# Patient Record
Sex: MALE | Employment: OTHER | URBAN - METROPOLITAN AREA
[De-identification: names, ages, dates, MRNs, and addresses within clinical notes are randomized per-mention and may not be internally consistent; named-entity substitution may affect disease eponyms.]

---

## 2017-02-03 DIAGNOSIS — G89.29 CHRONIC LEFT-SIDED LOW BACK PAIN WITH LEFT-SIDED SCIATICA: ICD-10-CM

## 2017-02-03 DIAGNOSIS — M54.42 CHRONIC LEFT-SIDED LOW BACK PAIN WITH LEFT-SIDED SCIATICA: ICD-10-CM

## 2017-02-03 RX ORDER — TIZANIDINE 4 MG/1
TABLET ORAL
Qty: 30 TAB | Refills: 0 | Status: SHIPPED | OUTPATIENT
Start: 2017-02-03 | End: 2017-03-28 | Stop reason: SDUPTHER

## 2017-02-03 RX ORDER — LIDOCAINE 50 MG/G
PATCH TOPICAL
Qty: 30 PATCH | Refills: 0 | Status: SHIPPED | OUTPATIENT
Start: 2017-02-03 | End: 2017-03-28 | Stop reason: SDUPTHER

## 2017-03-24 DIAGNOSIS — M54.42 CHRONIC LEFT-SIDED LOW BACK PAIN WITH LEFT-SIDED SCIATICA: ICD-10-CM

## 2017-03-24 DIAGNOSIS — G89.29 CHRONIC LEFT-SIDED LOW BACK PAIN WITH LEFT-SIDED SCIATICA: ICD-10-CM

## 2017-03-24 RX ORDER — TIZANIDINE 4 MG/1
TABLET ORAL
Qty: 30 TAB | Refills: 0 | OUTPATIENT
Start: 2017-03-24

## 2017-03-28 ENCOUNTER — OFFICE VISIT (OUTPATIENT)
Dept: FAMILY MEDICINE CLINIC | Age: 52
End: 2017-03-28

## 2017-03-28 VITALS
TEMPERATURE: 98.2 F | BODY MASS INDEX: 28.88 KG/M2 | DIASTOLIC BLOOD PRESSURE: 92 MMHG | SYSTOLIC BLOOD PRESSURE: 142 MMHG | RESPIRATION RATE: 16 BRPM | WEIGHT: 225 LBS | HEART RATE: 108 BPM | OXYGEN SATURATION: 96 % | HEIGHT: 74 IN

## 2017-03-28 DIAGNOSIS — F11.20 NARCOTIC ADDICTION (HCC): Primary | ICD-10-CM

## 2017-03-28 DIAGNOSIS — M54.42 CHRONIC LEFT-SIDED LOW BACK PAIN WITH LEFT-SIDED SCIATICA: ICD-10-CM

## 2017-03-28 DIAGNOSIS — G89.29 CHRONIC LEFT-SIDED LOW BACK PAIN WITH LEFT-SIDED SCIATICA: ICD-10-CM

## 2017-03-28 DIAGNOSIS — F10.20 UNCOMPLICATED ALCOHOL DEPENDENCE (HCC): ICD-10-CM

## 2017-03-28 RX ORDER — GABAPENTIN 300 MG/1
600 CAPSULE ORAL 3 TIMES DAILY
Qty: 540 CAP | Refills: 1 | Status: SHIPPED | OUTPATIENT
Start: 2017-03-28 | End: 2017-07-31 | Stop reason: SDUPTHER

## 2017-03-28 RX ORDER — LIDOCAINE 50 MG/G
PATCH TOPICAL
Qty: 90 PATCH | Refills: 0 | Status: SHIPPED | OUTPATIENT
Start: 2017-03-28 | End: 2018-02-01 | Stop reason: SDUPTHER

## 2017-03-28 RX ORDER — TIZANIDINE 4 MG/1
TABLET ORAL
Qty: 30 TAB | Refills: 0 | Status: SHIPPED | OUTPATIENT
Start: 2017-03-28 | End: 2017-05-16 | Stop reason: SDUPTHER

## 2017-03-28 NOTE — PROGRESS NOTES
Mack Umaña is a 49 y.o. male here today for medication refill. 1. Have you been to the ER, urgent care clinic since your last visit? Hospitalized since your last visit? Yes Reason for visit: 3/18/17 [facility], Alcohol dependency    2. Have you seen or consulted any other health care providers outside of the [address] [place] [address] since your last visit? Include any pap smears or colon screening.  No

## 2017-03-28 NOTE — PROGRESS NOTES
Assessment/Plan:    1. Chronic left-sided low back pain with left-sided sciatica  -refilled meds. - tiZANidine (ZANAFLEX) 4 mg tablet; TAKE 1 TABLET BY MOUTH EVERY DAY AS NEEDED  Dispense: 30 Tab; Refill: 0  - gabapentin (NEURONTIN) 300 mg capsule; Take 2 Caps by mouth three (3) times daily. Dispense: 540 Cap; Refill: 1  - lidocaine (LIDODERM) 5 %; APPLY 1 NEW PATCH TO AFFECTED AREA AND KEEP ON FOR 12 HOURS PER DAY THEN REMOVE AND KEEP OFF FOR 12 HOURS  Dispense: 90 Patch; Refill: 0    2. Narcotic addiction ([facility] and ETOH dependence  - pt to call with information on the inpatient rehab info. - REFERRAL TO BEHAVIORAL HEALTH      The plan was discussed with the patient. The patient verbalized understanding and is in agreement with the plan. All medication potential side effects were discussed with the patient. Health Maintenance:   Health Maintenance   Topic Date Due    DTaP/Tdap/Td series (1 - Tdap) 07/11/1987    FOBT Q 1 YEAR AGE 50-75  07/11/2016    Pneumococcal 19-64 Medium Risk  Completed    INFLUENZA AGE 9 TO ADULT  Completed       Titi Culp is a 49 y.o. male and presents with Follow Up Chronic Condition     Subjective:  Pt with chronic back pain. States he's addicted to alcohol and narcotics. He is requesting refills on lidoderm, zanaflex. He is accompanied by his . They are working to get him into inpatient substance abuse program.    ROS:  Constitutional: No recent weight change. No weakness/fatigue. No f/c. Cardiovascular: No CP/palpitations. No ALBA/orthopnea/PND. Respiratory: No cough/sputum, dyspnea, wheezing. Gastointestinal: No dysphagia, reflux. No n/v. No constipation/diarrhea. No melena/rectal bleeding. Neurological: No seizures/numbness/weakness. No paresthesias. The problem list was updated as a part of today's visit.   Patient Active Problem List   Diagnosis Code    ETOH abuse F10.10    Chronic back pain M54.9, G89.29    Tobacco dependence F17.200    HTN (hypertension) I10    HLD (hyperlipidemia) E78.5    Bipolar 1 disorder, depressed, severe ([address] F31.4    EtOH dependence ([address] F10.20    MDD (major depressive disorder), recurrent episode, severe ([address] F33.2    History of suicide attempt Z91.5    Depression F32.9       The PSH, FH were reviewed. SH:  Social History   Substance Use Topics    Smoking status: Current Every Day Smoker     Packs/day: 1.00     Years: 20.00     Types: Cigarettes    Smokeless tobacco: Never Used    Alcohol use 0.0 oz/week      Comment:  ETOH abuse       Medications/Allergies:  Current Outpatient Prescriptions on File Prior to Visit   Medication Sig Dispense Refill    multivitamin (ONE A DAY) tablet Take 1 Tab by mouth daily. 90 Tab 3    losartan-hydroCHLOROthiazide (HYZAAR) 100-25 mg per tablet TAKE 1 TABLET BY MOUTH DAILY FOR 30 DAYS FOR HYPERTENSION. Indications: Hypertension 90 Tab 1    carvedilol (COREG) 3.125 mg tablet TAKE 1 TABLET BY MOUTH TWICE A DAY. 60 Tab 5    aspirin delayed-release 81 mg tablet TAKE 1 TAB BY MOUTH DAILY. 90 Tab 3    QUEtiapine (SEROQUEL) 200 mg tablet Take 200 mg by mouth nightly as needed.  busPIRone (BUSPAR) 10 mg tablet Take 20 mg by mouth three (3) times daily.  DULoxetine (CYMBALTA) 60 mg capsule Take 60 mg by mouth daily. No current facility-administered medications on file prior to visit. Allergies   Allergen Reactions    Prednisone Other (comments)     He stated it hypes him up       Objective:  Visit Vitals    BP (!) 142/92    Pulse (!) 108    Temp 98.2 °F (36.8 °C)    Resp 16    Ht 6' 2\" (1.88 m)    Wt 225 lb (102.1 kg)    SpO2 96%    BMI 28.89 kg/m2      Constitutional: Well developed, nourished, no distress, alert   CV: S1, S2.  RRR. No murmurs/rubs. No thrills palpated. No carotid bruits. Intact distal pulses. No edema. Pulm: No abnormalities on inspection. Clear to auscultation bilaterally. No wheezing/rhonchi.   Normal effort. MS: Gait normal.  Joints without deformity/tenderness. Strength intact bilateral upper and lower ext. Normal ROM all extremities. Neuro: A/O x 3. No focal motor or sensory deficits. Speech normal.   Skin: seborrhea. Psych: Appropriate affect, judgement and insight. Short-term memory intact.

## 2017-03-28 NOTE — MR AVS SNAPSHOT
Visit Information Date & Time Provider Department Dept. Phone Encounter #  
 3/28/2017  2:45 PM Nia Mir MD 3 [facility] [phone] [id number] Upcoming Health Maintenance Date Due DTaP/Tdap/Td series (1 - Tdap) 7/11/1987 FOBT Q 1 YEAR AGE 50-75 7/11/2016 Allergies as of 3/28/2017  Review Complete On: 3/28/2017 By: Nia Mir MD  
  
 Severity Noted Reaction Type Reactions Prednisone  04/27/2014   Side Effect Other (comments) He stated it hypes him up Current Immunizations  Reviewed on 2/7/2014 Name Date Influenza Vaccine 9/1/2014, 11/15/2011 Novel Influenza-H1N1-09, All Formulations 10/26/2016 Pneumococcal Polysaccharide (PPSV-23) 11/30/2011, 11/15/2011 Not reviewed this visit You Were Diagnosed With   
  
 Codes Comments Narcotic addiction ([facility]    -  Primary ICD-10-CM: F11.20 ICD-9-CM: 304.90 Chronic left-sided low back pain with left-sided sciatica     ICD-10-CM: M54.42, G89.29 ICD-9-CM: 724.2, 724.3, 338.29 Uncomplicated alcohol dependence ([facility]     ICD-10-CM: F10.20 ICD-9-CM: 303.90 Vitals BP Pulse Temp Resp Height(growth percentile) Weight(growth percentile) (!) 142/92 (!) 108 98.2 °F (36.8 °C) 16 6' 2\" (1.88 m) 225 lb (102.1 kg) SpO2 BMI Smoking Status 96% 28.89 kg/m2 Current Every Day Smoker Vitals History BMI and BSA Data Body Mass Index Body Surface Area  
 28.89 kg/m 2 2.31 m 2 Preferred Pharmacy Pharmacy Name Phone  [facility], [address] [phone] Your Updated Medication List  
  
   
This list is accurate as of: 3/28/17  3:05 PM.  Always use your most recent med list.  
  
  
  
  
 aspirin delayed-release 81 mg tablet TAKE 1 TAB BY MOUTH DAILY. busPIRone 10 mg tablet Commonly known as:  BUSPAR Take 20 mg by mouth three (3) times daily. carvedilol 3.125 mg tablet Commonly known as:  COREG  
TAKE 1 TABLET BY MOUTH TWICE A DAY. CYMBALTA 60 mg capsule Generic drug:  DULoxetine Take 60 mg by mouth daily. gabapentin 300 mg capsule Commonly known as:  NEURONTIN Take 2 Caps by mouth three (3) times daily. lidocaine 5 % Commonly known as:  LIDODERM  
APPLY 1 NEW PATCH TO AFFECTED AREA AND KEEP ON FOR 12 HOURS PER DAY THEN REMOVE AND KEEP OFF FOR 12 HOURS  
  
 losartan-hydroCHLOROthiazide 100-25 mg per tablet Commonly known as:  HYZAAR  
TAKE 1 TABLET BY MOUTH DAILY FOR 30 DAYS FOR HYPERTENSION. Indications: Hypertension  
  
 multivitamin tablet Commonly known as:  ONE A DAY Take 1 Tab by mouth daily. SEROquel 200 mg tablet Generic drug:  QUEtiapine Take 200 mg by mouth nightly as needed. tiZANidine 4 mg tablet Commonly known as:  Elenore Piggs TAKE 1 TABLET BY MOUTH EVERY DAY AS NEEDED Prescriptions Sent to Pharmacy Refills  
 tiZANidine (ZANAFLEX) 4 mg tablet 0 Sig: TAKE 1 TABLET BY MOUTH EVERY DAY AS NEEDED Class: Normal  
 Pharmacy: [address] Ph #: [phone]  
 gabapentin (NEURONTIN) 300 mg capsule 1 Sig: Take 2 Caps by mouth three (3) times daily. Class: Normal  
 Pharmacy: [address] Ph #: [phone] Route: Oral  
 lidocaine (LIDODERM) 5 % 0 Sig: APPLY 1 NEW PATCH TO AFFECTED AREA AND KEEP ON FOR 12 HOURS PER DAY THEN REMOVE AND KEEP OFF FOR 12 HOURS Class: Normal  
 Pharmacy: [address] Ph #: [phone] We Performed the Following REFERRAL TO BEHAVIORAL HEALTH [REF15 Custom] Comments:  
 Please evaluate patient for inpatient rehab for narcotic and alcohol addiction. Pt to call with name Referral Information Referral ID Referred By Referred To  
  
 [id number] Estelle Ryan V Not Available Visits Status Start Date End Date 1 New Request 3/28/17 3/28/18 If your referral has a status of pending review or denied, additional information will be sent to support the outcome of this decision. Introducing [facility] SERVICES! Sugey Palafox introduces [employer] patient portal. Now you can access parts of your medical record, email your doctor's office, and request medication refills online. 1. In your internet browser, go to [email or web address] 2. Click on the First Time User? Click Here link in the Sign In box. You will see the New Member Sign Up page. 3. Enter your [employer] Access Code exactly as it appears below. You will not need to use this code after youve completed the sign-up process. If you do not sign up before the expiration date, you must request a new code. · [employer] Access Code: [email or web address] Expires: 6/26/2017  3:05 PM 
 
4. Enter the last four digits of your Social Security Number (xxxx) and Date of Birth (mm/dd/yyyy) as indicated and click Submit. You will be taken to the next sign-up page. 5. Create a [employer] ID. This will be your [employer] login ID and cannot be changed, so think of one that is secure and easy to remember. 6. Create a [employer] password. You can change your password at any time. 7. Enter your Password Reset Question and Answer. This can be used at a later time if you forget your password. 8. Enter your e-mail address. You will receive e-mail notification when new information is available in [address] 9. Click Sign Up. You can now view and download portions of your medical record. 10. Click the Download Summary menu link to download a portable copy of your medical information. If you have questions, please visit the Frequently Asked Questions section of the [employer] website. Remember, [employer] is NOT to be used for urgent needs. For medical emergencies, dial 911. Now available from your iPhone and Android! Please provide this summary of care documentation to your next provider. Your primary care clinician is listed as Oswald Colin. If you have any questions after today's visit, please call [phone].

## 2017-04-12 ENCOUNTER — PATIENT OUTREACH (OUTPATIENT)
Dept: FAMILY MEDICINE CLINIC | Age: 52
End: 2017-04-12

## 2017-04-12 NOTE — PROGRESS NOTES
.  Transitional Care Nurse Navigator Note:  Hospital Follow Up for [facility] Admission from 04/1 - 10/2017 for Alcohol Intoxication. RRAT score: 22 High  Medical History:     Past Medical History:   Diagnosis Date    Abuse     Anemia NEC     Anxiety     Arthritis     Chronic pain     Contact dermatitis and other eczema, due to unspecified cause     Depression     Depression     Headache(784.0)     Hypercholesterolemia     Hypertension     Liver disease     Low back pain     with left leg pain -- multilevel spondylosis and L4 radiculitis    Neck pain     mild spondylosis    Other ill-defined conditions(799.89)     MS symptoms    Pancreatitis     Psychotic disorder     Trauma        Patient presenting symptoms AMS  Diagnosed with   Metabolic encephalopathy    Alcohol intoxication    Narcotic overdose      Admitted to Hospitalist Service at [facility] to inpatient detox rehab stay [facility]. SW notes d/c orders and plan to transition to [facility].  Suzanne King manager with [facility]  [phone]) is en route to provide transportation.

## 2017-04-12 NOTE — Clinical Note
Patient was discharged to [facility] inpatient alcohol rehab detox facility on 4/10/2017 after a 9 day stay at [facility].

## 2017-05-01 ENCOUNTER — PATIENT OUTREACH (OUTPATIENT)
Dept: FAMILY MEDICINE CLINIC | Age: 52
End: 2017-05-01

## 2017-05-15 ENCOUNTER — PATIENT OUTREACH (OUTPATIENT)
Dept: FAMILY MEDICINE CLINIC | Age: 52
End: 2017-05-15

## 2017-05-16 DIAGNOSIS — M54.42 CHRONIC LEFT-SIDED LOW BACK PAIN WITH LEFT-SIDED SCIATICA: ICD-10-CM

## 2017-05-16 DIAGNOSIS — G89.29 CHRONIC LEFT-SIDED LOW BACK PAIN WITH LEFT-SIDED SCIATICA: ICD-10-CM

## 2017-05-17 RX ORDER — TIZANIDINE 4 MG/1
TABLET ORAL
Qty: 30 TAB | Refills: 0 | Status: SHIPPED | OUTPATIENT
Start: 2017-05-17 | End: 2017-08-21 | Stop reason: SDUPTHER

## 2017-06-06 ENCOUNTER — PATIENT OUTREACH (OUTPATIENT)
Dept: FAMILY MEDICINE CLINIC | Age: 52
End: 2017-06-06

## 2017-07-19 ENCOUNTER — TELEPHONE (OUTPATIENT)
Dept: FAMILY MEDICINE CLINIC | Age: 52
End: 2017-07-19

## 2017-07-19 NOTE — TELEPHONE ENCOUNTER
Patient was admitted to the hospital on 7/14/17 for blood clot at [facility]. Patient will be discharge tomorrow 7/20/17 and asking for a hospital follow up. There was nothing available next week. I also did see that the nurse navigator have been trying to get hold of him since June based on the note provided in [facility]. I verified to the patient if he happened to talk to the nurse navigator yet. Patient then stated that he never talked to anybody from the office that his cell phone is currently not working. Patient asking to be called back at [phone] room#[id number]. Please advise.

## 2017-07-20 ENCOUNTER — PATIENT OUTREACH (OUTPATIENT)
Dept: FAMILY MEDICINE CLINIC | Age: 52
End: 2017-07-20

## 2017-07-20 PROBLEM — J96.01 ACUTE HYPOXEMIC RESPIRATORY FAILURE (HCC): Status: ACTIVE | Noted: 2017-03-12

## 2017-07-20 PROBLEM — F10.931 DELIRIUM TREMENS (HCC): Status: ACTIVE | Noted: 2017-03-12

## 2017-07-20 PROBLEM — J69.0 ASPIRATION PNEUMONITIS (HCC): Status: ACTIVE | Noted: 2017-03-12

## 2017-07-20 PROBLEM — I26.99 PULMONARY EMBOLISM WITHOUT ACUTE COR PULMONALE (HCC): Status: ACTIVE | Noted: 2017-07-14

## 2017-07-20 PROBLEM — I26.99 PULMONARY EMBOLISM (HCC): Status: ACTIVE | Noted: 2017-07-14

## 2017-07-20 PROBLEM — F10.929 ALCOHOL INTOXICATION (HCC): Status: ACTIVE | Noted: 2017-03-12

## 2017-07-20 PROBLEM — F10.20 ALCOHOLISM (HCC): Status: ACTIVE | Noted: 2017-03-12

## 2017-07-20 RX ORDER — AMLODIPINE BESYLATE 5 MG/1
1 TABLET ORAL DAILY
COMMUNITY
Start: 2016-11-18 | End: 2017-08-21 | Stop reason: SDUPTHER

## 2017-07-20 NOTE — Clinical Note
Patient coming in for [facility] 7/31/2017 d/c from [facility] 7/20/2017 for PE. [place] a [place] at the [place]. Will be on Xarelto.  Did have some alcohol withdrawal symptoms prior to d/c

## 2017-07-20 NOTE — PROGRESS NOTES
Kwasi Vargas [facility] Follow Up for [facility] Admission from 7/14 - 20/2017 for PE. RRAT score: 25 High    Medical History:     Past Medical History:   Diagnosis Date    Abuse     Anemia NEC     Anxiety     Arthritis     Chronic pain     Contact dermatitis and other eczema, due to unspecified cause     Depression     Depression     Headache(784.0)     Hypercholesterolemia     Hypertension     Liver disease     Low back pain     with left leg pain -- multilevel spondylosis and L4 radiculitis    Neck pain     mild spondylosis    Other ill-defined conditions(799.89)     MS symptoms    Pancreatitis     Psychotic disorder     Trauma           This represents Transitions of Care b/c NN spoke with patient and/or caregiver within same business days of discharge. Pt's TCM follow up appt is scheduled with Dr. Paul Gtz on Monday 7/31/2017 @ 2 pm which is within 7 business days of discharge.  Called patient on 7/20/2017 and verified with 2 identifiers. Patient stated that he asked the nurse to have NN to call him at the hospital because his cell phone will be suspended until he paid it sometime today. Assured him that it was okay that I called him there. Discussed new medications Xarelto at present he is taking 15 mg twice a day. He has called his pharmacy to see if his medicaid card will pay for the medications but they said without the hard script they could not scan it for the cost. Then patient stated that he was calling down to the [facility] to see if they would fill it. Patient stated that he also was proactive in getting him a place to live ,[facility]. He feels since he has been sleeping in his car and back on the alcohol is what attributed to his blood clot and his feet and ankle swelling.   Isaura Willingham     Patient presenting symptoms Chest Pains          Course of current Hospitalization (referenced by Cyril Mcgowan MD   note):   Suzanne Hampton. is a 47 y.o. male with PMHx as noted below who comes in with complaint of leg edema and pain. Pt was checking in for alcohol detox and staff sent pt for eval of leg swelling and pain. On arrival pt also stated that he has been having CP x3 nights. He has hx of alcohol use, depression, bipolar, chronic back pain, HTN, TIA. States has not been much ambulatory over the last 2months, was sleeping in his car over the last 2 months. He went through alcohol detox program several times in the past, last drink of vodka was today. Over the last 2 weeks has leg edema and mild right calf pain, c/o mild shortness of breath and chest pain on the left side of the chest with radiation to the left arm, non-exertional, non worse with exertion, 8/10 on intensity. He has mild chronic cough, chronic smoker. He denies fever/chills, headache, dizziness, dysuria. States has RUQ abdominal pain that has been going on for 2 weeks, intermittent, associated with non-bloody diarrhea. He denies melena. Says had nausea and vomiting without blood yesterday. 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 Admitted to Hospitalist Service    Medication Reconciliation completed: YES     Barriers to care?   Housing Has a room at the [facility] [address] at present how long is undetermined  Substance abuse Alcohol,Vodka Smoking status: Current Every Day Smoker   Packs/day: 1.50   Alcohol use 128.1 oz/week   [address], 21 Glasses of wine per week   Comment: 1/5 vodka daily and 4/40 oz beers                                         

## 2017-07-24 ENCOUNTER — PATIENT OUTREACH (OUTPATIENT)
Dept: FAMILY MEDICINE CLINIC | Age: 52
End: 2017-07-24

## 2017-07-24 NOTE — PROGRESS NOTES
Patient Outreach made to patient. He states he is doing fine. No issues with the Xarelto. He will come in for his appointment on 7/31/17. He will contact office if he need any assistance.

## 2017-07-31 ENCOUNTER — OFFICE VISIT (OUTPATIENT)
Dept: FAMILY MEDICINE CLINIC | Age: 52
End: 2017-07-31

## 2017-07-31 ENCOUNTER — HOSPITAL ENCOUNTER (OUTPATIENT)
Dept: LAB | Age: 52
Discharge: HOME | End: 2017-07-31

## 2017-07-31 VITALS
OXYGEN SATURATION: 96 % | DIASTOLIC BLOOD PRESSURE: 98 MMHG | HEART RATE: 81 BPM | HEIGHT: 74 IN | TEMPERATURE: 97.9 F | WEIGHT: 242 LBS | SYSTOLIC BLOOD PRESSURE: 152 MMHG | BODY MASS INDEX: 31.06 KG/M2 | RESPIRATION RATE: 20 BRPM

## 2017-07-31 DIAGNOSIS — G89.29 CHRONIC LEFT-SIDED LOW BACK PAIN WITH LEFT-SIDED SCIATICA: ICD-10-CM

## 2017-07-31 DIAGNOSIS — I10 HTN (HYPERTENSION), BENIGN: ICD-10-CM

## 2017-07-31 DIAGNOSIS — I26.99 OTHER PULMONARY EMBOLISM WITHOUT ACUTE COR PULMONALE, UNSPECIFIED CHRONICITY (HCC): Primary | ICD-10-CM

## 2017-07-31 DIAGNOSIS — Z12.11 SCREEN FOR COLON CANCER: ICD-10-CM

## 2017-07-31 DIAGNOSIS — M54.42 CHRONIC LEFT-SIDED LOW BACK PAIN WITH LEFT-SIDED SCIATICA: ICD-10-CM

## 2017-07-31 DIAGNOSIS — R74.01 TRANSAMINITIS: ICD-10-CM

## 2017-07-31 DIAGNOSIS — F10.20 UNCOMPLICATED ALCOHOL DEPENDENCE (HCC): ICD-10-CM

## 2017-07-31 PROBLEM — F10.931 DELIRIUM TREMENS (HCC): Status: RESOLVED | Noted: 2017-03-12 | Resolved: 2017-07-31

## 2017-07-31 PROBLEM — J96.01 ACUTE HYPOXEMIC RESPIRATORY FAILURE (HCC): Status: RESOLVED | Noted: 2017-03-12 | Resolved: 2017-07-31

## 2017-07-31 PROBLEM — F10.929 ALCOHOL INTOXICATION (HCC): Status: RESOLVED | Noted: 2017-03-12 | Resolved: 2017-07-31

## 2017-07-31 PROCEDURE — 99001 SPECIMEN HANDLING PT-LAB: CPT | Performed by: INTERNAL MEDICINE

## 2017-07-31 RX ORDER — CHLORDIAZEPOXIDE HYDROCHLORIDE 5 MG/1
5 CAPSULE, GELATIN COATED ORAL
COMMUNITY
End: 2017-08-31 | Stop reason: ALTCHOICE

## 2017-07-31 RX ORDER — GABAPENTIN 300 MG/1
600 CAPSULE ORAL 3 TIMES DAILY
Qty: 540 CAP | Refills: 1 | Status: SHIPPED | OUTPATIENT
Start: 2017-07-31 | End: 2017-08-21 | Stop reason: SDUPTHER

## 2017-07-31 NOTE — PROGRESS NOTES
Cassy Hastings is a 47 y.o. male here today for hospital follow-up. 1. Have you been to the ER, urgent care clinic since your last visit? Hospitalized since your last visit? Yes Reason for visit: [facility] general, chest pain    2. Have you seen or consulted any other health care providers outside of the [address] [place] [address] since your last visit? Include any pap smears or colon screening.  No

## 2017-07-31 NOTE — MR AVS SNAPSHOT
Visit Information Date & Time Provider Department Dept. Phone Encounter #  
 7/31/2017  2:00 PM Tyson Ramos  [address] [id number]  Upcoming Health Maintenance Date Due DTaP/Tdap/Td series (1 - Tdap) 7/11/1987 Pneumococcal 19-64 Highest Risk (3 of 3 - PCV13) 11/30/2012 FOBT Q 1 YEAR AGE 50-75 7/11/2016 INFLUENZA AGE 9 TO ADULT 8/1/2017 Allergies as of 7/31/2017  Review Complete On: 7/31/2017 By: Den Townsend LPN Severity Noted Reaction Type Reactions Prednisone  04/27/2014   Side Effect Other (comments) He stated it hypes him up Current Immunizations  Reviewed on 2/7/2014 Name Date Influenza Vaccine 9/1/2014, 11/15/2011 Novel Influenza-H1N1-09, All Formulations 10/26/2016 Pneumococcal Polysaccharide (PPSV-23) 11/30/2011, 11/15/2011 Not reviewed this visit You Were Diagnosed With   
  
 Codes Comments Other pulmonary embolism without acute cor pulmonale, unspecified chronicity (HCC)    -  Primary ICD-10-CM: I26.99 
ICD-9-CM: 415.19 Transaminitis     ICD-10-CM: R74.0 ICD-9-CM: 790.4 HTN (hypertension), benign     ICD-10-CM: I10 
ICD-9-CM: 401.1 Lumbar radicular pain     ICD-10-CM: M54.16 
ICD-9-CM: 724.4 Uncomplicated alcohol dependence ([facility]     ICD-10-CM: F10.20 ICD-9-CM: 303.90 Chronic left-sided low back pain with left-sided sciatica     ICD-10-CM: M54.42, G89.29 ICD-9-CM: 724.2, 724.3, 338.29 Screen for colon cancer     ICD-10-CM: Z12.11 ICD-9-CM: V76.51 Vitals BP Pulse Temp Resp Height(growth percentile) Weight(growth percentile) (!) 152/98 81 97.9 °F (36.6 °C) 20 6' 2\" (1.88 m) 242 lb (109.8 kg) SpO2 BMI Smoking Status 96% 31.07 kg/m2 Current Every Day Smoker Vitals History BMI and BSA Data Body Mass Index Body Surface Area 31.07 kg/m 2 2.39 m 2 Preferred Pharmacy Pharmacy Name Phone [address], [address] [place] [address] [address] [phone] Your Updated Medication List  
  
   
This list is accurate as of: 7/31/17  2:08 PM.  Always use your most recent med list. amLODIPine 5 mg tablet Commonly known as:  Lamar Donaldo Take 1 Tab by mouth daily. aspirin delayed-release 81 mg tablet TAKE 1 TAB BY MOUTH DAILY. busPIRone 10 mg tablet Commonly known as:  BUSPAR Take 20 mg by mouth three (3) times daily. carvedilol 3.125 mg tablet Commonly known as:  COREG  
TAKE 1 TABLET BY MOUTH TWICE A DAY. chlordiazePOXIDE 5 mg capsule Commonly known as:  LIBRIUM Take 5 mg by mouth three (3) times daily as needed for Anxiety. CYMBALTA 60 mg capsule Generic drug:  DULoxetine Take 60 mg by mouth daily. gabapentin 300 mg capsule Commonly known as:  NEURONTIN Take 2 Caps by mouth three (3) times daily. lidocaine 5 % Commonly known as:  LIDODERM  
APPLY 1 NEW PATCH TO AFFECTED AREA AND KEEP ON FOR 12 HOURS PER DAY THEN REMOVE AND KEEP OFF FOR 12 HOURS  
  
 losartan-hydroCHLOROthiazide 100-25 mg per tablet Commonly known as:  HYZAAR  
TAKE 1 TABLET BY MOUTH DAILY FOR 30 DAYS FOR HYPERTENSION. Indications: Hypertension  
  
 multivitamin tablet Commonly known as:  ONE A DAY Take 1 Tab by mouth daily. rivaroxaban 20 mg Tab tablet Commonly known as:  Lourena Lawtey Take 1 Tab by mouth daily. SEROquel 200 mg tablet Generic drug:  QUEtiapine Take 200 mg by mouth nightly as needed. tiZANidine 4 mg tablet Commonly known as:  Eber Marcelo TAKE 1 TABLET BY MOUTH EVERY DAY AS NEEDED Prescriptions Sent to Pharmacy Refills  
 rivaroxaban (XARELTO) 20 mg tab tablet 0 Sig: Take 1 Tab by mouth daily. Class: Normal  
 Pharmacy: [address] #: [phone] Route: Oral  
 gabapentin (NEURONTIN) 300 mg capsule 1 Sig: Take 2 Caps by mouth three (3) times daily. Class: Normal  
 Pharmacy: [address] #: [phone] Route: Oral  
  
To-Do List   
 07/31/2017 Lab:  CBC WITH AUTOMATED DIFF   
  
 07/31/2017 Lab:  METABOLIC PANEL, COMPREHENSIVE   
  
 07/31/2017 Lab:  OCCULT BLOOD, IMMUNOASSAY (FIT) Introducing hospitals & HEALTH SERVICES! Grover Candy introduces [employer] patient portal. Now you can access parts of your medical record, email your doctor's office, and request medication refills online. 1. In your internet browser, go to [email or web address] 2. Click on the First Time User? Click Here link in the Sign In box. You will see the New Member Sign Up page. 3. Enter your [employer] Access Code exactly as it appears below. You will not need to use this code after youve completed the sign-up process. If you do not sign up before the expiration date, you must request a new code. · [employer] Access Code: [email or web address] Expires: 10/29/2017  1:27 PM 
 
4. Enter the last four digits of your Social Security Number (xxxx) and Date of Birth (mm/dd/yyyy) as indicated and click Submit. You will be taken to the next sign-up page. 5. Create a [employer] ID. This will be your [employer] login ID and cannot be changed, so think of one that is secure and easy to remember. 6. Create a [employer] password. You can change your password at any time. 7. Enter your Password Reset Question and Answer. This can be used at a later time if you forget your password. 8. Enter your e-mail address. You will receive e-mail notification when new information is available in [address] 9. Click Sign Up. You can now view and download portions of your medical record. 10. Click the Download Summary menu link to download a portable copy of your medical information.  
 
If you have questions, please visit the Frequently Asked Questions section of the [employer]. Remember, [employer] is NOT to be used for urgent needs. For medical emergencies, dial 911. Now available from your iPhone and Android! Please provide this summary of care documentation to your next provider. Your primary care clinician is listed as Oswald Colin. If you have any questions after today's visit, please call [phone].

## 2017-07-31 NOTE — PROGRESS NOTES
Assessment/Plan:    1. Other pulmonary embolism without acute cor pulmonale, unspecified chronicity ([facility]  -xarelto. Will need for anticoag for 3 mos (end date 10/14/17)  - rivaroxaban (XARELTO) 20 mg tab tablet; Take 1 Tab by mouth daily. Dispense: 90 Tab; Refill: 0  - CBC WITH AUTOMATED DIFF; Future  - CBC WITH AUTOMATED DIFF    2. Transaminitis  -from ETOH use and possible underlying fatty liver  - METABOLIC PANEL, COMPREHENSIVE; Future  - METABOLIC PANEL, COMPREHENSIVE    3. HTN (hypertension), benign  -cont current. F/u in 1mo. - CBC WITH AUTOMATED DIFF; Future  - CBC WITH AUTOMATED DIFF    5. Uncomplicated alcohol dependence ([facility]- discussed importance of abstaining from ETOH. Librium taper per hospital discharge. - chlordiazePOXIDE (LIBRIUM) 5 mg capsule; Take 5 mg by mouth three (3) times daily as needed for Anxiety. 6. Chronic left-sided low back pain with left-sided sciatica-refilled neurontin  - gabapentin (NEURONTIN) 300 mg capsule; Take 2 Caps by mouth three (3) times daily. Dispense: 540 Cap; Refill: 1    7. Screen for colon cancer- can't get colo for 3 mos. FIT for now. - OCCULT BLOOD, IMMUNOASSAY (FIT); Future    The plan was discussed with the patient. The patient verbalized understanding and is in agreement with the plan. All medication potential side effects were discussed with the patient. Health Maintenance:   Health Maintenance   Topic Date Due    DTaP/Tdap/Td series (1 - Tdap) 07/11/1987    Pneumococcal 19-64 Highest Risk (3 of 3 - PCV13) 11/30/2012    FOBT Q 1 YEAR AGE 50-75  07/11/2016    INFLUENZA AGE 9 TO ADULT  08/01/2017       Dany Duvall is a 47 y.o. male and presents with Hospital Follow Up     Subjective:  Pt recently found to have PE. He is on xarelto. No dyspnea or chest pain. This was in setting of prolonged immobility from ETOH intoxication. HTN - norvasc added at last visit. bp still slightly high. Pt is on neurontin for chronic low back pain. Has pain that radiates down L leg. Epidural injections have helped in the past. MRI showed mild bulging discs and mild foraminal stenosis. ETOH abuse - he attends AA daily. States he has quit. He is followed by psychiatry. ROS:  Constitutional: No recent weight change. No weakness/fatigue. No f/c. Eyes: No change in vision, double/blurred vision or eye pain/redness. Cardiovascular: No CP/palpitations. No ALBA/orthopnea/PND. Respiratory: No cough/sputum, dyspnea, wheezing. Gastointestinal: No dysphagia, reflux. No n/v. No constipation/diarrhea. No melena/rectal bleeding. Genitourinary: No dysuria, urinary hesitancy, nocturia, hematuria. No incontinence. Musculoskeletal: No joint pain/stiffness. No muscle pain/tenderness. Neurological: No seizures/numbness/weakness. No paresthesias. Psychiatric:  + depression, no anxiety. The problem list was updated as a part of today's visit. 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     SH:  Social History   Substance Use Topics    Smoking status: Current Every Day Smoker     Packs/day: 1.00     Years: 20.00     Types: Cigarettes    Smokeless tobacco: Never Used    Alcohol use 0.0 oz/week      Comment:  ETOH abuse       Medications/Allergies:  Current Outpatient Prescriptions on File Prior to Visit   Medication Sig Dispense Refill    amLODIPine (NORVASC) 5 mg tablet Take 1 Tab by mouth daily.  tiZANidine (ZANAFLEX) 4 mg tablet TAKE 1 TABLET BY MOUTH EVERY DAY AS NEEDED 30 Tab 0    gabapentin (NEURONTIN) 300 mg capsule Take 2 Caps by mouth three (3) times daily. 540 Cap 1    lidocaine (LIDODERM) 5 % APPLY 1 NEW PATCH TO AFFECTED AREA AND KEEP ON FOR 12 HOURS PER DAY THEN REMOVE AND KEEP OFF FOR 12 HOURS 90 Patch 0    multivitamin (ONE A DAY) tablet Take 1 Tab by mouth daily. 90 Tab 3    losartan-hydroCHLOROthiazide (HYZAAR) 100-25 mg per tablet TAKE 1 TABLET BY MOUTH DAILY FOR 30 DAYS FOR HYPERTENSION. Indications: Hypertension 90 Tab 1    carvedilol (COREG) 3.125 mg tablet TAKE 1 TABLET BY MOUTH TWICE A DAY. 60 Tab 5    aspirin delayed-release 81 mg tablet TAKE 1 TAB BY MOUTH DAILY. 90 Tab 3    QUEtiapine (SEROQUEL) 200 mg tablet Take 200 mg by mouth nightly as needed.  busPIRone (BUSPAR) 10 mg tablet Take 20 mg by mouth three (3) times daily.  DULoxetine (CYMBALTA) 60 mg capsule Take 60 mg by mouth daily. No current facility-administered medications on file prior to visit. Allergies   Allergen Reactions    Prednisone Other (comments)     He stated it hypes him up       Objective:  Visit Vitals    BP (!) 152/98    Pulse 81    Temp 97.9 °F (36.6 °C)    Resp 20    Ht 6' 2\" (1.88 m)    Wt 242 lb (109.8 kg)    SpO2 96%    BMI 31.07 kg/m2      Constitutional: Well developed, nourished, no distress, alert, obese habitus   CV: S1, S2.  RRR. No murmurs/rubs. No thrills palpated. No carotid bruits. Intact distal pulses. No edema. Pulm: No abnormalities on inspection. Clear to auscultation bilaterally. No wheezing/rhonchi. Normal effort. GI: Soft, nontender, nondistended. Normal active bowel sounds. MS: Gait normal.  Joints without deformity/tenderness. Strength intact bilateral upper and lower ext. Normal ROM all extremities. Neuro: A/O x 3. No focal motor or sensory deficits. Speech normal.   Psych: Appropriate affect, judgement and insight. Short-term memory intact. Labwork and Ancillary Studies:    CBC w/Diff  Lab Results   Component Value Date/Time    WBC 4.5 11/25/2016 09:25 AM    HGB 15.6 11/25/2016 09:25 AM    PLATELET 320 47/16/1049 09:25 AM         Basic Metabolic Profile/LFTs  Lab Results   Component Value Date/Time    Sodium 140 11/25/2016 09:25 AM    Potassium 4.1 11/25/2016 09:25 AM    Chloride 100 11/25/2016 09:25 AM    CO2 24 11/25/2016 09:25 AM    Anion gap 8 09/29/2015 10:24 PM    Glucose 97 11/25/2016 09:25 AM    BUN 11 11/25/2016 09:25 AM    Creatinine 0.86 11/25/2016 09:25 AM    BUN/Creatinine ratio 13 11/25/2016 09:25 AM    GFR est  11/25/2016 09:25 AM    GFR est non- 11/25/2016 09:25 AM    Calcium 9.3 11/25/2016 09:25 AM      Lab Results   Component Value Date/Time    ALT (SGPT) 26 11/25/2016 09:25 AM    AST (SGOT) 18 11/25/2016 09:25 AM    Alk. phosphatase 68 11/25/2016 09:25 AM    Bilirubin, direct <0.1 09/29/2015 10:24 PM    Bilirubin, total 0.2 11/25/2016 09:25 AM       Cholesterol  Lab Results   Component Value Date/Time    Cholesterol, total 159 11/25/2016 09:25 AM    HDL Cholesterol 46 11/25/2016 09:25 AM    LDL, calculated 81 11/25/2016 09:25 AM    Triglyceride 158 11/25/2016 09:25 AM    CHOL/HDL Ratio 4.0 04/22/2014 05:10 AM       MRI lumbar spine 9/2016: L1/L2: Patent canal and foramina. L2/L3: Patent canal and foramina. L3/L4: Mild disc bulge. Bilateral facet arthropathy. Mild spinal stenosis. Mild foraminal stenoses, left more than right. Transient exiting nerve contact but no overt impingement. L4/L5: Mild broadly based disc bulge.  More pronounced facet arthropathy. Mild spinal stenosis. There is some lateral recess stenosis right more than left. Transient distortion or crossing right L5 nerve. Unchanged. Moderate foraminal stenoses with transient distortion of perineural fat, left more than right. Reference sagittal image 5 on the left, and sagittal image 14 on the right. L5/S1: Transitional morphology. Patent canal and foramina.

## 2017-08-01 LAB
ALBUMIN SERPL-MCNC: 4.2 G/DL (ref 3.5–5.5)
ALBUMIN/GLOB SERPL: 1.8 {RATIO} (ref 1.2–2.2)
ALP SERPL-CCNC: 88 IU/L (ref 39–117)
ALT SERPL-CCNC: 32 IU/L (ref 0–44)
AST SERPL-CCNC: 26 IU/L (ref 0–40)
BASOPHILS # BLD AUTO: 0.1 X10E3/UL (ref 0–0.2)
BASOPHILS NFR BLD AUTO: 1 %
BILIRUB SERPL-MCNC: <0.2 MG/DL (ref 0–1.2)
BUN SERPL-MCNC: 8 MG/DL (ref 6–24)
BUN/CREAT SERPL: 8 (ref 9–20)
CALCIUM SERPL-MCNC: 9.5 MG/DL (ref 8.7–10.2)
CHLORIDE SERPL-SCNC: 99 MMOL/L (ref 96–106)
CO2 SERPL-SCNC: 20 MMOL/L (ref 18–29)
CREAT SERPL-MCNC: 1.04 MG/DL (ref 0.76–1.27)
EOSINOPHIL # BLD AUTO: 0.3 X10E3/UL (ref 0–0.4)
EOSINOPHIL NFR BLD AUTO: 3 %
ERYTHROCYTE [DISTWIDTH] IN BLOOD BY AUTOMATED COUNT: 16.1 % (ref 12.3–15.4)
GLOBULIN SER CALC-MCNC: 2.4 G/DL (ref 1.5–4.5)
GLUCOSE SERPL-MCNC: 95 MG/DL (ref 65–99)
HCT VFR BLD AUTO: 48 % (ref 37.5–51)
HGB BLD-MCNC: 15.8 G/DL (ref 12.6–17.7)
IMM GRANULOCYTES # BLD: 0 X10E3/UL (ref 0–0.1)
IMM GRANULOCYTES NFR BLD: 0 %
LYMPHOCYTES # BLD AUTO: 3 X10E3/UL (ref 0.7–3.1)
LYMPHOCYTES NFR BLD AUTO: 32 %
MCH RBC QN AUTO: 30 PG (ref 26.6–33)
MCHC RBC AUTO-ENTMCNC: 32.9 G/DL (ref 31.5–35.7)
MCV RBC AUTO: 91 FL (ref 79–97)
MONOCYTES # BLD AUTO: 0.6 X10E3/UL (ref 0.1–0.9)
MONOCYTES NFR BLD AUTO: 7 %
NEUTROPHILS # BLD AUTO: 5.3 X10E3/UL (ref 1.4–7)
NEUTROPHILS NFR BLD AUTO: 57 %
PLATELET # BLD AUTO: 296 X10E3/UL (ref 150–379)
POTASSIUM SERPL-SCNC: 4 MMOL/L (ref 3.5–5.2)
PROT SERPL-MCNC: 6.6 G/DL (ref 6–8.5)
RBC # BLD AUTO: 5.27 X10E6/UL (ref 4.14–5.8)
SODIUM SERPL-SCNC: 139 MMOL/L (ref 134–144)
WBC # BLD AUTO: 9.3 X10E3/UL (ref 3.4–10.8)

## 2017-08-08 ENCOUNTER — PATIENT OUTREACH (OUTPATIENT)
Dept: FAMILY MEDICINE CLINIC | Age: 52
End: 2017-08-08

## 2017-08-21 ENCOUNTER — PATIENT OUTREACH (OUTPATIENT)
Dept: FAMILY MEDICINE CLINIC | Age: 52
End: 2017-08-21

## 2017-08-21 DIAGNOSIS — M54.42 CHRONIC LEFT-SIDED LOW BACK PAIN WITH LEFT-SIDED SCIATICA: ICD-10-CM

## 2017-08-21 DIAGNOSIS — I10 ESSENTIAL HYPERTENSION: ICD-10-CM

## 2017-08-21 DIAGNOSIS — G89.29 CHRONIC LEFT-SIDED LOW BACK PAIN WITH LEFT-SIDED SCIATICA: ICD-10-CM

## 2017-08-21 PROBLEM — J69.0 ASPIRATION PNEUMONITIS (HCC): Status: RESOLVED | Noted: 2017-03-12 | Resolved: 2017-08-21

## 2017-08-21 RX ORDER — LOSARTAN POTASSIUM AND HYDROCHLOROTHIAZIDE 25; 100 MG/1; MG/1
TABLET ORAL
Qty: 90 TAB | Refills: 0 | Status: SHIPPED | OUTPATIENT
Start: 2017-08-21 | End: 2018-01-17

## 2017-08-21 RX ORDER — AMLODIPINE BESYLATE 5 MG/1
5 TABLET ORAL DAILY
Qty: 90 TAB | Refills: 0 | Status: SHIPPED | OUTPATIENT
Start: 2017-08-21 | End: 2017-08-31 | Stop reason: ALTCHOICE

## 2017-08-21 RX ORDER — TIZANIDINE 4 MG/1
TABLET ORAL
Qty: 30 TAB | Refills: 0 | Status: SHIPPED | OUTPATIENT
Start: 2017-08-21 | End: 2017-09-21 | Stop reason: SDUPTHER

## 2017-08-21 RX ORDER — GABAPENTIN 300 MG/1
600 CAPSULE ORAL 3 TIMES DAILY
Qty: 540 CAP | Refills: 1 | Status: SHIPPED | OUTPATIENT
Start: 2017-08-21 | End: 2018-02-27 | Stop reason: SDUPTHER

## 2017-08-21 RX ORDER — CARVEDILOL 3.12 MG/1
TABLET ORAL
Qty: 180 TAB | Refills: 0 | Status: SHIPPED | OUTPATIENT
Start: 2017-08-21 | End: 2017-11-19 | Stop reason: SDUPTHER

## 2017-08-21 NOTE — PROGRESS NOTES
Patient Outreach made to patient. He states he is doing fine. He just needs medication refills. Refill request sent to [address]. This episode is resolved.

## 2017-08-31 ENCOUNTER — OFFICE VISIT (OUTPATIENT)
Dept: FAMILY MEDICINE CLINIC | Age: 52
End: 2017-08-31

## 2017-08-31 VITALS
WEIGHT: 242 LBS | OXYGEN SATURATION: 96 % | HEART RATE: 82 BPM | TEMPERATURE: 98.3 F | DIASTOLIC BLOOD PRESSURE: 89 MMHG | SYSTOLIC BLOOD PRESSURE: 120 MMHG | HEIGHT: 74 IN | BODY MASS INDEX: 31.06 KG/M2 | RESPIRATION RATE: 20 BRPM

## 2017-08-31 DIAGNOSIS — I25.10 CORONARY ARTERY DISEASE INVOLVING NATIVE CORONARY ARTERY OF NATIVE HEART WITHOUT ANGINA PECTORIS: ICD-10-CM

## 2017-08-31 DIAGNOSIS — E87.6 DIURETIC-INDUCED HYPOKALEMIA: ICD-10-CM

## 2017-08-31 DIAGNOSIS — I21.4 NSTEMI (NON-ST ELEVATED MYOCARDIAL INFARCTION) (HCC): Primary | ICD-10-CM

## 2017-08-31 DIAGNOSIS — Z12.11 SCREEN FOR COLON CANCER: ICD-10-CM

## 2017-08-31 DIAGNOSIS — T50.2X5A DIURETIC-INDUCED HYPOKALEMIA: ICD-10-CM

## 2017-08-31 RX ORDER — SIMVASTATIN 20 MG/1
TABLET, FILM COATED ORAL
COMMUNITY
End: 2017-10-12 | Stop reason: SDUPTHER

## 2017-08-31 RX ORDER — NITROGLYCERIN 0.4 MG/1
0.4 TABLET SUBLINGUAL
Qty: 30 TAB | Refills: 0 | Status: SHIPPED | OUTPATIENT
Start: 2017-08-31 | End: 2018-08-11 | Stop reason: SDUPTHER

## 2017-08-31 RX ORDER — NITROGLYCERIN 400 UG/1
1 SPRAY ORAL
COMMUNITY
End: 2017-08-31 | Stop reason: ALTCHOICE

## 2017-08-31 RX ORDER — LANOLIN ALCOHOL/MO/W.PET/CERES
CREAM (GRAM) TOPICAL DAILY
COMMUNITY
End: 2018-01-17

## 2017-08-31 RX ORDER — POTASSIUM CHLORIDE 20 MEQ/1
20 TABLET, EXTENDED RELEASE ORAL 2 TIMES DAILY
Qty: 90 TAB | Refills: 0 | Status: SHIPPED | OUTPATIENT
Start: 2017-08-31 | End: 2017-10-27 | Stop reason: SDUPTHER

## 2017-08-31 RX ORDER — IBUPROFEN 200 MG
1 TABLET ORAL EVERY 24 HOURS
COMMUNITY
End: 2017-08-31 | Stop reason: ALTCHOICE

## 2017-08-31 NOTE — MR AVS SNAPSHOT
Visit Information Date & Time Provider Department Dept. Phone Encounter #  
 8/31/2017  1:30 PM Can Casas, [facility] [phone] [id number] Upcoming Health Maintenance Date Due DTaP/Tdap/Td series (1 - Tdap) 7/11/1987 FOBT Q 1 YEAR AGE 50-75 7/11/2016 INFLUENZA AGE 9 TO ADULT 8/1/2017 Allergies as of 8/31/2017  Review Complete On: 8/31/2017 By: Can Casas MD  
  
 Severity Noted Reaction Type Reactions Prednisone  04/27/2014   Side Effect Other (comments) He stated it hypes him up Current Immunizations  Reviewed on 2/7/2014 Name Date Influenza Vaccine 9/1/2014, 11/15/2011 Novel Influenza-H1N1-09, All Formulations 10/26/2016 Pneumococcal Polysaccharide (PPSV-23) 11/30/2011, 11/15/2011 Not reviewed this visit You Were Diagnosed With   
  
 Codes Comments NSTEMI (non-ST elevated myocardial infarction) ([facility]    -  Primary ICD-10-CM: I21.4 ICD-9-CM: 410.70 Diuretic-induced hypokalemia     ICD-10-CM: E87.6, T50.2X5A 
ICD-9-CM: 276.8, E944.4 Coronary artery disease involving native coronary artery of native heart without angina pectoris     ICD-10-CM: I25.10 ICD-9-CM: 414.01 Screen for colon cancer     ICD-10-CM: Z12.11 ICD-9-CM: V76.51 Vitals BP Pulse Temp Resp Height(growth percentile) Weight(growth percentile) 120/89 (BP 1 Location: Left arm, BP Patient Position: Sitting) 82 98.3 °F (36.8 °C) (Oral) 20 6' 2\" (1.88 m) 242 lb (109.8 kg) SpO2 BMI Smoking Status 96% 31.07 kg/m2 Current Every Day Smoker Vitals History BMI and BSA Data Body Mass Index Body Surface Area 31.07 kg/m 2 2.39 m 2 Preferred Pharmacy Pharmacy Name Phone [facility], [address] [facility] [address] [phone] Your Updated Medication List  
  
   
This list is accurate as of: 8/31/17  1:47 PM.  Always use your most recent med list.  
  
  
  
  
 aspirin delayed-release 81 mg tablet TAKE 1 TAB BY MOUTH DAILY. busPIRone 10 mg tablet Commonly known as:  BUSPAR Take 20 mg by mouth three (3) times daily. carvedilol 3.125 mg tablet Commonly known as:  COREG  
TAKE 1 TABLET BY MOUTH TWICE A DAY. CYMBALTA 60 mg capsule Generic drug:  DULoxetine Take 60 mg by mouth daily. gabapentin 300 mg capsule Commonly known as:  NEURONTIN Take 2 Caps by mouth three (3) times daily. lidocaine 5 % Commonly known as:  LIDODERM  
APPLY 1 NEW PATCH TO AFFECTED AREA AND KEEP ON FOR 12 HOURS PER DAY THEN REMOVE AND KEEP OFF FOR 12 HOURS  
  
 losartan-hydroCHLOROthiazide 100-25 mg per tablet Commonly known as:  HYZAAR  
TAKE 1 TABLET BY MOUTH DAILY FOR 30 DAYS FOR HYPERTENSION. Indications: hypertension  
  
 multivitamin tablet Commonly known as:  ONE A DAY Take 1 Tab by mouth daily. nitroglycerin 0.4 mg SL tablet Commonly known as:  NITROSTAT  
1 Tab by SubLINGual route every five (5) minutes as needed for Chest Pain (not to exceed 3 doses). potassium chloride 20 mEq tablet Commonly known as:  K-DUR, KLOR-CON Take 1 Tab by mouth two (2) times a day. Indications: hypokalemia  
  
 rivaroxaban 20 mg Tab tablet Commonly known as:  Payam Silvaer Take 1 Tab by mouth daily. SEROquel 200 mg tablet Generic drug:  QUEtiapine Take 200 mg by mouth nightly as needed. simvastatin 20 mg tablet Commonly known as:  ZOCOR Take  by mouth nightly. thiamine 100 mg tablet Commonly known as:  B-1 Take  by mouth daily. tiZANidine 4 mg tablet Commonly known as:  Miguelito Jack TAKE 1 TABLET BY MOUTH EVERY DAY AS NEEDED Prescriptions Sent to Pharmacy Refills  
 nitroglycerin (NITROSTAT) 0.4 mg SL tablet 0 Si Tab by SubLINGual route every five (5) minutes as needed for Chest Pain (not to exceed 3 doses).   
 Class: Normal  
 Pharmacy: [address] #: [phone] Route: SubLINGual  
 potassium chloride (K-DUR, KLOR-CON) 20 mEq tablet 0 Sig: Take 1 Tab by mouth two (2) times a day. Indications: hypokalemia Class: Normal  
 Pharmacy: [address] #: [phone] Route: Oral  
  
To-Do List   
 09/30/2017 Lab:  OCCULT BLOOD, IMMUNOASSAY (FIT) Introducing [facility] SERVICES! Dany Cary introduces Immedia patient portal. Now you can access parts of your medical record, email your doctor's office, and request medication refills online. 1. In your internet browser, go to [email or web address] 2. Click on the First Time User? Click Here link in the Sign In box. You will see the New Member Sign Up page. 3. Enter your Immedia Access Code exactly as it appears below. You will not need to use this code after youve completed the sign-up process. If you do not sign up before the expiration date, you must request a new code. · Immedia Access Code: [email or web address] Expires: 10/29/2017  1:27 PM 
 
4. Enter the last four digits of your Social Security Number (xxxx) and Date of Birth (mm/dd/yyyy) as indicated and click Submit. You will be taken to the next sign-up page. 5. Create a Immedia ID. This will be your Immedia login ID and cannot be changed, so think of one that is secure and easy to remember. 6. Create a Immedia password. You can change your password at any time. 7. Enter your Password Reset Question and Answer. This can be used at a later time if you forget your password. 8. Enter your e-mail address. You will receive e-mail notification when new information is available in [address] 9. Click Sign Up. You can now view and download portions of your medical record. 10. Click the Download Summary menu link to download a portable copy of your medical information. If you have questions, please visit the Frequently Asked Questions section of the [employer] website. Remember, VeryLastRoom is NOT to be used for urgent needs. For medical emergencies, dial 911. Now available from your iPhone and Android! Please provide this summary of care documentation to your next provider. Your primary care clinician is listed as Oswald Colin. If you have any questions after today's visit, please call [phone].

## 2017-08-31 NOTE — PROGRESS NOTES
1. Have you been to the ER, urgent care clinic since your last visit? Hospitalized since your last visit? Yes When: 8/28/17 Davy Hanks    2. Have you seen or consulted any other health care providers outside of the [address] [place] [address] since your last visit? Include any pap smears or colon screening.  No

## 2017-08-31 NOTE — PROGRESS NOTES
Assessment/Plan:    1. NSTEMI (non-ST elevated myocardial infarction) (HCC)/Coronary artery disease involving native coronary artery of native heart without angina pectoris  -on ASA. EF good. Has appt with cards. - nitroglycerin (NITROSTAT) 0.4 mg SL tablet; 1 Tab by SubLINGual route every five (5) minutes as needed for Chest Pain (not to exceed 3 doses). Dispense: 30 Tab; Refill: 0    2. Diuretic-induced hypokalemia  - potassium chloride (K-DUR, KLOR-CON) 20 mEq tablet; Take 1 Tab by mouth two (2) times a day. Indications: hypokalemia  Dispense: 90 Tab; Refill: 0    3. Screen for colon cancer  - OCCULT BLOOD, IMMUNOASSAY (FIT); Future    The plan was discussed with the patient. The patient verbalized understanding and is in agreement with the plan. All medication potential side effects were discussed with the patient. Health Maintenance:   Health Maintenance   Topic Date Due    DTaP/Tdap/Td series (1 - Tdap) 07/11/1987    FOBT Q 1 YEAR AGE 50-75  07/11/2016    INFLUENZA AGE 9 TO ADULT  08/01/2017    Pneumococcal 19-64 Medium Risk  Completed       Luis Elder is a 47 y.o. male and presents with Transitions Of Care; Hypertension; and Depression     Subjective:  He recently was hospitalized for NSTEMI (discharged yesterday). Cath showed: possible occlusion of small distal Cx, OM 50%, RCA 50%- medical management recommended. He was started on statin. Not having any more chest pain. But does have a HA. Has appt with cards 9/21. Echo looked good. Labs showed low potassium. On hctz. I reviewed the labs as noted. ROS:  Constitutional: No recent weight change. No weakness/fatigue. No f/c. HENT: + HA, no dizziness. No hearing loss/tinnitus. No nasal congestion/discharge. Eyes: No change in vision, double/blurred vision or eye pain/redness. Cardiovascular: No CP/palpitations. No ALBA/orthopnea/PND. Respiratory: No cough/sputum, dyspnea, wheezing.    Gastointestinal: No dysphagia, reflux. No n/v. No constipation/diarrhea. No melena/rectal bleeding. The problem list was updated as a part of today's visit. 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 SH:  Social History   Substance Use Topics    Smoking status: Current Every Day Smoker     Packs/day: 1.00     Years: 20.00     Types: Cigarettes    Smokeless tobacco: Never Used    Alcohol use 0.0 oz/week      Comment:  ETOH abuse       Medications/Allergies:  Current Outpatient Prescriptions on File Prior to Visit   Medication Sig Dispense Refill    carvedilol (COREG) 3.125 mg tablet TAKE 1 TABLET BY MOUTH TWICE A DAY. 180 Tab 0    losartan-hydroCHLOROthiazide (HYZAAR) 100-25 mg per tablet TAKE 1 TABLET BY MOUTH DAILY FOR 30 DAYS FOR HYPERTENSION. Indications: hypertension 90 Tab 0    tiZANidine (ZANAFLEX) 4 mg tablet TAKE 1 TABLET BY MOUTH EVERY DAY AS NEEDED 30 Tab 0    gabapentin (NEURONTIN) 300 mg capsule Take 2 Caps by mouth three (3) times daily. 540 Cap 1    rivaroxaban (XARELTO) 20 mg tab tablet Take 1 Tab by mouth daily.  90 Tab 0    lidocaine (LIDODERM) 5 % APPLY 1 NEW PATCH TO AFFECTED AREA AND KEEP ON FOR 12 HOURS PER DAY THEN REMOVE AND KEEP OFF FOR 12 HOURS 90 Patch 0    multivitamin (ONE A DAY) tablet Take 1 Tab by mouth daily. 90 Tab 3    aspirin delayed-release 81 mg tablet TAKE 1 TAB BY MOUTH DAILY. 90 Tab 3    QUEtiapine (SEROQUEL) 200 mg tablet Take 200 mg by mouth nightly as needed.  busPIRone (BUSPAR) 10 mg tablet Take 20 mg by mouth three (3) times daily.  DULoxetine (CYMBALTA) 60 mg capsule Take 60 mg by mouth daily. No current facility-administered medications on file prior to visit. Allergies   Allergen Reactions    Prednisone Other (comments)     He stated it hypes him up       Objective:  Visit Vitals    /89 (BP 1 Location: Left arm, BP Patient Position: Sitting)    Pulse 82    Temp 98.3 °F (36.8 °C) (Oral)    Resp 20    Ht 6' 2\" (1.88 m)    Wt 242 lb (109.8 kg)    SpO2 96%    BMI 31.07 kg/m2      Constitutional: Well developed, nourished, no distress, alert, obese habitus   CV: S1, S2.  RRR. No murmurs/rubs. No thrills palpated. No carotid bruits. Intact distal pulses. No edema. Pulm: No abnormalities on inspection. Clear to auscultation bilaterally. No wheezing/rhonchi. Normal effort. Neuro: A/O x 3. No focal motor or sensory deficits. Speech normal.   Skin: Cath site on R wrist looks good. Psych: Appropriate affect, judgement and insight. Short-term memory intact.        Labwork and Ancillary Studies:    CBC w/Diff  Lab Results   Component Value Date/Time    WBC 9.3 07/31/2017 02:17 PM    HGB 15.8 07/31/2017 02:17 PM    PLATELET 341 84/58/3293 02:17 PM         Basic Metabolic Profile/LFTs  Lab Results   Component Value Date/Time    Sodium 139 07/31/2017 02:17 PM    Potassium 4.0 07/31/2017 02:17 PM    Chloride 99 07/31/2017 02:17 PM    CO2 20 07/31/2017 02:17 PM    Anion gap 8 09/29/2015 10:24 PM    Glucose 95 07/31/2017 02:17 PM    BUN 8 07/31/2017 02:17 PM    Creatinine 1.04 07/31/2017 02:17 PM    BUN/Creatinine ratio 8 07/31/2017 02:17 PM    GFR est AA 96 07/31/2017 02:17 PM    GFR est non-AA 83 07/31/2017 02:17 PM    Calcium 9.5 07/31/2017 02:17 PM      Lab Results   Component Value Date/Time    ALT (SGPT) 32 07/31/2017 02:17 PM    AST (SGOT) 26 07/31/2017 02:17 PM    Alk.  phosphatase 88 07/31/2017 02:17 PM    Bilirubin, direct <0.1 09/29/2015 10:24 PM    Bilirubin, total <0.2 07/31/2017 02:17 PM       Cholesterol  Lab Results   Component Value Date/Time    Cholesterol, total 159 11/25/2016 09:25 AM    HDL Cholesterol 46 11/25/2016 09:25 AM    LDL, calculated 81 11/25/2016 09:25 AM    Triglyceride 158 11/25/2016 09:25 AM    CHOL/HDL Ratio 4.0 04/22/2014 05:10 AM

## 2017-09-16 LAB — HEMOCCULT STL QL IA: NEGATIVE

## 2017-09-22 PROBLEM — I25.2 HISTORY OF NON-ST ELEVATION MYOCARDIAL INFARCTION (NSTEMI): Status: ACTIVE | Noted: 2017-09-22

## 2017-09-22 PROBLEM — Z86.711 HISTORY OF PULMONARY EMBOLISM: Status: ACTIVE | Noted: 2017-09-22

## 2017-09-22 PROBLEM — I21.4 NSTEMI (NON-ST ELEVATED MYOCARDIAL INFARCTION) (HCC): Status: RESOLVED | Noted: 2017-08-31 | Resolved: 2017-09-22

## 2017-09-22 PROBLEM — I26.99 PULMONARY EMBOLISM WITHOUT ACUTE COR PULMONALE (HCC): Status: RESOLVED | Noted: 2017-07-14 | Resolved: 2017-09-22

## 2017-09-22 PROBLEM — Z86.73 HISTORY OF CVA (CEREBROVASCULAR ACCIDENT): Status: ACTIVE | Noted: 2017-09-22

## 2017-09-30 DIAGNOSIS — Z12.11 SCREEN FOR COLON CANCER: ICD-10-CM

## 2017-10-12 RX ORDER — SIMVASTATIN 20 MG/1
TABLET, FILM COATED ORAL
Qty: 30 TAB | Refills: 0 | Status: SHIPPED | OUTPATIENT
Start: 2017-10-12 | End: 2017-11-10 | Stop reason: SDUPTHER

## 2017-10-17 RX ORDER — BISMUTH SUBSALICYLATE 262 MG
1 TABLET,CHEWABLE ORAL DAILY
Qty: 90 TAB | Refills: 3 | Status: SHIPPED | OUTPATIENT
Start: 2017-10-17 | End: 2018-09-24 | Stop reason: SDUPTHER

## 2017-10-17 RX ORDER — BISMUTH SUBSALICYLATE 262 MG
1 TABLET,CHEWABLE ORAL DAILY
COMMUNITY
End: 2017-10-17 | Stop reason: SDUPTHER

## 2017-10-27 DIAGNOSIS — T50.2X5A DIURETIC-INDUCED HYPOKALEMIA: ICD-10-CM

## 2017-10-27 DIAGNOSIS — E87.6 DIURETIC-INDUCED HYPOKALEMIA: ICD-10-CM

## 2017-10-30 ENCOUNTER — OFFICE VISIT (OUTPATIENT)
Dept: FAMILY MEDICINE CLINIC | Age: 52
End: 2017-10-30

## 2017-10-30 VITALS
TEMPERATURE: 97.7 F | HEART RATE: 80 BPM | SYSTOLIC BLOOD PRESSURE: 98 MMHG | BODY MASS INDEX: 29.52 KG/M2 | DIASTOLIC BLOOD PRESSURE: 60 MMHG | RESPIRATION RATE: 19 BRPM | OXYGEN SATURATION: 96 % | WEIGHT: 230 LBS | HEIGHT: 74 IN

## 2017-10-30 DIAGNOSIS — I10 ESSENTIAL HYPERTENSION: Primary | ICD-10-CM

## 2017-10-30 DIAGNOSIS — I25.10 CORONARY ARTERY DISEASE INVOLVING NATIVE CORONARY ARTERY OF NATIVE HEART WITHOUT ANGINA PECTORIS: ICD-10-CM

## 2017-10-30 DIAGNOSIS — Z72.0 TOBACCO USE: ICD-10-CM

## 2017-10-30 RX ORDER — POTASSIUM CHLORIDE 20 MEQ/1
TABLET, EXTENDED RELEASE ORAL
Qty: 90 TAB | Refills: 0 | Status: SHIPPED | OUTPATIENT
Start: 2017-10-30 | End: 2018-01-08 | Stop reason: SDUPTHER

## 2017-10-30 NOTE — MR AVS SNAPSHOT
Visit Information Date & Time Provider Department Dept. Phone Encounter #  
 10/30/2017  3:45 PM Lord Carroll, 3 [facility] [phone] [id number] Upcoming Health Maintenance Date Due DTaP/Tdap/Td series (1 - Tdap) 7/11/1987 FOBT Q 1 YEAR AGE 50-75 9/13/2018 Allergies as of 10/30/2017  Review Complete On: 10/30/2017 By: Lord Carly MD  
  
 Severity Noted Reaction Type Reactions Prednisone  04/27/2014   Side Effect Other (comments) He stated it hypes him up Current Immunizations  Reviewed on 9/7/2017 Name Date Influenza Vaccine 8/31/2017, 9/1/2014, 11/15/2011 Novel Influenza-H1N1-09, All Formulations 10/26/2016 Pneumococcal Polysaccharide (PPSV-23) 11/30/2011, 11/15/2011 Not reviewed this visit You Were Diagnosed With   
  
 Codes Comments Essential hypertension    -  Primary ICD-10-CM: I10 
ICD-9-CM: 401.9 Coronary artery disease involving native coronary artery of native heart without angina pectoris     ICD-10-CM: I25.10 ICD-9-CM: 414.01 Tobacco use     ICD-10-CM: Z72.0 ICD-9-CM: 305.1 Vitals BP Pulse Temp Resp Height(growth percentile) Weight(growth percentile) 98/60 (BP 1 Location: Right arm, BP Patient Position: Sitting) 80 97.7 °F (36.5 °C) (Oral) 19 6' 2\" (1.88 m) 230 lb (104.3 kg) SpO2 BMI Smoking Status 96% 29.53 kg/m2 Current Every Day Smoker Vitals History BMI and BSA Data Body Mass Index Body Surface Area  
 29.53 kg/m 2 2.33 m 2 Preferred Pharmacy Pharmacy Name Phone [facility], [address] [facility] [phone] Your Updated Medication List  
  
   
This list is accurate as of: 10/30/17  4:03 PM.  Always use your most recent med list.  
  
  
  
  
 aspirin delayed-release 81 mg tablet TAKE 1 TAB BY MOUTH DAILY. carvedilol 3.125 mg tablet Commonly known as:  Ethan Gut  
 TAKE 1 TABLET BY MOUTH TWICE A DAY. CYMBALTA 60 mg capsule Generic drug:  DULoxetine Take 60 mg by mouth daily. gabapentin 300 mg capsule Commonly known as:  NEURONTIN Take 2 Caps by mouth three (3) times daily. lidocaine 5 % Commonly known as:  LIDODERM  
APPLY 1 NEW PATCH TO AFFECTED AREA AND KEEP ON FOR 12 HOURS PER DAY THEN REMOVE AND KEEP OFF FOR 12 HOURS  
  
 losartan-hydroCHLOROthiazide 100-25 mg per tablet Commonly known as:  HYZAAR  
TAKE 1 TABLET BY MOUTH DAILY FOR 30 DAYS FOR HYPERTENSION. Indications: hypertension  
  
 multivitamin tablet Commonly known as:  Namon Nova Take 1 Tab by mouth daily. nitroglycerin 0.4 mg SL tablet Commonly known as:  NITROSTAT  
1 Tab by SubLINGual route every five (5) minutes as needed for Chest Pain (not to exceed 3 doses). potassium chloride 20 mEq tablet Commonly known as:  K-DUR, KLOR-CON  
TAKE 1 TABLET TWICE A DAY  
  
 rivaroxaban 20 mg Tab tablet Commonly known as:  Cornelio Feldman Take 1 Tab by mouth daily. SEROquel 200 mg tablet Generic drug:  QUEtiapine Take 200 mg by mouth nightly as needed. simvastatin 20 mg tablet Commonly known as:  ZOCOR  
TAKE 1 TABLET BY MOUTH EVERY NIGHT AT BEDTIME FOR 30 DAYS  
  
 thiamine 100 mg tablet Commonly known as:  B-1 Take  by mouth daily. tiZANidine 4 mg tablet Commonly known as:  Anne Connelly TAKE 1 TABLET BY MOUTH EVERY DAY AS NEEDED Patient Instructions Stopping Smoking: Care Instructions Your Care Instructions Cigarette smokers crave the nicotine in cigarettes. Giving it up is much harder than simply changing a habit. Your body has to stop craving the nicotine. It is hard to quit, but you can do it. There are many tools that people use to quit smoking. You may find that combining tools works best for you. There are several steps to quitting. First you get ready to quit.  Then you get support to help you. After that, you learn new skills and behaviors to become a nonsmoker. For many people, a necessary step is getting and using medicine. Your doctor will help you set up the plan that best meets your needs. You may want to attend a smoking cessation program to help you quit smoking. When you choose a program, look for one that has proven success. Ask your doctor for ideas. You will greatly increase your chances of success if you take medicine as well as get counseling or join a cessation program. 
Some of the changes you feel when you first quit tobacco are uncomfortable. Your body will miss the nicotine at first, and you may feel short-tempered and grumpy. You may have trouble sleeping or concentrating. Medicine can help you deal with these symptoms. You may struggle with changing your smoking habits and rituals. The last step is the tricky one: Be prepared for the smoking urge to continue for a time. This is a lot to deal with, but keep at it. You will feel better. Follow-up care is a key part of your treatment and safety. Be sure to make and go to all appointments, and call your doctor if you are having problems. It's also a good idea to know your test results and keep a list of the medicines you take. How can you care for yourself at home? · Ask your family, friends, and coworkers for support. You have a better chance of quitting if you have help and support. · Join a support group, such as Nicotine Anonymous, for people who are trying to quit smoking. · Consider signing up for a smoking cessation program, such as the American Lung Association's Freedom from Smoking program. 
· Set a quit date. Pick your date carefully so that it is not right in the middle of a big deadline or stressful time. Once you quit, do not even take a puff. Get rid of all ashtrays and lighters after your last cigarette. Clean your house and your clothes so that they do not smell of smoke. · Learn how to be a nonsmoker. Think about ways you can avoid those things that make you reach for a cigarette. ¨ Avoid situations that put you at greatest risk for smoking. For some people, it is hard to have a drink with friends without smoking. For others, they might skip a coffee break with coworkers who smoke. ¨ Change your daily routine. Take a different route to work or eat a meal in a different place. · Cut down on stress. Calm yourself or release tension by doing an activity you enjoy, such as reading a book, taking a hot bath, or gardening. · Talk to your doctor or pharmacist about nicotine replacement therapy, which replaces the nicotine in your body. You still get nicotine but you do not use tobacco. Nicotine replacement products help you slowly reduce the amount of nicotine you need. These products come in several forms, many of them available over-the-counter: ¨ Nicotine patches ¨ Nicotine gum and lozenges ¨ Nicotine inhaler · Ask your doctor about bupropion (Wellbutrin) or varenicline (Chantix), which are prescription medicines. They do not contain nicotine. They help you by reducing withdrawal symptoms, such as stress and anxiety. · Some people find hypnosis, acupuncture, and massage helpful for ending the smoking habit. · Eat a healthy diet and get regular exercise. Having healthy habits will help your body move past its craving for nicotine. · Be prepared to keep trying. Most people are not successful the first few times they try to quit. Do not get mad at yourself if you smoke again. Make a list of things you learned and think about when you want to try again, such as next week, next month, or next year. Where can you learn more? Go to http://zayda-eden.info/. Enter T838 in the search box to learn more about \"Stopping Smoking: Care Instructions. \" Current as of: March 20, 2017 Content Version: 11.4 © 0764-1752 [employer]wise, [employer]. Care instructions adapted under license by [employer] (which disclaims liability or warranty for this information). If you have questions about a medical condition or this instruction, always ask your healthcare professional. [address] any warranty or liability for your use of this information. Introducing \[facility]\"" & HEALTH SERVICES! Manoj Subramanian introduces [employer] patient portal. Now you can access parts of your medical record, email your doctor's office, and request medication refills online. 1. In your internet browser, go to [email or web address] 2. Click on the First Time User? Click Here link in the Sign In box. You will see the New Member Sign Up page. 3. Enter your [employer] Access Code exactly as it appears below. You will not need to use this code after youve completed the sign-up process. If you do not sign up before the expiration date, you must request a new code. · [employer] Access Code: [email or web address] Expires: 1/28/2018  4:03 PM 
 
4. Enter the last four digits of your Social Security Number (xxxx) and Date of Birth (mm/dd/yyyy) as indicated and click Submit. You will be taken to the next sign-up page. 5. Create a [employer] ID. This will be your [employer] login ID and cannot be changed, so think of one that is secure and easy to remember. 6. Create a [employer] password. You can change your password at any time. 7. Enter your Password Reset Question and Answer. This can be used at a later time if you forget your password. 8. Enter your e-mail address. You will receive e-mail notification when new information is available in [address] 9. Click Sign Up. You can now view and download portions of your medical record. 10. Click the Download Summary menu link to download a portable copy of your medical information.  
 
If you have questions, please visit the Frequently Asked Questions section of the [employer]wheel. Remember, [employer] is NOT to be used for urgent needs. For medical emergencies, dial 911. Now available from your iPhone and Android! Please provide this summary of care documentation to your next provider. Your primary care clinician is listed as Oswald Colin. If you have any questions after today's visit, please call [phone].

## 2017-10-30 NOTE — PROGRESS NOTES
Assessment/Plan:    1. Essential hypertension  -stop norvasc. Cont losartan -hctz. If still having lows, will drop hctz component. 2. Coronary artery disease involving native coronary artery of native heart without angina pectoris  -ck lipids    3. Tobacco use  Has cut back. The plan was discussed with the patient. The patient verbalized understanding and is in agreement with the plan. All medication potential side effects were discussed with the patient. Health Maintenance:   Health Maintenance   Topic Date Due    DTaP/Tdap/Td series (1 - Tdap) 07/11/1987    FOBT Q 1 YEAR AGE 50-75  09/13/2018    Pneumococcal 19-64 Medium Risk  Completed    INFLUENZA AGE 9 TO ADULT  Completed       Darone Lance is a 47 y.o. male and presents with Hypertension and Coronary Artery Disease     Subjective:  HTN in setting of CAD - bp running low. Having dizziness. Is only taking 1/2 tab hyzaar b/c of dizzines. States his bp is running low when it's checked. He is taking norvasc 5mg though. ROS:  Constitutional: No recent weight change. No weakness/fatigue. No f/c. Cardiovascular: No CP/palpitations. No ALBA/orthopnea/PND. Respiratory: No cough/sputum, dyspnea, wheezing. Gastointestinal: No dysphagia, reflux. No n/v. No constipation/diarrhea. No melena/rectal bleeding. Neurological: No seizures/numbness/weakness. No paresthesias. The problem list was updated as a part of today's visit.   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 SH:  Social History   Substance Use Topics    Smoking status: Current Every Day Smoker     Packs/day: 1.00     Years: 20.00     Types: Cigarettes    Smokeless tobacco: Never Used    Alcohol use 0.0 oz/week      Comment:  ETOH abuse       Medications/Allergies:  Current Outpatient Prescriptions on File Prior to Visit   Medication Sig Dispense Refill    potassium chloride (K-DUR, KLOR-CON) 20 mEq tablet TAKE 1 TABLET TWICE A DAY 90 Tab 0    multivitamin (DAILY-CAYLA) tablet Take 1 Tab by mouth daily. 90 Tab 3    simvastatin (ZOCOR) 20 mg tablet TAKE 1 TABLET BY MOUTH EVERY NIGHT AT BEDTIME FOR 30 DAYS 30 Tab 0    tiZANidine (ZANAFLEX) 4 mg tablet TAKE 1 TABLET BY MOUTH EVERY DAY AS NEEDED 30 Tab 1    thiamine (B-1) 100 mg tablet Take  by mouth daily.  nitroglycerin (NITROSTAT) 0.4 mg SL tablet 1 Tab by SubLINGual route every five (5) minutes as needed for Chest Pain (not to exceed 3 doses). 30 Tab 0    carvedilol (COREG) 3.125 mg tablet TAKE 1 TABLET BY MOUTH TWICE A DAY. 180 Tab 0    losartan-hydroCHLOROthiazide (HYZAAR) 100-25 mg per tablet TAKE 1 TABLET BY MOUTH DAILY FOR 30 DAYS FOR HYPERTENSION. Indications: hypertension 90 Tab 0    gabapentin (NEURONTIN) 300 mg capsule Take 2 Caps by mouth three (3) times daily. 540 Cap 1    rivaroxaban (XARELTO) 20 mg tab tablet Take 1 Tab by mouth daily. 90 Tab 0    lidocaine (LIDODERM) 5 % APPLY 1 NEW PATCH TO AFFECTED AREA AND KEEP ON FOR 12 HOURS PER DAY THEN REMOVE AND KEEP OFF FOR 12 HOURS 90 Patch 0    aspirin delayed-release 81 mg tablet TAKE 1 TAB BY MOUTH DAILY. 90 Tab 3    QUEtiapine (SEROQUEL) 200 mg tablet Take 200 mg by mouth nightly as needed.  DULoxetine (CYMBALTA) 60 mg capsule Take 60 mg by mouth daily. No current facility-administered medications on file prior to visit. pt erroneously taking norvasc 5mg as well     Allergies   Allergen Reactions    Prednisone Other (comments)     He stated it hypes him up       Objective:  Visit Vitals    BP 98/60 (BP 1 Location: Right arm, BP Patient Position: Sitting)    Pulse 80    Temp 97.7 °F (36.5 °C) (Oral)    Resp 19    Ht 6' 2\" (1.88 m)    Wt 230 lb (104.3 kg)    SpO2 96%    BMI 29.53 kg/m2      Constitutional: Well developed, nourished, no distress, alert, obese habitus   CV: S1, S2.  RRR. No murmurs/rubs. No thrills palpated. No carotid bruits. Intact distal pulses. No edema. Pulm: No abnormalities on inspection. Clear to auscultation bilaterally. No wheezing/rhonchi. Normal effort. GI: Soft, nontender, nondistended. Normal active bowel sounds.

## 2017-10-30 NOTE — PATIENT INSTRUCTIONS

## 2017-10-30 NOTE — PROGRESS NOTES
Chioma Tello is a 47 y.o. male (: 1965) presenting to address:    Chief Complaint   Patient presents with    Hypertension    Coronary Artery Disease       Vitals:    10/30/17 1549   BP: 98/60   Pulse: 80   Resp: 19   Temp: 97.7 °F (36.5 °C)   TempSrc: Oral   SpO2: 96%   Weight: 230 lb (104.3 kg)   Height: 6' 2\" (1.88 m)   PainSc:   3   PainLoc: Back       Learning Assessment:     Learning Assessment 2014   PRIMARY LEARNER Patient   HIGHEST LEVEL OF EDUCATION - PRIMARY LEARNER  -   BARRIERS PRIMARY LEARNER -   CO-LEARNER CAREGIVER -   PRIMARY LANGUAGE ENGLISH   LEARNER PREFERENCE PRIMARY LISTENING   ANSWERED BY patient   RELATIONSHIP SELF     Depression Screening:     PHQ over the last two weeks 10/30/2017   PHQ Not Done Active Diagnosis of Depression or Bipolar Disorder     Fall Risk Assessment:     Fall Risk Assessment, last 12 mths 10/30/2017   Able to walk? Yes   Fall in past 12 months? No     Abuse Screening:     Abuse Screening Questionnaire 2017   Do you ever feel afraid of your partner? N   Are you in a relationship with someone who physically or mentally threatens you? N   Is it safe for you to go home? Y     Coordination of Care Questionaire:   1. Have you been to the ER, urgent care clinic since your last visit? Hospitalized since your last visit? NO    2. Have you seen or consulted any other health care providers outside of the [address] [place] [address] since your last visit? Include any pap smears or colon screening. YES. Cardiology    Advanced Directive:   1. Do you have an Advanced Directive? NO    2. Would you like information on Advanced Directives?  YES

## 2017-11-05 DIAGNOSIS — I26.99 OTHER PULMONARY EMBOLISM WITHOUT ACUTE COR PULMONALE, UNSPECIFIED CHRONICITY (HCC): ICD-10-CM

## 2017-11-06 RX ORDER — RIVAROXABAN 20 MG/1
TABLET, FILM COATED ORAL
Qty: 90 TAB | Refills: 0 | Status: SHIPPED | OUTPATIENT
Start: 2017-11-06 | End: 2018-02-01 | Stop reason: ALTCHOICE

## 2017-11-13 ENCOUNTER — TELEPHONE (OUTPATIENT)
Dept: FAMILY MEDICINE CLINIC | Age: 52
End: 2017-11-13

## 2017-11-19 DIAGNOSIS — I10 ESSENTIAL HYPERTENSION: ICD-10-CM

## 2017-11-20 ENCOUNTER — TELEPHONE (OUTPATIENT)
Dept: FAMILY MEDICINE CLINIC | Age: 52
End: 2017-11-20

## 2017-11-20 DIAGNOSIS — M54.42 CHRONIC LEFT-SIDED LOW BACK PAIN WITH LEFT-SIDED SCIATICA: ICD-10-CM

## 2017-11-20 DIAGNOSIS — G89.29 CHRONIC LEFT-SIDED LOW BACK PAIN WITH LEFT-SIDED SCIATICA: ICD-10-CM

## 2017-11-20 RX ORDER — CARVEDILOL 3.12 MG/1
TABLET ORAL
Qty: 180 TAB | Refills: 0 | Status: SHIPPED | OUTPATIENT
Start: 2017-11-20 | End: 2018-02-23 | Stop reason: SDUPTHER

## 2017-11-20 NOTE — TELEPHONE ENCOUNTER
Pt called, left msg that he is still having BP lows and requests that his BP med be changed and sent to pharmacy on file.

## 2017-11-21 RX ORDER — TIZANIDINE 4 MG/1
TABLET ORAL
Qty: 30 TAB | Refills: 1 | Status: SHIPPED | OUTPATIENT
Start: 2017-11-21 | End: 2018-01-13 | Stop reason: SDUPTHER

## 2018-01-08 DIAGNOSIS — T50.2X5A DIURETIC-INDUCED HYPOKALEMIA: ICD-10-CM

## 2018-01-08 DIAGNOSIS — E87.6 DIURETIC-INDUCED HYPOKALEMIA: ICD-10-CM

## 2018-01-09 RX ORDER — POTASSIUM CHLORIDE 1500 MG/1
TABLET, EXTENDED RELEASE ORAL
Qty: 90 TAB | Refills: 0 | Status: SHIPPED | OUTPATIENT
Start: 2018-01-09 | End: 2018-02-20 | Stop reason: SDUPTHER

## 2018-01-13 DIAGNOSIS — M54.42 CHRONIC LEFT-SIDED LOW BACK PAIN WITH LEFT-SIDED SCIATICA: ICD-10-CM

## 2018-01-13 DIAGNOSIS — G89.29 CHRONIC LEFT-SIDED LOW BACK PAIN WITH LEFT-SIDED SCIATICA: ICD-10-CM

## 2018-01-15 RX ORDER — TIZANIDINE 4 MG/1
TABLET ORAL
Qty: 30 TAB | Refills: 1 | Status: SHIPPED | OUTPATIENT
Start: 2018-01-15 | End: 2018-03-24 | Stop reason: SDUPTHER

## 2018-01-17 ENCOUNTER — OFFICE VISIT (OUTPATIENT)
Dept: FAMILY MEDICINE CLINIC | Age: 53
End: 2018-01-17

## 2018-01-17 VITALS
TEMPERATURE: 98.5 F | RESPIRATION RATE: 18 BRPM | HEIGHT: 74 IN | DIASTOLIC BLOOD PRESSURE: 84 MMHG | OXYGEN SATURATION: 98 % | WEIGHT: 224.6 LBS | SYSTOLIC BLOOD PRESSURE: 122 MMHG | HEART RATE: 71 BPM | BODY MASS INDEX: 28.83 KG/M2

## 2018-01-17 DIAGNOSIS — H61.23 BILATERAL IMPACTED CERUMEN: Primary | ICD-10-CM

## 2018-01-17 DIAGNOSIS — L30.9 ECZEMA, UNSPECIFIED TYPE: ICD-10-CM

## 2018-01-17 RX ORDER — TRIAMCINOLONE ACETONIDE 1 MG/G
CREAM TOPICAL 2 TIMES DAILY
Qty: 15 G | Refills: 1 | Status: SHIPPED | OUTPATIENT
Start: 2018-01-17 | End: 2018-03-30 | Stop reason: ALTCHOICE

## 2018-01-17 NOTE — PATIENT INSTRUCTIONS
Earwax Blockage: Care Instructions  Your Care Instructions    Earwax is a natural substance that protects the ear canal. Normally, earwax drains from the ears and does not cause problems. Sometimes earwax builds up and hardens. Earwax blockage (also called cerumen impaction) can cause some loss of hearing and pain. When wax is tightly packed, you will need to have your doctor remove it. Follow-up care is a key part of your treatment and safety. Be sure to make and go to all appointments, and call your doctor if you are having problems. It's also a good idea to know your test results and keep a list of the medicines you take. How can you care for yourself at home? · Do not try to remove earwax with cotton swabs, fingers, or other objects. This can make the blockage worse and damage the eardrum. · If your doctor recommends that you try to remove earwax at home:  ¨ Soften and loosen the earwax with warm mineral oil. You also can try hydrogen peroxide mixed with an equal amount of room temperature water. Place 2 drops of the fluid, warmed to body temperature, in the ear two times a day for up to 5 days. ¨ Once the wax is loose and soft, all that is usually needed to remove it from the ear canal is a gentle, warm shower. Direct the water into the ear, then tip your head to let the earwax drain out. Dry your ear thoroughly with a hair dryer set on low. Hold the dryer several inches from your ear. ¨ If the warm mineral oil and shower do not work, use an over-the-counter wax softener followed by gentle flushing with an ear syringe each night for a week or two. Make sure the flushing solution is body temperature. Cool or hot fluids in the ear can cause dizziness. When should you call for help? Call your doctor now or seek immediate medical care if:  ? · Pus or blood drains from your ear. ? · Your ears are ringing or feel full. ? · You have a loss of hearing. ? Watch closely for changes in your health, and be sure to contact your doctor if:  ? · You have pain or reduced hearing after 1 week of home treatment. ? · You have any new symptoms, such as nausea or balance problems. Where can you learn more? Go to http://zayda-eden.info/. Enter C200 in the search box to learn more about \"Earwax Blockage: Care Instructions. \"  Current as of: March 20, 2017  Content Version: 11.4  © 7742-7163 [employer]. Care instructions adapted under license by [employer] (which disclaims liability or warranty for this information). If you have questions about a medical condition or this instruction, always ask your healthcare professional. [place] [address] any warranty or liability for your use of this information.

## 2018-01-17 NOTE — PROGRESS NOTES
Chrystal Starks is a 47 y.o. male here for ear pressure      Chrystal Starks is a 47 y.o. male (: 1965) presenting to address:    Chief Complaint   Patient presents with    Ear Fullness     pt states his  L ear is clogged, pressure for a few days       Vitals:    18 1423   BP: 122/84   Pulse: 71   Resp: 18   SpO2: 98%   Weight: 224 lb 9.6 oz (101.9 kg)   Height: 6' 2\" (1.88 m)   PainSc:   0 - No pain       Hearing/Vision:   No exam data present    Learning Assessment:     Learning Assessment 2014   PRIMARY LEARNER Patient   HIGHEST LEVEL OF EDUCATION - PRIMARY LEARNER  -   BARRIERS PRIMARY LEARNER -   CO-LEARNER CAREGIVER -   PRIMARY LANGUAGE ENGLISH   LEARNER PREFERENCE PRIMARY LISTENING   ANSWERED BY patient   RELATIONSHIP SELF     Depression Screening:     PHQ over the last two weeks 10/30/2017   PHQ Not Done Active Diagnosis of Depression or Bipolar Disorder     Fall Risk Assessment:     Fall Risk Assessment, last 12 mths 10/30/2017   Able to walk? Yes   Fall in past 12 months? No     Abuse Screening:     Abuse Screening Questionnaire 2017   Do you ever feel afraid of your partner? N   Are you in a relationship with someone who physically or mentally threatens you? N   Is it safe for you to go home? Y     Coordination of Care Questionaire:   1. Have you been to the ER, urgent care clinic since your last visit? Hospitalized since your last visit? NO    2. Have you seen or consulted any other health care providers outside of the [address] [place] [address] since your last visit? Include any pap smears or colon screening. NO    Advanced Directive:   1. Do you have an Advanced Directive? NO    2. Would you like information on Advanced Directives?  NO

## 2018-01-17 NOTE — MR AVS SNAPSHOT
Lavaun Jeans 
 
 
 [address] [facility] [address] 
[phone] Patient: Dina Alvarez MRN: [id number] :1965 Visit Information Date & Time Provider Department Dept. Phone Encounter #  
 2018  2:15 PM Reese Strauss [employer] [phone] [id number] Your Appointments 2018  3:15 PM  
Follow Up with Radha Nowak MD  
[employer] Glenny Coombs) Appt Note: 3 month f/u  
 [address] [facility] [address]  
[phone]  
  
   
 [address] Upcoming Health Maintenance Date Due DTaP/Tdap/Td series (1 - Tdap) 1987 FOBT Q 1 YEAR AGE 50-75 2018 Allergies as of 2018  Review Complete On: 2018 By: Reese Strauss MD  
  
 Severity Noted Reaction Type Reactions Prednisone  2014   Side Effect Other (comments) He stated it hypes him up Current Immunizations  Reviewed on 2017 Name Date Influenza Vaccine 2017, 2014, 11/15/2011 Novel Influenza-H1N1-09, All Formulations 10/26/2016 Pneumococcal Polysaccharide (PPSV-23) 2011, 11/15/2011 Not reviewed this visit You Were Diagnosed With   
  
 Codes Comments Bilateral impacted cerumen    -  Primary ICD-10-CM: F30.59 
ICD-9-CM: 380.4 Eczema, unspecified type     ICD-10-CM: L30.9 ICD-9-CM: 692.9 Vitals BP Pulse Temp Resp Height(growth percentile) Weight(growth percentile) 122/84 (BP 1 Location: Left arm, BP Patient Position: Sitting) 71 98.5 °F (36.9 °C) (Oral) 18 6' 2\" (1.88 m) 224 lb 9.6 oz (101.9 kg) SpO2 BMI Smoking Status 98% 28.84 kg/m2 Current Every Day Smoker Vitals History BMI and BSA Data Body Mass Index Body Surface Area  
 28.84 kg/m 2 2.31 m 2 Preferred Pharmacy Pharmacy Name Phone  [facility], [address] [place] [address] Ethan Duong [phone] Your Updated Medication List  
  
   
This list is accurate as of: 1/17/18  2:56 PM.  Always use your most recent med list.  
  
  
  
  
 aspirin delayed-release 81 mg tablet TAKE 1 TAB BY MOUTH DAILY. carvedilol 3.125 mg tablet Commonly known as:  COREG  
TAKE 1 TABLET BY MOUTH TWICE A DAY. CYMBALTA 60 mg capsule Generic drug:  DULoxetine Take 60 mg by mouth daily. gabapentin 300 mg capsule Commonly known as:  NEURONTIN Take 2 Caps by mouth three (3) times daily. KLOR-CON M20 20 mEq tablet Generic drug:  potassium chloride TAKE 1 TABLET TWICE A DAY  
  
 lidocaine 5 % Commonly known as:  LIDODERM  
APPLY 1 NEW PATCH TO AFFECTED AREA AND KEEP ON FOR 12 HOURS PER DAY THEN REMOVE AND KEEP OFF FOR 12 HOURS  
  
 multivitamin tablet Commonly known as:  Burnard Garcia Take 1 Tab by mouth daily. nitroglycerin 0.4 mg SL tablet Commonly known as:  NITROSTAT  
1 Tab by SubLINGual route every five (5) minutes as needed for Chest Pain (not to exceed 3 doses). SEROquel 200 mg tablet Generic drug:  QUEtiapine Take 200 mg by mouth nightly as needed. simvastatin 20 mg tablet Commonly known as:  ZOCOR  
TAKE 1 TABLET BY MOUTH EVERY NIGHT AT BEDTIME FOR 30 DAYS  
  
 tiZANidine 4 mg tablet Commonly known as:  Sonny Paganini TAKE 1 TABLET BY MOUTH EVERY DAY AS NEEDED  
  
 triamcinolone acetonide 0.1 % topical cream  
Commonly known as:  KENALOG Apply  to affected area two (2) times a day. Apply sparingly to affected areas twice daily, do not use for more than 14 days consecutively without a break XARELTO 20 mg Tab tablet Generic drug:  rivaroxaban TAKE 1 TABLET BY MOUTH DAILY. Prescriptions Printed Refills  
 triamcinolone acetonide (KENALOG) 0.1 % topical cream 1 Sig: Apply  to affected area two (2) times a day.  Apply sparingly to affected areas twice daily, do not use for more than 14 days consecutively without a break Class: Print Route: Topical  
  
Patient Instructions Earwax Blockage: Care Instructions Your Care Instructions Earwax is a natural substance that protects the ear canal. Normally, earwax drains from the ears and does not cause problems. Sometimes earwax builds up and hardens. Earwax blockage (also called cerumen impaction) can cause some loss of hearing and pain. When wax is tightly packed, you will need to have your doctor remove it. Follow-up care is a key part of your treatment and safety. Be sure to make and go to all appointments, and call your doctor if you are having problems. It's also a good idea to know your test results and keep a list of the medicines you take. How can you care for yourself at home? · Do not try to remove earwax with cotton swabs, fingers, or other objects. This can make the blockage worse and damage the eardrum. · If your doctor recommends that you try to remove earwax at home: ¨ Soften and loosen the earwax with warm mineral oil. You also can try hydrogen peroxide mixed with an equal amount of room temperature water. Place 2 drops of the fluid, warmed to body temperature, in the ear two times a day for up to 5 days. ¨ Once the wax is loose and soft, all that is usually needed to remove it from the ear canal is a gentle, warm shower. Direct the water into the ear, then tip your head to let the earwax drain out. Dry your ear thoroughly with a hair dryer set on low. Hold the dryer several inches from your ear. ¨ If the warm mineral oil and shower do not work, use an over-the-counter wax softener followed by gentle flushing with an ear syringe each night for a week or two. Make sure the flushing solution is body temperature. Cool or hot fluids in the ear can cause dizziness. When should you call for help? Call your doctor now or seek immediate medical care if: ? · Pus or blood drains from your ear. ? · Your ears are ringing or feel full. ? · You have a loss of hearing. ? Watch closely for changes in your health, and be sure to contact your doctor if: 
? · You have pain or reduced hearing after 1 week of home treatment. ? · You have any new symptoms, such as nausea or balance problems. Where can you learn more? Go to http://zayda-eden.info/. Enter P044 in the search box to learn more about \"Earwax Blockage: Care Instructions. \" Current as of: March 20, 2017 Content Version: 11.4 © 0444-9823 [employer]. Care instructions adapted under license by [employer] (which disclaims liability or warranty for this information). If you have questions about a medical condition or this instruction, always ask your healthcare professional. [address] any warranty or liability for your use of this information. Introducing [facility] & HEALTH SERVICES! [employer] introduces [employer] patient portal. Now you can access parts of your medical record, email your doctor's office, and request medication refills online. 1. In your internet browser, go to [email or web address] 2. Click on the First Time User? Click Here link in the Sign In box. You will see the New Member Sign Up page. 3. Enter your [employer] Access Code exactly as it appears below. You will not need to use this code after youve completed the sign-up process. If you do not sign up before the expiration date, you must request a new code. · [employer] Access Code: [email or web address] Expires: 1/28/2018  3:03 PM 
 
4. Enter the last four digits of your Social Security Number (xxxx) and Date of Birth (mm/dd/yyyy) as indicated and click Submit. You will be taken to the next sign-up page. 5. Create a [employer] ID. This will be your [employer] login ID and cannot be changed, so think of one that is secure and easy to remember. 6. Create a [employer] password. You can change your password at any time. 7. Enter your Password Reset Question and Answer. This can be used at a later time if you forget your password. 8. Enter your e-mail address. You will receive e-mail notification when new information is available in [address] 9. Click Sign Up. You can now view and download portions of your medical record. 10. Click the Download Summary menu link to download a portable copy of your medical information. If you have questions, please visit the Frequently Asked Questions section of the [employer] website. Remember, [employer] is NOT to be used for urgent needs. For medical emergencies, dial 911. Now available from your iPhone and Android! Please provide this summary of care documentation to your next provider. Your primary care clinician is listed as Oswald 13. If you have any questions after today's visit, please call [phone].

## 2018-01-17 NOTE — PROGRESS NOTES
HISTORY OF PRESENT ILLNESS  Tobey Denver is a 47 y.o. male. Ear Fullness    The history is provided by the patient and medical records. This is a new problem. Episode onset: a few days ago. Associated symptoms include hearing loss (left ear) and rash (crusty changes about right external ear). 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 ([address] F10.24, F32.89    Transaminitis R74.0    Chronic left-sided low back pain with left-sided sciatica M54.42, G89.29    Diuretic-induced hypokalemia E87.6, T50.2X5A    Coronary artery disease involving native coronary artery of native heart without angina pectoris I25.10    History of non-ST elevation myocardial infarction (NSTEMI) I25.2    History of pulmonary embolism Z86.711    History of CVA (cerebrovascular accident) Z86.73       Current Outpatient Prescriptions:     tiZANidine (ZANAFLEX) 4 mg tablet, TAKE 1 TABLET BY MOUTH EVERY DAY AS NEEDED, Disp: 30 Tab, Rfl: 1    KLOR-CON M20 20 mEq tablet, TAKE 1 TABLET TWICE A DAY, Disp: 90 Tab, Rfl: 0    carvedilol (COREG) 3.125 mg tablet, TAKE 1 TABLET BY MOUTH TWICE A DAY., Disp: 180 Tab, Rfl: 0    simvastatin (ZOCOR) 20 mg tablet, TAKE 1 TABLET BY MOUTH EVERY NIGHT AT BEDTIME FOR 30 DAYS, Disp: 90 Tab, Rfl: 0    XARELTO 20 mg tab tablet, TAKE 1 TABLET BY MOUTH DAILY. , Disp: 90 Tab, Rfl: 0    multivitamin (DAILY-CAYLA) tablet, Take 1 Tab by mouth daily. , Disp: 90 Tab, Rfl: 3    nitroglycerin (NITROSTAT) 0.4 mg SL tablet, 1 Tab by SubLINGual route every five (5) minutes as needed for Chest Pain (not to exceed 3 doses). , Disp: 30 Tab, Rfl: 0    gabapentin (NEURONTIN) 300 mg capsule, Take 2 Caps by mouth three (3) times daily. , Disp: 540 Cap, Rfl: 1    lidocaine (LIDODERM) 5 %, APPLY 1 NEW PATCH TO AFFECTED AREA AND KEEP ON FOR 12 HOURS PER DAY THEN REMOVE AND KEEP OFF FOR 12 HOURS, Disp: 90 Patch, Rfl: 0    aspirin delayed-release 81 mg tablet, TAKE 1 TAB BY MOUTH DAILY. , Disp: 90 Tab, Rfl: 3    QUEtiapine (SEROQUEL) 200 mg tablet, Take 200 mg by mouth nightly as needed. , Disp: , Rfl:     DULoxetine (CYMBALTA) 60 mg capsule, Take 60 mg by mouth daily. , Disp: , Rfl:     Allergies   Allergen Reactions    Prednisone Other (comments)     He stated it hypes him up         Review of Systems   Constitutional: Negative for fever. HENT: Positive for ear pain (left ear feels clogged) and hearing loss (left ear). Negative for congestion. Skin: Positive for rash (crusty changes about right external ear). Physical Exam   Constitutional: He is oriented to person, place, and time. He appears well-developed and well-nourished. HENT:   Head: Normocephalic. EACs occluded with cerumen bilaterally, lavaged clear revealing intact TMs   Eyes: EOM are normal.   Neck: Neck supple. Cardiovascular: Normal rate, regular rhythm and normal heart sounds. Pulmonary/Chest: Effort normal and breath sounds normal.   Musculoskeletal: He exhibits no edema. Neurological: He is alert and oriented to person, place, and time. Skin: Skin is warm and dry. Inflammatory changes right pinna consistent with eczema   Psychiatric: He has a normal mood and affect. His behavior is normal.   Nursing note and vitals reviewed. ASSESSMENT and PLAN    ICD-10-CM ICD-9-CM    1. Bilateral impacted cerumen H61.23 380.4    2. Eczema, unspecified type L30.9 692.9 triamcinolone acetonide (KENALOG) 0.1 % topical cream   Triamcinolone 0.1% cream - Apply sparingly to affected areas twice daily, do not use for more than 14 days consecutively without a break  Follow up for new symptoms, worsening symptoms or failure to improve.

## 2018-02-01 DIAGNOSIS — M54.42 CHRONIC LEFT-SIDED LOW BACK PAIN WITH LEFT-SIDED SCIATICA: ICD-10-CM

## 2018-02-01 DIAGNOSIS — G89.29 CHRONIC LEFT-SIDED LOW BACK PAIN WITH LEFT-SIDED SCIATICA: ICD-10-CM

## 2018-02-01 DIAGNOSIS — I26.99 OTHER PULMONARY EMBOLISM WITHOUT ACUTE COR PULMONALE, UNSPECIFIED CHRONICITY (HCC): ICD-10-CM

## 2018-02-01 RX ORDER — LIDOCAINE 50 MG/G
PATCH TOPICAL
Qty: 90 PATCH | Refills: 0 | Status: SHIPPED | OUTPATIENT
Start: 2018-02-01 | End: 2018-03-30 | Stop reason: ALTCHOICE

## 2018-02-01 RX ORDER — RIVAROXABAN 20 MG/1
TABLET, FILM COATED ORAL
Qty: 90 TAB | Refills: 0 | OUTPATIENT
Start: 2018-02-01

## 2018-02-20 DIAGNOSIS — E87.6 DIURETIC-INDUCED HYPOKALEMIA: ICD-10-CM

## 2018-02-20 DIAGNOSIS — T50.2X5A DIURETIC-INDUCED HYPOKALEMIA: ICD-10-CM

## 2018-02-20 RX ORDER — POTASSIUM CHLORIDE 1500 MG/1
TABLET, EXTENDED RELEASE ORAL
Qty: 90 TAB | Refills: 0 | Status: SHIPPED | OUTPATIENT
Start: 2018-02-20 | End: 2018-03-30 | Stop reason: ALTCHOICE

## 2018-02-23 DIAGNOSIS — I10 ESSENTIAL HYPERTENSION: ICD-10-CM

## 2018-02-23 RX ORDER — SIMVASTATIN 20 MG/1
TABLET, FILM COATED ORAL
Qty: 90 TAB | Refills: 0 | Status: SHIPPED | OUTPATIENT
Start: 2018-02-23 | End: 2018-06-04 | Stop reason: ALTCHOICE

## 2018-02-23 RX ORDER — CARVEDILOL 3.12 MG/1
TABLET ORAL
Qty: 180 TAB | Refills: 0 | Status: SHIPPED | OUTPATIENT
Start: 2018-02-23 | End: 2018-06-04 | Stop reason: SDUPTHER

## 2018-02-27 DIAGNOSIS — M54.42 CHRONIC LEFT-SIDED LOW BACK PAIN WITH LEFT-SIDED SCIATICA: ICD-10-CM

## 2018-02-27 DIAGNOSIS — G89.29 CHRONIC LEFT-SIDED LOW BACK PAIN WITH LEFT-SIDED SCIATICA: ICD-10-CM

## 2018-02-27 RX ORDER — GABAPENTIN 300 MG/1
CAPSULE ORAL
Qty: 540 CAP | Refills: 1 | Status: SHIPPED | OUTPATIENT
Start: 2018-02-27 | End: 2018-03-30 | Stop reason: ALTCHOICE

## 2018-03-13 ENCOUNTER — TELEPHONE (OUTPATIENT)
Dept: FAMILY MEDICINE CLINIC | Age: 53
End: 2018-03-13

## 2018-03-16 ENCOUNTER — DOCUMENTATION ONLY (OUTPATIENT)
Dept: FAMILY MEDICINE CLINIC | Age: 53
End: 2018-03-16

## 2018-03-24 DIAGNOSIS — M54.42 CHRONIC LEFT-SIDED LOW BACK PAIN WITH LEFT-SIDED SCIATICA: ICD-10-CM

## 2018-03-24 DIAGNOSIS — G89.29 CHRONIC LEFT-SIDED LOW BACK PAIN WITH LEFT-SIDED SCIATICA: ICD-10-CM

## 2018-03-26 RX ORDER — TIZANIDINE 4 MG/1
TABLET ORAL
Qty: 30 TAB | Refills: 1 | Status: SHIPPED | OUTPATIENT
Start: 2018-03-26 | End: 2018-06-02 | Stop reason: SDUPTHER

## 2018-03-30 ENCOUNTER — OFFICE VISIT (OUTPATIENT)
Dept: FAMILY MEDICINE CLINIC | Age: 53
End: 2018-03-30

## 2018-03-30 VITALS
DIASTOLIC BLOOD PRESSURE: 89 MMHG | BODY MASS INDEX: 27.85 KG/M2 | WEIGHT: 217 LBS | RESPIRATION RATE: 20 BRPM | HEIGHT: 74 IN | OXYGEN SATURATION: 98 % | SYSTOLIC BLOOD PRESSURE: 130 MMHG | TEMPERATURE: 98.2 F | HEART RATE: 84 BPM

## 2018-03-30 DIAGNOSIS — J30.2 SEASONAL ALLERGIC RHINITIS, UNSPECIFIED TRIGGER: ICD-10-CM

## 2018-03-30 DIAGNOSIS — M54.50 CHRONIC BILATERAL LOW BACK PAIN WITHOUT SCIATICA: Primary | ICD-10-CM

## 2018-03-30 DIAGNOSIS — G89.29 CHRONIC BILATERAL LOW BACK PAIN WITHOUT SCIATICA: Primary | ICD-10-CM

## 2018-03-30 RX ORDER — NAPROXEN 500 MG/1
500 TABLET ORAL
Qty: 180 TAB | Refills: 0 | Status: SHIPPED | OUTPATIENT
Start: 2018-03-30 | End: 2018-06-04 | Stop reason: ALTCHOICE

## 2018-03-30 RX ORDER — LOSARTAN POTASSIUM 100 MG/1
100 TABLET ORAL DAILY
COMMUNITY
End: 2018-05-14 | Stop reason: SDUPTHER

## 2018-03-30 RX ORDER — BUSPIRONE HYDROCHLORIDE 30 MG/1
30 TABLET ORAL DAILY
COMMUNITY
End: 2018-05-14 | Stop reason: SDUPTHER

## 2018-03-30 RX ORDER — FOLIC ACID 1 MG/1
TABLET ORAL DAILY
COMMUNITY
End: 2018-12-10 | Stop reason: SDUPTHER

## 2018-03-30 RX ORDER — ISOSORBIDE MONONITRATE 30 MG/1
TABLET, EXTENDED RELEASE ORAL DAILY
COMMUNITY
End: 2018-06-04 | Stop reason: ALTCHOICE

## 2018-03-30 RX ORDER — LORATADINE 10 MG/1
10 TABLET ORAL
Qty: 90 TAB | Refills: 1 | Status: SHIPPED | OUTPATIENT
Start: 2018-03-30 | End: 2018-12-10 | Stop reason: SDUPTHER

## 2018-03-30 RX ORDER — GABAPENTIN 800 MG/1
TABLET ORAL 3 TIMES DAILY
COMMUNITY
End: 2018-03-30 | Stop reason: SDUPTHER

## 2018-03-30 RX ORDER — GABAPENTIN 800 MG/1
800 TABLET ORAL 3 TIMES DAILY
Qty: 270 TAB | Refills: 11 | Status: SHIPPED | OUTPATIENT
Start: 2018-03-30 | End: 2018-08-15 | Stop reason: SDUPTHER

## 2018-03-30 NOTE — PROGRESS NOTES
Earl Flor is a 47 y.o. male (: 1965) presenting to address:    Chief Complaint   Patient presents with   [address] [place] [address] ED Follow-up       Vitals:    18 1403   BP: 130/89   Pulse: 84   Resp: 20   Temp: 98.2 °F (36.8 °C)   TempSrc: Oral   SpO2: 98%   Weight: 217 lb (98.4 kg)   Height: 6' 2\" (1.88 m)   PainSc:   4   PainLoc: Back       Learning Assessment:     Learning Assessment 2014   PRIMARY LEARNER Patient   HIGHEST LEVEL OF EDUCATION - PRIMARY LEARNER  -   BARRIERS PRIMARY LEARNER -   CO-LEARNER CAREGIVER -   PRIMARY LANGUAGE ENGLISH   LEARNER PREFERENCE PRIMARY LISTENING   ANSWERED BY patient   RELATIONSHIP SELF     Depression Screening:     PHQ over the last two weeks 3/30/2018   PHQ Not Done Active Diagnosis of Depression or Bipolar Disorder     Fall Risk Assessment:     Fall Risk Assessment, last 12 mths 3/30/2018   Able to walk? Yes   Fall in past 12 months? Yes   Fall with injury? No   Number of falls in past 12 months 3   Fall Risk Score 3     Abuse Screening:     Abuse Screening Questionnaire 3/30/2018   Do you ever feel afraid of your partner? N   Are you in a relationship with someone who physically or mentally threatens you? N   Is it safe for you to go home? Y     Coordination of Care Questionaire:   1. Have you been to the ER, urgent care clinic since your last visit? Hospitalized since your last visit? YES [facility]-ED 3/25/18    2. Have you seen or consulted any other health care providers outside of the [address] [place] [address] since your last visit? Include any pap smears or colon screening. YES psychiatry 2018    Advanced Directive:   1. Do you have an Advanced Directive? NO    2. Would you like information on Advanced Directives?  NO

## 2018-03-30 NOTE — LETTER
3/30/2018 2:18 PM 
 
Mr. Charles Nieto [address] [address] To Whom It May Concern: 
 
Charles Nieto is currently under the care of [address] [place] [address]. He has chronic back pain is requesting to use his own mattress to help his symptoms. If there are questions or concerns please have the patient contact our office. Sincerely, Braxton Dueñas MD

## 2018-03-30 NOTE — MR AVS SNAPSHOT
Titi Smoker 
 
 
 [address] [facility] [address] [phone] Patient: Maureen Coffman MRN: [id number] :1965 Visit Information Date & Time Provider Department Dept. Phone Encounter #  
 3/30/2018  2:00 PM Wilner Longo, Venkata Nicole [place] [id number] Follow-up Instructions Return in about 6 months (around 2018). Upcoming Health Maintenance Date Due DTaP/Tdap/Td series (1 - Tdap) 1987 FOBT Q 1 YEAR AGE 50-75 2018 Allergies as of 3/30/2018  Review Complete On: 3/30/2018 By: Wilner Longo MD  
  
 Severity Noted Reaction Type Reactions Prednisone  2014   Side Effect Other (comments) He stated it hypes him up Current Immunizations  Reviewed on 2017 Name Date Influenza Vaccine 2017, 2014, 11/15/2011 12:00 AM  
 Novel Influenza-H1N1-09, All Formulations 10/26/2016 Pneumococcal Polysaccharide (PPSV-23) 2011, 11/15/2011 12:00 AM  
  
 Not reviewed this visit You Were Diagnosed With   
  
 Codes Comments Chronic bilateral low back pain without sciatica    -  Primary ICD-10-CM: M54.5, G89.29 ICD-9-CM: 724.2, 338.29 Seasonal allergic rhinitis, unspecified trigger     ICD-10-CM: J30.2 ICD-9-CM: 477.9 Vitals BP Pulse Temp Resp Height(growth percentile) Weight(growth percentile) 130/89 (BP 1 Location: Left arm, BP Patient Position: Sitting) 84 98.2 °F (36.8 °C) (Oral) 20 6' 2\" (1.88 m) 217 lb (98.4 kg) SpO2 BMI Smoking Status 98% 27.86 kg/m2 Current Every Day Smoker Vitals History BMI and BSA Data Body Mass Index Body Surface Area  
 27.86 kg/m 2 2.27 m 2 Preferred Pharmacy Pharmacy Name Phone [facility] [address] [facility], [address] [place] [address] [phone] Your Updated Medication List  
  
   
 This list is accurate as of 3/30/18  2:26 PM.  Always use your most recent med list.  
  
  
  
  
 aspirin delayed-release 81 mg tablet TAKE 1 TAB BY MOUTH DAILY. busPIRone 30 mg tablet Commonly known as:  BUSPAR Take 30 mg by mouth daily. carvedilol 3.125 mg tablet Commonly known as:  COREG  
TAKE 1 TABLET BY MOUTH TWICE A DAY. CYMBALTA 60 mg capsule Generic drug:  DULoxetine Take 60 mg by mouth daily. folic acid 1 mg tablet Commonly known as:  Google Take  by mouth daily. gabapentin 800 mg tablet Commonly known as:  NEURONTIN Take 1 Tab by mouth three (3) times daily. isosorbide mononitrate ER 30 mg tablet Commonly known as:  IMDUR Take  by mouth daily. loratadine 10 mg tablet Commonly known as:  Michaeline Haven Take 1 Tab by mouth daily as needed for Allergies. losartan 100 mg tablet Commonly known as:  COZAAR Take 100 mg by mouth daily. multivitamin tablet Commonly known as:  Diandra Latus Take 1 Tab by mouth daily. naproxen 500 mg tablet Commonly known as:  NAPROSYN Take 1 Tab by mouth two (2) times daily as needed. Indications: Pain  
  
 nitroglycerin 0.4 mg SL tablet Commonly known as:  NITROSTAT  
1 Tab by SubLINGual route every five (5) minutes as needed for Chest Pain (not to exceed 3 doses). SEROquel 200 mg tablet Generic drug:  QUEtiapine Take 200 mg by mouth nightly as needed. simvastatin 20 mg tablet Commonly known as:  ZOCOR  
TAKE 1 TABLET BY MOUTH EVERY NIGHT  
  
 tiZANidine 4 mg tablet Commonly known as:  Loise Salen TAKE 1 TABLET BY MOUTH EVERY DAY AS NEEDED Prescriptions Sent to Pharmacy Refills  
 loratadine (CLARITIN) 10 mg tablet 1 Sig: Take 1 Tab by mouth daily as needed for Allergies. Class: Normal  
 Pharmacy: [address] #: [phone]  Route: Oral  
 naproxen (NAPROSYN) 500 mg tablet 0  
 Sig: Take 1 Tab by mouth two (2) times daily as needed. Indications: Pain Class: Normal  
 Pharmacy: [facility] [address] [place] [address] #: [phone] Route: Oral  
 gabapentin (NEURONTIN) 800 mg tablet 11 Sig: Take 1 Tab by mouth three (3) times daily. Class: Normal  
 Pharmacy: [facility] Taran Kenton [address] #: [phone] Route: Oral  
  
Follow-up Instructions Return in about 6 months (around 9/30/2018). Introducing [facility]! Mar[employer] Murray introduces [email or web address] patient portal. Now you can access parts of your medical record, email your doctor's office, and request medication refills online. 1. In your internet browser, go to [email or web address] 2. Click on the First Time User? Click Here link in the Sign In box. You will see the New Member Sign Up page. 3. Enter your [employer] Access Code exactly as it appears below. You will not need to use this code after youve completed the sign-up process. If you do not sign up before the expiration date, you must request a new code. · [email or web address] Access Code: [email or web address] Expires: 6/28/2018  2:26 PM 
 
4. Enter the last four digits of your Social Security Number (xxxx) and Date of Birth (mm/dd/yyyy) as indicated and click Submit. You will be taken to the next sign-up page. 5. Create a [employer] ID. This will be your [employer] login ID and cannot be changed, so think of one that is secure and easy to remember. 6. Create a [employer] password. You can change your password at any time. 7. Enter your Password Reset Question and Answer. This can be used at a later time if you forget your password. 8. Enter your e-mail address. You will receive e-mail notification when new information is available in [address] 9. Click Sign Up. You can now view and download portions of your medical record. 10. Click the Download Summary menu link to download a portable copy of your medical information. If you have questions, please visit the Frequently Asked Questions section of the [employer] website. Remember, [employer] is NOT to be used for urgent needs. For medical emergencies, dial 911. Now available from your iPhone and Android! Please provide this summary of care documentation to your next provider. Your primary care clinician is listed as Oswald 13. If you have any questions after today's visit, please call [phone].

## 2018-03-30 NOTE — PROGRESS NOTES
Assessment/Plan:    1. Chronic bilateral low back pain without sciatica  -naprosyn prn.  - naproxen (NAPROSYN) 500 mg tablet; Take 1 Tab by mouth two (2) times daily as needed. Indications: Pain  Dispense: 180 Tab; Refill: 0    2. Seasonal allergic rhinitis, unspecified trigger  - loratadine (CLARITIN) 10 mg tablet; Take 1 Tab by mouth daily as needed for Allergies. Dispense: 90 Tab; Refill: 1    The plan was discussed with the patient. The patient verbalized understanding and is in agreement with the plan. All medication potential side effects were discussed with the patient. Health Maintenance:   Health Maintenance   Topic Date Due    DTaP/Tdap/Td series (1 - Tdap) 07/11/1987    FOBT Q 1 YEAR AGE 50-75  09/13/2018    Pneumococcal 19-64 Medium Risk  Completed    Influenza Age 5 to Adult  Completed     Judie Pallas is a 47 y.o. male and presents with ED Follow-up     Subjective:  Was recently seen in ER for back pain thought to be musculoskeletal in nature. He did have a hospitalization earlier this month for suicidal ideation. Had chest pain and w/u was neg. He's on neurontin for back pain and this helps. ROS:  Constitutional: No recent weight change. No weakness/fatigue. No f/c. Cardiovascular: No CP/palpitations. No ALBA/orthopnea/PND. Respiratory: No cough/sputum, dyspnea, wheezing. Gastointestinal: No dysphagia, reflux. No n/v. No constipation/diarrhea. No melena/rectal bleeding. Neurological: No seizures/numbness/weakness. No paresthesias. Psychiatric:  No depression, anxiety. The problem list was updated as a part of today's visit.   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 SH:  Social History   Substance Use Topics    Smoking status: Current Every Day Smoker     Packs/day: 1.00     Years: 20.00     Types: Cigarettes    Smokeless tobacco: Never Used    Alcohol use 0.0 oz/week      Comment:  ETOH abuse       Medications/Allergies:  Current Outpatient Prescriptions on File Prior to Visit   Medication Sig Dispense Refill    tiZANidine (ZANAFLEX) 4 mg tablet TAKE 1 TABLET BY MOUTH EVERY DAY AS NEEDED 30 Tab 1    carvedilol (COREG) 3.125 mg tablet TAKE 1 TABLET BY MOUTH TWICE A DAY. 180 Tab 0    simvastatin (ZOCOR) 20 mg tablet TAKE 1 TABLET BY MOUTH EVERY NIGHT 90 Tab 0    multivitamin (DAILY-CAYLA) tablet Take 1 Tab by mouth daily. 90 Tab 3    aspirin delayed-release 81 mg tablet TAKE 1 TAB BY MOUTH DAILY. 90 Tab 3    QUEtiapine (SEROQUEL) 200 mg tablet Take 200 mg by mouth nightly as needed.  DULoxetine (CYMBALTA) 60 mg capsule Take 60 mg by mouth daily.  nitroglycerin (NITROSTAT) 0.4 mg SL tablet 1 Tab by SubLINGual route every five (5) minutes as needed for Chest Pain (not to exceed 3 doses). 30 Tab 0     No current facility-administered medications on file prior to visit.          Allergies   Allergen Reactions    Prednisone Other (comments)     He stated it hypes him up       Objective:  Visit Vitals    /89 (BP 1 Location: Left arm, BP Patient Position: Sitting)    Pulse 84    Temp 98.2 °F (36.8 °C) (Oral)    Resp 20    Ht 6' 2\" (1.88 m)    Wt 217 lb (98.4 kg)    SpO2 98%  BMI 27.86 kg/m2      Constitutional: Well developed, nourished, no distress, alert, obese habitus   CV: S1, S2.  RRR. No murmurs/rubs. No thrills palpated. No carotid bruits. Intact distal pulses. No edema. Pulm: No abnormalities on inspection. Clear to auscultation bilaterally. No wheezing/rhonchi. Normal effort. MS: Gait normal.  Joints without deformity/tenderness. Strength intact bilateral upper and lower ext. Normal ROM all extremities. Psych: Appropriate affect, judgement and insight. Short-term memory intact.

## 2018-05-14 RX ORDER — LOSARTAN POTASSIUM 100 MG/1
100 TABLET ORAL DAILY
Qty: 90 TAB | Refills: 0 | Status: SHIPPED | OUTPATIENT
Start: 2018-05-14 | End: 2018-06-04 | Stop reason: SDUPTHER

## 2018-05-14 RX ORDER — BUSPIRONE HYDROCHLORIDE 30 MG/1
30 TABLET ORAL DAILY
Qty: 90 TAB | Refills: 0 | Status: SHIPPED | OUTPATIENT
Start: 2018-05-14 | End: 2020-09-10

## 2018-05-14 NOTE — TELEPHONE ENCOUNTER
[facility] pharmacist called asking if pt can please have these meds filled. States they believe is has been in the hospital and is out of the meds.

## 2018-05-30 ENCOUNTER — TELEPHONE (OUTPATIENT)
Dept: FAMILY MEDICINE CLINIC | Age: 53
End: 2018-05-30

## 2018-05-30 NOTE — TELEPHONE ENCOUNTER
Pt called asking to move his CHARLA from 6/4/18 to today. Stated to the , he had a seizure last night, passed out for several hours and woke up in a \"pool of blood\". Call came back to myself at the nurses station. Pt verified these same details. Told pt he must go the ED per Dr Herlinda Antoine direction for immediate eval, not appropriate for office visit. Female in the background heard saying \" I told you to call the ambulance! \". Pt states he will return to the ED.

## 2018-06-02 DIAGNOSIS — G89.29 CHRONIC LEFT-SIDED LOW BACK PAIN WITH LEFT-SIDED SCIATICA: ICD-10-CM

## 2018-06-02 DIAGNOSIS — M54.42 CHRONIC LEFT-SIDED LOW BACK PAIN WITH LEFT-SIDED SCIATICA: ICD-10-CM

## 2018-06-04 ENCOUNTER — OFFICE VISIT (OUTPATIENT)
Dept: FAMILY MEDICINE CLINIC | Age: 53
End: 2018-06-04

## 2018-06-04 VITALS
TEMPERATURE: 96 F | SYSTOLIC BLOOD PRESSURE: 130 MMHG | WEIGHT: 234 LBS | DIASTOLIC BLOOD PRESSURE: 90 MMHG | OXYGEN SATURATION: 96 % | HEART RATE: 89 BPM | HEIGHT: 74 IN | BODY MASS INDEX: 30.03 KG/M2 | RESPIRATION RATE: 17 BRPM

## 2018-06-04 DIAGNOSIS — F10.20 UNCOMPLICATED ALCOHOL DEPENDENCE (HCC): ICD-10-CM

## 2018-06-04 DIAGNOSIS — I26.99 OTHER ACUTE PULMONARY EMBOLISM WITHOUT ACUTE COR PULMONALE (HCC): ICD-10-CM

## 2018-06-04 DIAGNOSIS — I26.99 RECURRENT PULMONARY EMBOLI (HCC): ICD-10-CM

## 2018-06-04 DIAGNOSIS — I25.10 CORONARY ARTERY DISEASE INVOLVING NATIVE CORONARY ARTERY OF NATIVE HEART WITHOUT ANGINA PECTORIS: ICD-10-CM

## 2018-06-04 DIAGNOSIS — R93.89 ABNORMAL MRI: ICD-10-CM

## 2018-06-04 DIAGNOSIS — Z92.89 S/P ALCOHOL DETOXIFICATION: ICD-10-CM

## 2018-06-04 DIAGNOSIS — I10 ESSENTIAL HYPERTENSION: Primary | ICD-10-CM

## 2018-06-04 DIAGNOSIS — S01.01XA LACERATION OF SCALP WITHOUT FOREIGN BODY, INITIAL ENCOUNTER: ICD-10-CM

## 2018-06-04 PROBLEM — M54.50 CHRONIC BILATERAL LOW BACK PAIN WITHOUT SCIATICA: Status: RESOLVED | Noted: 2018-03-30 | Resolved: 2018-06-04

## 2018-06-04 PROBLEM — F12.90 MARIJUANA USE: Status: ACTIVE | Noted: 2018-06-04

## 2018-06-04 PROBLEM — Z86.711 HISTORY OF PULMONARY EMBOLISM: Status: RESOLVED | Noted: 2017-09-22 | Resolved: 2018-06-04

## 2018-06-04 PROBLEM — G89.29 CHRONIC BILATERAL LOW BACK PAIN WITHOUT SCIATICA: Status: RESOLVED | Noted: 2018-03-30 | Resolved: 2018-06-04

## 2018-06-04 RX ORDER — CARVEDILOL 6.25 MG/1
TABLET ORAL
Qty: 180 TAB | Refills: 0 | Status: SHIPPED | OUTPATIENT
Start: 2018-06-04 | End: 2018-07-23 | Stop reason: SDUPTHER

## 2018-06-04 RX ORDER — LIDOCAINE 50 MG/G
1 PATCH TOPICAL DAILY
Refills: 0 | COMMUNITY
Start: 2018-06-02 | End: 2018-06-04 | Stop reason: ALTCHOICE

## 2018-06-04 RX ORDER — ATORVASTATIN CALCIUM 40 MG/1
40 TABLET, FILM COATED ORAL DAILY
Qty: 90 TAB | Refills: 1 | Status: SHIPPED | OUTPATIENT
Start: 2018-06-04 | End: 2018-07-23 | Stop reason: SDUPTHER

## 2018-06-04 RX ORDER — THIAMINE HCL 100 MG
1 TABLET ORAL DAILY
Refills: 2 | COMMUNITY
Start: 2018-06-02 | End: 2018-06-04 | Stop reason: ALTCHOICE

## 2018-06-04 RX ORDER — TIZANIDINE 4 MG/1
TABLET ORAL
Qty: 30 TAB | Refills: 1 | Status: SHIPPED | OUTPATIENT
Start: 2018-06-04 | End: 2018-08-15 | Stop reason: ALTCHOICE

## 2018-06-04 RX ORDER — LOSARTAN POTASSIUM 25 MG/1
25 TABLET ORAL DAILY
Qty: 90 TAB | Refills: 0 | Status: SHIPPED | OUTPATIENT
Start: 2018-06-04 | End: 2018-07-23 | Stop reason: SDUPTHER

## 2018-06-04 NOTE — MR AVS SNAPSHOT
[address] [place] [address]  [address] [facility] [id number] [phone] Patient: Luis Elder MRN: [id number] :1965 Visit Information Date & Time Provider Department Dept. Phone Encounter #  
 2018 10:15 AM Paulino Duggan, 3 [facility] [phone] Upcoming Health Maintenance Date Due DTaP/Tdap/Td series (1 - Tdap) 1987 Influenza Age 5 to Adult 2018 FOBT Q 1 YEAR AGE 50-75 2018 Allergies as of 2018  Review Complete On: 2018 By: Paulino Duggan MD  
  
 Severity Noted Reaction Type Reactions Prednisone  2014   Side Effect Other (comments) He stated it hypes him up Current Immunizations  Reviewed on 2017 Name Date Influenza Vaccine 2017 12:00 AM, 2017, 2014, 11/15/2011 12:00 AM  
 Novel Influenza-H1N1-09, All Formulations 10/26/2016 Pneumococcal Polysaccharide (PPSV-23) 2011, 11/15/2011 12:00 AM  
 Tdap 2018 12:00 AM  
  
 Not reviewed this visit You Were Diagnosed With   
  
 Codes Comments Essential hypertension    -  Primary ICD-10-CM: I10 
ICD-9-CM: 401.9 Coronary artery disease involving native coronary artery of native heart without angina pectoris     ICD-10-CM: I25.10 ICD-9-CM: 414.01 Uncomplicated alcohol dependence ([facility]     ICD-10-CM: F10.20 ICD-9-CM: 303.90 S/P alcohol detoxification     ICD-10-CM: A37 ICD-9-CM: V67.59 Abnormal MRI     ICD-10-CM: R93.8 ICD-9-CM: 793.99 Vitals BP Pulse Temp Resp Height(growth percentile) Weight(growth percentile) 130/90 (BP 1 Location: Left arm, BP Patient Position: Sitting) 89 96 °F (35.6 °C) (Oral) 17 6' 2\" (1.88 m) 234 lb (106.1 kg) SpO2 BMI Smoking Status 96% 30.04 kg/m2 Current Every Day Smoker Vitals History BMI and BSA Data Body Mass Index Body Surface Area  30.04 kg/m 2 2.35 m 2  
  
  
 Preferred Pharmacy Pharmacy Name Phone [facility], [facility] [address] [place] [place] [phone] Your Updated Medication List  
  
   
This list is accurate as of 6/4/18 10:53 AM.  Always use your most recent med list.  
  
  
  
  
 aspirin delayed-release 81 mg tablet TAKE 1 TAB BY MOUTH DAILY. atorvastatin 40 mg tablet Commonly known as:  LIPITOR Take 1 Tab by mouth daily. busPIRone 30 mg tablet Commonly known as:  BUSPAR Take 1 Tab by mouth daily. carvedilol 6.25 mg tablet Commonly known as:  COREG  
TAKE 1 TABLET BY MOUTH TWICE A DAY. CYMBALTA 60 mg capsule Generic drug:  DULoxetine Take 60 mg by mouth daily. folic acid 1 mg tablet Commonly known as:  Google Take  by mouth daily. gabapentin 800 mg tablet Commonly known as:  NEURONTIN Take 1 Tab by mouth three (3) times daily. loratadine 10 mg tablet Commonly known as:  Mynor Found Take 1 Tab by mouth daily as needed for Allergies. losartan 25 mg tablet Commonly known as:  COZAAR Take 1 Tab by mouth daily. multivitamin tablet Commonly known as:  Arleth Diesel Take 1 Tab by mouth daily. nitroglycerin 0.4 mg SL tablet Commonly known as:  NITROSTAT  
1 Tab by SubLINGual route every five (5) minutes as needed for Chest Pain (not to exceed 3 doses). rivaroxaban 15 mg Tab tablet Commonly known as:  Luevenia Shown Take 1 Tab by mouth two (2) times a day. tiZANidine 4 mg tablet Commonly known as:  Maki Fierro TAKE 1 TABLET BY MOUTH EVERY DAY AS NEEDED Prescriptions Sent to Pharmacy Refills  
 carvedilol (COREG) 6.25 mg tablet 0 Sig: TAKE 1 TABLET BY MOUTH TWICE A DAY. Class: Normal  
 Pharmacy: [place] [address] Ph #: [phone]  
 losartan (COZAAR) 25 mg tablet 0 Sig: Take 1 Tab by mouth daily.   
 Class: Normal  
 Pharmacy: [address] #: [phone] Route: Oral  
 atorvastatin (LIPITOR) 40 mg tablet 1 Sig: Take 1 Tab by mouth daily. Class: Normal  
 Pharmacy: [address] #: [phone] Route: Oral  
  
We Performed the Following REFERRAL TO CARDIOLOGY [JER99 Custom] REFERRAL TO NEUROLOGY [GIH35 Custom] Comments:  
 Please evaluate patient for seizure, abnormal MRI. [place] location Referral Information Referral ID Referred By Referred To  
  
 [id number] Yancy Squires MD   
   [address], [address] [place] Phone: [phone] Fax: [phone] Visits Status Start Date End Date 1 New Request 6/4/18 6/4/19 If your referral has a status of pending review or denied, additional information will be sent to support the outcome of this decision. Referral ID Referred By Referred To  
 [id number] Pola Benavides MD  
   [facility] [address] Phone: [phone] Fax: [phone] Visits Status Start Date End Date 1 New Request 6/4/18 6/4/19 If your referral has a status of pending review or denied, additional information will be sent to support the outcome of this decision. Introducing [facility]! [employer] introduces [employer] patient portal. Now you can access parts of your medical record, email your doctor's office, and request medication refills online. 1. In your internet browser, go to [email or web address] 2. Click on the First Time User? Click Here link in the Sign In box. You will see the New Member Sign Up page. 3. Enter your [employer] Access Code exactly as it appears below. You will not need to use this code after youve completed the sign-up process.  If you do not sign up before the expiration date, you must request a new code. · Visualant Access Code: [email or web address] Expires: 6/28/2018  2:26 PM 
 
4. Enter the last four digits of your Social Security Number (xxxx) and Date of Birth (mm/dd/yyyy) as indicated and click Submit. You will be taken to the next sign-up page. 5. Create a Visualant ID. This will be your Visualant login ID and cannot be changed, so think of one that is secure and easy to remember. 6. Create a Visualant password. You can change your password at any time. 7. Enter your Password Reset Question and Answer. This can be used at a later time if you forget your password. 8. Enter your e-mail address. You will receive e-mail notification when new information is available in [address] 9. Click Sign Up. You can now view and download portions of your medical record. 10. Click the Download Summary menu link to download a portable copy of your medical information. If you have questions, please visit the Frequently Asked Questions section of the Visual[employer] website. Remember, Visualant is NOT to be used for urgent needs. For medical emergencies, dial 911. Now available from your iPhone and Android! Please provide this summary of care documentation to your next provider. Your primary care clinician is listed as [address] 13. If you have any questions after today's visit, please call [phone].

## 2018-06-04 NOTE — PROGRESS NOTES
Assessment/Plan:    Diagnoses and all orders for this visit:    1. Essential hypertension-will cont meds as prescribed at time of d/c. F/u in1 mo. Bring med bottles to visit. -     carvedilol (COREG) 6.25 mg tablet; TAKE 1 TABLET BY MOUTH TWICE A DAY. -     losartan (COZAAR) 25 mg tablet; Take 1 Tab by mouth daily. 2. Coronary artery disease involving native coronary artery of native heart without angina pectoris- pt hasn't set up f/u. Will place referral to assist with process. Needs [place] location. On ACEI, BB, xarelto, statin.  -     REFERRAL TO CARDIOLOGY  -     atorvastatin (LIPITOR) 40 mg tablet; Take 1 Tab by mouth daily. 3. Uncomplicated alcohol dependence ([address]- followed by psych. Going to AA. S/P alcohol detoxification    5. Abnormal MRI and seizures (likely from ETOH withdrawal)  -     REFERRAL TO NEUROLOGY    6. Laceration of scalp without foreign body, initial encounter  -     SUTURE / STAPLE REMOVAL BY PROVIDER    7. Other acute pulmonary embolism without acute cor pulmonale (HCC)  -change xarelto to appropriate dosing (15mg bid x 21 days) then do 20mg daily. The plan was discussed with the patient. The patient verbalized understanding and is in agreement with the plan. All medication potential side effects were discussed with the patient. Health Maintenance:   Health Maintenance   Topic Date Due    DTaP/Tdap/Td series (1 - Tdap) 07/11/1987    Influenza Age 5 to Adult  08/01/2018    FOBT Q 1 YEAR AGE 50-75  09/13/2018    Pneumococcal 19-64 Medium Risk  Completed       Frank Ibarra is a 47 y.o. male and presents with Hospital Follow Up     Subjective:  Transition of care note is not in the system. Pt recently in hospital for detox from ETOH (drinks about a gallon of vodka/day). He had originally presented for stroke like sx with LUE numbness/weakness and L facial numbness. MRI showed some nonspecific injuries on brain. Was not eval by neuro.   He is going to [place] [address] for ETOH depenendence. He also had chest pain in setting of known CAD and positive troponins. However, cards opted for medical management with outpt followup given acute detox issues. He was changed from zocor to lipitor. imdur d/c'd due to HA. He didn't bring in his meds for reconciliation. He was also found to have acute PE again, 5/24. He was started on xarelto- daily dosing (instead of BID)? Betha Lute He had been passed out again/immobility. This happened during previous episode of PE. He also states he had a seizure a few days after discharge from the hospital.  He hit his head and had sutures placed 5/30. Wants them removed. aspirin EC 81 mg PO TBEC   Take 1 Tab by Mouth Once a Day. 30 Tab   3 05/28/2018     atorvastatin (LIPITOR) 40 mg PO TABS   Take 1 Tab by Mouth Every Night at Bedtime. 30 Tab   3 05/28/2018     Buspirone 30 mg PO TABS   Take 30 mg by Mouth Twice Daily.           carvedilol (COREG) 6.25 mg PO TABS   Take 1 Tab by Mouth Twice Daily. 60 Tab   3 05/28/2018     cyclobenzaprine (FLEXERIL) 10 mg PO TABS   Take 1 Tab by Mouth Every 8 Hours As Needed (muscle spasm). 15 Tab   0 03/25/2018     Duloxetine (CYMBALTA) 60 mg PO CPDR   Take 60 mg by Mouth Once a Day.           Gabapentin 800 mg PO TABS   Take 1 Tab by Mouth 3 Times Daily for 30 days. 90 Tab   3 05/28/2018 06/27/2018   loratadine (CLARITIN) 10 mg PO TABS   Take 10 mg by Mouth Daily as needed.   1 03/30/2018     losartan (COZAAR) 25 mg PO TABS   Take 1 Tab by Mouth Once a Day. 30 Tab   3 05/29/2018     multivitamin (THERAGRAN) PO TABS   Take 1 Tab by Mouth Once a Day.     11/23/2016     nicotine (NICODERM CQ) 21 mg/24 hr TD PT24   Apply 1 Patch as directed Every 24 Hours. 30 Patch   2 05/28/2018     rivaroxaban (XARELTO) 20 mg PO TABS    Indications: PULMONARY THROMBOEMBOLISM PREVENTION              ROS:  Constitutional: No recent weight change. No weakness/fatigue. No f/c. Cardiovascular: No CP/palpitations.   No ALBA/orthopnea/PND. Respiratory: No cough/sputum, dyspnea, wheezing. Gastointestinal: No dysphagia, reflux. No n/v. No constipation/diarrhea. No melena/rectal bleeding. Neurological: + seizures/+numbness/+weakness. No paresthesias. Psychiatric:  + depression, no anxiety. The problem list was updated as a part of today's visit. Patient Active Problem List   Diagnosis Code    Tobacco dependence F17.200    HTN (hypertension) I10    HLD (hyperlipidemia) E78.5    Bipolar 1 disorder, depressed, severe ([address] F31.4    EtOH dependence (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 F10.24, F32.89    Transaminitis R74.0    Chronic left-sided low back pain with left-sided sciatica M54.42, G89.29    Diuretic-induced hypokalemia E87.6, T50.2X5A    Coronary artery disease involving native coronary artery of native heart without angina pectoris I25.10    History of non-ST elevation myocardial infarction (NSTEMI) I25.2    History of pulmonary embolism Z86.711    History of CVA (cerebrovascular accident) Z86.73    Eczema L30.9    Chronic bilateral low back pain without sciatica M54.5, G89.29       The PSH, FH were reviewed. SH:  Social History   Substance Use Topics    Smoking status: Current Every Day Smoker     Packs/day: 1.00     Years: 20.00     Types: Cigarettes    Smokeless tobacco: Never Used    Alcohol use 0.0 oz/week      Comment:  ETOH abuse       Medications/Allergies:  Current Outpatient Prescriptions on File Prior to Visit   Medication Sig Dispense Refill    tiZANidine (ZANAFLEX) 4 mg tablet TAKE 1 TABLET BY MOUTH EVERY DAY AS NEEDED 30 Tab 1    busPIRone (BUSPAR) 30 mg tablet Take 1 Tab by mouth daily. 90 Tab 0    losartan (COZAAR) 100 mg tablet Take 1 Tab by mouth daily. 90 Tab 0    folic acid (FOLVITE) 1 mg tablet Take  by mouth daily.       isosorbide mononitrate ER (IMDUR) 30 mg tablet Take by mouth daily.  loratadine (CLARITIN) 10 mg tablet Take 1 Tab by mouth daily as needed for Allergies. 90 Tab 1    naproxen (NAPROSYN) 500 mg tablet Take 1 Tab by mouth two (2) times daily as needed. Indications: Pain 180 Tab 0    gabapentin (NEURONTIN) 800 mg tablet Take 1 Tab by mouth three (3) times daily. 270 Tab 11    carvedilol (COREG) 3.125 mg tablet TAKE 1 TABLET BY MOUTH TWICE A DAY. 180 Tab 0    simvastatin (ZOCOR) 20 mg tablet TAKE 1 TABLET BY MOUTH EVERY NIGHT 90 Tab 0    multivitamin (DAILY-CAYLA) tablet Take 1 Tab by mouth daily. 90 Tab 3    nitroglycerin (NITROSTAT) 0.4 mg SL tablet 1 Tab by SubLINGual route every five (5) minutes as needed for Chest Pain (not to exceed 3 doses). 30 Tab 0    aspirin delayed-release 81 mg tablet TAKE 1 TAB BY MOUTH DAILY. 90 Tab 3    DULoxetine (CYMBALTA) 60 mg capsule Take 60 mg by mouth daily.  QUEtiapine (SEROQUEL) 200 mg tablet Take 200 mg by mouth nightly as needed. No current facility-administered medications on file prior to visit. Allergies   Allergen Reactions    Prednisone Other (comments)     He stated it hypes him up       Objective:  Visit Vitals    /90 (BP 1 Location: Left arm, BP Patient Position: Sitting)    Pulse 89    Temp 96 °F (35.6 °C) (Oral)    Resp 17    Ht 6' 2\" (1.88 m)    Wt 234 lb (106.1 kg)    SpO2 96%    BMI 30.04 kg/m2      Constitutional: Well developed, nourished, no distress, alert   CV: S1, S2.  RRR. No murmurs/rubs. No thrills palpated. No carotid bruits. Intact distal pulses. No edema. Pulm: No abnormalities on inspection. Clear to auscultation bilaterally. No wheezing/rhonchi. Normal effort. Neuro:  No focal motor or sensory deficits. Speech normal.   Psych: Appropriate affect, judgement and insight. Short-term memory intact. Skin: 3cm scabbed over laceration L scalp/forehead with sutures.

## 2018-06-04 NOTE — PROGRESS NOTES
Diamond Yates is a 47 y.o. male (: 1965) presenting to address:    Chief Complaint   Patient presents with   [facility] Follow Up       Vitals:    18 1027   BP: 130/90   Pulse: 89   Resp: 17   Temp: 96 °F (35.6 °C)   TempSrc: Oral   SpO2: 96%   Weight: 217 lb (98.4 kg)   Height: 6' 2\" (1.88 m)   PainSc:   0 - No pain       Hearing/Vision:   No exam data present    Learning Assessment:     Learning Assessment 2014   PRIMARY LEARNER Patient   HIGHEST LEVEL OF EDUCATION - PRIMARY LEARNER  -   BARRIERS PRIMARY LEARNER -   CO-LEARNER CAREGIVER -   PRIMARY LANGUAGE ENGLISH   LEARNER PREFERENCE PRIMARY LISTENING   ANSWERED BY patient   RELATIONSHIP SELF     Depression Screening:     PHQ over the last two weeks 3/30/2018   PHQ Not Done Active Diagnosis of Depression or Bipolar Disorder     Fall Risk Assessment:     Fall Risk Assessment, last 12 mths 3/30/2018   Able to walk? Yes   Fall in past 12 months? Yes   Fall with injury? No   Number of falls in past 12 months 3   Fall Risk Score 3     Abuse Screenin. [facility]  admit and return to ED. 2. Have you seen or consulted any other health care providers outside of the [facility] since your last visit? Include any pap smears or colon screening. Yes     Advanced Directive:   1. Do you have an Advanced Directive? No     2. Would you like information on Advanced Directives? No    Pt states he takes Buspar TID, also states norvasc gives him a headache.

## 2018-06-04 NOTE — PROGRESS NOTES
6 suture (s) were removed by  TIFFANIE Herman without difficulty. Wound edges were well approximated. Steri-strips were not used. There was not evidence of infection. Patient did not tolerate well.

## 2018-06-07 NOTE — TELEPHONE ENCOUNTER
Pt left msg stating Xarelto 15 mg was not sent so he took a double dose of 20 mg yesterday. Reviewing the med list, the Rx for Xarelto did not transmit electronically. Resending med request. Pt verbalized understanding of correct dosing schedule and will  15 mg Rx tonight.

## 2018-07-02 ENCOUNTER — PATIENT OUTREACH (OUTPATIENT)
Dept: FAMILY MEDICINE CLINIC | Age: 53
End: 2018-07-02

## 2018-07-02 RX ORDER — QUETIAPINE 300 MG/1
300 TABLET, FILM COATED, EXTENDED RELEASE ORAL
COMMUNITY
End: 2020-09-10 | Stop reason: SDUPTHER

## 2018-07-02 RX ORDER — OMEPRAZOLE 20 MG/1
20 CAPSULE, DELAYED RELEASE ORAL DAILY
COMMUNITY
End: 2018-12-10 | Stop reason: SDUPTHER

## 2018-07-02 RX ORDER — CHLORDIAZEPOXIDE HYDROCHLORIDE 25 MG/1
25 CAPSULE, GELATIN COATED ORAL 2 TIMES DAILY
COMMUNITY
End: 2018-07-10 | Stop reason: ALTCHOICE

## 2018-07-02 RX ORDER — LANOLIN ALCOHOL/MO/W.PET/CERES
CREAM (GRAM) TOPICAL DAILY
COMMUNITY
End: 2018-12-10 | Stop reason: SDUPTHER

## 2018-07-02 NOTE — Clinical Note
Florecita appointment is scheduled for 7/10/18 at 2 pm. Seen at [facility] for Alcohol Withdrawal.

## 2018-07-02 NOTE — PROGRESS NOTES
Hospital Discharge Follow-Up      Date/Time:  7/2/2018 1:28 PM    Patient was admitted to [facility] on 6/22/18 and discharged on 9/12/62 for Alcoholic pancreatitis/Alcohol withdrawals/Hx of PE/HTN/HLP/Depression/Anxiety. The physician discharge summary was available at the time of outreach. Patient was contacted within 2 business days of discharge. I spoke with patient. He states he is doing okay. He has spoken with his sponsor Valencia Person and will attend [place] [id number] meetings. There were some medication changes but patient states he did not make any changes to his medications. He did fill the new prescriptions. He states his blood pressure was high because of the alcohol so he did not increase the Coreg. He did decrease the Gabapentin or start Baclofen. CHARLA appointment is scheduled for 7/10/18 at 2 pm.    Transition of Care   Admit Date: 6/22/2018          D/C Date: 6/28/2018           Patient Class: Inpatient      Primary Discharge Diagnosis:   Alcoholic pancreatitis/Alcohol withdrawals/Hx of PE/HTN/HLP/Depression/Anxiety  Hospital Course   Chief Complaint/History of Present Illness:   \"51 years old male with history of anxiety depression etoh abuse smoking, PE, dyslipidemia, htn, stroke comes for alcohol detox, also c/o nausea and epigastric burning pain, not able to tolerate po diet, found to have etoh pancreatitis,  Not a great historian  Admitted for further eval\"     Problems Managed During Hospitalization:  47years old male with history of anxiety depression etoh abuse smoking, PE, dyslipidemia, htn, stroke comes for alcohol detox, also c/o nausea and epigastric burning pain, not able to tolerate po diet, found to have etoh pancreatitis.      - Acute alcoholic pancreatitis - Improved. Lipase down to 66. Tolerating regular diet for days now.     - Chronic alcohol abuse , alcohol intoxication, alcohol withdrawal  Much improved.  Librium at 20 mg BID on discharge and instructed to wean off in 1-2 weeks. Keep Baclofen 5 mg BID for now. Counseled for abstinence. Has been sober for years before. Going home today with his sponsor to get rid of all the remaining alcohol at his house.      Had head tremor and Right hand tremor both at rest and on intentional activity. Suspect he has baseline Essential familial tremor as well which is more pronounced now since off alcohol. Primidone cannot be tried since interaction with Xarelto.     - Possible PUD/Gastritis secondary to heavy alcohol use and smoking. Doing better. No pain or nausea. Continue PPIs on dc for a month at least.  Denies having an EGD and denies blood in stool. Hb normal.     - Diarrhea - Resolved. Likely from withdrawals  C.diff Negative.     - H/o alcohol withdrawal seizures  Mentions had one seizure few days ago and had scalp laceration     - H/o PE on xarelto - not a good candidate for long term AC because of etoh abuse and fall risk, since PE is within 3 months NOACS resumed. Reports has pills at home.     - Hypokalemia - replaced and resolved.     - Mild rhabdomyolysis with elevated CPK possible sec to seizure  CPK normalized.     - Dyslipidemia-  Statin held due to elevated lfts  Resume on discharge     - H/o cva  On asa     - Anxiety depression  On cymbalta/buspar  Follows with psychiatrist as an outpatient  Denies any suicidal and homicidal ideations.      - Tobacco dependence  Counseled for cessation, on nicotine patch.     - HTN- controlled     - Bronchitis - Improved. Possible mild copd exacerbation Vs aspiration with seizures  Wean to room air.     - Cad  Stable.  Trops neg     DC home today           Top Challenges reviewed with the provider   Avoid alcohol         Method of communication with provider :chart routing    Inpatient RRAT score: Low Risk            7       Total Score        3 Has Seen PCP in Last 6 Months (Yes=3, No=0)    4 Charlson Comorbidity Score (Age + Comorbid Conditions)        Criteria that do not apply: . Living with Significant Other. Assisted Living. LTAC. SNF. or   Rehab    Patient Length of Stay (>5 days = 3)    IP Visits Last 12 Months (1-3=4, 4=9, >4=11)    Pt. Coverage (Medicare=5 , Medicaid, or Self-Pay=4)        Was this a readmission? no  Patient stated reason for the readmission: n/a    Panel manager contacted the patient by telephone to perform post hospital discharge assessment. Verified name and  with patient as identifiers. Provided introduction to self, and explanation of the Nurse Navigator role. Reviewed discharge instructions and red flags with patient who verbalized understanding. Patient given an opportunity to ask questions and does not have any further questions or concerns at this time. The patient agrees to contact the PCP office for questions related to their healthcare. NN provided contact information for future reference. Disease Specific:   N/A    Summary of patient's top problems:  1. Alcohol withdrawl  2. Anxiety  3. Home Health orders at discharge: no  [address]: n/a  Date of initial visit: n/a    Durable Medical Equipment ordered/company: no  Durable Medical Equipment received: none    Barriers to care? none    Advance Care Planning:   Does patient have an Advance Directive:  not on file     Medication(s):     New Medications at Discharge: Librium, Prilosec, Thiamine  Changed Medications at Discharge: pt did not make changes to medications  Discontinued Medications at Discharge: pt did not make any changes to medications    Medication reconciliation was performed with patient, who verbalizes understanding of administration of home medications. There were no barriers to obtaining medications identified at this time. Referral to Pharm D needed: no     Current Outpatient Prescriptions   Medication Sig    QUEtiapine SR (SEROQUEL XR) 300 mg sr tablet Take 300 mg by mouth nightly.     chlordiazePOXIDE (LIBRIUM) 25 mg capsule Take 25 mg by mouth two (2) times a day. Take Librium twice daily and taper off in 1-2 weeks  Indications: Alcohol Withdrawal Syndrome    thiamine (B-1) 100 mg tablet Take  by mouth daily.  omeprazole (PRILOSEC) 20 mg capsule Take 20 mg by mouth daily.  rivaroxaban (XARELTO) 15 mg tab tablet Take 1 Tab by mouth two (2) times a day.  tiZANidine (ZANAFLEX) 4 mg tablet TAKE 1 TABLET BY MOUTH EVERY DAY AS NEEDED    carvedilol (COREG) 6.25 mg tablet TAKE 1 TABLET BY MOUTH TWICE A DAY.  losartan (COZAAR) 25 mg tablet Take 1 Tab by mouth daily.  atorvastatin (LIPITOR) 40 mg tablet Take 1 Tab by mouth daily.  busPIRone (BUSPAR) 30 mg tablet Take 1 Tab by mouth daily.  folic acid (FOLVITE) 1 mg tablet Take  by mouth daily.  loratadine (CLARITIN) 10 mg tablet Take 1 Tab by mouth daily as needed for Allergies.  gabapentin (NEURONTIN) 800 mg tablet Take 1 Tab by mouth three (3) times daily.  multivitamin (DAILY-CAYLA) tablet Take 1 Tab by mouth daily.  nitroglycerin (NITROSTAT) 0.4 mg SL tablet 1 Tab by SubLINGual route every five (5) minutes as needed for Chest Pain (not to exceed 3 doses).  aspirin delayed-release 81 mg tablet TAKE 1 TAB BY MOUTH DAILY.  DULoxetine (CYMBALTA) 60 mg capsule Take 60 mg by mouth daily. No current facility-administered medications for this visit. There are no discontinued medications. PCP/Specialist follow up: Future Appointments  Date Time Provider Aristides Ibanez   7/10/2018 2:00 PM MD Leticia Domingo 23 Supportive resources in place to maintain patient in the community (ie. Home Health, DME equipment, refer to, medication assistant plan, etc.)            Goal: patient will remain safe  Intervention- contact sponsor, attend AA meetings       Understands red flags post discharge.             Goal: Signs and symptoms will be absent or minimal  Intervention:  Avoid alcohol  Contact sponsor when having cravings for alcohol

## 2018-07-10 ENCOUNTER — OFFICE VISIT (OUTPATIENT)
Dept: FAMILY MEDICINE CLINIC | Age: 53
End: 2018-07-10

## 2018-07-10 VITALS
SYSTOLIC BLOOD PRESSURE: 128 MMHG | HEART RATE: 99 BPM | OXYGEN SATURATION: 96 % | WEIGHT: 243 LBS | HEIGHT: 74 IN | TEMPERATURE: 98.2 F | BODY MASS INDEX: 31.18 KG/M2 | DIASTOLIC BLOOD PRESSURE: 86 MMHG

## 2018-07-10 DIAGNOSIS — Z09 HOSPITAL DISCHARGE FOLLOW-UP: ICD-10-CM

## 2018-07-10 DIAGNOSIS — M25.541 ARTHRALGIA OF BOTH HANDS: ICD-10-CM

## 2018-07-10 DIAGNOSIS — M25.562 ACUTE PAIN OF LEFT KNEE: ICD-10-CM

## 2018-07-10 DIAGNOSIS — K85.20 ALCOHOL-INDUCED ACUTE PANCREATITIS WITHOUT INFECTION OR NECROSIS: Primary | ICD-10-CM

## 2018-07-10 DIAGNOSIS — M25.542 ARTHRALGIA OF BOTH HANDS: ICD-10-CM

## 2018-07-10 DIAGNOSIS — I26.99 OTHER ACUTE PULMONARY EMBOLISM WITHOUT ACUTE COR PULMONALE (HCC): ICD-10-CM

## 2018-07-10 RX ORDER — IBUPROFEN 200 MG
1 TABLET ORAL EVERY 24 HOURS
COMMUNITY
End: 2018-08-15 | Stop reason: ALTCHOICE

## 2018-07-10 RX ORDER — ISOSORBIDE MONONITRATE 30 MG/1
TABLET, EXTENDED RELEASE ORAL DAILY
COMMUNITY
End: 2018-12-10 | Stop reason: SDUPTHER

## 2018-07-10 RX ORDER — LIDOCAINE 50 MG/G
PATCH TOPICAL EVERY 24 HOURS
COMMUNITY
End: 2018-12-10 | Stop reason: SDUPTHER

## 2018-07-10 NOTE — PATIENT INSTRUCTIONS
Meniscus Tear: Exercises  Your Care Instructions  Here are some examples of typical rehabilitation exercises for your condition. Start each exercise slowly. Ease off the exercise if you start to have pain. Your doctor or physical therapist will tell you when you can start these exercises and which ones will work best for you. How to do the exercises  Calf wall stretch    1. Stand facing a wall with your hands on the wall at about eye level. Put your affected leg about a step behind your other leg. 2. Keeping your back leg straight and your back heel on the floor, bend your front knee and gently bring your hip and chest toward the wall until you feel a stretch in the calf of your back leg. 3. Hold the stretch for at least 15 to 30 seconds. 4. Repeat 2 to 4 times. 5. Repeat steps 1 through 4, but this time keep your back knee bent. Hamstring wall stretch    1. Lie on your back in a doorway, with your good leg through the open door. 2. Slide your affected leg up the wall to straighten your knee. You should feel a gentle stretch down the back of your leg. 1. Do not arch your back. 2. Do not bend either knee. 3. Keep one heel touching the floor and the other heel touching the wall. Do not point your toes. 3. Hold the stretch for at least 1 minute. Then over time, try to lengthen the time you hold the stretch to as long as 6 minutes. 4. Repeat 2 to 4 times. 5. If you do not have a place to do this exercise in a doorway, there is another way to do it:  6. Lie on your back, and bend your affected leg. 7. Loop a towel under the ball and toes of that foot, and hold the ends of the towel in your hands. 8. Straighten your knee, and slowly pull back on the towel. You should feel a gentle stretch down the back of your leg. 9. Hold the stretch for at least 15 to 30 seconds. Or even better, hold the stretch for 1 minute if you can. 10. Repeat 2 to 4 times. Quad sets    1.  Sit with your leg straight and supported on the floor or a firm bed. (If you feel discomfort in the front or back of your knee, place a small towel roll under your knee.)  2. Tighten the muscles on top of your thigh by pressing the back of your knee flat down to the floor. (If you feel discomfort under your kneecap, place a small towel roll under your knee.)  3. Hold for about 6 seconds, then rest up to 10 seconds. 4. Do 8 to 12 repetitions several times a day. Straight-leg raises to the front    1. Lie on your back with your good knee bent so that your foot rests flat on the floor. Your injured leg should be straight. Make sure that your low back has a normal curve. You should be able to slip your flat hand in between the floor and the small of your back, with your palm touching the floor and your back touching the back of your hand. 2. Tighten the thigh muscles in the injured leg by pressing the back of your knee flat down to the floor. Hold your knee straight. 3. Keeping the thigh muscles tight, lift your injured leg up so that your heel is about 12 inches off the floor. Hold for 5 seconds and then lower slowly. 4. Do 8 to 12 repetitions. Straight-leg raises to the back    1. Lie on your stomach, and lift your leg straight up behind you (toward the ceiling). 2. Lift your toes about 6 inches off the floor, hold for about 6 seconds, then lower slowly. 3. Do 8 to 12 repetitions. Hamstring curls    1. Lie on your stomach with your knees straight. If your kneecap is uncomfortable, roll up a washcloth and put it under your leg just above your kneecap. 2. Lift the foot of your injured leg by bending the knee so that you bring the foot up toward your buttock. If this motion hurts, try it without bending your knee quite as far. This may help you avoid any painful motion. 3. Slowly lower your leg back to the floor. 4. Do 8 to 12 repetitions.   5. With permission from your doctor or physical therapist, you may also want to add a cuff weight to your ankle (not more than 5 pounds). With weight, you do not have to lift your leg more than 12 inches to get a hamstring workout. Wall slide with ball squeeze    1. Stand with your back against a wall and with your feet about shoulder-width apart. Your feet should be about 12 inches away from the wall. 2. Put a ball about the size of a soccer ball between your knees. Then slowly slide down the wall until your knees are bent about 20 to 30 degrees. 3. Tighten your thigh muscles by squeezing the ball between your knees. Hold that position for about 10 seconds, then stop squeezing. Rest for up to 10 seconds between repetitions. 4. Repeat 8 to 12 times. Heel raises    1. Stand with your feet a few inches apart, with your hands lightly resting on a counter or chair in front of you. 2. Slowly raise your heels off the floor while keeping your knees straight. 3. Hold for about 6 seconds, then slowly lower your heels to the floor. 4. Do 8 to 12 repetitions several times during the day. Heel dig bridging    Stop doing this exercise if it causes pain. 1. Lie on your back with both knees bent and your ankles bent so that only your heels are digging into the floor. Your knees should be bent about 90 degrees. 2. Then push your heels into the floor, squeeze your buttocks, and lift your hips off the floor until your shoulders, hips, and knees are all in a straight line. 3. Hold for about 6 seconds as you continue to breathe normally, and then slowly lower your hips back down to the floor and rest for up to 10 seconds. 4. Do 8 to 12 repetitions. Shallow standing knee bends    Do this exercise only if you have very little pain; if you have no clicking, locking, or giving way in the injured knee; and if it does not hurt while you are doing 8 to 12 repetitions. 1. Stand with your hands lightly resting on a counter or chair in front of you. Put your feet shoulder-width apart.   2. Slowly bend your knees so that you squat down like you are going to sit in a chair. Make sure your knees do not go in front of your toes. 3. Lower yourself about 6 inches. Your heels should remain on the floor at all times. 4. Rise slowly to a standing position. Follow-up care is a key part of your treatment and safety. Be sure to make and go to all appointments, and call your doctor if you are having problems. It's also a good idea to know your test results and keep a list of the medicines you take. Where can you learn more? Go to http://zayda-eden.info/. Enter C183 in the search box to learn more about \"Meniscus Tear: Exercises. \"  Current as of: November 29, 2017  Content Version: 11.7  © 7836-3377 [employer]. Care instructions adapted under license by [employer] (which disclaims liability or warranty for this information). If you have questions about a medical condition or this instruction, always ask your healthcare professional. [address] any warranty or liability for your use of this information.

## 2018-07-10 NOTE — PROGRESS NOTES
Al Hopkins is a 47 y.o. male (: 1965) presenting to address:    Chief Complaint   Patient presents with    Transitions Of Care       Vitals:    07/10/18 1405   BP: 128/86   Pulse: 99   Temp: 98.2 °F (36.8 °C)   TempSrc: Oral   SpO2: 96%   Weight: 243 lb (110.2 kg)   Height: 6' 2\" (1.88 m)   PainSc:   7   PainLoc: Generalized       Learning Assessment:     Learning Assessment 2014   PRIMARY LEARNER Patient   HIGHEST LEVEL OF EDUCATION - PRIMARY LEARNER  -   BARRIERS PRIMARY LEARNER -   CO-LEARNER CAREGIVER -   PRIMARY LANGUAGE ENGLISH   LEARNER PREFERENCE PRIMARY LISTENING   ANSWERED BY patient   RELATIONSHIP SELF     Depression Screening:     PHQ over the last two weeks 3/30/2018   PHQ Not Done Active Diagnosis of Depression or Bipolar Disorder     Fall Risk Assessment:     Fall Risk Assessment, last 12 mths 3/30/2018   Able to walk? Yes   Fall in past 12 months? Yes   Fall with injury? No   Number of falls in past 12 months 3   Fall Risk Score 3     Abuse Screening:     Abuse Screening Questionnaire 3/30/2018   Do you ever feel afraid of your partner? N   Are you in a relationship with someone who physically or mentally threatens you? N   Is it safe for you to go home? Y     Coordination of Care Questionaire:   1. Have you been to the ER, urgent care clinic since your last visit? Hospitalized since your last visit? YES. [facility]    2. Have you seen or consulted any other health care providers outside of the [address] [place] [address] since your last visit? Include any pap smears or colon screening. NO    Advanced Directive:   1. Do you have an Advanced Directive? YES    2. Would you like information on Advanced Directives?  NO

## 2018-07-10 NOTE — PROGRESS NOTES
Assessment/Plan:    1. Other acute pulmonary embolism without acute cor pulmonale (HCC)  -cont xarelto  - rivaroxaban (XARELTO) 20 mg tab tablet; Take 1 Tab by mouth daily (with breakfast). Dispense: 90 Tab; Refill: 1    2. Acute pain of left knee  -suspect meniscus tear. Ck uric acid. Home exercises. If not improved in a few weeks, will refer to PT  - URIC ACID; Future    3. Arthralgia of both hands  - LUCY QL, W/REFLEX CASCADE; Future  - CYCLIC CITRUL PEPTIDE AB, IGG; Future  - RHEUMATOID FACTOR, IGM; Future    4. Pancreatitis - resolved. States he's no longer drinking etoh. The plan was discussed with the patient. The patient verbalized understanding and is in agreement with the plan. All medication potential side effects were discussed with the patient. Health Maintenance:   Health Maintenance   Topic Date Due    Influenza Age 5 to Adult  08/01/2018    FOBT Q 1 YEAR AGE 50-75  09/13/2018    DTaP/Tdap/Td series (2 - Td) 05/30/2028    Pneumococcal 19-64 Medium Risk  Completed       Kellie Tabares is a 47 y.o. male and presents with Transitions Of Care     Subjective:  Mr. Ivan Hernadez is a 47y.o. year old male, he is seen today for Transition of Care services following a hospital discharge for alcoholic pancreatitis and etoh withdrawal on 6/28. Our office Nurse Navigator performed an outreach to Mr. Carcamo on 7/2 (within 2 business days of discharge) to complete medication reconciliation and a telephonic assessment of his condition. He feels recovered from his bout of pancreatitis. I reviewed labs from hospitalization. He notes L knee pain and swelling x 1 week. Denies trauma. Has grinding sensation when he moves it. Bending makes pain worse. He c/o all joints being tender- has pip, dip and mcp tenderness. L elbow pain. ROS:  Constitutional: No recent weight change. No weakness/fatigue. No f/c. Cardiovascular: No CP/palpitations. No ALBA/orthopnea/PND.    Respiratory: No cough/sputum, dyspnea, wheezing. Musculoskeletal: + joint pain/stiffness. No muscle pain/tenderness. The problem list was updated as a part of today's visit. 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 SH:  Social History   Substance Use Topics    Smoking status: Current Every Day Smoker     Packs/day: 1.00     Years: 20.00     Types: Cigarettes    Smokeless tobacco: Never Used    Alcohol use 0.0 oz/week      Comment:  ETOH abuse       Medications/Allergies:  Current Outpatient Prescriptions on File Prior to Visit   Medication Sig Dispense Refill    QUEtiapine SR (SEROQUEL XR) 300 mg sr tablet Take 300 mg by mouth nightly.  thiamine (B-1) 100 mg tablet Take  by mouth daily.  omeprazole (PRILOSEC) 20 mg capsule Take 20 mg by mouth daily.  tiZANidine (ZANAFLEX) 4 mg tablet TAKE 1 TABLET BY MOUTH EVERY DAY AS NEEDED 30 Tab 1    carvedilol (COREG) 6.25 mg tablet TAKE 1 TABLET BY MOUTH TWICE A DAY. 180 Tab 0    losartan (COZAAR) 25 mg tablet Take 1 Tab by mouth daily. 90 Tab 0    busPIRone (BUSPAR) 30 mg tablet Take 1 Tab by mouth daily. 90 Tab 0    folic acid (FOLVITE) 1 mg tablet Take  by mouth daily.       loratadine (CLARITIN) 10 mg tablet Take 1 Tab by mouth daily as needed for Allergies. 90 Tab 1    gabapentin (NEURONTIN) 800 mg tablet Take 1 Tab by mouth three (3) times daily. 270 Tab 11    multivitamin (DAILY-CAYLA) tablet Take 1 Tab by mouth daily. 90 Tab 3    nitroglycerin (NITROSTAT) 0.4 mg SL tablet 1 Tab by SubLINGual route every five (5) minutes as needed for Chest Pain (not to exceed 3 doses). 30 Tab 0    aspirin delayed-release 81 mg tablet TAKE 1 TAB BY MOUTH DAILY. 90 Tab 3    DULoxetine (CYMBALTA) 60 mg capsule Take 60 mg by mouth daily.  atorvastatin (LIPITOR) 40 mg tablet Take 1 Tab by mouth daily. 90 Tab 1     No current facility-administered medications on file prior to visit. Allergies   Allergen Reactions    Prednisone Other (comments)     He stated it hypes him up       Objective:  Visit Vitals    /86 (BP 1 Location: Right arm, BP Patient Position: Sitting)    Pulse 99    Temp 98.2 °F (36.8 °C) (Oral)    Ht 6' 2\" (1.88 m)    Wt 243 lb (110.2 kg)    SpO2 96%    BMI 31.2 kg/m2      Constitutional: Well developed, nourished, no distress, alert, obese habitus   CV: S1, S2.  RRR. No murmurs/rubs. No thrills palpated. No carotid bruits. Intact distal pulses. No edema. Pulm: No abnormalities on inspection. Clear to auscultation bilaterally. No wheezing/rhonchi. Normal effort. MS: Gait normal.  +pain over medial joint line R patella, no effusion, +crepitus. Neg thessaly    Neuro: A/O x 3. No focal motor or sensory deficits. Speech normal. +tremor. No asterixus. No synovial inflammation in hands. Psych: Appropriate affect, judgement and insight.

## 2018-07-10 NOTE — MR AVS SNAPSHOT
[address] [place] [address] [address] [facility] [id number] [phone] Patient: Daniel Lechuga MRN: [id number] :1965 Visit Information Date & Time Provider Department Dept. Phone Encounter #  
 7/10/2018  2:00 PM Obi Goncalves, [employer] [phone] [id number] Upcoming Health Maintenance Date Due Influenza Age 5 to Adult 2018 FOBT Q 1 YEAR AGE 50-75 2018 DTaP/Tdap/Td series (2 - Td) 2028 Allergies as of 7/10/2018  Review Complete On: 7/10/2018 By: Jakub Armstrong Severity Noted Reaction Type Reactions Prednisone  2014   Side Effect Other (comments) He stated it hypes him up Current Immunizations  Reviewed on 2017 Name Date Influenza Vaccine 2017 12:00 AM, 2017, 2014, 11/15/2011 12:00 AM  
 Novel Influenza-H1N1-09, All Formulations 10/26/2016 Pneumococcal Polysaccharide (PPSV-23) 2011, 11/15/2011 12:00 AM  
 Tdap 2018 12:00 AM  
  
 Not reviewed this visit You Were Diagnosed With   
  
 Codes Comments Acute pain of left knee    -  Primary ICD-10-CM: Y67.989 ICD-9-CM: 719.46 Other acute pulmonary embolism without acute cor pulmonale (HCC)     ICD-10-CM: I26.99 
ICD-9-CM: 415.19 Arthralgia of both hands     ICD-10-CM: M25.541, M25.542 ICD-9-CM: 719.44 Vitals BP Pulse Temp Height(growth percentile) Weight(growth percentile) SpO2  
 128/86 (BP 1 Location: Right arm, BP Patient Position: Sitting) 99 98.2 °F (36.8 °C) (Oral) 6' 2\" (1.88 m) 243 lb (110.2 kg) 96% BMI Smoking Status 31.2 kg/m2 Current Every Day Smoker Vitals History BMI and BSA Data Body Mass Index Body Surface Area  
 31.2 kg/m 2 2.4 m 2 Preferred Pharmacy Pharmacy Name Phone [facility] [address] [facility], [address] [place] [address] Mariya Lee [phone] Your Updated Medication List  
 This list is accurate as of 7/10/18  2:31 PM.  Always use your most recent med list.  
  
  
  
  
 aspirin delayed-release 81 mg tablet TAKE 1 TAB BY MOUTH DAILY. atorvastatin 40 mg tablet Commonly known as:  LIPITOR Take 1 Tab by mouth daily. busPIRone 30 mg tablet Commonly known as:  BUSPAR Take 1 Tab by mouth daily. carvedilol 6.25 mg tablet Commonly known as:  COREG  
TAKE 1 TABLET BY MOUTH TWICE A DAY. CYMBALTA 60 mg capsule Generic drug:  DULoxetine Take 60 mg by mouth daily. folic acid 1 mg tablet Commonly known as:  Google Take  by mouth daily. gabapentin 800 mg tablet Commonly known as:  NEURONTIN Take 1 Tab by mouth three (3) times daily. isosorbide mononitrate ER 30 mg tablet Commonly known as:  IMDUR Take  by mouth daily. lidocaine 5 % Commonly known as:  LIDODERM  
by TransDERmal route every twenty-four (24) hours. Apply patch to the affected area for 12 hours a day and remove for 12 hours a day. loratadine 10 mg tablet Commonly known as:  Garlan Baas Take 1 Tab by mouth daily as needed for Allergies. losartan 25 mg tablet Commonly known as:  COZAAR Take 1 Tab by mouth daily. multivitamin tablet Commonly known as:  Lemond Proffer Take 1 Tab by mouth daily. nicotine 21 mg/24 hr  
Commonly known as:  NICODERM CQ  
1 Patch by TransDERmal route every twenty-four (24) hours. nitroglycerin 0.4 mg SL tablet Commonly known as:  NITROSTAT  
1 Tab by SubLINGual route every five (5) minutes as needed for Chest Pain (not to exceed 3 doses). PriLOSEC 20 mg capsule Generic drug:  omeprazole Take 20 mg by mouth daily. rivaroxaban 20 mg Tab tablet Commonly known as:  Sharlette Hialeah Take 1 Tab by mouth daily (with breakfast). SEROquel  mg sr tablet Generic drug:  QUEtiapine SR Take 300 mg by mouth nightly. thiamine 100 mg tablet Commonly known as:  B-1  
 Take  by mouth daily. tiZANidine 4 mg tablet Commonly known as:  Andreea Brown TAKE 1 TABLET BY MOUTH EVERY DAY AS NEEDED Prescriptions Sent to Pharmacy Refills  
 rivaroxaban (XARELTO) 20 mg tab tablet 1 Sig: Take 1 Tab by mouth daily (with breakfast). Class: Normal  
 Pharmacy: [address] #: [phone] Route: Oral  
  
To-Do List   
 07/10/2018 Lab:  LUCY QL, W/REFLEX CASCADE   
  
 07/10/2018 Lab:  CYCLIC CITRUL PEPTIDE AB, IGG   
  
 07/10/2018 Lab:  RHEUMATOID FACTOR, IGM   
  
 07/10/2018 Lab:  URIC ACID Patient Instructions Meniscus Tear: Exercises Your Care Instructions Here are some examples of typical rehabilitation exercises for your condition. Start each exercise slowly. Ease off the exercise if you start to have pain. Your doctor or physical therapist will tell you when you can start these exercises and which ones will work best for you. How to do the exercises Calf wall stretch 1. Stand facing a wall with your hands on the wall at about eye level. Put your affected leg about a step behind your other leg. 2. Keeping your back leg straight and your back heel on the floor, bend your front knee and gently bring your hip and chest toward the wall until you feel a stretch in the calf of your back leg. 3. Hold the stretch for at least 15 to 30 seconds. 4. Repeat 2 to 4 times. 5. Repeat steps 1 through 4, but this time keep your back knee bent. Hamstring wall stretch 1. Lie on your back in a doorway, with your good leg through the open door. 2. Slide your affected leg up the wall to straighten your knee. You should feel a gentle stretch down the back of your leg. 1. Do not arch your back. 2. Do not bend either knee. 3. Keep one heel touching the floor and the other heel touching the wall. Do not point your toes. 3. Hold the stretch for at least 1 minute. Then over time, try to lengthen the time you hold the stretch to as long as 6 minutes. 4. Repeat 2 to 4 times. 5. If you do not have a place to do this exercise in a doorway, there is another way to do it: 6. Lie on your back, and bend your affected leg. 7. Loop a towel under the ball and toes of that foot, and hold the ends of the towel in your hands. 8. Straighten your knee, and slowly pull back on the towel. You should feel a gentle stretch down the back of your leg. 9. Hold the stretch for at least 15 to 30 seconds. Or even better, hold the stretch for 1 minute if you can. 10. Repeat 2 to 4 times. [employer] 1. Sit with your leg straight and supported on the floor or a firm bed. (If you feel discomfort in the front or back of your knee, place a small towel roll under your knee.) 2. Tighten the muscles on top of your thigh by pressing the back of your knee flat down to the floor. (If you feel discomfort under your kneecap, place a small towel roll under your knee.) 3. Hold for about 6 seconds, then rest up to 10 seconds. 4. Do 8 to 12 repetitions several times a day. Straight-leg raises to the front 1. Lie on your back with your good knee bent so that your foot rests flat on the floor. Your injured leg should be straight. Make sure that your low back has a normal curve. You should be able to slip your flat hand in between the floor and the small of your back, with your palm touching the floor and your back touching the back of your hand. 2. Tighten the thigh muscles in the injured leg by pressing the back of your knee flat down to the floor. Hold your knee straight. 3. Keeping the thigh muscles tight, lift your injured leg up so that your heel is about 12 inches off the floor. Hold for 5 seconds and then lower slowly. 4. Do 8 to 12 repetitions. Straight-leg raises to the back 1.  Lie on your stomach, and lift your leg straight up behind you (toward the ceiling). 2. Lift your toes about 6 inches off the floor, hold for about 6 seconds, then lower slowly. 3. Do 8 to 12 repetitions. Hamstring curls 1. Lie on your stomach with your knees straight. If your kneecap is uncomfortable, roll up a washcloth and put it under your leg just above your kneecap. 2. Lift the foot of your injured leg by bending the knee so that you bring the foot up toward your buttock. If this motion hurts, try it without bending your knee quite as far. This may help you avoid any painful motion. 3. Slowly lower your leg back to the floor. 4. Do 8 to 12 repetitions. 5. With permission from your doctor or physical therapist, you may also want to add a cuff weight to your ankle (not more than 5 pounds). With weight, you do not have to lift your leg more than 12 inches to get a hamstring workout. Wall slide with ball squeeze 1. Stand with your back against a wall and with your feet about shoulder-width apart. Your feet should be about 12 inches away from the wall. 2. Put a ball about the size of a soccer ball between your knees. Then slowly slide down the wall until your knees are bent about 20 to 30 degrees. 3. Tighten your thigh muscles by squeezing the ball between your knees. Hold that position for about 10 seconds, then stop squeezing. Rest for up to 10 seconds between repetitions. 4. Repeat 8 to 12 times. Heel raises 1. Stand with your feet a few inches apart, with your hands lightly resting on a counter or chair in front of you. 2. Slowly raise your heels off the floor while keeping your knees straight. 3. Hold for about 6 seconds, then slowly lower your heels to the floor. 4. Do 8 to 12 repetitions several times during the day. Heel dig bridging Stop doing this exercise if it causes pain. 1. Lie on your back with both knees bent and your ankles bent so that only your heels are digging into the floor. Your knees should be bent about 90 degrees. 2. Then push your heels into the floor, squeeze your buttocks, and lift your hips off the floor until your shoulders, hips, and knees are all in a straight line. 3. Hold for about 6 seconds as you continue to breathe normally, and then slowly lower your hips back down to the floor and rest for up to 10 seconds. 4. Do 8 to 12 repetitions. Shallow standing knee bends Do this exercise only if you have very little pain; if you have no clicking, locking, or giving way in the injured knee; and if it does not hurt while you are doing 8 to 12 repetitions. 1. Stand with your hands lightly resting on a counter or chair in front of you. Put your feet shoulder-width apart. 2. Slowly bend your knees so that you squat down like you are going to sit in a chair. Make sure your knees do not go in front of your toes. 3. Lower yourself about 6 inches. Your heels should remain on the floor at all times. 4. Rise slowly to a standing position. Follow-up care is a key part of your treatment and safety. Be sure to make and go to all appointments, and call your doctor if you are having problems. It's also a good idea to know your test results and keep a list of the medicines you take. Where can you learn more? Go to http://zayda-eden.info/. Enter C183 in the search box to learn more about \"Meniscus Tear: Exercises. \" Current as of: November 29, 2017 Content Version: 11.7 © 5046-7709 [employer]. Care instructions adapted under license by [employer] (which disclaims liability or warranty for this information). If you have questions about a medical condition or this instruction, always ask your healthcare professional. [place] [address] any warranty or liability for your use of this information. Introducing [facility]!    
 Ian Lemos introduces [employer] patient portal. Now you can access parts of your medical record, email your doctor's office, and request medication refills online. 1. In your internet browser, go to [email or web address] 2. Click on the First Time User? Click Here link in the Sign In box. You will see the New Member Sign Up page. 3. Enter your [employer] Access Code exactly as it appears below. You will not need to use this code after youve completed the sign-up process. If you do not sign up before the expiration date, you must request a new code. · [employer] Access Code: [email or web address] Expires: 10/8/2018  2:30 PM 
 
4. Enter the last four digits of your Social Security Number (xxxx) and Date of Birth (mm/dd/yyyy) as indicated and click Submit. You will be taken to the next sign-up page. 5. Create a [employer] ID. This will be your [employer] login ID and cannot be changed, so think of one that is secure and easy to remember. 6. Create a [employer] password. You can change your password at any time. 7. Enter your Password Reset Question and Answer. This can be used at a later time if you forget your password. 8. Enter your e-mail address. You will receive e-mail notification when new information is available in [address] 9. Click Sign Up. You can now view and download portions of your medical record. 10. Click the Download Summary menu link to download a portable copy of your medical information. If you have questions, please visit the Frequently Asked Questions section of the [employer] website. Remember, [employer] is NOT to be used for urgent needs. For medical emergencies, dial 911. Now available from your iPhone and Android! Please provide this summary of care documentation to your next provider. Your primary care clinician is listed as Oswald 13. If you have any questions after today's visit, please call [phone].

## 2018-07-11 ENCOUNTER — PATIENT OUTREACH (OUTPATIENT)
Dept: FAMILY MEDICINE CLINIC | Age: 53
End: 2018-07-11

## 2018-07-19 ENCOUNTER — HOSPITAL ENCOUNTER (EMERGENCY)
Age: 53
Discharge: LEFT BEFORE DISCHARGE | End: 2018-07-19
Attending: EMERGENCY MEDICINE
Payer: MEDICAID

## 2018-07-19 VITALS
DIASTOLIC BLOOD PRESSURE: 85 MMHG | RESPIRATION RATE: 18 BRPM | HEART RATE: 115 BPM | SYSTOLIC BLOOD PRESSURE: 137 MMHG | TEMPERATURE: 99.7 F | OXYGEN SATURATION: 100 %

## 2018-07-19 LAB
ALBUMIN SERPL-MCNC: 4.3 G/DL (ref 3.4–5)
ALBUMIN/GLOB SERPL: 1 {RATIO} (ref 0.8–1.7)
ALP SERPL-CCNC: 123 U/L (ref 45–117)
ALT SERPL-CCNC: 102 U/L (ref 16–61)
ANION GAP SERPL CALC-SCNC: 11 MMOL/L (ref 3–18)
AST SERPL-CCNC: 107 U/L (ref 15–37)
BASOPHILS # BLD: 0 K/UL (ref 0–0.1)
BASOPHILS NFR BLD: 1 % (ref 0–2)
BILIRUB SERPL-MCNC: 0.6 MG/DL (ref 0.2–1)
BUN SERPL-MCNC: 6 MG/DL (ref 7–18)
BUN/CREAT SERPL: 8 (ref 12–20)
CALCIUM SERPL-MCNC: 8.8 MG/DL (ref 8.5–10.1)
CHLORIDE SERPL-SCNC: 102 MMOL/L (ref 100–108)
CO2 SERPL-SCNC: 28 MMOL/L (ref 21–32)
CREAT SERPL-MCNC: 0.75 MG/DL (ref 0.6–1.3)
DIFFERENTIAL METHOD BLD: ABNORMAL
EOSINOPHIL # BLD: 0.1 K/UL (ref 0–0.4)
EOSINOPHIL NFR BLD: 1 % (ref 0–5)
ERYTHROCYTE [DISTWIDTH] IN BLOOD BY AUTOMATED COUNT: 15.4 % (ref 11.6–14.5)
ETHANOL SERPL-MCNC: 365 MG/DL (ref 0–3)
GLOBULIN SER CALC-MCNC: 4.2 G/DL (ref 2–4)
GLUCOSE SERPL-MCNC: 106 MG/DL (ref 74–99)
HCT VFR BLD AUTO: 47.2 % (ref 36–48)
HGB BLD-MCNC: 16.7 G/DL (ref 13–16)
LYMPHOCYTES # BLD: 2.9 K/UL (ref 0.9–3.6)
LYMPHOCYTES NFR BLD: 39 % (ref 21–52)
MCH RBC QN AUTO: 32.2 PG (ref 24–34)
MCHC RBC AUTO-ENTMCNC: 35.4 G/DL (ref 31–37)
MCV RBC AUTO: 90.9 FL (ref 74–97)
MONOCYTES # BLD: 0.6 K/UL (ref 0.05–1.2)
MONOCYTES NFR BLD: 8 % (ref 3–10)
NEUTS SEG # BLD: 3.8 K/UL (ref 1.8–8)
NEUTS SEG NFR BLD: 51 % (ref 40–73)
PLATELET # BLD AUTO: 152 K/UL (ref 135–420)
PMV BLD AUTO: 11.1 FL (ref 9.2–11.8)
POTASSIUM SERPL-SCNC: 3.2 MMOL/L (ref 3.5–5.5)
PROT SERPL-MCNC: 8.5 G/DL (ref 6.4–8.2)
RBC # BLD AUTO: 5.19 M/UL (ref 4.7–5.5)
SODIUM SERPL-SCNC: 141 MMOL/L (ref 136–145)
WBC # BLD AUTO: 7.4 K/UL (ref 4.6–13.2)

## 2018-07-19 PROCEDURE — 80307 DRUG TEST PRSMV CHEM ANLYZR: CPT | Performed by: EMERGENCY MEDICINE

## 2018-07-19 PROCEDURE — 85025 COMPLETE CBC W/AUTO DIFF WBC: CPT | Performed by: EMERGENCY MEDICINE

## 2018-07-19 PROCEDURE — 80053 COMPREHEN METABOLIC PANEL: CPT | Performed by: EMERGENCY MEDICINE

## 2018-07-19 PROCEDURE — 99284 EMERGENCY DEPT VISIT MOD MDM: CPT

## 2018-07-19 PROCEDURE — 96360 HYDRATION IV INFUSION INIT: CPT

## 2018-07-19 PROCEDURE — 74011250636 HC RX REV CODE- 250/636: Performed by: EMERGENCY MEDICINE

## 2018-07-19 RX ADMIN — SODIUM CHLORIDE 1000 ML: 900 INJECTION, SOLUTION INTRAVENOUS at 17:30

## 2018-07-19 NOTE — ED NOTES
Patient is not in bed. IV pulled fluids wide open laying in bed. Staff walked outside to look for patient.    Charge nurse aware

## 2018-07-23 ENCOUNTER — PATIENT OUTREACH (OUTPATIENT)
Dept: FAMILY MEDICINE CLINIC | Age: 53
End: 2018-07-23

## 2018-07-23 DIAGNOSIS — I25.10 CORONARY ARTERY DISEASE INVOLVING NATIVE CORONARY ARTERY OF NATIVE HEART WITHOUT ANGINA PECTORIS: ICD-10-CM

## 2018-07-23 DIAGNOSIS — I26.99 OTHER ACUTE PULMONARY EMBOLISM WITHOUT ACUTE COR PULMONALE (HCC): ICD-10-CM

## 2018-07-23 DIAGNOSIS — I10 ESSENTIAL HYPERTENSION: ICD-10-CM

## 2018-07-23 RX ORDER — ATORVASTATIN CALCIUM 40 MG/1
40 TABLET, FILM COATED ORAL DAILY
Qty: 90 TAB | Refills: 1 | Status: SHIPPED | OUTPATIENT
Start: 2018-07-23 | End: 2018-12-10 | Stop reason: SDUPTHER

## 2018-07-23 RX ORDER — LOSARTAN POTASSIUM 25 MG/1
25 TABLET ORAL DAILY
Qty: 90 TAB | Refills: 0 | Status: SHIPPED | OUTPATIENT
Start: 2018-07-23 | End: 2018-08-15 | Stop reason: SDUPTHER

## 2018-07-23 RX ORDER — CARVEDILOL 6.25 MG/1
TABLET ORAL
Qty: 180 TAB | Refills: 0 | Status: SHIPPED | OUTPATIENT
Start: 2018-07-23 | End: 2018-09-07 | Stop reason: SDUPTHER

## 2018-07-23 NOTE — Clinical Note
Patient has been readmitted for alcohol withdrawal. Possible discharge today. Do you want to see him for a CHARLA? Patient states he lost his bag with all his medications. He is requesting new prescriptions.  You can put in comments on the prescription asking pharmacist to do an override because meds were lost. May help with getting insurance to cover cost.

## 2018-07-23 NOTE — PROGRESS NOTES
Hospital Discharge Follow-Up      Date/Time:  7/23/2018 1:08 PM    Patient Outreach made to patient. He states he is currently admitted at [facility]. Patient admitted on 7/20/18 for Alcohol Withdrawal. He states he may be released today. He is asking for new prescriptions. He states he lost his bag with all his prescriptions. Top Challenges reviewed with the provider   New prescriptions needed. Patient lost all his medications. Current Outpatient Prescriptions   Medication Sig    lidocaine (LIDODERM) 5 % by TransDERmal route every twenty-four (24) hours. Apply patch to the affected area for 12 hours a day and remove for 12 hours a day.  nicotine (NICODERM CQ) 21 mg/24 hr 1 Patch by TransDERmal route every twenty-four (24) hours.  isosorbide mononitrate ER (IMDUR) 30 mg tablet Take  by mouth daily.  rivaroxaban (XARELTO) 20 mg tab tablet Take 1 Tab by mouth daily (with breakfast).  QUEtiapine SR (SEROQUEL XR) 300 mg sr tablet Take 300 mg by mouth nightly.  thiamine (B-1) 100 mg tablet Take  by mouth daily.  omeprazole (PRILOSEC) 20 mg capsule Take 20 mg by mouth daily.  tiZANidine (ZANAFLEX) 4 mg tablet TAKE 1 TABLET BY MOUTH EVERY DAY AS NEEDED    carvedilol (COREG) 6.25 mg tablet TAKE 1 TABLET BY MOUTH TWICE A DAY.  losartan (COZAAR) 25 mg tablet Take 1 Tab by mouth daily.  atorvastatin (LIPITOR) 40 mg tablet Take 1 Tab by mouth daily.  busPIRone (BUSPAR) 30 mg tablet Take 1 Tab by mouth daily.  folic acid (FOLVITE) 1 mg tablet Take  by mouth daily.  loratadine (CLARITIN) 10 mg tablet Take 1 Tab by mouth daily as needed for Allergies.  gabapentin (NEURONTIN) 800 mg tablet Take 1 Tab by mouth three (3) times daily.  multivitamin (DAILY-CAYLA) tablet Take 1 Tab by mouth daily.  nitroglycerin (NITROSTAT) 0.4 mg SL tablet 1 Tab by SubLINGual route every five (5) minutes as needed for Chest Pain (not to exceed 3 doses).     aspirin delayed-release 81 mg tablet TAKE 1 TAB BY MOUTH DAILY.  DULoxetine (CYMBALTA) 60 mg capsule Take 60 mg by mouth daily. No current facility-administered medications for this visit. There are no discontinued medications. [facility] follow up appointment(s): Future Appointments  Date Time Provider Aristides Marcelle   10/12/2018 1:00 PM MD Jolene Giron 23 Supportive resources in place to maintain patient in the community (ie. Home Health, DME equipment, refer to, medication assistant plan, etc.)            Goal: patient will remain safe  Intervention- contact sponsor, attend AA meetings       Understands red flags post discharge.             Goal: Signs and symptoms will be absent or minimal  Intervention:  Avoid alcohol  Contact sponsor when having cravings for alcohol

## 2018-07-26 ENCOUNTER — PATIENT OUTREACH (OUTPATIENT)
Dept: FAMILY MEDICINE CLINIC | Age: 53
End: 2018-07-26

## 2018-07-26 NOTE — PROGRESS NOTES
Patient Outreached Attempted. Received patient's voicemail box. Message left requesting call back. No new admissions or ED visits noted.

## 2018-08-02 ENCOUNTER — PATIENT OUTREACH (OUTPATIENT)
Dept: FAMILY MEDICINE CLINIC | Age: 53
End: 2018-08-02

## 2018-08-08 ENCOUNTER — TELEPHONE (OUTPATIENT)
Dept: FAMILY MEDICINE CLINIC | Age: 53
End: 2018-08-08

## 2018-08-08 NOTE — TELEPHONE ENCOUNTER
Patient was released from [address] [place] [address] yesterday 8/7/18. He was in for 7 days for severe alcohol withdrawl. Where would you like him fit into your schedule?

## 2018-08-11 DIAGNOSIS — I21.4 NSTEMI (NON-ST ELEVATED MYOCARDIAL INFARCTION) (HCC): ICD-10-CM

## 2018-08-13 RX ORDER — NITROGLYCERIN 0.4 MG/1
TABLET SUBLINGUAL
Qty: 25 TAB | Refills: 0 | Status: SHIPPED | OUTPATIENT
Start: 2018-08-13 | End: 2020-11-13

## 2018-08-15 ENCOUNTER — OFFICE VISIT (OUTPATIENT)
Dept: FAMILY MEDICINE CLINIC | Age: 53
End: 2018-08-15

## 2018-08-15 VITALS
OXYGEN SATURATION: 94 % | HEART RATE: 74 BPM | SYSTOLIC BLOOD PRESSURE: 100 MMHG | HEIGHT: 74 IN | RESPIRATION RATE: 20 BRPM | BODY MASS INDEX: 29.03 KG/M2 | DIASTOLIC BLOOD PRESSURE: 78 MMHG | WEIGHT: 226.2 LBS | TEMPERATURE: 97.6 F

## 2018-08-15 DIAGNOSIS — Z12.11 SCREEN FOR COLON CANCER: ICD-10-CM

## 2018-08-15 DIAGNOSIS — I10 ESSENTIAL HYPERTENSION: ICD-10-CM

## 2018-08-15 DIAGNOSIS — M54.42 CHRONIC LEFT-SIDED LOW BACK PAIN WITH LEFT-SIDED SCIATICA: ICD-10-CM

## 2018-08-15 DIAGNOSIS — K76.0 HEPATIC STEATOSIS: ICD-10-CM

## 2018-08-15 DIAGNOSIS — F10.20 UNCOMPLICATED ALCOHOL DEPENDENCE (HCC): Primary | ICD-10-CM

## 2018-08-15 DIAGNOSIS — G89.29 CHRONIC LEFT-SIDED LOW BACK PAIN WITH LEFT-SIDED SCIATICA: ICD-10-CM

## 2018-08-15 PROBLEM — D13.5 ADENOMYOMATOSIS OF GALLBLADDER: Status: ACTIVE | Noted: 2018-08-15

## 2018-08-15 PROBLEM — E87.6 DIURETIC-INDUCED HYPOKALEMIA: Status: RESOLVED | Noted: 2017-08-31 | Resolved: 2018-08-15

## 2018-08-15 PROBLEM — T50.2X5A DIURETIC-INDUCED HYPOKALEMIA: Status: RESOLVED | Noted: 2017-08-31 | Resolved: 2018-08-15

## 2018-08-15 RX ORDER — GABAPENTIN 600 MG/1
600 TABLET ORAL 2 TIMES DAILY
Qty: 180 TAB | Refills: 0 | Status: SHIPPED | OUTPATIENT
Start: 2018-08-15 | End: 2019-09-13 | Stop reason: ALTCHOICE

## 2018-08-15 RX ORDER — LOSARTAN POTASSIUM 50 MG/1
50 TABLET ORAL DAILY
Qty: 90 TAB | Refills: 0 | Status: SHIPPED | OUTPATIENT
Start: 2018-08-15 | End: 2018-09-07 | Stop reason: SDUPTHER

## 2018-08-15 RX ORDER — CLOTRIMAZOLE AND BETAMETHASONE DIPROPIONATE 10; .64 MG/G; MG/G
CREAM TOPICAL
Qty: 45 G | Refills: 0 | Status: SHIPPED | OUTPATIENT
Start: 2018-08-15 | End: 2018-12-10 | Stop reason: SDUPTHER

## 2018-08-15 RX ORDER — FLUTICASONE PROPIONATE 50 MCG
2 SPRAY, SUSPENSION (ML) NASAL DAILY
COMMUNITY
End: 2018-12-10 | Stop reason: SDUPTHER

## 2018-08-15 RX ORDER — NAPROXEN 500 MG/1
500 TABLET ORAL 2 TIMES DAILY WITH MEALS
COMMUNITY
End: 2018-12-10 | Stop reason: SDUPTHER

## 2018-08-15 NOTE — PROGRESS NOTES
Assessment/Plan:    1. Uncomplicated alcohol dependence ([address]  -pt continues to struggle with EtOH dependence. Discussed he needs to stop completely. 2. Hepatic steatosis      3. Essential hypertension  -monitor for lows. If needed, cut losartan back to 25mg.  - losartan (COZAAR) 50 mg tablet; Take 1 Tab by mouth daily. Indications: hypertension  Dispense: 90 Tab; Refill: 0    4. Screen for colon cancer  -to be done 9/2018  - OCCULT BLOOD IMMUNOASSAY,DIAGNOSTIC; Future    5. Chronic left-sided low back pain with left-sided sciatica  -decrease neurontin from 800mg tid to 600mg bid. MRI w/o significant findings. Discussed neurontin is controlled substance I would like to see him on lower dose. Stop zanaflex as well. The plan was discussed with the patient. The patient verbalized understanding and is in agreement with the plan. All medication potential side effects were discussed with the patient. Health Maintenance:   Health Maintenance   Topic Date Due    Influenza Age 5 to Adult  08/01/2018    FOBT Q 1 YEAR AGE 50-75  09/13/2018    DTaP/Tdap/Td series (2 - Td) 05/30/2028    Pneumococcal 19-64 Medium Risk  Completed       [facility] 47 [facility] and presents with Hospital Follow Up     Subjective:  Patient had another hospitalization for ETOH withdrawal.  He was drinking about a gallon of vodka/day. He has h/o ETOH related complications, including:  Delirium tremens, Seizures, Pancreatitis, Hx Aspiration pneumonitis, bone marrow suppression. He has an appointment upcoming with psychiatry, [facility]. CAD, with h/o NSTEMI - noncompliant with meds, largely due to his addiction to ETOH. On statin, BB, ARB and xarelto. Pt has h/o recurrent PE - this has happened in the setting of intoxication/immobility. He's noncompliant with xarelto therapy. He's on neurontin and tizanidine for back pain. MRI didn't show significant findings 2014.        ROS:  Constitutional: No recent weight change. No weakness/fatigue. No f/c. Cardiovascular: No CP/palpitations. No ALBA/orthopnea/PND. Respiratory: No cough/sputum, dyspnea, wheezing. Gastointestinal: No dysphagia, reflux. No n/v. No constipation/diarrhea. No melena/rectal bleeding. The problem list was updated as a part of today's visit. 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 SH:  Social History   Substance Use Topics    Smoking status: Current Every Day Smoker     Packs/day: 1.00     Years: 20.00     Types: Cigarettes    Smokeless tobacco: Never Used    Alcohol use 0.0 oz/week      Comment:  ETOH abuse       Medications/Allergies:  Current Outpatient Prescriptions on File Prior to Visit   Medication Sig Dispense Refill    nitroglycerin (NITROSTAT) 0.4 mg SL tablet TAKE 1 TABLET UNDER TOUNGE EVERY 5 MINS AS NEEDED FOR CHEST PAIN. DO NOT EXCEED 3 DOSES 25 Tab 0    rivaroxaban (XARELTO) 20 mg tab tablet Take 1 Tab by mouth daily (with breakfast). Indications: pulmonary thromboembolism 90 Tab 1    carvedilol (COREG) 6.25 mg tablet TAKE 1 TABLET BY MOUTH TWICE A DAY.   Indications: hypertension 180 Tab 0    losartan (COZAAR) 25 mg tablet Take 1 Tab by mouth daily. Indications: hypertension 90 Tab 0    atorvastatin (LIPITOR) 40 mg tablet Take 1 Tab by mouth daily. Indications: hypercholesterolemia 90 Tab 1    lidocaine (LIDODERM) 5 % by TransDERmal route every twenty-four (24) hours. Apply patch to the affected area for 12 hours a day and remove for 12 hours a day.  nicotine (NICODERM CQ) 21 mg/24 hr 1 Patch by TransDERmal route every twenty-four (24) hours.  isosorbide mononitrate ER (IMDUR) 30 mg tablet Take  by mouth daily.  QUEtiapine SR (SEROQUEL XR) 300 mg sr tablet Take 300 mg by mouth nightly.  thiamine (B-1) 100 mg tablet Take  by mouth daily.  omeprazole (PRILOSEC) 20 mg capsule Take 20 mg by mouth daily.  tiZANidine (ZANAFLEX) 4 mg tablet TAKE 1 TABLET BY MOUTH EVERY DAY AS NEEDED 30 Tab 1    busPIRone (BUSPAR) 30 mg tablet Take 1 Tab by mouth daily. 90 Tab 0    folic acid (FOLVITE) 1 mg tablet Take  by mouth daily.  loratadine (CLARITIN) 10 mg tablet Take 1 Tab by mouth daily as needed for Allergies. 90 Tab 1    gabapentin (NEURONTIN) 800 mg tablet Take 1 Tab by mouth three (3) times daily. 270 Tab 11    multivitamin (DAILY-CAYLA) tablet Take 1 Tab by mouth daily. 90 Tab 3    aspirin delayed-release 81 mg tablet TAKE 1 TAB BY MOUTH DAILY. 90 Tab 3    DULoxetine (CYMBALTA) 60 mg capsule Take 60 mg by mouth daily. No current facility-administered medications on file prior to visit. Allergies   Allergen Reactions    Prednisone Other (comments)     He stated it hypes him up       Objective:  Visit Vitals    /78 (BP 1 Location: Left arm, BP Patient Position: Sitting)    Pulse 74    Temp 97.6 °F (36.4 °C) (Oral)    Resp 20    Ht 6' 2\" (1.88 m)    Wt 226 lb 3.2 oz (102.6 kg)    SpO2 94%    BMI 29.04 kg/m2      Constitutional: Well developed, nourished, no distress, alert   CV: S1, S2.  RRR. No murmurs/rubs. No thrills palpated. No carotid bruits. Intact distal pulses. No edema.   No aortic bruits. Pulm: No abnormalities on inspection. Clear to auscultation bilaterally. No wheezing/rhonchi. Normal effort. GI: Soft, nontender, nondistended. Normal active bowel sounds. MS: Gait normal.  Joints without deformity/tenderness. Strength intact bilateral upper and lower ext. Normal ROM all extremities. Neuro: A/O x 3. No focal motor or sensory deficits. Speech normal.   Skin: No lesions/rashes on inspection. Psych: Appropriate affect, judgement and insight. Short-term memory intact. Labwork and Ancillary Studies:    CBC w/Diff  Lab Results   Component Value Date/Time    WBC 7.4 07/19/2018 05:25 PM    HGB 16.7 (H) 07/19/2018 05:25 PM    PLATELET 239 70/88/9697 05:25 PM         Basic Metabolic Profile/LFTs  Lab Results   Component Value Date/Time    Sodium 141 07/19/2018 05:25 PM    Potassium 3.2 (L) 07/19/2018 05:25 PM    Chloride 102 07/19/2018 05:25 PM    CO2 28 07/19/2018 05:25 PM    Anion gap 11 07/19/2018 05:25 PM    Glucose 106 (H) 07/19/2018 05:25 PM    BUN 6 (L) 07/19/2018 05:25 PM    Creatinine 0.75 07/19/2018 05:25 PM    BUN/Creatinine ratio 8 (L) 07/19/2018 05:25 PM    GFR est AA >60 07/19/2018 05:25 PM    GFR est non-AA >60 07/19/2018 05:25 PM    Calcium 8.8 07/19/2018 05:25 PM      Lab Results   Component Value Date/Time    ALT (SGPT) 102 (H) 07/19/2018 05:25 PM    AST (SGOT) 107 (H) 07/19/2018 05:25 PM    Alk.  phosphatase 123 (H) 07/19/2018 05:25 PM    Bilirubin, direct <0.1 09/29/2015 10:24 PM    Bilirubin, total 0.6 07/19/2018 05:25 PM       Cholesterol  Lab Results   Component Value Date/Time    Cholesterol, total 159 11/25/2016 09:25 AM    HDL Cholesterol 46 11/25/2016 09:25 AM    LDL, calculated 81 11/25/2016 09:25 AM    Triglyceride 158 (H) 11/25/2016 09:25 AM    CHOL/HDL Ratio 4.0 04/22/2014 05:10 AM

## 2018-08-15 NOTE — PROGRESS NOTES
Sera Nguyen is a 47 y.o. male (: 1965) presenting to address:    Chief Complaint   Patient presents with   [facility] Follow Up       Vitals:    08/15/18 0913   BP: 100/78   Pulse: 74   Resp: 20   Temp: 97.6 °F (36.4 °C)   TempSrc: Oral   SpO2: 94%   Weight: 226 lb 3.2 oz (102.6 kg)   Height: 6' 2\" (1.88 m)   PainSc:   4   PainLoc: Back       Learning Assessment:     Learning Assessment 2014   PRIMARY LEARNER Patient   HIGHEST LEVEL OF EDUCATION - PRIMARY LEARNER  -   BARRIERS PRIMARY LEARNER -   CO-LEARNER CAREGIVER -   PRIMARY LANGUAGE ENGLISH   LEARNER PREFERENCE PRIMARY LISTENING   ANSWERED BY patient   RELATIONSHIP SELF     Depression Screening:     PHQ over the last two weeks 8/15/2018   PHQ Not Done Active Diagnosis of Depression or Bipolar Disorder     Fall Risk Assessment:     Fall Risk Assessment, last 12 mths 3/30/2018   Able to walk? Yes   Fall in past 12 months? Yes   Fall with injury? No   Number of falls in past 12 months 3   Fall Risk Score 3     Abuse Screening:     Abuse Screening Questionnaire 3/30/2018   Do you ever feel afraid of your partner? N   Are you in a relationship with someone who physically or mentally threatens you? N   Is it safe for you to go home? Y     Coordination of Care Questionaire:   1. Have you been to the ER, urgent care clinic since your last visit? Hospitalized since your last visit? YES [facility] -18, [facility] 18    2. Have you seen or consulted any other health care providers outside of the [address] [place] [address] since your last visit? Include any pap smears or colon screening. NO    Advanced Directive:   1. Do you have an Advanced Directive? YES    2. Would you like information on Advanced Directives?  NO

## 2018-08-15 NOTE — MR AVS SNAPSHOT
[address] [place] [address] 
 
 
 [address] [facility] [id number] [phone] Patient: Angelica Dela Cruz MRN: [id number] :1965 Visit Information Date & Time Provider Department Dept. Phone Encounter #  
 8/15/2018  9:15 AM Maria C Galdamez [facility] [phone] [id number] Your Appointments 10/12/2018  1:00 PM  
Follow Up with Maria C Galdamez MD  
[facility] Appt Note: 3 month f/u appt [address] [facility] [id number] [phone]  
  
   
 [address] [place] [address] Upcoming Health Maintenance Date Due Influenza Age 5 to Adult 2018 FOBT Q 1 YEAR AGE 50-75 2018 DTaP/Tdap/Td series (2 - Td) 2028 Allergies as of 8/15/2018  Review Complete On: 8/15/2018 By: Maria C Galdamez MD  
  
 Severity Noted Reaction Type Reactions Prednisone  2014   Side Effect Other (comments) He stated it hypes him up Current Immunizations  Reviewed on 2017 Name Date Influenza Vaccine 2017 12:00 AM, 2017, 2014, 11/15/2011 12:00 AM  
 Novel Influenza-H1N1-09, All Formulations 10/26/2016 Pneumococcal Polysaccharide (PPSV-23) 2011, 11/15/2011 12:00 AM  
 Tdap 2018 12:00 AM  
  
 Not reviewed this visit You Were Diagnosed With   
  
 Codes Comments Uncomplicated alcohol dependence ([facility]    -  Primary ICD-10-CM: X50.16 ICD-9-CM: 303.90 Hepatic steatosis     ICD-10-CM: K76.0 ICD-9-CM: 571.8 Essential hypertension     ICD-10-CM: I10 
ICD-9-CM: 401.9 Screen for colon cancer     ICD-10-CM: Z12.11 ICD-9-CM: V76.51 Chronic left-sided low back pain with left-sided sciatica     ICD-10-CM: M54.42, G89.29 ICD-9-CM: 724.2, 724.3, 338.29 Vitals BP Pulse Temp Resp Height(growth percentile) Weight(growth percentile) 100/78 (BP 1 Location: Left arm, BP Patient Position: Sitting) 74 97.6 °F (36.4 °C) (Oral) 20 6' 2\" (1.88 m) 226 lb 3.2 oz (102.6 kg) SpO2 BMI Smoking Status 94% 29.04 kg/m2 Current Every Day Smoker Vitals History BMI and BSA Data Body Mass Index Body Surface Area 29.04 kg/m 2 2.31 m 2 Preferred Pharmacy Pharmacy Name Phone  [facility], [address] [place] [address] [phone] Your Updated Medication List  
  
   
This list is accurate as of 8/15/18  9:42 AM.  Always use your most recent med list.  
  
  
  
  
 aspirin delayed-release 81 mg tablet TAKE 1 TAB BY MOUTH DAILY. atorvastatin 40 mg tablet Commonly known as:  LIPITOR Take 1 Tab by mouth daily. Indications: hypercholesterolemia  
  
 busPIRone 30 mg tablet Commonly known as:  BUSPAR Take 1 Tab by mouth daily. carvedilol 6.25 mg tablet Commonly known as:  COREG  
TAKE 1 TABLET BY MOUTH TWICE A DAY. Indications: hypertension  
  
 clotrimazole-betamethasone topical cream  
Commonly known as:  Catarina Hu Apply pea-sized amount bid to affecteed area x 10d CYMBALTA 60 mg capsule Generic drug:  DULoxetine Take 60 mg by mouth daily. FLONASE ALLERGY RELIEF 50 mcg/actuation nasal spray Generic drug:  fluticasone 2 Sprays by Both Nostrils route daily. folic acid 1 mg tablet Commonly known as:  Google Take  by mouth daily. gabapentin 600 mg tablet Commonly known as:  NEURONTIN Take 1 Tab by mouth two (2) times a day. isosorbide mononitrate ER 30 mg tablet Commonly known as:  IMDUR Take  by mouth daily. lidocaine 5 % Commonly known as:  LIDODERM  
by TransDERmal route every twenty-four (24) hours. Apply patch to the affected area for 12 hours a day and remove for 12 hours a day. loratadine 10 mg tablet Commonly known as:  Kalyn Khang Take 1 Tab by mouth daily as needed for Allergies. losartan 50 mg tablet Commonly known as:  COZAAR Take 1 Tab by mouth daily. Indications: hypertension  
  
 multivitamin tablet Commonly known as:  Jarett Mindy Take 1 Tab by mouth daily. naproxen 500 mg tablet Commonly known as:  NAPROSYN Take 500 mg by mouth two (2) times daily (with meals). nitroglycerin 0.4 mg SL tablet Commonly known as:  NITROSTAT  
TAKE 1 TABLET UNDER TOUNGE EVERY 5 MINS AS NEEDED FOR CHEST PAIN. DO NOT EXCEED 3 DOSES PriLOSEC 20 mg capsule Generic drug:  omeprazole Take 20 mg by mouth daily. rivaroxaban 20 mg Tab tablet Commonly known as:  Aaron Monday Take 1 Tab by mouth daily (with breakfast). Indications: pulmonary thromboembolism SEROquel  mg sr tablet Generic drug:  QUEtiapine SR Take 300 mg by mouth nightly. thiamine  mg tablet Commonly known as:  B-1 Take  by mouth daily. Prescriptions Printed Refills  
 clotrimazole-betamethasone (LOTRISONE) topical cream 0 Sig: Apply pea-sized amount bid to affecteed area x 10d Class: Print Prescriptions Sent to Pharmacy Refills  
 losartan (COZAAR) 50 mg tablet 0 Sig: Take 1 Tab by mouth daily. Indications: hypertension Class: Normal  
 Pharmacy: [place] [address] Ph #: [phone] Route: Oral  
 gabapentin (NEURONTIN) 600 mg tablet 0 Sig: Take 1 Tab by mouth two (2) times a day. Class: Normal  
 Pharmacy: [place] [address] Ph #: [phone] Route: Oral  
  
To-Do List   
 08/15/2018 Lab:  OCCULT BLOOD IMMUNOASSAY,DIAGNOSTIC Introducing [facility]! [facility] introduces [employer] patient portal. Now you can access parts of your medical record, email your doctor's office, and request medication refills online. 1. In your internet browser, go to [email or web address] 2. Click on the First Time User? Click Here link in the Sign In box. You will see the New Member Sign Up page. 3. Enter your [employer] Access Code exactly as it appears below. You will not need to use this code after youve completed the sign-up process. If you do not sign up before the expiration date, you must request a new code. · [employer] Access Code: [email or web address] Expires: 10/8/2018  2:30 PM 
 
4. Enter the last four digits of your Social Security Number (xxxx) and Date of Birth (mm/dd/yyyy) as indicated and click Submit. You will be taken to the next sign-up page. 5. Create a [employer] ID. This will be your [employer] login ID and cannot be changed, so think of one that is secure and easy to remember. 6. Create a [employer] password. You can change your password at any time. 7. Enter your Password Reset Question and Answer. This can be used at a later time if you forget your password. 8. Enter your e-mail address. You will receive e-mail notification when new information is available in [address] 9. Click Sign Up. You can now view and download portions of your medical record. 10. Click the Download Summary menu link to download a portable copy of your medical information. If you have questions, please visit the Frequently Asked Questions section of the [employer] website. Remember, [employer] is NOT to be used for urgent needs. For medical emergencies, dial 911. Now available from your iPhone and Android! Please provide this summary of care documentation to your next provider. Your primary care clinician is listed as [address] 13. If you have any questions after today's visit, please call [phone].

## 2018-09-04 DIAGNOSIS — I10 ESSENTIAL HYPERTENSION: ICD-10-CM

## 2018-09-04 RX ORDER — CARVEDILOL 6.25 MG/1
TABLET ORAL
Qty: 180 TAB | Refills: 0 | Status: CANCELLED | OUTPATIENT
Start: 2018-09-04

## 2018-09-07 DIAGNOSIS — I10 ESSENTIAL HYPERTENSION: ICD-10-CM

## 2018-09-07 RX ORDER — CARVEDILOL 6.25 MG/1
12.5 TABLET ORAL 2 TIMES DAILY
Qty: 180 TAB | Refills: 1 | Status: SHIPPED | OUTPATIENT
Start: 2018-09-07 | End: 2018-12-10

## 2018-09-07 RX ORDER — LOSARTAN POTASSIUM 25 MG/1
25 TABLET ORAL DAILY
Qty: 90 TAB | Refills: 1 | Status: SHIPPED | OUTPATIENT
Start: 2018-09-07 | End: 2018-12-10 | Stop reason: SDUPTHER

## 2018-09-24 RX ORDER — BISMUTH SUBSALICYLATE 262 MG
1 TABLET,CHEWABLE ORAL DAILY
Qty: 90 TAB | Refills: 3 | Status: SHIPPED | OUTPATIENT
Start: 2018-09-24 | End: 2018-12-10 | Stop reason: SDUPTHER

## 2018-09-24 NOTE — TELEPHONE ENCOUNTER
Patient pharmacy is requesting a med refill of Daily- Joseph tab    Last visit: 8/15/18    Last refill: 10/17/17

## 2018-12-10 ENCOUNTER — OFFICE VISIT (OUTPATIENT)
Dept: INTERNAL MEDICINE CLINIC | Age: 53
End: 2018-12-10

## 2018-12-10 VITALS
SYSTOLIC BLOOD PRESSURE: 92 MMHG | DIASTOLIC BLOOD PRESSURE: 59 MMHG | HEART RATE: 82 BPM | OXYGEN SATURATION: 94 % | TEMPERATURE: 97.5 F | WEIGHT: 228.8 LBS | BODY MASS INDEX: 29.36 KG/M2 | RESPIRATION RATE: 20 BRPM | HEIGHT: 74 IN

## 2018-12-10 DIAGNOSIS — F43.10 PTSD (POST-TRAUMATIC STRESS DISORDER): ICD-10-CM

## 2018-12-10 DIAGNOSIS — Z12.11 COLON CANCER SCREENING: ICD-10-CM

## 2018-12-10 DIAGNOSIS — F17.200 TOBACCO DEPENDENCE: ICD-10-CM

## 2018-12-10 DIAGNOSIS — J30.2 SEASONAL ALLERGIC RHINITIS, UNSPECIFIED TRIGGER: ICD-10-CM

## 2018-12-10 DIAGNOSIS — I10 ESSENTIAL HYPERTENSION: Primary | ICD-10-CM

## 2018-12-10 DIAGNOSIS — I26.99 OTHER ACUTE PULMONARY EMBOLISM WITHOUT ACUTE COR PULMONALE (HCC): ICD-10-CM

## 2018-12-10 DIAGNOSIS — F10.21 ALCOHOL DEPENDENCE IN EARLY FULL REMISSION (HCC): ICD-10-CM

## 2018-12-10 DIAGNOSIS — I25.10 CORONARY ARTERY DISEASE INVOLVING NATIVE CORONARY ARTERY OF NATIVE HEART WITHOUT ANGINA PECTORIS: ICD-10-CM

## 2018-12-10 RX ORDER — ISOSORBIDE MONONITRATE 30 MG/1
30 TABLET, EXTENDED RELEASE ORAL DAILY
Qty: 90 TAB | Refills: 3 | Status: SHIPPED | OUTPATIENT
Start: 2018-12-10 | End: 2019-09-13 | Stop reason: ALTCHOICE

## 2018-12-10 RX ORDER — ASPIRIN 81 MG/1
TABLET ORAL
Qty: 90 TAB | Refills: 3 | Status: SHIPPED | OUTPATIENT
Start: 2018-12-10 | End: 2019-09-13 | Stop reason: ALTCHOICE

## 2018-12-10 RX ORDER — CLOTRIMAZOLE AND BETAMETHASONE DIPROPIONATE 10; .64 MG/G; MG/G
CREAM TOPICAL
Qty: 45 G | Refills: 0 | Status: SHIPPED | OUTPATIENT
Start: 2018-12-10 | End: 2019-05-20 | Stop reason: SDUPTHER

## 2018-12-10 RX ORDER — FLUTICASONE PROPIONATE 50 MCG
2 SPRAY, SUSPENSION (ML) NASAL DAILY
Qty: 1 BOTTLE | Refills: 3 | Status: SHIPPED | OUTPATIENT
Start: 2018-12-10 | End: 2019-04-02 | Stop reason: SDUPTHER

## 2018-12-10 RX ORDER — FOLIC ACID 1 MG/1
1 TABLET ORAL DAILY
Qty: 90 TAB | Refills: 3 | Status: SHIPPED | OUTPATIENT
Start: 2018-12-10 | End: 2020-11-13

## 2018-12-10 RX ORDER — ACETAMINOPHEN 500 MG
TABLET ORAL
Qty: 1 KIT | Refills: 0 | Status: SHIPPED | OUTPATIENT
Start: 2018-12-10 | End: 2019-09-13 | Stop reason: ALTCHOICE

## 2018-12-10 RX ORDER — LIDOCAINE 50 MG/G
1 PATCH TOPICAL EVERY 24 HOURS
Qty: 30 EACH | Refills: 5 | Status: SHIPPED | OUTPATIENT
Start: 2018-12-10 | End: 2019-05-31 | Stop reason: SDUPTHER

## 2018-12-10 RX ORDER — NAPROXEN 500 MG/1
500 TABLET ORAL 2 TIMES DAILY WITH MEALS
Qty: 100 TAB | Refills: 2 | Status: SHIPPED | OUTPATIENT
Start: 2018-12-10 | End: 2019-09-13 | Stop reason: ALTCHOICE

## 2018-12-10 RX ORDER — CARVEDILOL 6.25 MG/1
6.25 TABLET ORAL 2 TIMES DAILY
Qty: 180 TAB | Refills: 3 | Status: SHIPPED | OUTPATIENT
Start: 2018-12-10 | End: 2019-09-13

## 2018-12-10 RX ORDER — LOSARTAN POTASSIUM 25 MG/1
25 TABLET ORAL DAILY
Qty: 90 TAB | Refills: 1 | Status: SHIPPED | OUTPATIENT
Start: 2018-12-10 | End: 2019-06-03 | Stop reason: SDUPTHER

## 2018-12-10 RX ORDER — OMEPRAZOLE 20 MG/1
20 CAPSULE, DELAYED RELEASE ORAL DAILY
Qty: 90 CAP | Refills: 3 | Status: SHIPPED | OUTPATIENT
Start: 2018-12-10 | End: 2019-09-13 | Stop reason: ALTCHOICE

## 2018-12-10 RX ORDER — LORATADINE 10 MG/1
10 TABLET ORAL
Qty: 90 TAB | Refills: 1 | Status: SHIPPED | OUTPATIENT
Start: 2018-12-10 | End: 2019-06-03 | Stop reason: SDUPTHER

## 2018-12-10 RX ORDER — LANOLIN ALCOHOL/MO/W.PET/CERES
100 CREAM (GRAM) TOPICAL DAILY
Qty: 90 TAB | Refills: 3 | Status: SHIPPED | OUTPATIENT
Start: 2018-12-10 | End: 2020-11-13

## 2018-12-10 RX ORDER — BISMUTH SUBSALICYLATE 262 MG
1 TABLET,CHEWABLE ORAL DAILY
Qty: 90 TAB | Refills: 3 | Status: SHIPPED | OUTPATIENT
Start: 2018-12-10 | End: 2020-09-10 | Stop reason: SDUPTHER

## 2018-12-10 RX ORDER — ATORVASTATIN CALCIUM 40 MG/1
40 TABLET, FILM COATED ORAL DAILY
Qty: 90 TAB | Refills: 1 | Status: SHIPPED | OUTPATIENT
Start: 2018-12-10 | End: 2019-05-31 | Stop reason: SDUPTHER
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
## 2018-12-10 NOTE — PATIENT INSTRUCTIONS

## 2018-12-10 NOTE — PROGRESS NOTES
ROOM # 16  Identified pt with two pt identifiers(name and ). Reviewed record in preparation for visit and have obtained necessary documentation. Chief Complaint   Patient presents with   [address] preferred language for health care discussion is english/other. Is the patient using any DME equipment during OV? Cynthia Trevino is due for:  Health Maintenance Due   Topic    Shingrix Vaccine Age 50> (1 of 2)    Influenza Age 5 to Adult     FOBT Q 1 YEAR AGE 50-75      Health Maintenance reviewed and discussed per provider  Please order/place referral if appropriate. Advance Directive:  1. Do you have an advance directive in place? Patient Reply: NO    2. If not, would you like material regarding how to put one in place? NO    Coordination of Care:  1. Have you been to the ER, urgent care clinic since your last visit? Hospitalized since your last visit? YES    2. Have you seen or consulted any other health care providers outside of the [address] [place] [address] since your last visit? Include any pap smears or colon screening. YES    Patient is accompanied by self I have received verbal consent from [place] to discuss any/all medical information while they are present in the room.     Learning Assessment:  Learning Assessment 2013   PRIMARY LEARNER Patient Patient Patient   HIGHEST LEVEL OF EDUCATION - PRIMARY LEARNER  - DID NOT GRADUATE HIGH SCHOOL DID NOT GRADUATE HIGH SCHOOL   BARRIERS PRIMARY LEARNER - NONE COGNITIVE   CO-LEARNER CAREGIVER - No No   PRIMARY LANGUAGE ENGLISH ENGLISH ENGLISH   LEARNER PREFERENCE PRIMARY LISTENING DEMONSTRATION DEMONSTRATION   ANSWERED BY patient self Patient   RELATIONSHIP SELF SELF SELF     Depression Screening:  PHQ over the last two weeks 8/15/2018 3/30/2018 10/30/2017   PHQ Not Done Active Diagnosis of Depression or Bipolar Disorder Active Diagnosis of Depression or Bipolar Disorder Active Diagnosis of Depression or Bipolar Disorder     Abuse Screening:  Abuse Screening Questionnaire 3/30/2018 8/31/2017 7/28/2014   Do you ever feel afraid of your partner? N N N   Are you in a relationship with someone who physically or mentally threatens you? N N N   Is it safe for you to go home? Parrish Coats     Fall Risk  Fall Risk Assessment, last 12 mths 3/30/2018 10/30/2017   Able to walk? Yes Yes   Fall in past 12 months? Yes No   Fall with injury?  No -   Number of falls in past 12 months 3 -   Fall Risk Score 3 -

## 2018-12-28 ENCOUNTER — OFFICE VISIT (OUTPATIENT)
Dept: INTERNAL MEDICINE CLINIC | Age: 53
End: 2018-12-28

## 2018-12-28 VITALS
OXYGEN SATURATION: 95 % | TEMPERATURE: 99 F | HEIGHT: 74 IN | DIASTOLIC BLOOD PRESSURE: 81 MMHG | BODY MASS INDEX: 29.26 KG/M2 | SYSTOLIC BLOOD PRESSURE: 110 MMHG | RESPIRATION RATE: 20 BRPM | HEART RATE: 93 BPM | WEIGHT: 228 LBS

## 2018-12-28 DIAGNOSIS — Z87.19 HISTORY OF COLITIS: ICD-10-CM

## 2018-12-28 RX ORDER — IBUPROFEN 200 MG
1 TABLET ORAL
COMMUNITY
Start: 2018-12-24 | End: 2019-09-13 | Stop reason: ALTCHOICE

## 2018-12-28 RX ORDER — POTASSIUM CHLORIDE 20 MEQ/1
10 TABLET, EXTENDED RELEASE ORAL
COMMUNITY
End: 2019-09-13 | Stop reason: ALTCHOICE

## 2018-12-28 RX ORDER — CLOPIDOGREL BISULFATE 75 MG/1
75 TABLET ORAL
COMMUNITY
Start: 2018-12-25 | End: 2019-01-24

## 2018-12-28 RX ORDER — OXYCODONE HYDROCHLORIDE 5 MG/1
5 TABLET ORAL
COMMUNITY
Start: 2018-12-26 | End: 2018-12-31

## 2018-12-28 RX ORDER — CLONIDINE HYDROCHLORIDE 0.1 MG/1
0.1 TABLET ORAL
COMMUNITY
Start: 2018-12-24 | End: 2019-03-13

## 2018-12-28 RX ORDER — CYCLOBENZAPRINE HCL 5 MG
5 TABLET ORAL
COMMUNITY
Start: 2018-12-24 | End: 2019-05-20 | Stop reason: SDUPTHER

## 2018-12-28 RX ORDER — METRONIDAZOLE 500 MG/1
500 TABLET ORAL
COMMUNITY
Start: 2018-12-26 | End: 2019-01-05

## 2018-12-28 RX ORDER — PHENOL 1.4 %
AEROSOL, SPRAY (ML) MUCOUS MEMBRANE
Refills: 3 | COMMUNITY
Start: 2018-12-10 | End: 2019-09-13 | Stop reason: ALTCHOICE

## 2018-12-28 RX ORDER — CIPROFLOXACIN 500 MG/1
TABLET ORAL
Refills: 0 | COMMUNITY
Start: 2018-12-26 | End: 2019-03-13

## 2018-12-28 NOTE — PROGRESS NOTES
ROOM # 2    Cordelia Jeans presents today for   Chief Complaint   Patient presents with    Cardiac arrest     [facility]  from [facility] .dx: heart attack    Abdominal Pain     follwo up        Cordelia Jeans preferred language for health care discussion is english/other. Is someone accompanying this pt? no    Is the patient using any DME equipment during OV? no    Depression Screening:  PHQ over the last two weeks 8/15/2018 3/30/2018 10/30/2017   PHQ Not Done Active Diagnosis of Depression or Bipolar Disorder Active Diagnosis of Depression or Bipolar Disorder Active Diagnosis of Depression or Bipolar Disorder       Learning Assessment:  Learning Assessment 1/28/2014 12/31/2013 8/20/2013   PRIMARY LEARNER Patient Patient Patient   HIGHEST LEVEL OF EDUCATION - PRIMARY LEARNER  - DID NOT GRADUATE HIGH SCHOOL DID NOT GRADUATE HIGH SCHOOL   BARRIERS PRIMARY LEARNER - NONE COGNITIVE   CO-LEARNER CAREGIVER - No No   PRIMARY LANGUAGE ENGLISH ENGLISH ENGLISH   LEARNER PREFERENCE PRIMARY LISTENING DEMONSTRATION DEMONSTRATION   ANSWERED BY patient self Patient   RELATIONSHIP SELF SELF SELF       Abuse Screening:  Abuse Screening Questionnaire 3/30/2018 8/31/2017 7/28/2014   Do you ever feel afraid of your partner? N N N   Are you in a relationship with someone who physically or mentally threatens you? N N N   Is it safe for you to go home? Taryn Trina       Fall Risk  Fall Risk Assessment, last 12 mths 3/30/2018 10/30/2017   Able to walk? Yes Yes   Fall in past 12 months? Yes No   Fall with injury? No -   Number of falls in past 12 months 3 -   Fall Risk Score 3 -       Health Maintenance reviewed and discussed per provider. Yes    Cordelia Jeans is due for   Health Maintenance Due   Topic Date Due    Shingrix Vaccine Age 50> (1 of 2) 07/11/2016    FOBT Q 1 YEAR AGE 50-75  09/13/2018         Please order/place referral if appropriate. Advance Directive:  1. Do you have an advance directive in place?  Patient Reply: no    2. If not, would you like material regarding how to put one in place? Patient Reply: no    Coordination of Care:  1. Have you been to the ER, urgent care clinic since your last visit? [facility] Hospitalized since your last visit? yes    2. Have you seen or consulted any other health care providers outside of the [facility] since your last visit? Include any pap smears or colon screening.  no

## 2018-12-28 NOTE — PROGRESS NOTES
HISTORY OF PRESENT ILLNESS  Benita Medrano is a 47 y.o. male. Patient was here for hospital follow up. He has questions about cardiac medications. He is taking antibiotics for colitis. His follow up with cardiologist is on Feb 11, 2019. Cardiac arrest   Associated symptoms include abdominal pain. Pertinent negatives include no chest pain, no headaches and no shortness of breath. Abdominal Pain   Associated symptoms include abdominal pain. Pertinent negatives include no chest pain, no headaches and no shortness of breath. Review of Systems   Constitutional: Negative for chills, fever and malaise/fatigue. HENT: Negative for congestion. Eyes: Negative for blurred vision and double vision. Respiratory: Negative for cough and shortness of breath. Cardiovascular: Negative for chest pain, palpitations, orthopnea, claudication, leg swelling and PND. Gastrointestinal: Positive for abdominal pain. Negative for blood in stool, constipation, diarrhea, heartburn, melena, nausea and vomiting. Genitourinary: Negative for dysuria, frequency and urgency. Musculoskeletal: Negative for myalgias. Neurological: Negative for dizziness, tingling and headaches. Psychiatric/Behavioral: Negative for depression. The patient is not nervous/anxious. Physical Exam   Constitutional: He is oriented to person, place, and time. He appears well-developed and well-nourished. No distress. HENT:   Head: Normocephalic and atraumatic. Mouth/Throat: Oropharynx is clear and moist.   Eyes: Pupils are equal, round, and reactive to light. Neck: Neck supple. No JVD present. Cardiovascular: Normal rate and normal heart sounds. Pulmonary/Chest: Effort normal and breath sounds normal. No respiratory distress. He has no wheezes. He has no rales. He exhibits no tenderness. Abdominal: Soft. He exhibits distension. He exhibits no mass. There is tenderness. There is no rebound and no guarding.    Neurological: He is alert and oriented to person, place, and time. No cranial nerve deficit. Coordination normal.   Skin: Skin is dry. He is not diaphoretic. Psychiatric: He has a normal mood and affect. Nursing note and vitals reviewed. ASSESSMENT and PLAN    ICD-10-CM ICD-9-CM    1. Transition of care performed with sharing of clinical summary Z91.89 V15.89    2. History of colitis Z87.19 V12.79 REFERRAL TO GASTROENTEROLOGY   patient is advised to keep taking xeralto and plavix as ordered by cardiologist. He needs to call cardiology office to inquire about possible cardiac rehab if needed. He needs to complete his Cipro and Flagyl and follow up with GI. Dietary teachings done. Follow up with PCP as needed.

## 2019-01-22 ENCOUNTER — DOCUMENTATION ONLY (OUTPATIENT)
Dept: INTERNAL MEDICINE CLINIC | Age: 54
End: 2019-01-22

## 2019-01-22 NOTE — PROGRESS NOTES
Pharmacy Note: Immunization Update    Patient: Minor Potts (71 y.o., 1965)     Patient's immunization history was updated to reflect information contained in the [place] and/or outside immunization/pharmacy records were reconciled within [address] [facility]. Health Maintenance schedule updated when appropriate.     Current immunizations now reflect:       Immunization History   Administered Date(s) Administered    Influenza Vaccine 11/15/2011, 09/01/2014, 08/31/2017, 12/01/2017, 10/01/2018    Novel Influenza-H1N1-09, All Formulations 10/26/2016    Pneumococcal Polysaccharide (PPSV-23) 11/15/2011, 11/30/2011    Tdap 05/30/2018       Doretha Marvin, PharmD, BCACP

## 2019-03-11 ENCOUNTER — OFFICE VISIT (OUTPATIENT)
Dept: INTERNAL MEDICINE CLINIC | Age: 54
End: 2019-03-11

## 2019-03-11 VITALS
WEIGHT: 233 LBS | DIASTOLIC BLOOD PRESSURE: 109 MMHG | SYSTOLIC BLOOD PRESSURE: 152 MMHG | HEART RATE: 118 BPM | BODY MASS INDEX: 29.9 KG/M2 | OXYGEN SATURATION: 97 % | TEMPERATURE: 97.5 F | RESPIRATION RATE: 18 BRPM | HEIGHT: 74 IN

## 2019-03-11 DIAGNOSIS — F10.220 ALCOHOL DEPENDENCE WITH UNCOMPLICATED INTOXICATION (HCC): Primary | ICD-10-CM

## 2019-03-11 NOTE — PROGRESS NOTES
HISTORY OF PRESENT ILLNESS  Agatha Shelley is a 47 y.o. male. 48 yo male here for f/u of alcohol dependence. Admits that he has never stopped drinking when asked when he resumed. Would like to go to in rehab. Reports last drink this morning prior to visit, but then admits that liquid in his water bottle is vodka. BP and HR increased. Denies CP, SOB. Spoke on speaker phone in his presence with  Wendy Ho. He wanted to go to rehab facility in [place]. Attempted to contact this facility and when nurse reached them she was informed that he is not eligible as he is not in [place]. Sees addiction specialist (Dr. Angelina Cavanaugh)      Hypertension    Pertinent negatives include no chest pain, no palpitations, no blurred vision, no headaches, no dizziness, no shortness of breath, no nausea and no vomiting. Alcohol Problem   Pertinent negatives include no fever, no nausea and no vomiting. Associated medical issues do not include depression. Review of Systems   Constitutional: Negative for chills, fever and weight loss. HENT: Negative for congestion and ear pain. Eyes: Negative for blurred vision and pain. Respiratory: Negative for cough and shortness of breath. Cardiovascular: Negative for chest pain, palpitations and leg swelling. Gastrointestinal: Negative for nausea and vomiting. Genitourinary: Negative for frequency and urgency. Musculoskeletal: Negative for joint pain and myalgias. Skin: Negative for itching and rash. Neurological: Negative for dizziness, tingling and headaches. Psychiatric/Behavioral: Positive for substance abuse. Negative for depression. The patient is not nervous/anxious.       Past Medical History:   Diagnosis Date    Abuse     Anemia NEC     Anxiety     Arthritis     Chronic pain     Colitis     Contact dermatitis and other eczema, due to unspecified cause     Depression     Depression     Headache(784.0)     Heart attack ([facility] [address]     Hypercholesterolemia     Hypertension     Liver disease     Low back pain     with left leg pain -- multilevel spondylosis and L4 radiculitis    Neck pain     mild spondylosis    Other ill-defined conditions(679.89)     MS symptoms    Pancreatitis     Psychotic disorder (HCC)     Trauma      Current Outpatient Medications on File Prior to Visit   Medication Sig Dispense Refill    ciprofloxacin HCl (CIPRO) 500 mg tablet   0    cloNIDine HCl (CATAPRES) 0.1 mg tablet 0.1 mg.      cyclobenzaprine (FLEXERIL) 5 mg tablet 5 mg.  potassium chloride (K-DUR, KLOR-CON) 20 mEq tablet 10 mEq.  nicotine (NICODERM CQ) 21 mg/24 hr 1 Patch.  ADULTS MULTIVITAMIN 18 mg iron-400 mcg-25 mcg tab   3    FLUZONE QUAD [id number], PF, syrg injection TO BE ADMINISTERED BY PHARMACIST FOR IMMUNIZATION  0    Blood Pressure Monitor kit Use as directed to check blood pressure daily. 1 Kit 0    carvedilol (COREG) 6.25 mg tablet Take 1 Tab by mouth two (2) times a day. 180 Tab 3    losartan (COZAAR) 25 mg tablet Take 1 Tab by mouth daily. 90 Tab 1    multivitamin (DAILY-CAYLA) tablet Take 1 Tab by mouth daily. 90 Tab 3    naproxen (NAPROSYN) 500 mg tablet Take 1 Tab by mouth two (2) times daily (with meals). 100 Tab 2    fluticasone (FLONASE ALLERGY RELIEF) 50 mcg/actuation nasal spray 2 Sprays by Both Nostrils route daily. 1 Bottle 3    clotrimazole-betamethasone (LOTRISONE) topical cream Apply pea-sized amount bid to affecteed area x 10d 45 g 0    rivaroxaban (XARELTO) 20 mg tab tablet Take 1 Tab by mouth daily (with breakfast). 90 Tab 1    atorvastatin (LIPITOR) 40 mg tablet Take 1 Tab by mouth daily. 90 Tab 1    lidocaine (LIDODERM) 5 % 1 Patch by TransDERmal route every twenty-four (24) hours. 30 Each 5    isosorbide mononitrate ER (IMDUR) 30 mg tablet Take 1 Tab by mouth daily. 90 Tab 3    thiamine HCL (B-1) 100 mg tablet Take 1 Tab by mouth daily.  90 Tab 3    omeprazole (PRILOSEC) 20 mg capsule Take 1 Cap by mouth daily. 90 Cap 3    folic acid (FOLVITE) 1 mg tablet Take 1 Tab by mouth daily. 90 Tab 3    loratadine (CLARITIN) 10 mg tablet Take 1 Tab by mouth daily as needed for Allergies. 90 Tab 1    aspirin delayed-release 81 mg tablet TAKE 1 TAB BY MOUTH DAILY. 90 Tab 3    gabapentin (NEURONTIN) 600 mg tablet Take 1 Tab by mouth two (2) times a day. 180 Tab 0    nitroglycerin (NITROSTAT) 0.4 mg SL tablet TAKE 1 TABLET UNDER TOUNGE EVERY 5 MINS AS NEEDED FOR CHEST PAIN. DO NOT EXCEED 3 DOSES 25 Tab 0    QUEtiapine SR (SEROQUEL XR) 300 mg sr tablet Take 300 mg by mouth nightly.  busPIRone (BUSPAR) 30 mg tablet Take 1 Tab by mouth daily. (Patient taking differently: Take 10 mg by mouth daily.) 90 Tab 0    DULoxetine (CYMBALTA) 60 mg capsule Take 60 mg by mouth daily. No current facility-administered medications on file prior to visit. Physical Exam   Constitutional: He appears well-developed and well-nourished. No distress. BP (!) 152/109 (BP 1 Location: Left arm, BP Patient Position: Sitting)   Pulse (!) 118   Temp 97.5 °F (36.4 °C) (Oral)   Resp 18   Ht 6' 2\" (1.88 m)   Wt 233 lb (105.7 kg)   SpO2 97%   BMI 29.92 kg/m²      Eyes: EOM are normal. Right eye exhibits no discharge. Left eye exhibits no discharge. No scleral icterus. Cardiovascular: Normal rate, regular rhythm and normal heart sounds. Exam reveals no gallop and no friction rub. No murmur heard. Pulmonary/Chest: Effort normal and breath sounds normal. No respiratory distress. He has no wheezes. He has no rales. Musculoskeletal: He exhibits no edema or tenderness. Neurological: He is alert. He exhibits normal muscle tone. Skin: Skin is warm and dry. Psychiatric: He has a normal mood and affect.      Lab Results   Component Value Date/Time    Sodium 138 01/16/2019 11:55 AM    Potassium 4.0 01/16/2019 11:55 AM    Chloride 105 01/16/2019 11:55 AM    CO2 28 01/16/2019 11:55 AM    Anion gap 5 01/16/2019 11:55 AM Glucose 113 (H) 01/16/2019 11:55 AM    BUN 6 (L) 01/16/2019 11:55 AM    Creatinine 1.0 01/16/2019 11:55 AM    BUN/Creatinine ratio 8 (L) 07/19/2018 05:25 PM    GFR est AA >60 01/16/2019 11:55 AM    GFR est non-AA >60 01/16/2019 11:55 AM    Calcium 8.9 01/16/2019 11:55 AM    Bilirubin, total 0.6 01/16/2019 11:55 AM    AST (SGOT) 35 01/16/2019 11:55 AM    Alk. phosphatase 69 01/16/2019 11:55 AM    Protein, total 7.2 01/16/2019 11:55 AM    Albumin 3.8 01/16/2019 11:55 AM    Globulin 4.2 (H) 07/19/2018 05:25 PM    A-G Ratio 1.0 07/19/2018 05:25 PM    ALT (SGPT) 55 01/16/2019 11:55 AM     ASSESSMENT and PLAN    ICD-10-CM ICD-9-CM    1. Alcohol dependence with uncomplicated intoxication ([facility] [address] F10.220 303.00      Pt is not safe to drive. Will be transported to ED to assist with detoxification.

## 2019-03-11 NOTE — PROGRESS NOTES
Rm:17    Chief Complaint   Patient presents with    Hypertension    Alcohol Problem     pt wants to be admitted to [facility]     Depression Screening:  3 most recent [facility] Screens 8/15/2018 3/30/2018 10/30/2017   PHQ Not Done Active Diagnosis of Depression or Bipolar Disorder Active Diagnosis of Depression or Bipolar Disorder Active Diagnosis of Depression or Bipolar Disorder       Learning Assessment:  Learning Assessment 1/28/2014 12/31/2013 8/20/2013   PRIMARY LEARNER Patient Patient Patient   HIGHEST LEVEL OF EDUCATION - PRIMARY LEARNER  - DID NOT GRADUATE HIGH SCHOOL DID NOT GRADUATE HIGH SCHOOL   BARRIERS PRIMARY LEARNER - NONE COGNITIVE   CO-LEARNER CAREGIVER - No No   PRIMARY LANGUAGE ENGLISH ENGLISH ENGLISH   LEARNER PREFERENCE PRIMARY LISTENING DEMONSTRATION DEMONSTRATION   ANSWERED BY patient self Patient   RELATIONSHIP SELF SELF SELF       Abuse Screening:  Abuse Screening Questionnaire 3/30/2018 8/31/2017 7/28/2014   Do you ever feel afraid of your partner? N N N   Are you in a relationship with someone who physically or mentally threatens you? N N N   Is it safe for you to go home? [facility] reviewed and discussed per provider: yes     Coordination of Care:    1. Have you been to the ER, urgent care clinic since your last visit? Hospitalized since your last visit? no    2. Have you seen or consulted any other health care providers outside of the [address] [place] [address] since your last visit? Include any pap smears or colon screening.  no

## 2019-03-12 ENCOUNTER — APPOINTMENT (OUTPATIENT)
Dept: GENERAL RADIOLOGY | Age: 54
End: 2019-03-12
Attending: EMERGENCY MEDICINE
Payer: MEDICAID

## 2019-03-12 ENCOUNTER — HOSPITAL ENCOUNTER (INPATIENT)
Age: 54
LOS: 1 days | Discharge: HOME | End: 2019-03-13
Attending: EMERGENCY MEDICINE | Admitting: HOSPITALIST
Payer: MEDICAID

## 2019-03-12 DIAGNOSIS — R56.9 ALCOHOL WITHDRAWAL SEIZURE WITHOUT COMPLICATION (HCC): Primary | ICD-10-CM

## 2019-03-12 DIAGNOSIS — F10.930 ALCOHOL WITHDRAWAL SEIZURE WITHOUT COMPLICATION (HCC): Primary | ICD-10-CM

## 2019-03-12 DIAGNOSIS — R11.2 NON-INTRACTABLE VOMITING WITH NAUSEA, UNSPECIFIED VOMITING TYPE: ICD-10-CM

## 2019-03-12 DIAGNOSIS — F10.10 ALCOHOL ABUSE: ICD-10-CM

## 2019-03-12 DIAGNOSIS — F10.920 ACUTE ALCOHOLIC INTOXICATION WITHOUT COMPLICATION (HCC): ICD-10-CM

## 2019-03-12 DIAGNOSIS — R10.13 EPIGASTRIC ABDOMINAL PAIN: ICD-10-CM

## 2019-03-12 PROBLEM — F10.939 ALCOHOL WITHDRAWAL SEIZURE (HCC): Status: ACTIVE | Noted: 2019-03-12

## 2019-03-12 PROBLEM — K56.609 SBO (SMALL BOWEL OBSTRUCTION) (HCC): Status: ACTIVE | Noted: 2019-03-12

## 2019-03-12 LAB
ALBUMIN SERPL-MCNC: 3.6 G/DL (ref 3.4–5)
ALBUMIN/GLOB SERPL: 0.9 {RATIO} (ref 0.8–1.7)
ALP SERPL-CCNC: 84 U/L (ref 45–117)
ALT SERPL-CCNC: 37 U/L (ref 16–61)
ANION GAP SERPL CALC-SCNC: 8 MMOL/L (ref 3–18)
AST SERPL-CCNC: 34 U/L (ref 15–37)
ATRIAL RATE: 102 BPM
BASOPHILS # BLD: 0.1 K/UL (ref 0–0.1)
BASOPHILS NFR BLD: 1 % (ref 0–2)
BILIRUB DIRECT SERPL-MCNC: 0.1 MG/DL (ref 0–0.2)
BILIRUB SERPL-MCNC: 0.5 MG/DL (ref 0.2–1)
BUN SERPL-MCNC: 7 MG/DL (ref 7–18)
BUN/CREAT SERPL: 9 (ref 12–20)
CALCIUM SERPL-MCNC: 8.2 MG/DL (ref 8.5–10.1)
CALCULATED P AXIS, ECG09: 64 DEGREES
CALCULATED R AXIS, ECG10: 31 DEGREES
CALCULATED T AXIS, ECG11: 51 DEGREES
CHLORIDE SERPL-SCNC: 107 MMOL/L (ref 100–108)
CO2 SERPL-SCNC: 28 MMOL/L (ref 21–32)
CREAT SERPL-MCNC: 0.75 MG/DL (ref 0.6–1.3)
DIAGNOSIS, 93000: NORMAL
DIFFERENTIAL METHOD BLD: ABNORMAL
EOSINOPHIL # BLD: 0.1 K/UL (ref 0–0.4)
EOSINOPHIL NFR BLD: 1 % (ref 0–5)
ERYTHROCYTE [DISTWIDTH] IN BLOOD BY AUTOMATED COUNT: 13.9 % (ref 11.6–14.5)
ETHANOL SERPL-MCNC: 380 MG/DL (ref 0–3)
GLOBULIN SER CALC-MCNC: 3.8 G/DL (ref 2–4)
GLUCOSE SERPL-MCNC: 100 MG/DL (ref 74–99)
HCT VFR BLD AUTO: 45.3 % (ref 36–48)
HGB BLD-MCNC: 15.2 G/DL (ref 13–16)
LIPASE SERPL-CCNC: 62 U/L (ref 73–393)
LYMPHOCYTES # BLD: 2.6 K/UL (ref 0.9–3.6)
LYMPHOCYTES NFR BLD: 42 % (ref 21–52)
MCH RBC QN AUTO: 30.6 PG (ref 24–34)
MCHC RBC AUTO-ENTMCNC: 33.6 G/DL (ref 31–37)
MCV RBC AUTO: 91.1 FL (ref 74–97)
MONOCYTES # BLD: 0.6 K/UL (ref 0.05–1.2)
MONOCYTES NFR BLD: 11 % (ref 3–10)
NEUTS SEG # BLD: 2.8 K/UL (ref 1.8–8)
NEUTS SEG NFR BLD: 45 % (ref 40–73)
P-R INTERVAL, ECG05: 174 MS
PLATELET # BLD AUTO: 252 K/UL (ref 135–420)
PMV BLD AUTO: 10.4 FL (ref 9.2–11.8)
POTASSIUM SERPL-SCNC: 3.8 MMOL/L (ref 3.5–5.5)
PROT SERPL-MCNC: 7.4 G/DL (ref 6.4–8.2)
Q-T INTERVAL, ECG07: 326 MS
QRS DURATION, ECG06: 74 MS
QTC CALCULATION (BEZET), ECG08: 424 MS
RBC # BLD AUTO: 4.97 M/UL (ref 4.7–5.5)
SODIUM SERPL-SCNC: 143 MMOL/L (ref 136–145)
VENTRICULAR RATE, ECG03: 102 BPM
WBC # BLD AUTO: 6.1 K/UL (ref 4.6–13.2)

## 2019-03-12 PROCEDURE — 93005 ELECTROCARDIOGRAM TRACING: CPT

## 2019-03-12 PROCEDURE — 96374 THER/PROPH/DIAG INJ IV PUSH: CPT

## 2019-03-12 PROCEDURE — 74011250636 HC RX REV CODE- 250/636: Performed by: EMERGENCY MEDICINE

## 2019-03-12 PROCEDURE — 96375 TX/PRO/DX INJ NEW DRUG ADDON: CPT

## 2019-03-12 PROCEDURE — 74011250636 HC RX REV CODE- 250/636

## 2019-03-12 PROCEDURE — 80048 BASIC METABOLIC PNL TOTAL CA: CPT

## 2019-03-12 PROCEDURE — 65270000029 HC RM PRIVATE

## 2019-03-12 PROCEDURE — 77030037878 HC DRSG MEPILEX >48IN BORD MOLN -B

## 2019-03-12 PROCEDURE — 99285 EMERGENCY DEPT VISIT HI MDM: CPT

## 2019-03-12 PROCEDURE — 83690 ASSAY OF LIPASE: CPT

## 2019-03-12 PROCEDURE — 96361 HYDRATE IV INFUSION ADD-ON: CPT

## 2019-03-12 PROCEDURE — 74011250637 HC RX REV CODE- 250/637: Performed by: HOSPITALIST

## 2019-03-12 PROCEDURE — 85025 COMPLETE CBC W/AUTO DIFF WBC: CPT

## 2019-03-12 PROCEDURE — 80307 DRUG TEST PRSMV CHEM ANLYZR: CPT

## 2019-03-12 PROCEDURE — 94761 N-INVAS EAR/PLS OXIMETRY MLT: CPT

## 2019-03-12 PROCEDURE — 80076 HEPATIC FUNCTION PANEL: CPT

## 2019-03-12 PROCEDURE — 74022 RADEX COMPL AQT ABD SERIES: CPT

## 2019-03-12 PROCEDURE — 74011250636 HC RX REV CODE- 250/636: Performed by: HOSPITALIST

## 2019-03-12 RX ORDER — SODIUM CHLORIDE 0.9 % (FLUSH) 0.9 %
5-40 SYRINGE (ML) INJECTION EVERY 8 HOURS
Status: DISCONTINUED | OUTPATIENT
Start: 2019-03-12 | End: 2019-03-13 | Stop reason: HOSPADM

## 2019-03-12 RX ORDER — LORAZEPAM 2 MG/ML
1 INJECTION INTRAMUSCULAR
Status: DISCONTINUED | OUTPATIENT
Start: 2019-03-12 | End: 2019-03-13 | Stop reason: HOSPADM

## 2019-03-12 RX ORDER — DEXTROSE, SODIUM CHLORIDE, AND POTASSIUM CHLORIDE 5; .45; .15 G/100ML; G/100ML; G/100ML
100 INJECTION INTRAVENOUS CONTINUOUS
Status: DISCONTINUED | OUTPATIENT
Start: 2019-03-12 | End: 2019-03-13 | Stop reason: HOSPADM

## 2019-03-12 RX ORDER — LORAZEPAM 2 MG/ML
2 INJECTION INTRAMUSCULAR
Status: DISCONTINUED | OUTPATIENT
Start: 2019-03-12 | End: 2019-03-13 | Stop reason: HOSPADM

## 2019-03-12 RX ORDER — LORAZEPAM 2 MG/ML
3 INJECTION INTRAMUSCULAR
Status: DISCONTINUED | OUTPATIENT
Start: 2019-03-12 | End: 2019-03-13 | Stop reason: HOSPADM

## 2019-03-12 RX ORDER — SODIUM CHLORIDE 0.9 % (FLUSH) 0.9 %
5-40 SYRINGE (ML) INJECTION AS NEEDED
Status: DISCONTINUED | OUTPATIENT
Start: 2019-03-12 | End: 2019-03-13 | Stop reason: HOSPADM

## 2019-03-12 RX ORDER — GABAPENTIN 300 MG/1
600 CAPSULE ORAL 2 TIMES DAILY
Status: DISCONTINUED | OUTPATIENT
Start: 2019-03-12 | End: 2019-03-13 | Stop reason: HOSPADM

## 2019-03-12 RX ORDER — ASPIRIN 81 MG/1
81 TABLET ORAL DAILY
Status: DISCONTINUED | OUTPATIENT
Start: 2019-03-13 | End: 2019-03-13 | Stop reason: HOSPADM

## 2019-03-12 RX ORDER — QUETIAPINE 300 MG/1
300 TABLET, FILM COATED, EXTENDED RELEASE ORAL
Status: DISCONTINUED | OUTPATIENT
Start: 2019-03-12 | End: 2019-03-13 | Stop reason: HOSPADM

## 2019-03-12 RX ORDER — LORAZEPAM 2 MG/ML
INJECTION INTRAMUSCULAR
Status: COMPLETED
Start: 2019-03-12 | End: 2019-03-12

## 2019-03-12 RX ORDER — PANTOPRAZOLE SODIUM 40 MG/1
40 TABLET, DELAYED RELEASE ORAL
Status: DISCONTINUED | OUTPATIENT
Start: 2019-03-13 | End: 2019-03-13 | Stop reason: HOSPADM

## 2019-03-12 RX ORDER — MORPHINE SULFATE 4 MG/ML
4 INJECTION INTRAVENOUS
Status: COMPLETED | OUTPATIENT
Start: 2019-03-12 | End: 2019-03-12

## 2019-03-12 RX ORDER — ATORVASTATIN CALCIUM 40 MG/1
40 TABLET, FILM COATED ORAL DAILY
Status: DISCONTINUED | OUTPATIENT
Start: 2019-03-13 | End: 2019-03-13 | Stop reason: HOSPADM

## 2019-03-12 RX ORDER — LORAZEPAM 1 MG/1
1 TABLET ORAL
Status: DISCONTINUED | OUTPATIENT
Start: 2019-03-12 | End: 2019-03-13 | Stop reason: HOSPADM

## 2019-03-12 RX ORDER — FOLIC ACID 1 MG/1
1 TABLET ORAL DAILY
Status: DISCONTINUED | OUTPATIENT
Start: 2019-03-13 | End: 2019-03-13 | Stop reason: HOSPADM

## 2019-03-12 RX ORDER — LANOLIN ALCOHOL/MO/W.PET/CERES
100 CREAM (GRAM) TOPICAL DAILY
Status: DISCONTINUED | OUTPATIENT
Start: 2019-03-13 | End: 2019-03-13 | Stop reason: HOSPADM

## 2019-03-12 RX ORDER — BUSPIRONE HYDROCHLORIDE 10 MG/1
10 TABLET ORAL DAILY
Status: DISCONTINUED | OUTPATIENT
Start: 2019-03-13 | End: 2019-03-13 | Stop reason: HOSPADM

## 2019-03-12 RX ORDER — LORAZEPAM 1 MG/1
2 TABLET ORAL
Status: DISCONTINUED | OUTPATIENT
Start: 2019-03-12 | End: 2019-03-13 | Stop reason: HOSPADM

## 2019-03-12 RX ORDER — ACETAMINOPHEN 325 MG/1
650 TABLET ORAL
Status: DISCONTINUED | OUTPATIENT
Start: 2019-03-12 | End: 2019-03-13 | Stop reason: HOSPADM

## 2019-03-12 RX ORDER — ONDANSETRON 2 MG/ML
4 INJECTION INTRAMUSCULAR; INTRAVENOUS
Status: COMPLETED | OUTPATIENT
Start: 2019-03-12 | End: 2019-03-12

## 2019-03-12 RX ORDER — CARVEDILOL 6.25 MG/1
6.25 TABLET ORAL 2 TIMES DAILY
Status: DISCONTINUED | OUTPATIENT
Start: 2019-03-12 | End: 2019-03-13 | Stop reason: HOSPADM

## 2019-03-12 RX ORDER — LOSARTAN POTASSIUM 50 MG/1
25 TABLET ORAL DAILY
Status: DISCONTINUED | OUTPATIENT
Start: 2019-03-13 | End: 2019-03-13 | Stop reason: HOSPADM

## 2019-03-12 RX ADMIN — LORAZEPAM 1 MG: 2 INJECTION, SOLUTION INTRAMUSCULAR; INTRAVENOUS at 16:38

## 2019-03-12 RX ADMIN — MORPHINE SULFATE 4 MG: 4 INJECTION INTRAVENOUS at 14:27

## 2019-03-12 RX ADMIN — SODIUM CHLORIDE 1000 ML: 900 INJECTION, SOLUTION INTRAVENOUS at 14:06

## 2019-03-12 RX ADMIN — Medication 10 ML: at 22:00

## 2019-03-12 RX ADMIN — DEXTROSE MONOHYDRATE, SODIUM CHLORIDE, AND POTASSIUM CHLORIDE 100 ML/HR: 50; 4.5; 1.49 INJECTION, SOLUTION INTRAVENOUS at 20:25

## 2019-03-12 RX ADMIN — Medication 5 ML: at 17:37

## 2019-03-12 RX ADMIN — CARVEDILOL 6.25 MG: 12.5 TABLET, FILM COATED ORAL at 20:26

## 2019-03-12 RX ADMIN — LORAZEPAM 1 MG: 2 INJECTION INTRAMUSCULAR at 16:40

## 2019-03-12 RX ADMIN — GABAPENTIN 600 MG: 300 CAPSULE ORAL at 20:26

## 2019-03-12 RX ADMIN — ONDANSETRON 4 MG: 2 INJECTION INTRAMUSCULAR; INTRAVENOUS at 14:27

## 2019-03-12 RX ADMIN — LORAZEPAM 1 MG: 2 INJECTION, SOLUTION INTRAMUSCULAR; INTRAVENOUS at 19:37

## 2019-03-12 RX ADMIN — ACETAMINOPHEN 650 MG: 325 TABLET, FILM COATED ORAL at 20:27

## 2019-03-12 NOTE — H&P
Internal Medicine History and Physical          Subjective     HPI: Constantine Can is a 47 y.o. male with a PMHx of etoh abuse, anxiety/depression, HLD, HTN, hx PE who presented to the ED with c/o abd pain. Patient states he drinks vodka daily; his last drink was this morning. His serum etoh level is 380. In the ED, the patient was found seizing on the floor and he was given ativan. Pt will be admitted for further evaluation. PMHx:  Past Medical History:   Diagnosis Date    Abuse     Anemia NEC     Anxiety     Arthritis     Chronic pain     Colitis     Contact dermatitis and other eczema, due to unspecified cause     Depression     Depression     Headache(784.0)     Heart attack ([address]     Hypercholesterolemia     Hypertension     Liver disease     Low back pain     with left leg pain -- multilevel spondylosis and L4 radiculitis    Neck pain     mild spondylosis    Other ill-defined conditions(799.89)     MS symptoms    Pancreatitis     Psychotic disorder ([address]     Trauma        PSurgHx:  Past Surgical History:   Procedure Laterality Date    HX CYST REMOVAL         SocialHx:  Social History     Socioeconomic History    Marital status:      Spouse name: Not on file    Number of children: Not on file    Years of education: Not on file    Highest education level: Not on file   Social Needs    Financial resource strain: Not on file    Food insecurity - worry: Not on file    Food insecurity - inability: Not on file   [employer] needs - medical: Not on file   [employer] needs - non-medical: Not on file   Occupational History    Not on file   Tobacco Use    Smoking status: Current Every Day Smoker     Packs/day: 0.50     Years: 20.00     Pack years: 10.00     Types: Cigarettes    Smokeless tobacco: Never Used   Substance and Sexual Activity    Alcohol use:  Yes     Alcohol/week: 0.0 oz     Comment:  ETOH abuse - quit NOV 2018    Drug use: No    Sexual activity: Not Currently     Partners: Female     Birth control/protection: Condom   Other Topics Concern    Not on file   Social History Narrative    ** Merged History Encounter **            FamilyHx:  Family History   Problem Relation Age of Onset    Psychiatric Disorder Mother     Heart Disease Mother     Arthritis-osteo Father     Alcohol abuse Father     Cancer Father         lung    Elevated Lipids Father     Headache Father     Hypertension Father     Lung Disease Father     Diabetes Father        Prior to Admission Medications   Prescriptions Last Dose Informant Patient Reported? Taking? ADULTS MULTIVITAMIN 18 mg iron-400 mcg-25 mcg tab   Yes No   Blood Pressure Monitor kit   No No   Sig: Use as directed to check blood pressure daily. DULoxetine (CYMBALTA) 60 mg capsule   Yes No   Sig: Take 60 mg by mouth daily. FLUZONE QUAD [id number], PF, syrg injection   Yes No   Sig: TO BE ADMINISTERED BY PHARMACIST FOR IMMUNIZATION   QUEtiapine SR (SEROQUEL XR) 300 mg sr tablet   Yes No   Sig: Take 300 mg by mouth nightly. aspirin delayed-release 81 mg tablet   No No   Sig: TAKE 1 TAB BY MOUTH DAILY. atorvastatin (LIPITOR) 40 mg tablet   No No   Sig: Take 1 Tab by mouth daily. busPIRone (BUSPAR) 30 mg tablet   No No   Sig: Take 1 Tab by mouth daily. Patient taking differently: Take 10 mg by mouth daily. carvedilol (COREG) 6.25 mg tablet   No No   Sig: Take 1 Tab by mouth two (2) times a day. ciprofloxacin HCl (CIPRO) 500 mg tablet   Yes No   cloNIDine HCl (CATAPRES) 0.1 mg tablet   Yes No   Si.1 mg.   clotrimazole-betamethasone (LOTRISONE) topical cream   No No   Sig: Apply pea-sized amount bid to affecteed area x 10d   cyclobenzaprine (FLEXERIL) 5 mg tablet   Yes No   Si mg. fluticasone (FLONASE ALLERGY RELIEF) 50 mcg/actuation nasal spray   No No   Si Sprays by Both Nostrils route daily. folic acid (FOLVITE) 1 mg tablet   No No   Sig: Take 1 Tab by mouth daily.    gabapentin (NEURONTIN) 600 mg tablet   No No   Sig: Take 1 Tab by mouth two (2) times a day. isosorbide mononitrate ER (IMDUR) 30 mg tablet   No No   Sig: Take 1 Tab by mouth daily. lidocaine (LIDODERM) 5 %   No No   Si Patch by TransDERmal route every twenty-four (24) hours. loratadine (CLARITIN) 10 mg tablet   No No   Sig: Take 1 Tab by mouth daily as needed for Allergies. losartan (COZAAR) 25 mg tablet   No No   Sig: Take 1 Tab by mouth daily. multivitamin (DAILY-CAYLA) tablet   No No   Sig: Take 1 Tab by mouth daily. naproxen (NAPROSYN) 500 mg tablet   No No   Sig: Take 1 Tab by mouth two (2) times daily (with meals). nicotine (NICODERM CQ) 21 mg/24 hr   Yes No   Si Patch. nitroglycerin (NITROSTAT) 0.4 mg SL tablet   No No   Sig: TAKE 1 TABLET UNDER TOUNGE EVERY 5 MINS AS NEEDED FOR CHEST PAIN. DO NOT EXCEED 3 DOSES   omeprazole (PRILOSEC) 20 mg capsule   No No   Sig: Take 1 Cap by mouth daily. potassium chloride (K-DUR, KLOR-CON) 20 mEq tablet   Yes No   Sig: 10 mEq.   rivaroxaban (XARELTO) 20 mg tab tablet   No No   Sig: Take 1 Tab by mouth daily (with breakfast). thiamine HCL (B-1) 100 mg tablet   No No   Sig: Take 1 Tab by mouth daily.       Facility-Administered Medications: None       Review of Systems:  CONST: Fever, weight loss, fatigue or chills  HEENT: Recent changes in vision, vertigo, epistaxis, dysphagia and hoarseness  CV: Chest pain, palpitations, HTN, edema and varicosities  RESP: Cough, shortness of breath, wheezing, hemoptysis, snoring and reactive airway disease  GI: Nausea, vomiting, abdominal pain, change in bowel habits, hematochezia, melena, and GERD   : Hematuria, dysuria, frequency, urgency, nocturia and stress urinary incontinence   MS: Weakness, joint pain and arthritis  ENDO: Diabetes, thyroid disease, polyuria, polydipsia, polyphagia, poor wound healing, heat intolerance, cold intolerance  LYMPH/HEME: Anemia, bruising and history of blood transfusions  INTEG: Dermatitis, abnormal moles  NEURO: Dizziness, headache, fainting, seizures and stroke  PSYCH: Anxiety and depression      Objective      Visit Vitals  BP (!) 145/97   Pulse (!) 107   Temp 98.5 °F (36.9 °C)   Resp 20   Ht 6' 2\" (1.88 m)   Wt 99.8 kg (220 lb)   SpO2 95%   BMI 28.25 kg/m²       Physical Exam:  General Appearance: NAD, conversant, intoxicated, breath smells of alcohol  HENT: normocephalic/atraumatic, moist mucus membranes  Lungs: CTA with normal respiratory effort  Cardiovascular: tachycardic, no m/r/g, capillary refill < 2 sec, B/L DP/PT pulses +3/4  Abdomen: soft, mild TTP epigastric region, normal bowel sounds  Extremities: no cyanosis, no peripheral edema  Neuro: moves all extremities, no focal deficits      Laboratory Studies:  CMP:   Lab Results   Component Value Date/Time     03/12/2019 01:30 PM    K 3.8 03/12/2019 01:30 PM     03/12/2019 01:30 PM    CO2 28 03/12/2019 01:30 PM    AGAP 8 03/12/2019 01:30 PM     (H) 03/12/2019 01:30 PM    BUN 7 03/12/2019 01:30 PM    CREA 0.75 03/12/2019 01:30 PM    GFRAA >60 03/12/2019 01:30 PM    GFRNA >60 03/12/2019 01:30 PM    CA 8.2 (L) 03/12/2019 01:30 PM    ALB 3.6 03/12/2019 01:30 PM    TP 7.4 03/12/2019 01:30 PM    GLOB 3.8 03/12/2019 01:30 PM    AGRAT 0.9 03/12/2019 01:30 PM    SGOT 34 03/12/2019 01:30 PM    ALT 37 03/12/2019 01:30 PM     CBC:   Lab Results   Component Value Date/Time    WBC 6.1 03/12/2019 01:30 PM    HGB 15.2 03/12/2019 01:30 PM    HCT 45.3 03/12/2019 01:30 PM     03/12/2019 01:30 PM       Imaging Reviewed:  Xr Abd Acute W 1 V Chest    Result Date: 3/12/2019  INDICATION: Abdominal pain. COMPARISON: None. FINDINGS: An acute abdominal series consisting of a frontal view of the chest and supine and upright views of the abdomen and pelvis is provided. The lungs are well expanded without confluent infiltrate, pleural effusion, or pulmonary edema. Minimal linear atelectasis is present at both lung bases.  The cardiac silhouette and pulmonary vasculature are within normal limits. Multiple mildly dilated gas-filled loops of small bowel are apparent. Gas is also noted in nondilated colon. This bowel gas pattern is nonspecific but is concerning for early small bowel obstruction. No free intraperitoneal air. There is lower lumbar spondylosis. Mild-moderate degenerative change both hips. IMPRESSION: 1. Nonspecific bowel gas pattern concerning for early small bowel obstruction. Clinical correlation and radiographic follow-up is recommended. 2. Minimal linear atelectasis at both lung bases. EKG:   unchanged from previous tracings, sinus tachycardia. Assessment/Plan     Principal Problem:    Alcohol withdrawal seizure ([address] (3/12/2019)    Active Problems:    SBO (small bowel obstruction) ([address] (3/12/2019)      Hx pulmonary embolism (9/22/2017)      Overview: During prolonged immobility (ETOH binge, sleeping in car homeless)      HTN (hypertension) (8/20/2013)      HLD (hyperlipidemia) (8/20/2013)      EtOH dependence ([facility] [address] (4/22/2014)      Overview: Numerous hospitalization of intoxication/suicidal ideation    etoh withdrawal seizure  - CIWA protocol  - hold cymbalta   - UDS +BZD, THC  - counseled etoh cessation    SBO  - concern on XR  - NPO, IVF    Hx PE  - cont xarelto    HTN/HLD  - home meds    - Cont acceptable home medications for chronic conditions   - DVT protocol    I have personally reviewed all pertinent labs, films and EKGs that have officially resulted. I reviewed available electronic documentation outlining the initial presentation as well as the emergency room physician's encounter.     Rosario Tello PA-C  [facility]  Hospitalist Division  Office:  [phone]  Pager: [phone]

## 2019-03-12 NOTE — ED PROVIDER NOTES
[facility] EMERGENCY DEPT      1:23 PM    Date: 3/12/2019  Patient Name: Cassy Hastings    History of Presenting Illness     Chief Complaint   Patient presents with    Abdominal Pain    Alcohol intoxication       47 y.o. male with noted past medical history who presents to the emergency department with chief complaint of abdominal pain and nausea and vomiting. Patient admits to being a known alcoholic with drinking about 1/5 of vodka a day. Per the patient, he started developing a dull epigastric to right upper quadrant abdominal pain 2 days ago that continues to the present. There is associated nausea with vomiting to present. He has no diarrhea. Patient states this feels his prior pancreatitis pain. Patient denies any other associated signs or symptoms. Patient denies any other complaints. Nursing notes regarding the HPI and triage nursing notes were reviewed. Prior medical records were reviewed. Current Facility-Administered Medications   Medication Dose Route Frequency Provider Last Rate Last Dose    LORazepam (ATIVAN) 2 mg/mL injection              Current Outpatient Medications   Medication Sig Dispense Refill    ciprofloxacin HCl (CIPRO) 500 mg tablet   0    cloNIDine HCl (CATAPRES) 0.1 mg tablet 0.1 mg.      cyclobenzaprine (FLEXERIL) 5 mg tablet 5 mg.  potassium chloride (K-DUR, KLOR-CON) 20 mEq tablet 10 mEq.  nicotine (NICODERM CQ) 21 mg/24 hr 1 Patch.  ADULTS MULTIVITAMIN 18 mg iron-400 mcg-25 mcg tab   3    FLUZONE QUAD 1348-8247, PF, syrg injection TO BE ADMINISTERED BY PHARMACIST FOR IMMUNIZATION  0    Blood Pressure Monitor kit Use as directed to check blood pressure daily. 1 Kit 0    carvedilol (COREG) 6.25 mg tablet Take 1 Tab by mouth two (2) times a day. 180 Tab 3    losartan (COZAAR) 25 mg tablet Take 1 Tab by mouth daily. 90 Tab 1    multivitamin (DAILY-CAYLA) tablet Take 1 Tab by mouth daily.  90 Tab 3    naproxen (NAPROSYN) 500 mg tablet Take 1 Tab by mouth two (2) times daily (with meals). 100 Tab 2    fluticasone (FLONASE ALLERGY RELIEF) 50 mcg/actuation nasal spray 2 Sprays by Both Nostrils route daily. 1 Bottle 3    clotrimazole-betamethasone (LOTRISONE) topical cream Apply pea-sized amount bid to affecteed area x 10d 45 g 0    rivaroxaban (XARELTO) 20 mg tab tablet Take 1 Tab by mouth daily (with breakfast). 90 Tab 1    atorvastatin (LIPITOR) 40 mg tablet Take 1 Tab by mouth daily. 90 Tab 1    lidocaine (LIDODERM) 5 % 1 Patch by TransDERmal route every twenty-four (24) hours. 30 Each 5    isosorbide mononitrate ER (IMDUR) 30 mg tablet Take 1 Tab by mouth daily. 90 Tab 3    thiamine HCL (B-1) 100 mg tablet Take 1 Tab by mouth daily. 90 Tab 3    omeprazole (PRILOSEC) 20 mg capsule Take 1 Cap by mouth daily. 90 Cap 3    folic acid (FOLVITE) 1 mg tablet Take 1 Tab by mouth daily. 90 Tab 3    loratadine (CLARITIN) 10 mg tablet Take 1 Tab by mouth daily as needed for Allergies. 90 Tab 1    aspirin delayed-release 81 mg tablet TAKE 1 TAB BY MOUTH DAILY. 90 Tab 3    gabapentin (NEURONTIN) 600 mg tablet Take 1 Tab by mouth two (2) times a day. 180 Tab 0    nitroglycerin (NITROSTAT) 0.4 mg SL tablet TAKE 1 TABLET UNDER TOUNGE EVERY 5 MINS AS NEEDED FOR CHEST PAIN. DO NOT EXCEED 3 DOSES 25 Tab 0    QUEtiapine SR (SEROQUEL XR) 300 mg sr tablet Take 300 mg by mouth nightly.  busPIRone (BUSPAR) 30 mg tablet Take 1 Tab by mouth daily. (Patient taking differently: Take 10 mg by mouth daily.) 90 Tab 0    DULoxetine (CYMBALTA) 60 mg capsule Take 60 mg by mouth daily.          Past History     Past Medical History:  Past Medical History:   Diagnosis Date    Abuse     Anemia NEC     Anxiety     Arthritis     Chronic pain     Colitis     Contact dermatitis and other eczema, due to unspecified cause     Depression     Depression     Headache(784.0)     Heart attack ([address]     Hypercholesterolemia     Hypertension     Liver disease  Low back pain     with left leg pain -- multilevel spondylosis and L4 radiculitis    Neck pain     mild spondylosis    Other ill-defined conditions(799.89)     MS symptoms    Pancreatitis     [facility] [address] [address]     Trauma        Past Surgical History:  Past Surgical History:   Procedure Laterality Date    HX CYST REMOVAL         Family History:  Family History   Problem Relation Age of Onset    Psychiatric Disorder Mother     Heart Disease Mother     Arthritis-osteo Father     Alcohol abuse Father     Cancer Father         lung    Elevated Lipids Father     Headache Father     Hypertension Father     Lung Disease Father     Diabetes Father        Social History:  Social History     Tobacco Use    Smoking status: Current Every Day Smoker     Packs/day: 0.50     Years: 20.00     Pack years: 10.00     Types: Cigarettes    Smokeless tobacco: Never Used   Substance Use Topics    Alcohol use: Yes     Alcohol/week: 0.0 oz     Comment:  ETOH abuse - quit NOV 2018    Drug use: No       Allergies: Allergies   Allergen Reactions    Amlodipine Other (comments)    Carbamazepine Unknown (comments)     violent    Lisinopril Unknown (comments)    Prednisone Other (comments)     He stated it hypes him up       Patient's primary care provider (as noted in EPIC):  Neelam Vincent MD    Review of Systems   Constitutional: Negative for diaphoresis. HENT: Negative for congestion. Eyes: Negative for discharge. Respiratory: Negative for stridor. Cardiovascular: Negative for palpitations. Gastrointestinal: Positive for abdominal pain, nausea and vomiting. Negative for constipation. Endocrine: Negative for heat intolerance. Genitourinary: Negative for flank pain. Musculoskeletal: Negative for back pain. Neurological: Negative for weakness. Psychiatric/Behavioral: Negative for hallucinations. All other systems reviewed and are negative.       Visit Vitals  BP (!) 148/100   Pulse 90 Temp 98.5 °F (36.9 °C)   Resp 18   Ht 6' 2\" (1.88 m)   Wt 99.8 kg (220 lb)   SpO2 96%   BMI 28.25 kg/m²       PHYSICAL EXAM:    CONSTITUTIONAL:  Alert, in no apparent distress;  well developed;  well nourished. HEAD:  Normocephalic, atraumatic. EYES:  EOMI. Non-icteric sclera. Normal conjunctiva. ENTM:  Nose:  no rhinorrhea. Throat:  no erythema or exudate, mucous membranes moist.  NECK:  No JVD. Supple  RESPIRATORY:  Chest clear, equal breath sounds, good air movement. CARDIOVASCULAR:  Regular rate and rhythm. No murmurs, rubs, or gallops. GI:  Normal bowel sounds, abdomen soft with mild epigastric to medial right upper quadrant tenderness to palpation. Negative Reynoso sign. No rebound or guarding. No palpable masses. BACK:  Non-tender. UPPER EXT:  Normal inspection. LOWER EXT:  No edema, no calf tenderness. Distal pulses intact. NEURO:  Moves all four extremities, and grossly normal motor exam.  SKIN:  No rashes;  Normal for age. PSYCH:  Alert and normal affect. DIFFERENTIAL DIAGNOSES/ MEDICAL DECISION MAKING:  Gastritis, gerd, peptic ulcer disease, cholecystitis, pancreatitis, gastroenteritis, constipation related pain, atypical appendicitis pain, obstruction, atypical cardiac (ami or anginal pain), referred pain from pulmonary process (pneumonia, empyema), or combination of the above versus many other processes.       Diagnostic Study Results     Abnormal lab results from this emergency department encounter:  Labs Reviewed   CBC WITH AUTOMATED DIFF - Abnormal; Notable for the following components:       Result Value    MONOCYTES 11 (*)     All other components within normal limits   METABOLIC PANEL, BASIC - Abnormal; Notable for the following components:    Glucose 100 (*)     BUN/Creatinine ratio 9 (*)     Calcium 8.2 (*)     All other components within normal limits   LIPASE - Abnormal; Notable for the following components:    Lipase 62 (*)     All other components within normal limits   ETHYL ALCOHOL - Abnormal; Notable for the following components:    ALCOHOL(ETHYL),SERUM 380 (*)     All other components within normal limits   HEPATIC FUNCTION PANEL       Lab values for this patient within approximately the last 12 hours:  Recent Results (from the past 12 hour(s))   CBC WITH AUTOMATED DIFF    Collection Time: 03/12/19  1:30 PM   Result Value Ref Range    WBC 6.1 4.6 - 13.2 K/uL    RBC 4.97 4.70 - 5.50 M/uL    HGB 15.2 13.0 - 16.0 g/dL    HCT 45.3 36.0 - 48.0 %    MCV 91.1 74.0 - 97.0 FL    MCH 30.6 24.0 - 34.0 PG    MCHC 33.6 31.0 - 37.0 g/dL    RDW 13.9 11.6 - 14.5 %    PLATELET 108 277 - 416 K/uL    MPV 10.4 9.2 - 11.8 FL    NEUTROPHILS 45 40 - 73 %    LYMPHOCYTES 42 21 - 52 %    MONOCYTES 11 (H) 3 - 10 %    EOSINOPHILS 1 0 - 5 %    BASOPHILS 1 0 - 2 %    ABS. NEUTROPHILS 2.8 1.8 - 8.0 K/UL    ABS. LYMPHOCYTES 2.6 0.9 - 3.6 K/UL    ABS. MONOCYTES 0.6 0.05 - 1.2 K/UL    ABS. EOSINOPHILS 0.1 0.0 - 0.4 K/UL    ABS. BASOPHILS 0.1 0.0 - 0.1 K/UL    DF AUTOMATED     METABOLIC PANEL, BASIC    Collection Time: 03/12/19  1:30 PM   Result Value Ref Range    Sodium 143 136 - 145 mmol/L    Potassium 3.8 3.5 - 5.5 mmol/L    Chloride 107 100 - 108 mmol/L    CO2 28 21 - 32 mmol/L    Anion gap 8 3.0 - 18 mmol/L    Glucose 100 (H) 74 - 99 mg/dL    BUN 7 7.0 - 18 MG/DL    Creatinine 0.75 0.6 - 1.3 MG/DL    BUN/Creatinine ratio 9 (L) 12 - 20      GFR est AA >60 >60 ml/min/1.73m2    GFR est non-AA >60 >60 ml/min/1.73m2    Calcium 8.2 (L) 8.5 - 10.1 MG/DL   LIPASE    Collection Time: 03/12/19  1:30 PM   Result Value Ref Range    Lipase 62 (L) 73 - 393 U/L   HEPATIC FUNCTION PANEL    Collection Time: 03/12/19  1:30 PM   Result Value Ref Range    Protein, total 7.4 6.4 - 8.2 g/dL    Albumin 3.6 3.4 - 5.0 g/dL    Globulin 3.8 2.0 - 4.0 g/dL    A-G Ratio 0.9 0.8 - 1.7      Bilirubin, total 0.5 0.2 - 1.0 MG/DL    Bilirubin, direct 0.1 0.0 - 0.2 MG/DL    Alk.  phosphatase 84 45 - 117 U/L    AST (SGOT) 34 15 - 37 U/L    ALT (SGPT) 37 16 - 61 U/L   ETHYL ALCOHOL    Collection Time: 03/12/19  1:30 PM   Result Value Ref Range    ALCOHOL(ETHYL),SERUM 380 (HH) 0 - 3 MG/DL   EKG, 12 LEAD, INITIAL    Collection Time: 03/12/19  1:40 PM   Result Value Ref Range    Ventricular Rate 102 BPM    Atrial Rate 102 BPM    P-R Interval 174 ms    QRS Duration 74 ms    Q-T Interval 326 ms    QTC Calculation (Bezet) 424 ms    Calculated P Axis 64 degrees    Calculated R Axis 31 degrees    Calculated T Axis 51 degrees    Diagnosis       Sinus tachycardia  Otherwise normal ECG  When compared with ECG of 15-JUL-2015 12:45,  No significant change was found         Radiologist and cardiologist interpretations if available at time of this note:  Xr Abd Acute W 1 V Chest    Result Date: 3/12/2019  INDICATION: Abdominal pain. COMPARISON: None. FINDINGS: An acute abdominal series consisting of a frontal view of the chest and supine and upright views of the abdomen and pelvis is provided. The lungs are well expanded without confluent infiltrate, pleural effusion, or pulmonary edema. Minimal linear atelectasis is present at both lung bases. The cardiac silhouette and pulmonary vasculature are within normal limits. Multiple mildly dilated gas-filled loops of small bowel are apparent. Gas is also noted in nondilated colon. This bowel gas pattern is nonspecific but is concerning for early small bowel obstruction. No free intraperitoneal air. There is lower lumbar spondylosis. Mild-moderate degenerative change both hips. IMPRESSION: 1. Nonspecific bowel gas pattern concerning for early small bowel obstruction. Clinical correlation and radiographic follow-up is recommended. 2. Minimal linear atelectasis at both lung bases.       Medication(s) ordered for patient during this emergency visit encounter:  Medications   LORazepam (ATIVAN) 2 mg/mL injection (not administered)   sodium chloride 0.9 % bolus infusion 1,000 mL (0 mL IntraVENous IV Completed 3/12/19 1618)   ondansetron (ZOFRAN) injection 4 mg (4 mg IntraVENous Given 3/12/19 1427)   morphine injection 4 mg (4 mg IntraVENous Given 3/12/19 1427)       Medical Decision Making     I am the first provider for this patient. I reviewed the vital signs, available nursing notes, past medical history, past surgical history, family history and social history. Vital Signs:  Reviewed the patient's vital signs. ED COURSE:  Pain and nausea improved in the ED with noted meds. 2:27 PM  Patient is acute alcohol intoxication given his serum alcohol level. Given how elevated it is I am not comfortable with discharge the patient home until he is clinically sober, and if the alcohol level is less than 100, or someone such as family or friend will come to the ER and agrees to watch for the next 12 hours. 4:35 PM  I was called the patient's room by nursing staff who knew the patient on the floor seizing. Ativan 1 mg IV was ordered. Within a minute of me arriving the patient stopped seizing. Given his significant alcohol abuse and his elevated alcohol level, there is concern that this is seizure secondary to alcohol withdrawal. I will plan on admitting the patient. Alcohol withdrawal protocol order set was initiated. Critical Care Note:  Alcohol withdrawal    Critical care minutes: 39 MINUTES. Given patients presentation to ed with noted change in mental status, numerous serial neurological examinations were performed, as well as evaluation of vital signs. Given the patients underlying condition medical intervention(s) were needed, requiring numerous reevaluations of patient's vital signs and response to different emergency department therapies, total bedside time evaluating and/or treating the patient, not including procedures, is noted below. Admit to Hospitalist    The patient was presented to the accepting hospitalist, Dr. Lux Elias.     The patient's primary doctor is Moise Maldonado MD, and admissions for this physician are with the hospitalist.  If the patient has no primary doctor, then admission is to the hospitalist as well. As the emergency physician, I wrote courtesy admission orders for the hospitalist physician. The courtesy orders included explicit instructions for the floor nursing staff to call the admitting attending physician upon patient arrival on the floor. Coding Diagnoses     Clinical Impression:   1. Alcohol withdrawal seizure without complication (HCC)    2. Epigastric abdominal pain    3. Non-intractable vomiting with nausea, unspecified vomiting type    4. Alcohol abuse    5. Acute alcoholic intoxication without complication (HCC)        Disposition     Disposition:  Admit. BECCA Prince Board Certified Emergency Physician    Provider Attestation:  If a scribe was utilized in generation of this patient record, I personally performed the services described in the documentation, reviewed the documentation, as recorded by the scribe in my presence, and it accurately records the patient's history of presenting illness, review of systems, patient physical examination, and procedures performed by me as the attending physician. BECCA Prince Board Certified Emergency Physician  3/12/2019.  1:25 PM  Aa

## 2019-03-12 NOTE — ED NOTES
TRANSFER - ED to INPATIENT REPORT:    SBAR report made available to receiving floor on this patient being transferred to [facility] (2100  for routine progression of care       Admitting diagnosis Alcohol withdrawal seizure ([facility] [address] [W61.742, R56.9]    Information from the following report(s) SBAR, ED Summary and MAR was made available to receiving floor.     Lines:   Peripheral IV 03/12/19 Left Antecubital (Active)   Site Assessment Clean, dry, & intact 3/12/2019  1:30 PM   Phlebitis Assessment 0 3/12/2019  1:30 PM   Infiltration Assessment 0 3/12/2019  1:30 PM   Dressing Status Clean, dry, & intact 3/12/2019  1:30 PM   Dressing Type Transparent 3/12/2019  1:30 PM   Hub Color/Line Status Green 3/12/2019  1:30 PM           Valuables transported with patient         MAP (Monitor): 104 =Monitored (most recent)  Vitals w/ MEWS Score (last day)     Date/Time MEWS Score Pulse Resp Temp BP Level of Consciousness SpO2    03/12/19 1745  2  108  (Abnormal)   18  98.4 °F (36.9 °C)  153/88  Alert  96 %    03/12/19 1730  --  107  (Abnormal)   20  --  145/97  (Abnormal)   --  95 %    03/12/19 1715  --  106  (Abnormal)   19  --  148/101  (Abnormal)   --  96 %    03/12/19 1700  --  107  (Abnormal)   19  --  139/106  (Abnormal)   --  94 %    03/12/19 1645  --  110  (Abnormal)   18  --  133/93  (Abnormal)   --  --    03/12/19 1630  --  --  --  --  142/95  (Abnormal)   --  --    03/12/19 1445  --  90  18  --  148/100  (Abnormal)   --  96 %    03/12/19 1430  --  110  (Abnormal)   13  --  121/98  (Abnormal)   --  94 %    03/12/19 1315  --  100  19  --  146/104  (Abnormal)   --  96 %    03/12/19 1259  --  --  --  --  --  --  94 %    03/12/19 1257  --  105  (Abnormal)   20  98.5 °F (36.9 °C)  135/95  (Abnormal)   --  89 %  (Abnormal)     03/12/19 1240  --  --  --  --  --  Alert  --

## 2019-03-12 NOTE — ED NOTES
Pt found to have climbed out of the bed with both siderails up and call bell in the bed to use the urinal. Pt lying face down on the floor having a seizure. Seizure lasted approximately 45 seconds. Pt returned to stretcher. With 2 assist and pt participating. No obvious injury noted. Pt states he has had \"Alcohol Withdrawal seizures in the past\". I asked pt if he was hurting anywhere and he responded \"I am a tough old bird\"    Pt moved to bed # 7 for better view in the department.  Report given to [facility]

## 2019-03-12 NOTE — ED TRIAGE NOTES
Patient with abdominal pain and +ETOH, was to see doctor yesterday but was intoxicated and doctor refused to see pain

## 2019-03-13 ENCOUNTER — APPOINTMENT (OUTPATIENT)
Dept: GENERAL RADIOLOGY | Age: 54
End: 2019-03-13
Attending: HOSPITALIST
Payer: MEDICAID

## 2019-03-13 VITALS
RESPIRATION RATE: 18 BRPM | HEART RATE: 87 BPM | TEMPERATURE: 98.1 F | DIASTOLIC BLOOD PRESSURE: 90 MMHG | SYSTOLIC BLOOD PRESSURE: 135 MMHG | HEIGHT: 74 IN | WEIGHT: 220 LBS | BODY MASS INDEX: 28.23 KG/M2 | OXYGEN SATURATION: 91 %

## 2019-03-13 LAB
ANION GAP SERPL CALC-SCNC: 6 MMOL/L (ref 3–18)
BASOPHILS # BLD: 0.1 K/UL (ref 0–0.1)
BASOPHILS NFR BLD: 1 % (ref 0–2)
BUN SERPL-MCNC: 9 MG/DL (ref 7–18)
BUN/CREAT SERPL: 13 (ref 12–20)
CALCIUM SERPL-MCNC: 8.4 MG/DL (ref 8.5–10.1)
CHLORIDE SERPL-SCNC: 105 MMOL/L (ref 100–108)
CO2 SERPL-SCNC: 29 MMOL/L (ref 21–32)
CREAT SERPL-MCNC: 0.71 MG/DL (ref 0.6–1.3)
DIFFERENTIAL METHOD BLD: ABNORMAL
EOSINOPHIL # BLD: 0.2 K/UL (ref 0–0.4)
EOSINOPHIL NFR BLD: 4 % (ref 0–5)
ERYTHROCYTE [DISTWIDTH] IN BLOOD BY AUTOMATED COUNT: 13.9 % (ref 11.6–14.5)
GLUCOSE SERPL-MCNC: 99 MG/DL (ref 74–99)
HCT VFR BLD AUTO: 42 % (ref 36–48)
HGB BLD-MCNC: 13.6 G/DL (ref 13–16)
LYMPHOCYTES # BLD: 2.2 K/UL (ref 0.9–3.6)
LYMPHOCYTES NFR BLD: 40 % (ref 21–52)
MAGNESIUM SERPL-MCNC: 1.9 MG/DL (ref 1.6–2.6)
MCH RBC QN AUTO: 30 PG (ref 24–34)
MCHC RBC AUTO-ENTMCNC: 32.4 G/DL (ref 31–37)
MCV RBC AUTO: 92.7 FL (ref 74–97)
MONOCYTES # BLD: 0.8 K/UL (ref 0.05–1.2)
MONOCYTES NFR BLD: 14 % (ref 3–10)
NEUTS SEG # BLD: 2.3 K/UL (ref 1.8–8)
NEUTS SEG NFR BLD: 41 % (ref 40–73)
PLATELET # BLD AUTO: 208 K/UL (ref 135–420)
PMV BLD AUTO: 10.7 FL (ref 9.2–11.8)
POTASSIUM SERPL-SCNC: 4 MMOL/L (ref 3.5–5.5)
RBC # BLD AUTO: 4.53 M/UL (ref 4.7–5.5)
SODIUM SERPL-SCNC: 140 MMOL/L (ref 136–145)
WBC # BLD AUTO: 5.5 K/UL (ref 4.6–13.2)

## 2019-03-13 PROCEDURE — 74011250636 HC RX REV CODE- 250/636: Performed by: HOSPITALIST

## 2019-03-13 PROCEDURE — 80048 BASIC METABOLIC PNL TOTAL CA: CPT

## 2019-03-13 PROCEDURE — 83735 ASSAY OF MAGNESIUM: CPT

## 2019-03-13 PROCEDURE — 74011250637 HC RX REV CODE- 250/637: Performed by: HOSPITALIST

## 2019-03-13 PROCEDURE — 36415 COLL VENOUS BLD VENIPUNCTURE: CPT

## 2019-03-13 PROCEDURE — 74018 RADEX ABDOMEN 1 VIEW: CPT

## 2019-03-13 PROCEDURE — 74011250636 HC RX REV CODE- 250/636: Performed by: EMERGENCY MEDICINE

## 2019-03-13 PROCEDURE — 85025 COMPLETE CBC W/AUTO DIFF WBC: CPT

## 2019-03-13 PROCEDURE — 74011250637 HC RX REV CODE- 250/637: Performed by: EMERGENCY MEDICINE

## 2019-03-13 RX ADMIN — LOSARTAN POTASSIUM 25 MG: 50 TABLET ORAL at 09:00

## 2019-03-13 RX ADMIN — DEXTROSE MONOHYDRATE, SODIUM CHLORIDE, AND POTASSIUM CHLORIDE 100 ML/HR: 50; 4.5; 1.49 INJECTION, SOLUTION INTRAVENOUS at 06:12

## 2019-03-13 RX ADMIN — Medication 10 ML: at 06:00

## 2019-03-13 RX ADMIN — LORAZEPAM 1 MG: 1 TABLET ORAL at 11:34

## 2019-03-13 RX ADMIN — GABAPENTIN 600 MG: 300 CAPSULE ORAL at 11:11

## 2019-03-13 RX ADMIN — CARVEDILOL 6.25 MG: 12.5 TABLET, FILM COATED ORAL at 11:11

## 2019-03-13 RX ADMIN — PANTOPRAZOLE SODIUM 40 MG: 40 TABLET, DELAYED RELEASE ORAL at 11:11

## 2019-03-13 RX ADMIN — QUETIAPINE FUMARATE 300 MG: 300 TABLET, EXTENDED RELEASE ORAL at 00:35

## 2019-03-13 RX ADMIN — FOLIC ACID 1 MG: 1 TABLET ORAL at 11:11

## 2019-03-13 RX ADMIN — LORAZEPAM 1 MG: 2 INJECTION, SOLUTION INTRAMUSCULAR; INTRAVENOUS at 00:28

## 2019-03-13 RX ADMIN — BUSPIRONE HYDROCHLORIDE 10 MG: 10 TABLET ORAL at 11:11

## 2019-03-13 RX ADMIN — ASPIRIN 81 MG: 81 TABLET, COATED ORAL at 11:11

## 2019-03-13 RX ADMIN — RIVAROXABAN 20 MG: 10 TABLET, FILM COATED ORAL at 11:11

## 2019-03-13 RX ADMIN — ATORVASTATIN CALCIUM 40 MG: 40 TABLET, FILM COATED ORAL at 11:11

## 2019-03-13 NOTE — PROGRESS NOTES
Problem: Falls - Risk of  Goal: *Absence of Falls  Document Maddie Fall Risk and appropriate interventions in the flowsheet.   Outcome: Progressing Towards Goal  Fall Risk Interventions:            Medication Interventions: Bed/chair exit alarm, Evaluate medications/consider consulting pharmacy, Patient to call before getting OOB, Teach patient to arise slowly

## 2019-03-13 NOTE — PROGRESS NOTES
Nutrition initial assessment/Plan of care      RECOMMENDATIONS:   1. Regular Diet  2. Monitor labs, weight and PO intake  3. RD to follow     GOALS:   1. PO intake meets >75% of protein/calorie needs by 3/20  2. Weight Maintenance (+/- 1-2 lb) by 3/20      ASSESSMENT:   Wt status is classified as overweight per BMI of 28.2. However per documented weight records Pt w/ a 10 lb or 4.3% loss x 1 month. Poor to fair PO intake. Labs noted. Nutrition recommendations listed. RD to follow. Nutrition Diagnoses:   Inadequate PO intake related to decreased appetite as evidenced by only 40% of lunch tray consumed. Nutrition Risk:  [] High  [] Moderate [x]  Low    SUBJECTIVE/OBJECTIVE:    Pt admitted for ETOH withdraw seizure. Note variable weights recorded per documented weight records with -230s lb over the past year, but Pt w/ a 10 lb or 4.3% loss x 1 month. Pt seen in room at lunch; observed 40% of meal consumed. Denies having any food allergies or problems chewing/swallowing and  lb. Report consuming 2 meals per day at home normally but noted a decreased appetite for the past week. Discussed some nutrition recommendations for sobriety; handouts provided. Plan to D/C later today. Information Obtained from:    [x] Chart Review   [x] Patient   [] Family/Caregiver   [] Nurse/Physician   [] Interdisciplinary Meeting/Rounds      Diet:Regular Diet  Medications: [x] Reviewed    Allergies: [x] Reviewed   Encounter Diagnoses     ICD-10-CM ICD-9-CM   1. Alcohol withdrawal seizure without complication (HCC) I84.807 291.81   2. Epigastric abdominal pain R10.13 789.06   3. Non-intractable vomiting with nausea, unspecified vomiting type R11.2 787.01   4. Alcohol abuse F10.10 305.00   5.  Acute alcoholic intoxication without complication (HCC) A46.418 305.00     Past Medical History:   Diagnosis Date    Abuse     Anemia NEC     Anxiety     Arthritis     Chronic pain     Colitis     Contact dermatitis and other eczema, due to unspecified cause     Depression     Depression     Headache(784.0)     [facility] [address] [address]     Hypercholesterolemia     Hypertension     Liver disease     Low back pain     with left leg pain -- multilevel spondylosis and L4 radiculitis    Neck pain     mild spondylosis    Other ill-defined conditions(799.89)     MS symptoms    Pancreatitis     Psychotic disorder ([facility] [address]     Trauma       Labs:    Lab Results   Component Value Date/Time    Sodium 140 03/13/2019 05:10 AM    Potassium 4.0 03/13/2019 05:10 AM    Chloride 105 03/13/2019 05:10 AM    CO2 29 03/13/2019 05:10 AM    Anion gap 6 03/13/2019 05:10 AM    Glucose 99 03/13/2019 05:10 AM    BUN 9 03/13/2019 05:10 AM    Creatinine 0.71 03/13/2019 05:10 AM    Calcium 8.4 (L) 03/13/2019 05:10 AM    Magnesium 1.9 03/13/2019 05:10 AM    Phosphorus 2.9 04/24/2014 08:48 AM    Albumin 3.6 03/12/2019 01:30 PM     Anthropometrics: BMI (calculated): 28.2  Last 3 Recorded Weights in this Encounter    03/12/19 1240   Weight: 99.8 kg (220 lb)      Ht Readings from Last 1 Encounters:   03/12/19 6' 2\" (1.88 m)     Weight Metrics 3/12/2019 3/11/2019 12/28/2018 12/10/2018 8/15/2018 7/10/2018 6/4/2018   Weight 220 lb 233 lb 228 lb 228 lb 12.8 oz 226 lb 3.2 oz 243 lb 234 lb   BMI 28.25 kg/m2 29.92 kg/m2 29.27 kg/m2 29.38 kg/m2 29.04 kg/m2 31.2 kg/m2 30.04 kg/m2   Some encounter information is confidential and restricted. Go to Review Flowsheets activity to see all data. No data found. IBW: 190 lb %IBW: 116% UBW: 235 lb   [x] Weight Loss [] Weight Gain [] Weight Stable    Estimated Nutrition Needs: [x] MSJ  [] Other:  Calories: 2113-7167 kcal Based on:   [x] Actual BW    Protein:   100-120 g Based on:   [x] Actual BW    Fluid:       0806-8879 ml Based on:   [] Actual BW      [x] No Cultural, Islam or ethnic dietary need identified.     [] Cultural, Islam and ethnic food preferences identified and addressed     Wt Status:  [] Normal (18.6 - 24. 9) [] Underweight (<18.5) [x] Overweight (25 - 29.9) [] Mild Obesity (30 - 34.9)  [] Moderate Obesity (35 - 39.9) [] Morbid Obesity (40+)       Nutrition Problems Identified:   [x] Suboptimal PO intake (fair)  [] Food Allergies  [] Difficulty chewing/swallowing/poor dentition  [] Constipation/Diarrhea   [] Nausea/Vomiting   [] None  [] Other:     Plan:   [] Therapeutic Diet  []  Obtained/adjusted food preferences/tolerances and/or snacks options   []  Supplements added   [] Occupational therapy following for feeding techniques  []  HS snack added   []  Modify diet texture   []  Modify diet for food allergies   [x]  Educate patient   []  Assist with menu selection   [x]  Monitor PO intake on meal rounds   [x]  Continue inpatient monitoring and intervention   []  Participated in discharge planning/Interdisciplinary rounds/Team meetings   []  Other:     Education Needs:   [] Not appropriate for teaching at this time due to:    [x] Identified and addressed    Nutrition Monitoring and Evaluation:  [x] Continue ongoing monitoring and intervention  [] Other    Balbina Petties

## 2019-03-13 NOTE — PROGRESS NOTES
conducted an initial consultation and Spiritual Assessment for Frank Ibarra, who is a 47 y. o.,male. Patients Primary Language is: [place]. According to the patients EMR Cheondoism Affiliation is: [place]. The reason the Patient came to the hospital is:   Patient Active Problem List    Diagnosis Date Noted    Bipolar 1 disorder, depressed, severe ([facility] 02/08/2014     Priority: 1 - One    Alcohol withdrawal seizure ([address] 03/12/2019    SBO (small bowel obstruction) ([facility] 03/12/2019    Adenomyomatosis of gallbladder 08/15/2018    Hepatic steatosis 08/15/2018    Marijuana use 06/04/2018    Recurrent pulmonary emboli ([address] 06/04/2018    Eczema 01/17/2018    History of non-ST elevation myocardial infarction (NSTEMI) 09/22/2017    Hx pulmonary embolism 09/22/2017    History of CVA (cerebrovascular accident) 09/22/2017    Coronary artery disease involving native coronary artery of native heart without angina pectoris 08/31/2017    Chronic left-sided low back pain with left-sided sciatica 07/31/2017    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 Explored issues of sara, spirituality and/or Oriental orthodox needs while hospitalized. Listened empathically. Provided chaplaincy education. Provided information about Spiritual Care Services. Offered prayer and assurance of continued prayers on patient's behalf. Chart reviewed. The following outcomes were achieved:  Patient shared some information about their medical narrative and spiritual journey/beliefs. Patient processed feeling about current hospitalization.   Patient expressed gratitude for the 's visit. Assessment:  Patient did not indicate any spiritual or Islam issues which require Spiritual Care Services interventions at this time. Patient does not have any Islam/cultural needs that will affect patients preferences in health care. Plan:  Chaplains will continue to follow and will provide pastoral care on an as needed or requested basis.  recommends bedside caregivers page  on duty if patient shows signs of acute spiritual or emotional distress.     [facility]   Staff [address] [place] [address]   [phone]

## 2019-03-13 NOTE — PROGRESS NOTES
Problem: Falls - Risk of  Goal: *Absence of Falls  Document Maddie Fall Risk and appropriate interventions in the flowsheet.   Outcome: Progressing Towards Goal  Fall Risk Interventions:

## 2019-03-13 NOTE — PROGRESS NOTES
Reason for Admission:  Alcoholic withdrawal seizure                   RRAT Score:   13               Do you (patient/family) have any concerns for transition/discharge? None @ this time. Plan for utilizing home health:   Not indicated      Likelihood of readmission? Mod/yellow            Transition of Care Plan:   Home with out-pt follow up. Chart reviewed. Met with pt., verified all demographics. States has [employer] ins. States Dr. Mitchell Paz is his PCP, states last seen couple days ago. : Deena Palumbo, : [phone]. Lives alone. Uses no DME. Independent with ADL's prior to admit. States not sure who will pick him up @ discharge. Shelia YoonRN,ext [phone]. Patient has designated no one to participate in his/her discharge plan and to receive any needed information. Name:   Address:  Phone number:    Care Management Interventions  PCP Verified by CM: Yes(Dr. Mitchell Paz, last seen couple days ago)  Palliative Care Criteria Met (RRAT>21 & CHF Dx)?: No  Mode of Transport at Discharge:  Other (see comment)(friend)  Transition of Care Consult (CM Consult): Discharge Planning  Discharge Durable Medical Equipment: No  Physical Therapy Consult: No  Occupational Therapy Consult: No  Speech Therapy Consult: No  Current Support Network: Lives Alone  Confirm Follow Up Transport: Self  Plan discussed with Pt/Family/Caregiver: Yes  Discharge Location  Discharge Placement: Home

## 2019-03-13 NOTE — PROGRESS NOTES
Problem: Falls - Risk of  Goal: *Absence of Falls  Document Maddie Fall Risk and appropriate interventions in the flowsheet.   Outcome: Resolved/Met Date Met: 03/13/19  Fall Risk Interventions:            Medication Interventions: Bed/chair exit alarm, Evaluate medications/consider consulting pharmacy, Patient to call before getting OOB, Teach patient to arise slowly

## 2019-03-13 NOTE — PROGRESS NOTES
[phone]  Patient given discharge education    1600  Patient picked up by cab to complete discharge

## 2019-03-13 NOTE — PROGRESS NOTES
Pt arrived from ED via stretcher, no acute concerns, vitals WNL, HR mildly elevated, pain is reported managed, CIWA score of 13, 1 mg ativan given per protocal. Skin intact. Pt oriented to room, call bell, and falls education. Will continue to monitor.

## 2019-03-13 NOTE — PROGRESS NOTES
NUTRITION  Patient/Family Education Record    FACTORS THAT MAY INFLUENCE PATIENTS ABILITY TO LEARN:   []   Language barrier    []   Cultural needs   [x]   Motivation    []   Cognitive limitation    []   Physical   []   Education   []   Physiological factors   []   Hearing/vision/speaking impairment   []   Shinto beliefs    []   Financial limitations    []  Other:   []   No barriers limiting ability to learn     Person Instructed:   [x]   Patient   []   Family   []  Other     Preference for Learning:   [x]   Verbal   [x]   Written   []  Demonstration     Patient educated on:   [] Cardiac/heart healthy diet  [] 2gm Sodium diet  [] Vitamin K regulated diet (coumadin)  [] Weight loss/portion control  [] High protein  [x] Other: MNT for sobriety    Goal:  Patient will demonstrate understanding of modified diet by implementing suggestions to assist with sobriety     Outcome:   [x]  Patient verbalized understanding of education and willing to comply with recommendations.   []  Patient declined education  []  Patient needs follow up education; scheduled date for follow up:  [x]  Written information provided  []  RD contact information provided    Mini Cedillo

## 2019-03-13 NOTE — PROGRESS NOTES
Transportation at Discharge: 3/13/19    Transport Company/Representative:  [employer]quez[employer] will find a local cab co to  patient    Transportation Phone number: [phone]  Method of Transport: Medicaid Cab    Estimated pick-up time: 30mins to 3 hours from 3:30P  Destination: Home Address  [place] [address]. [address]  [address] [facility]  Authorization: [id number]    Requesting Outcomes Manager: Saturnino Shaw    Per Mikel Dandy at [employer] a cab will arrive 30mins to 3 hours from time of call.   The  will be instructed to call the nursing station [phone] upon arrival      Christiano Sims, Care- ext [phone]

## 2019-03-13 NOTE — DISCHARGE SUMMARY
Discharge Summary    Patient: Arcadio Loyola               Sex: male          DOA: 3/12/2019       YOB: 1965      Age:  47 y.o.        LOS:  LOS: 1 day                Admit Date: 3/12/2019    Discharge Date: 3/13/2019    Admission Diagnoses: Alcohol withdrawal seizure ([facility] [D34.126, R56.9]    Discharge Diagnoses:    Problem List as of 3/13/2019 Date Reviewed: 12/28/2018          Codes Class Noted - Resolved    Bipolar 1 disorder, depressed, severe ([facility] (Chronic) ICD-10-CM: F31.4  ICD-9-CM: 296.53  2/8/2014 - Present    Overview Signed 4/22/2014 11:04 AM by Faizan Workman MD     Followed by [facility] Valente Stiles LPC and Leonela Hammond NP)             * (Principal) Alcohol withdrawal seizure [facility]) ICD-10-CM: X94.604, R56.9  ICD-9-CM: 291.81, 780.39  3/12/2019 - Present        SBO (small bowel obstruction) ([facility] ICD-10-CM: V19.088  ICD-9-CM: 560.9  3/12/2019 - Present        Adenomyomatosis of gallbladder ICD-10-CM: D13.5  ICD-9-CM: 211.5  8/15/2018 - Present        Hepatic steatosis ICD-10-CM: K76.0  ICD-9-CM: 571.8  8/15/2018 - Present        Marijuana use ICD-10-CM: F12.90  ICD-9-CM: 305.20  6/4/2018 - Present        Recurrent pulmonary emboli ([facility] ICD-10-CM: I26.99  ICD-9-CM: 415.19  6/4/2018 - Present    Overview Signed 6/4/2018 12:19 PM by Faizan Workman MD     Due to immobility/intoxication             Eczema ICD-10-CM: L30.9  ICD-9-CM: 692.9  1/17/2018 - Present        History of non-ST elevation myocardial infarction (NSTEMI) ICD-10-CM: I25.2  ICD-9-CM: 165  9/22/2017 - Present        Hx pulmonary embolism ICD-10-CM: V62.656  ICD-9-CM: V12.55  9/22/2017 - Present    Overview Signed 9/22/2017 12:46 PM by Faizan Workman MD     During prolonged immobility (ETOH binge, sleeping in car homeless)             History of CVA (cerebrovascular accident) ICD-10-CM: Z86.73  ICD-9-CM: V12.54  9/22/2017 - Present        Coronary artery disease involving native coronary artery of native heart without angina pectoris ICD-10-CM: I25.10  ICD-9-CM: 414.01  8/31/2017 - Present    Overview Signed 9/22/2017 12:45 PM by Krista Leary MD     NSTEMI 8/28, cath possible occlusion of small distal Cx culprit, OM 50%, RCA 50% medically managed             Chronic left-sided low back pain with left-sided sciatica ICD-10-CM: M54.42, G89.29  ICD-9-CM: 724.2, 724.3, 338.29  7/31/2017 - Present        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 ([facility] ICD-10-CM: F10.24, F32.89  ICD-9-CM: 291.89, 303.90  8/6/2016 - Present        History of suicide attempt ICD-10-CM: Z91.5  ICD-9-CM: V11.8  11/11/2014 - Present    Overview Addendum 11/11/2014  7:59 AM by Krista Leary MD     Overdose on ativan 11/2014             EtOH dependence ([facility] ICD-10-CM: P84.95  ICD-9-CM: 303.90  4/22/2014 - Present    Overview Addendum 3/28/2017  2:55 PM by Krista Leary MD     Numerous hospitalization of intoxication/suicidal ideation             Tobacco dependence ICD-10-CM: B33.130  ICD-9-CM: 305.1  8/20/2013 - Present        HTN (hypertension) ICD-10-CM: I10  ICD-9-CM: 401.9  8/20/2013 - Present        HLD (hyperlipidemia) ICD-10-CM: E78.5  ICD-9-CM: 272.4  8/20/2013 - Present        RESOLVED: Chronic bilateral low back pain without sciatica ICD-10-CM: M54.5, G89.29  ICD-9-CM: 724.2, 338.29  3/30/2018 - 6/4/2018        RESOLVED: NSTEMI (non-ST elevated myocardial infarction) ([facility] ICD-10-CM: I21.4  ICD-9-CM: 410.70  8/31/2017 - 9/22/2017        RESOLVED: Diuretic-induced hypokalemia ICD-10-CM: E87.6, T50.2X5A  ICD-9-CM: 276.8, E944.4  8/31/2017 - 8/15/2018        RESOLVED: Pulmonary embolism ([facility] ICD-10-CM: I26.99  ICD-9-CM: 415.19  7/14/2017 - 7/31/2017        RESOLVED: Pulmonary embolism without acute cor pulmonale ([facility] ICD-10-CM: I26.99  ICD-9-CM: 415.19  7/14/2017 - 9/22/2017        RESOLVED: Acute hypoxemic respiratory failure ([facility] ICD-10-CM: J96.01  ICD-9-CM: 518.81  3/12/2017 - 7/31/2017        RESOLVED: Aspiration pneumonitis ([place] [address] ICD-10-CM: J69.0  ICD-9-CM: 507.0  3/12/2017 - 8/21/2017        RESOLVED: Delirium tremens ([place] [address] ICD-10-CM: T84.674  ICD-9-CM: 291.0  3/12/2017 - 7/31/2017        RESOLVED: Alcohol intoxication ([place] [address] ICD-10-CM: M08.752  ICD-9-CM: 305.00  3/12/2017 - 7/31/2017    Overview Signed 7/20/2017  9:18 AM by Jose Florez RN     Overview:   Overall goal level 0.17 in ED (on March 12 was 0.24)             RESOLVED: Alcoholism ([place] [address] ICD-10-CM: F10.20  ICD-9-CM: 303.90  3/12/2017 - 7/31/2017    Overview Signed 7/20/2017  9:18 AM by Jose Florez RN     Overview:   Has been treated for DTs             RESOLVED: Alcohol withdrawal ([place] [address] ICD-10-CM: R61.776  ICD-9-CM: 291.81  10/21/2016 - 9/22/2017        RESOLVED: Alcohol dependence with withdrawal with complication ([place] [address] YYI-64-ID: X45.241  ICD-9-CM: 291.81  8/6/2016 - 9/22/2017        RESOLVED: Housing or economic circumstances ICD-10-CM: Z59.9  ICD-9-CM: V60.9  8/6/2016 - 7/31/2017        RESOLVED: Without employment ICD-10-CM: Z56.0  ICD-9-CM: V62.0  8/6/2016 - 7/31/2017        RESOLVED: Depression ICD-10-CM: F32.9  ICD-9-CM: 698  7/15/2015 - 3/28/2017        RESOLVED: Transaminitis ICD-10-CM: R74.0  ICD-9-CM: 790.4  6/20/2015 - 6/4/2018        RESOLVED: CVA (cerebrovascular accident) [facility]) ICD-10-CM: I63.9  ICD-9-CM: 434.91  11/24/2014 - 9/22/2017        RESOLVED: Influenza ICD-10-CM: J11.1  ICD-9-CM: 487.1  9/24/2014 - 7/31/2017        RESOLVED: HCAP (healthcare-associated pneumonia) ICD-10-CM: J18.9  ICD-9-CM: 489  9/23/2014 - 7/31/2017        RESOLVED: Acute renal failure (ARF) ([facility] ICD-10-CM: N17.9  ICD-9-CM: 584.9  9/17/2014 - 7/31/2017        RESOLVED: Hypocalcemia ICD-10-CM: E83.51  ICD-9-CM: 275.41  9/17/2014 - 7/31/2017        RESOLVED: MDD (major depressive disorder), recurrent episode, severe ([facility] ICD-10-CM: F33.2  ICD-9-CM: 296.33  4/22/2014 - 3/28/2017    Overview Signed 4/22/2014 11:05 AM by Sheri Rose MD     Hospitalized 4/2014             RESOLVED: ETOH abuse ICD-10-CM: F10.10  ICD-9-CM: 305.00  8/20/2013 - 7/31/2017        RESOLVED: Chronic back pain ICD-10-CM: M54.9, G89.29  ICD-9-CM: 724.5, 338.29  8/20/2013 - 8/21/2017        RESOLVED: Bipolar disorder ([facility] ICD-10-CM: F31.9  ICD-9-CM: 296.80  8/20/2013 - 4/22/2014        RESOLVED: Chronic low back pain ICD-10-CM: M54.5, G89.29  ICD-9-CM: 724.2, 338.29  11/14/2011 - 7/31/2017        RESOLVED: HTN (hypertension), benign ICD-10-CM: I10  ICD-9-CM: 401.1  11/14/2011 - 8/21/2017        RESOLVED: Severe episode of recurrent major depressive disorder ([facility] ICD-10-CM: F33.2  ICD-9-CM: 296.33  11/14/2011 - 9/22/2017    Overview Signed 7/20/2017  9:18 AM by Bernadette Petersen RN     Overview:   Most probable SIMD             RESOLVED: Depression, reactive ICD-10-CM: F32.9  ICD-9-CM: 300.4  8/16/2011 - 4/22/2014        RESOLVED: Dysthymic disorder ICD-10-CM: F34.1  ICD-9-CM: 300.4  8/16/2011 - 4/22/2014              Discharge Condition:  Improved    HPI:  Karyn Luu is a 47 y.o. male with a PMHx of etoh abuse, anxiety/depression, HLD, HTN, hx PE who presented to the ED with c/o abd pain. Patient states he drinks vodka daily; his last drink was this morning. His serum etoh level is 380. In the ED, the patient was found seizing on the floor and he was given ativan. Pt will be admitted for further evaluation. Hospital Course:  KUB showed possible early SBO. Pt was kept NPO and given IV fluids. Pt was observed overnight and did not have recurrence of seizures. Repeat KUB showed nonobstructive bowel pattern. He was tolerating a regular diet. He was discharged home in stable condition. He was strongly advised to cut down on alcohol consumption and wean off, to prevent further seizures, he expressed understanding.      Consults:    None    Labs:  Labs: Results:       Chemistry Recent Labs     03/13/19  0510 03/12/19  1330   GLU 99 100*    143   K 4.0 3.8    107   CO2 29 28 BUN 9 7   CREA 0.71 0.75   CA 8.4* 8.2*   AGAP 6 8   BUCR 13 9*   AP  --  84   TP  --  7.4   ALB  --  3.6   GLOB  --  3.8   AGRAT  --  0.9      CBC w/Diff Recent Labs     03/13/19  0510 03/12/19  1330   WBC 5.5 6.1   RBC 4.53* 4.97   HGB 13.6 15.2   HCT 42.0 45.3    252   GRANS 41 45   LYMPH 40 42   EOS 4 1      Cardiac Enzymes No results for input(s): CPK, CKND1, ROHIT in the last 72 hours. No lab exists for component: CKRMB, TROIP   Coagulation No results for input(s): PTP, INR, APTT in the last 72 hours. No lab exists for component: INREXT    Lipid Panel Lab Results   Component Value Date/Time    Cholesterol, total 159 11/25/2016 09:25 AM    HDL Cholesterol 46 11/25/2016 09:25 AM    LDL, calculated 81 11/25/2016 09:25 AM    VLDL, calculated 32 11/25/2016 09:25 AM    Triglyceride 158 (H) 11/25/2016 09:25 AM    CHOL/HDL Ratio 4.0 04/22/2014 05:10 AM      BNP No results for input(s): BNPP in the last 72 hours. Liver Enzymes Recent Labs     03/12/19  1330   TP 7.4   ALB 3.6   AP 84   SGOT 34      Thyroid Studies Lab Results   Component Value Date/Time    TSH 1.310 01/16/2019 11:55 AM            Significant Diagnostic Studies:   Xr Abd (kub)    Result Date: 3/13/2019  Abdomen History: Follow-up small bowel obstruction Comparison: Abdomen acute series 3/12/2019 Findings: 3 portable supine views of the abdomen performed. Study demonstrates a nonobstructive bowel gas pattern. No abnormally dilated small bowel loops identified. There is scattered air throughout the colon. No radiopaque foreign bodies are seen. Degenerative changes are present in the lumbar spine. IMPRESSION: Nonobstructive bowel gas pattern. Improvement in previously seen mid abdominal slightly prominent air distended small bowel loops. Xr Abd Acute W 1 V Chest    Result Date: 3/12/2019  INDICATION: Abdominal pain. COMPARISON: None.  FINDINGS: An acute abdominal series consisting of a frontal view of the chest and supine and upright views of the abdomen and pelvis is provided. The lungs are well expanded without confluent infiltrate, pleural effusion, or pulmonary edema. Minimal linear atelectasis is present at both lung bases. The cardiac silhouette and pulmonary vasculature are within normal limits. Multiple mildly dilated gas-filled loops of small bowel are apparent. Gas is also noted in nondilated colon. This bowel gas pattern is nonspecific but is concerning for early small bowel obstruction. No free intraperitoneal air. There is lower lumbar spondylosis. Mild-moderate degenerative change both hips. IMPRESSION: 1. Nonspecific bowel gas pattern concerning for early small bowel obstruction. Clinical correlation and radiographic follow-up is recommended. 2. Minimal linear atelectasis at both lung bases. Discharge Medications:     Current Discharge Medication List      CONTINUE these medications which have NOT CHANGED    Details   cyclobenzaprine (FLEXERIL) 5 mg tablet 5 mg.      potassium chloride (K-DUR, KLOR-CON) 20 mEq tablet 10 mEq.      nicotine (NICODERM CQ) 21 mg/24 hr 1 Patch. ADULTS MULTIVITAMIN 18 mg iron-400 mcg-25 mcg tab Refills: 3      FLUZONE QUAD 4734-0684, PF, syrg injection TO BE ADMINISTERED BY PHARMACIST FOR IMMUNIZATION  Refills: 0      Blood Pressure Monitor kit Use as directed to check blood pressure daily. Qty: 1 Kit, Refills: 0      carvedilol (COREG) 6.25 mg tablet Take 1 Tab by mouth two (2) times a day. Qty: 180 Tab, Refills: 3    Comments: PT lost meds  Associated Diagnoses: Essential hypertension      losartan (COZAAR) 25 mg tablet Take 1 Tab by mouth daily. Qty: 90 Tab, Refills: 1    Associated Diagnoses: Essential hypertension      multivitamin (DAILY-CAYLA) tablet Take 1 Tab by mouth daily. Qty: 90 Tab, Refills: 3      naproxen (NAPROSYN) 500 mg tablet Take 1 Tab by mouth two (2) times daily (with meals).   Qty: 100 Tab, Refills: 2      fluticasone (FLONASE ALLERGY RELIEF) 50 mcg/actuation nasal spray 2 Sprays by Both Nostrils route daily. Qty: 1 Bottle, Refills: 3      clotrimazole-betamethasone (LOTRISONE) topical cream Apply pea-sized amount bid to affecteed area x 10d  Qty: 45 g, Refills: 0      rivaroxaban (XARELTO) 20 mg tab tablet Take 1 Tab by mouth daily (with breakfast). Qty: 90 Tab, Refills: 1    Comments: Pt lost meds. Please fill early  Associated Diagnoses: Other acute pulmonary embolism without acute cor pulmonale (HCC)      atorvastatin (LIPITOR) 40 mg tablet Take 1 Tab by mouth daily. Qty: 90 Tab, Refills: 1    Comments: Pt lost meds. Please fill early  Associated Diagnoses: Coronary artery disease involving native coronary artery of native heart without angina pectoris      lidocaine (LIDODERM) 5 % 1 Patch by TransDERmal route every twenty-four (24) hours. Qty: 30 Each, Refills: 5      isosorbide mononitrate ER (IMDUR) 30 mg tablet Take 1 Tab by mouth daily. Qty: 90 Tab, Refills: 3      thiamine HCL (B-1) 100 mg tablet Take 1 Tab by mouth daily. Qty: 90 Tab, Refills: 3      omeprazole (PRILOSEC) 20 mg capsule Take 1 Cap by mouth daily. Qty: 90 Cap, Refills: 3      folic acid (FOLVITE) 1 mg tablet Take 1 Tab by mouth daily. Qty: 90 Tab, Refills: 3      loratadine (CLARITIN) 10 mg tablet Take 1 Tab by mouth daily as needed for Allergies. Qty: 90 Tab, Refills: 1    Associated Diagnoses: Seasonal allergic rhinitis, unspecified trigger      aspirin delayed-release 81 mg tablet TAKE 1 TAB BY MOUTH DAILY. Qty: 90 Tab, Refills: 3      gabapentin (NEURONTIN) 600 mg tablet Take 1 Tab by mouth two (2) times a day. Qty: 180 Tab, Refills: 0      nitroglycerin (NITROSTAT) 0.4 mg SL tablet TAKE 1 TABLET UNDER TOUNGE EVERY 5 MINS AS NEEDED FOR CHEST PAIN. DO NOT EXCEED 3 DOSES  Qty: 25 Tab, Refills: 0    Comments: PER PT  Associated Diagnoses: NSTEMI (non-ST elevated myocardial infarction) ([facility])      QUEtiapine SR (SEROQUEL XR) 300 mg sr tablet Take 300 mg by mouth nightly. busPIRone (BUSPAR) 30 mg tablet Take 1 Tab by mouth daily. Qty: 90 Tab, Refills: 0         STOP taking these medications       ciprofloxacin HCl (CIPRO) 500 mg tablet Comments:   Reason for Stopping:         cloNIDine HCl (CATAPRES) 0.1 mg tablet Comments:   Reason for Stopping:         DULoxetine (CYMBALTA) 60 mg capsule Comments:   Reason for Stopping:               Activity: Activity as tolerated    Diet: Regular Diet    Follow-up: PCP in 1 week.          Total time spent including time spent on final examination and discharge discussion, discharge documentation and records reviewed and medication reconciliation: > 30 minutes    Lali Reynoso MD  [facility]

## 2019-03-13 NOTE — DISCHARGE INSTRUCTIONS
Patient Education        Alcohol, Drug, or Poison Ingestion: Care Instructions  Your Care Instructions    A person can become very sick, or die, from swallowing or using alcohol, drugs, or poisons. Alcohol poisoning occurs when a person drinks a large amount of alcohol. Alcohol can stop nerve signals that control breathing. It can also stop the gag reflex that prevents choking. Alcohol poisoning is serious. It can lead to brain damage or death if it's not treated right away. Drugs can be used by accident or on purpose. They can be swallowed, inhaled, injected, or absorbed through the skin. Drugs include over-the-counter medicine (such as aspirin or acetaminophen) and prescription medicine. They also include vitamins and supplements. And they include illegal drugs such as cocaine and heroin. And poisons are all around us. They include household , cosmetics, houseplants, and garden chemicals. The doctor has checked you carefully, but problems can develop later. If you notice any problems or new symptoms, get medical treatment right away. Follow-up care is a key part of your treatment and safety. Be sure to make and go to all appointments, and call your doctor if you are having problems. It's also a good idea to know your test results and keep a list of the medicines you take. How can you care for yourself at home? Alcohol problems  · Talk to your doctor or counselor about programs that can help you stop using alcohol. · Plan ways to avoid being tempted to drink. ? Get rid of all alcohol in your home. ? Avoid places where you tend to drink. ? Stay away from places or events that offer alcohol. ? Stay away from people who drink a lot. Drug problems  · Talk to your doctor about programs that can help you stop using drugs. · Get rid of any drugs you might be tempted to misuse. · Learn how to say no when other people use drugs. · Don't spend time with people who use drugs.   Poison prevention  · Keep products in the containers they came in. Keep them with the original labels. · Be careful when you use cleaning products, paints, solvents, and pesticides. Read labels before use. Use a fan to move strong odors and fumes out of your home. · Do not mix cleaning products. Try to use nontoxic . These include vinegar, lemon juice, and baking soda. When should you call for help? Poison control centers, hospitals, or your doctor can give immediate advice in the case of a poisoning. The [facility] number is [phone]. Have the poison container with you so you can give complete information to the poison control center, such as what the poison or substance is, how much was taken and when. Do not try to make the person vomit.   Call 911 anytime you think you may need emergency care. For example, call if you or someone else:    · Has used or currently uses alcohol or drugs and is very confused or can't stay awake.     · Has passed out (lost consciousness).     · Has severe trouble breathing.     · Is having a seizure.    Call your doctor now or seek immediate medical care if you or someone else:    · Has new symptoms, or is not acting normally.    Watch closely for changes in your health, and be sure to contact your doctor if:    · You do not get better as expected.     · You need help with drug or alcohol problems.     · You have problems with depression or other mental health issues. Where can you learn more? Go to http://zayda-eden.info/. Enter R613 in the search box to learn more about \"Alcohol, Drug, or Poison Ingestion: Care Instructions. \"  Current as of: September 23, 2018  Content Version: 11.9  © 4143-8829 [employer]. Care instructions adapted under license by [employer] (which disclaims liability or warranty for this information).  If you have questions about a medical condition or this instruction, always ask your healthcare professional. [address] any warranty or liability for your use of this information. Patient Education        Alcohol Detoxification and Withdrawal: Care Instructions  Your Care Instructions    If you drink alcohol regularly and then suddenly stop, you may go through some physical and emotional problems while the alcohol clears out of your system. Clearing the alcohol from your body is called detoxification, or detox. Physical and emotional problems that may happen during detox are called withdrawal.  Symptoms of withdrawal can be scary and dangerous. Mild symptoms include nausea and vomiting, sweating, shakiness, and intense worry. Severe symptoms include being confused and irritable, feeling things on your body that are not there, seeing or hearing things that are not there, and trembling. You may even have seizures. If your symptoms become severe you must see a doctor. People who drink large amounts of alcohol should not try to detox at home. A person can die of severe alcohol withdrawal.  Symptoms of alcohol withdrawal may begin from 4 to 12 hours after you stop drinking. But they may not start for several days after the last drink. They can last a few days. It is hard to stop drinking. But when you have cleared the alcohol from your system, you will be able to start the next part of your life, free from the burden of being dependent. Follow-up care is a key part of your treatment and safety. Be sure to make and go to all appointments, and call your doctor if you are having problems. It's also a good idea to know your test results and keep a list of the medicines you take. How can you care for yourself at home? · Before you stop drinking, talk to your doctor about how you plan to stop. Be sure to be completely honest with him or her about how much you have been drinking.  Your doctor will figure out whether you need to detox in a supervised medical center. · Take your medicines exactly as prescribed. Call your doctor if you think you are having a problem with your medicine. · Make sure someone you trust is with you the whole time. Have friends and family members take turns staying with you until you are done with detox. · Put a list of emergency numbers near the phone. This should include your doctor, the police, the nearest hospital and emergency room, and neighbors who can help if needed. · Make sure all alcohol is removed from the house before you start. This includes beverages as well as medicines, rubbing alcohol, and certain flavorings like vanilla extract. · Keep \"drinking buddies\" away during the time you are going through detox. · Make your surroundings calm. Soft lights, soft music, and a comfortable place to sit or lie down can help make the process easier. · Drink lots of fluids and eat snacks such as fruit, cheese and crackers, and pretzels. Foods high in carbohydrate may help reduce the craving for alcohol. · Understand that detox is going to be hard. · Keep in mind that the people watching over you during detox are there to help. Explain to them before you start that you may not act like yourself until detox is finished. · Consider joining a support group such as Alcoholics Anonymous. Sharing your experiences with other people who face similar challenges may help you feel less overwhelmed. · Keep the numbers for these national suicide hotlines: 2-846-806-TALK (9-978.242.9002) and 6-342-FYTTQML (5-478.241.2240). If you or someone you know talks about suicide or feeling hopeless, get help right away. When should you call for help? Call 911 anytime you think you may need emergency care.  For example, call if:    · You feel you cannot stop from hurting yourself or someone else.     · You vomit many times and cannot stop.     · You vomit blood or what looks like coffee grounds.     · You have trouble breathing or are breathing very fast.   · Your heart beats more than 120 times a minute and will not slow down.     · You have chest pain.     · You have a seizure.     · You see or feel things that are not there (hallucinate).    If you are caring for someone who is going through detox, call if:    · The person passes out (loses consciousness).     · The person sees or feels things that are not there and sees or hears the same things many times.     · The person is very agitated and will not calm down.     · The person becomes violent or threatens to be violent.     · The person has a seizure.    Call your doctor now or seek immediate medical care if:    · You have a high fever.     · You have severe belly pain.     · You are very shaky.    Watch closely for changes in your health, and be sure to contact your doctor if:    · You do not get better as expected. Where can you learn more? Go to http://zayda-eden.info/. Enter [phone] in the search box to learn more about \"Alcohol Detoxification and Withdrawal: Care Instructions. \"  Current as of: May 7, 2018  Content Version: 11.9  © 6496-8100 [employer] Everywhere. Care instructions adapted under license by [employer] (which disclaims liability or warranty for this information). If you have questions about a medical condition or this instruction, always ask your healthcare professional. [address] any warranty or liability for your use of this information. Patient Education        Learning About Alcohol Withdrawal  What is alcohol withdrawal?    If you drink alcohol regularly (more than a few drinks on most days) and then suddenly stop or cut down, you may go through some physical and emotional problems while the alcohol clears out of your system. This is called withdrawal. Clearing the alcohol from your body is called detoxification, or detox. What are the symptoms?   Symptoms of alcohol withdrawal may start as soon as 4 to 12 hours after you stop drinking. Or they may not start until several days after the last drink. Mild symptoms include:  · Nausea. · Sweating. · Shakiness. · Diarrhea. · Intense worry. · Disturbed sleep. · Headache. More severe symptoms include:  · Vomiting or belly pain. · Being confused, upset, and irritable. · Changed sensations. You might feel things on your body that aren't really there. Or you may see or hear things that aren't there. · Trembling. · Being short of breath or having pain in your chest.  · Having seizures. Symptoms may peak within a few days. Mild symptoms can last for a few weeks. If your symptoms are severe, you'll need to see a doctor. What is the treatment for alcohol withdrawal?  Most people may be able to cut down or stop drinking with only mild withdrawal. They can stay safe by simply resting, drinking lots of fluids, and eating healthy foods. But people who drink large amounts of alcohol or are at risk for severe withdrawal symptoms should not try to detox at home unless they work closely with a doctor to manage it. A person can die of severe alcohol withdrawal.  Before you stop drinking, talk to your doctor about how you plan to stop. Be completely honest about how much you've been drinking. Your doctor will figure out if you need to detox in a medical center. You may get medicine to treat the symptoms whether you are at home or in a medical center. Medicine that treats seizures can also help. Your doctor will explain what types of medicine might help you. You may start with a high dose and then take smaller amounts over several days. There's also medicine that can help you avoid alcohol while you recover. How can you manage your withdrawal and recovery? Here are a few tips that can help you to not start drinking again. · Make sure there's no alcohol in the house.  This includes drinks as well as liquid medicines, rubbing alcohol, and certain flavorings like vanilla extract. · Try not to hang out with people you used to drink with. · Don't go it alone. Spend time with people who support the changes you are making in your life. This includes asking for advice and help from people who have stopped drinking. You might also try mutual support groups such as Alcoholics Anonymous. · Drink lots of fluids. · Eat snacks such as fruit, cheese and crackers, and pretzels. High-carbohydrate foods may help reduce the craving for alcohol. What happens after withdrawal?  It can be hard to stop drinking. But after you clear the alcohol from your system, you can start the next, healthier part of your life. After detox, you will focus on staying alcohol-free. You can learn skills that you can use to stay abstinent (or sober) as you recover. Finding new ways to deal with life's challenges, without drinking, takes time and effort. Recovery is a long-term process. It's not something you can achieve in a few weeks. Most people get some type of therapy, such as group counseling. You also may need medicine to help you stay sober. Treatment doesn't focus on alcohol use alone. It may address other parts of your life, like your relationships, work, medical problems, and home life. Treatment, support, patience, and commitment will help you make the changes you need to live a [place] life without alcohol. You may find, over time, that the process gets easier, life becomes more joyous, and your connections to others becomes more rewarding. Where can you find help? Behavioral Health Treatment Services . This service from the [facility] and [facility]  can help you find local alcohol treatment services. Search online at [employer]. samhsa.gov or call 6-856-133-HELP ([phone]), or [employer] 4-828.709.7963. Where can you learn more? Go to http://zayda-eden.info/.   Enter A011 in the search box to learn more about \"Learning About Alcohol Withdrawal.\"  Current as of: May 7, 2018  Content Version: 11.9  © 9727-6150 [employer]. Care instructions adapted under license by [employer] (which disclaims liability or warranty for this information). If you have questions about a medical condition or this instruction, always ask your healthcare professional. [address] any warranty or liability for your use of this information. Patient Education        Seizure: Care Instructions  Your Care Instructions    Seizures are caused by abnormal patterns of electrical signals in the brain. They are different for each person. Seizures can affect movement, speech, vision, or awareness. Some people have only slight shaking of a hand and do not pass out. Other people may pass out and have violent shaking of the whole body. Some people appear to stare into space. They are awake, but they can't respond normally. Later, they may not remember what happened. You may need tests to identify the type and cause of the seizures. A seizure may occur only once, or you may have them more than one time. Taking medicines as directed and following up with your doctor may help keep you from having more seizures. The doctor has checked you carefully, but problems can develop later. If you notice any problems or new symptoms, get medical treatment right away. Follow-up care is a key part of your treatment and safety. Be sure to make and go to all appointments, and call your doctor if you are having problems. It's also a good idea to know your test results and keep a list of the medicines you take. How can you care for yourself at home? · Be safe with medicines. Take your medicines exactly as prescribed. Call your doctor if you think you are having a problem with your medicine. · Do not do any activity that could be dangerous to you or others until your doctor says it is safe to do so.  For example, do not drive a car, operate machinery, swim, or climb ladders. · Be sure that anyone treating you for any health problem knows that you have had a seizure and what medicines you are taking for it. · Identify and avoid things that may make you more likely to have a seizure. These may include lack of sleep, alcohol or drug use, stress, or not eating. · Make sure you go to your follow-up appointment. When should you call for help? Call 911 anytime you think you may need emergency care. For example, call if:    · You have another seizure.     · You have more than one seizure in 24 hours.     · You have new symptoms, such as trouble walking, speaking, or thinking clearly.    Call your doctor now or seek immediate medical care if:    · You are not acting normally.    Watch closely for changes in your health, and be sure to contact your doctor if you have any problems. Where can you learn more? Go to http://zaydaHybio Pharmaceuticaleden.info/. Enter K912 in the search box to learn more about \"Seizure: Care Instructions. \"  Current as of: Eveline 3, 2018  Content Version: 11.9  © 3019-6221 [employer]. Care instructions adapted under license by [employer] (which disclaims liability or warranty for this information). If you have questions about a medical condition or this instruction, always ask your healthcare professional. [address] any warranty or liability for your use of this information. DISCHARGE SUMMARY from Nurse    PATIENT INSTRUCTIONS:    After general anesthesia or intravenous sedation, for 24 hours or while taking prescription Narcotics:  · Limit your activities  · Do not drive and operate hazardous machinery  · Do not make important personal or business decisions  · Do  not drink alcoholic beverages  · If you have not urinated within 8 hours after discharge, please contact your surgeon on call.     Report the following to your surgeon:  · Excessive pain, swelling, redness or odor of or around the surgical area  · Temperature over 100.5  · Nausea and vomiting lasting longer than 4 hours or if unable to take medications  · Any signs of decreased circulation or nerve impairment to extremity: change in color, persistent  numbness, tingling, coldness or increase pain  · Any questions    What to do at Home:  Recommended activity: Activity as tolerated,     If you experience any of the following symptoms listed in the \"when should I call for help\" section, please follow up with primary care physician     *  Please give a list of your current medications to your Primary Care Provider. *  Please update this list whenever your medications are discontinued, doses are      changed, or new medications (including over-the-counter products) are added. *  Please carry medication information at all times in case of emergency situations. These are general instructions for a healthy lifestyle:    No smoking/ No tobacco products/ Avoid exposure to second hand smoke  Surgeon General's Warning:  Quitting smoking now greatly reduces serious risk to your health. Obesity, smoking, and sedentary lifestyle greatly increases your risk for illness    A healthy diet, regular physical exercise & weight monitoring are important for maintaining a healthy lifestyle    You may be retaining fluid if you have a history of heart failure or if you experience any of the following symptoms:  Weight gain of 3 pounds or more overnight or 5 pounds in a week, increased swelling in our hands or feet or shortness of breath while lying flat in bed. Please call your doctor as soon as you notice any of these symptoms; do not wait until your next office visit.     Recognize signs and symptoms of STROKE:    F-face looks uneven    A-arms unable to move or move unevenly    S-speech slurred or non-existent    T-time-call 911 as soon as signs and symptoms begin-DO NOT go       Back to bed or wait to see if you get better-TIME IS BRAIN. Warning Signs of HEART ATTACK     Call 911 if you have these symptoms:   Chest discomfort. Most heart attacks involve discomfort in the center of the chest that lasts more than a few minutes, or that goes away and comes back. It can feel like uncomfortable pressure, squeezing, fullness, or pain.  Discomfort in other areas of the upper body. Symptoms can include pain or discomfort in one or both arms, the back, neck, jaw, or stomach.  Shortness of breath with or without chest discomfort.  Other signs may include breaking out in a cold sweat, nausea, or lightheadedness. Don't wait more than five minutes to call 911 - MINUTES MATTER! Fast action can save your life. Calling 911 is almost always the fastest way to get lifesaving treatment. Emergency Medical Services staff can begin treatment when they arrive -- up to an hour sooner than if someone gets to the hospital by car. The discharge information has been reviewed with the patient. The patient verbalized understanding. Discharge medications reviewed with the patient and appropriate educational materials and side effects teaching were provided.   ___________________________________________________________________________________________________________________________________

## 2019-03-13 NOTE — PROGRESS NOTES
Assume care from [facility] MARIE. Pt resting in bed not signs of pain or distress. Bed locked in lowest position with call bell within reach.

## 2019-03-13 NOTE — PROGRESS NOTES
1942: Assumed pt care. Received pt resting in bed, pt is alert and oriented x 3. Denies nausea and vomiting at this time. Pt complains of sharp abdominal pain. No signs of distress. Dual skin assessment done with MARIE Lock. Pt's skin is intact. Seizure precautions in placed. Bed on lowest position, wheels locked, call bell within reach. Bed alarm activated. 2004: Paged Dr. Shawnee Peters. 2006: received a call back from Dr. Shawnee Peters, notified him that pt is complaining of sharp abdominal pain rated pain as 7/10. Pt has tylenol PO ordered for pain, pt is NPO, asked him if it's ok to give pt oral meds with sips of clear liquid, Dr. Shawnee Peters said ok. RBV provided. 2027: tylenol given for abdominal pain. 2110: patient sleeping, no signs of distress. 2205: pt sleeping. 3-13-19    No seizure episode, no nausea, no vomiting.    0747: Bedside and Verbal shift change report given to MARIE Guajardo (oncoming nurse) by Chandrakant Hill   (offgoing nurse). Report included the following information SBAR, Kardex, Intake/Output, MAR and Recent Results.

## 2019-03-15 RX ORDER — LORAZEPAM 2 MG/ML
1 INJECTION INTRAMUSCULAR
Status: COMPLETED | OUTPATIENT
Start: 2019-03-12 | End: 2019-03-12

## 2019-04-02 RX ORDER — FLUTICASONE PROPIONATE 50 MCG
2 SPRAY, SUSPENSION (ML) NASAL DAILY
Qty: 16 G | Refills: 3 | Status: SHIPPED | OUTPATIENT
Start: 2019-04-02 | End: 2019-06-28 | Stop reason: SDUPTHER

## 2019-05-20 ENCOUNTER — OFFICE VISIT (OUTPATIENT)
Dept: INTERNAL MEDICINE CLINIC | Age: 54
End: 2019-05-20

## 2019-05-20 VITALS
SYSTOLIC BLOOD PRESSURE: 127 MMHG | DIASTOLIC BLOOD PRESSURE: 86 MMHG | BODY MASS INDEX: 30.42 KG/M2 | HEIGHT: 74 IN | HEART RATE: 90 BPM | OXYGEN SATURATION: 98 % | WEIGHT: 237 LBS | TEMPERATURE: 97.3 F | RESPIRATION RATE: 18 BRPM

## 2019-05-20 DIAGNOSIS — G89.29 OTHER CHRONIC PAIN: Primary | ICD-10-CM

## 2019-05-20 DIAGNOSIS — R21 RASH: ICD-10-CM

## 2019-05-20 DIAGNOSIS — F10.24 ALCOHOL-INDUCED DEPRESSIVE DISORDER WITH MODERATE OR SEVERE USE DISORDER (HCC): ICD-10-CM

## 2019-05-20 DIAGNOSIS — M54.42 CHRONIC LEFT-SIDED LOW BACK PAIN WITH LEFT-SIDED SCIATICA: ICD-10-CM

## 2019-05-20 DIAGNOSIS — G89.29 CHRONIC LEFT-SIDED LOW BACK PAIN WITH LEFT-SIDED SCIATICA: ICD-10-CM

## 2019-05-20 DIAGNOSIS — I10 ESSENTIAL HYPERTENSION: ICD-10-CM

## 2019-05-20 RX ORDER — TAMSULOSIN HYDROCHLORIDE 0.4 MG/1
0.4 CAPSULE ORAL DAILY
COMMUNITY
End: 2019-09-13 | Stop reason: ALTCHOICE

## 2019-05-20 RX ORDER — CLONIDINE HYDROCHLORIDE 0.1 MG/1
0.1 TABLET ORAL 3 TIMES DAILY
Qty: 90 TAB | Refills: 3 | Status: SHIPPED | OUTPATIENT
Start: 2019-05-20 | End: 2019-06-17 | Stop reason: SDUPTHER

## 2019-05-20 RX ORDER — CYCLOBENZAPRINE HCL 5 MG
5 TABLET ORAL
Qty: 60 TAB | Refills: 2 | Status: SHIPPED | OUTPATIENT
Start: 2019-05-20 | End: 2019-09-06 | Stop reason: SDUPTHER

## 2019-05-20 RX ORDER — NIFEDIPINE 30 MG/1
30 TABLET, FILM COATED, EXTENDED RELEASE ORAL DAILY
Qty: 90 TAB | Refills: 3 | Status: SHIPPED | OUTPATIENT
Start: 2019-05-20 | End: 2019-09-13 | Stop reason: ALTCHOICE

## 2019-05-20 RX ORDER — NIFEDIPINE 30 MG/1
TABLET, FILM COATED, EXTENDED RELEASE ORAL DAILY
COMMUNITY
End: 2019-05-20 | Stop reason: SDUPTHER

## 2019-05-20 RX ORDER — CLONAZEPAM 1 MG/1
TABLET ORAL 2 TIMES DAILY
COMMUNITY
End: 2019-09-13 | Stop reason: ALTCHOICE

## 2019-05-20 RX ORDER — DULOXETIN HYDROCHLORIDE 60 MG/1
60 CAPSULE, DELAYED RELEASE ORAL DAILY
COMMUNITY
End: 2020-09-10 | Stop reason: SDUPTHER

## 2019-05-20 RX ORDER — PREGABALIN 150 MG/1
CAPSULE ORAL 2 TIMES DAILY
COMMUNITY
End: 2019-05-20 | Stop reason: SDUPTHER

## 2019-05-20 RX ORDER — PREGABALIN 150 MG/1
150 CAPSULE ORAL 2 TIMES DAILY
Qty: 60 CAP | Refills: 2 | Status: SHIPPED | OUTPATIENT
Start: 2019-05-20 | End: 2019-09-13

## 2019-05-20 RX ORDER — CLOTRIMAZOLE AND BETAMETHASONE DIPROPIONATE 10; .64 MG/G; MG/G
CREAM TOPICAL
Qty: 45 G | Refills: 0 | Status: SHIPPED | OUTPATIENT
Start: 2019-05-20 | End: 2020-06-06

## 2019-05-20 NOTE — PROGRESS NOTES
HISTORY OF PRESENT ILLNESS  Zayra Alexandra is a 47 y.o. male. Here for f/u of HTN, alcohol, hip pain. Admitted this month at [facility] due to alcohol withdrawal. He reports he was admitted 5 days. States he had withdrawal sx after stopping benzos. Seen in ED 5/15 due to worse HA of his life. CT was okay. Given Librium, feeling better. Has f/u with addiction medicine tomorrow. Chroniic L hip pain. He has done PT in the past for this and back pain and would like to try this again. Hospital Follow Up   Associated symptoms include headaches (resolved). Pertinent negatives include no chest pain and no shortness of breath. Review of Systems   Constitutional: Negative for chills, fever and weight loss. HENT: Negative for congestion and ear pain. Eyes: Negative for blurred vision and pain. Respiratory: Negative for cough and shortness of breath. Cardiovascular: Negative for chest pain, palpitations and leg swelling. Gastrointestinal: Negative for nausea and vomiting. Genitourinary: Negative for frequency and urgency. Musculoskeletal: Positive for back pain and joint pain. Negative for myalgias. Skin: Negative for itching and rash. Neurological: Positive for headaches (resolved). Negative for dizziness and tingling. Psychiatric/Behavioral: Negative for depression. The patient is not nervous/anxious.       Past Medical History:   Diagnosis Date    Abuse     Anemia NEC     Anxiety     Arthritis     Chronic pain     Colitis     Contact dermatitis and other eczema, due to unspecified cause     Depression     Depression     Headache(784.0)     Heart attack ([address]     Hypercholesterolemia     Hypertension     Liver disease     Low back pain     with left leg pain -- multilevel spondylosis and L4 radiculitis    Neck pain     mild spondylosis    Other ill-defined conditions(799.89)     MS symptoms    Pancreatitis     Psychotic disorder ([address]     Trauma      Current Outpatient Medications on File Prior to Visit   Medication Sig Dispense Refill    acamprosate calcium (CAMPRAL PO) Take  by mouth.  NIFEdipine ER (ADALAT CC) 30 mg ER tablet Take  by mouth daily.  tamsulosin (FLOMAX) 0.4 mg capsule Take 0.4 mg by mouth daily.  pregabalin (LYRICA) 150 mg capsule Take  by mouth two (2) times a day.  clonazePAM (KLONOPIN) 1 mg tablet Take  by mouth two (2) times a day.  DULoxetine (CYMBALTA) 60 mg capsule Take 60 mg by mouth daily.  fluticasone propionate (FLONASE) 50 mcg/actuation nasal spray 2 SPRAYS BY BOTH NOSTRILS ROUTE DAILY. 16 g 3    cyclobenzaprine (FLEXERIL) 5 mg tablet 5 mg.  ADULTS MULTIVITAMIN 18 mg iron-400 mcg-25 mcg tab   3    carvedilol (COREG) 6.25 mg tablet Take 1 Tab by mouth two (2) times a day. 180 Tab 3    losartan (COZAAR) 25 mg tablet Take 1 Tab by mouth daily. 90 Tab 1    multivitamin (DAILY-CAYLA) tablet Take 1 Tab by mouth daily. 90 Tab 3    naproxen (NAPROSYN) 500 mg tablet Take 1 Tab by mouth two (2) times daily (with meals). 100 Tab 2    rivaroxaban (XARELTO) 20 mg tab tablet Take 1 Tab by mouth daily (with breakfast). 90 Tab 1    atorvastatin (LIPITOR) 40 mg tablet Take 1 Tab by mouth daily. 90 Tab 1    thiamine HCL (B-1) 100 mg tablet Take 1 Tab by mouth daily. 90 Tab 3    omeprazole (PRILOSEC) 20 mg capsule Take 1 Cap by mouth daily. 90 Cap 3    folic acid (FOLVITE) 1 mg tablet Take 1 Tab by mouth daily. 90 Tab 3    loratadine (CLARITIN) 10 mg tablet Take 1 Tab by mouth daily as needed for Allergies. 90 Tab 1    aspirin delayed-release 81 mg tablet TAKE 1 TAB BY MOUTH DAILY. 90 Tab 3    nitroglycerin (NITROSTAT) 0.4 mg SL tablet TAKE 1 TABLET UNDER TOUNGE EVERY 5 MINS AS NEEDED FOR CHEST PAIN. DO NOT EXCEED 3 DOSES 25 Tab 0    QUEtiapine SR (SEROQUEL XR) 300 mg sr tablet Take 300 mg by mouth nightly.  busPIRone (BUSPAR) 30 mg tablet Take 1 Tab by mouth daily.  (Patient taking differently: Take 10 mg by mouth daily.) 90 Tab 0    potassium chloride (K-DUR, KLOR-CON) 20 mEq tablet 10 mEq.  nicotine (NICODERM CQ) 21 mg/24 hr 1 Patch.  FLUZONE QUAD [id number], PF, syrg injection TO BE ADMINISTERED BY PHARMACIST FOR IMMUNIZATION  0    Blood Pressure Monitor kit Use as directed to check blood pressure daily. 1 Kit 0    clotrimazole-betamethasone (LOTRISONE) topical cream Apply pea-sized amount bid to affecteed area x 10d 45 g 0    lidocaine (LIDODERM) 5 % 1 Patch by TransDERmal route every twenty-four (24) hours. 30 Each 5    isosorbide mononitrate ER (IMDUR) 30 mg tablet Take 1 Tab by mouth daily. 90 Tab 3    gabapentin (NEURONTIN) 600 mg tablet Take 1 Tab by mouth two (2) times a day. 180 Tab 0     No current facility-administered medications on file prior to visit. Physical Exam   Constitutional: He appears well-developed and well-nourished. No distress. /86 (BP 1 Location: Left arm, BP Patient Position: Sitting)   Pulse 90   Temp 97.3 °F (36.3 °C) (Oral)   Resp 18   Ht 6' 2\" (1.88 m)   Wt 237 lb (107.5 kg)   SpO2 98%   BMI 30.43 kg/m²      Eyes: EOM are normal. Right eye exhibits no discharge. Left eye exhibits no discharge. No scleral icterus. Neck: Neck supple. Cardiovascular: Normal rate, regular rhythm and normal heart sounds. Exam reveals no gallop and no friction rub. No murmur heard. Pulmonary/Chest: Effort normal and breath sounds normal. No respiratory distress. He has no wheezes. He has no rales. Musculoskeletal: He exhibits no edema or tenderness. Right hip: He exhibits normal range of motion and no tenderness. Left hip: He exhibits decreased range of motion. He exhibits no tenderness. Lymphadenopathy:     He has no cervical adenopathy. Neurological: He is alert. He exhibits normal muscle tone. Skin: Skin is warm and dry. Rash (anterior chest wall) noted. Psychiatric: He has a normal mood and affect.      Lab Results   Component Value Date/Time Sodium 140 03/13/2019 05:10 AM    Potassium 4.0 03/13/2019 05:10 AM    Chloride 105 03/13/2019 05:10 AM    CO2 29 03/13/2019 05:10 AM    Anion gap 6 03/13/2019 05:10 AM    Glucose 99 03/13/2019 05:10 AM    BUN 9 03/13/2019 05:10 AM    Creatinine 0.71 03/13/2019 05:10 AM    BUN/Creatinine ratio 13 03/13/2019 05:10 AM    GFR est AA >60 03/13/2019 05:10 AM    GFR est non-AA >60 03/13/2019 05:10 AM    Calcium 8.4 (L) 03/13/2019 05:10 AM    Bilirubin, total 0.5 03/12/2019 01:30 PM    AST (SGOT) 34 03/12/2019 01:30 PM    Alk. phosphatase 84 03/12/2019 01:30 PM    Protein, total 7.4 03/12/2019 01:30 PM    Albumin 3.6 03/12/2019 01:30 PM    Globulin 3.8 03/12/2019 01:30 PM    A-G Ratio 0.9 03/12/2019 01:30 PM    ALT (SGPT) 37 03/12/2019 01:30 PM     ASSESSMENT and PLAN    ICD-10-CM ICD-9-CM    1. Other chronic pain G89.29 338.29 pregabalin (LYRICA) 150 mg capsule   2. 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 Chronic left-sided low back pain with left-sided sciatica M54.42 724.2 REFERRAL TO PHYSICAL THERAPY    G89.29 724.3      338.29    4. Essential hypertension I10 401.9    5. Rash R21 782. 1      Lyrica refilled. Referral entered to PT  F/u as planned with addiction specialist  BP improved.  Continue with current medication  Lotrisone refilled for rash

## 2019-05-20 NOTE — PROGRESS NOTES
Rm: 16    Chief Complaint   Patient presents with   [facility] Follow Up         Depression Screening:  3 most recent [facility] [place] Screens 8/15/2018 3/30/2018 10/30/2017   PHQ Not Done Active Diagnosis of Depression or Bipolar Disorder Active Diagnosis of Depression or Bipolar Disorder Active Diagnosis of Depression or Bipolar Disorder       Learning Assessment:  Learning Assessment 1/28/2014 12/31/2013 8/20/2013   PRIMARY LEARNER Patient Patient Patient   HIGHEST LEVEL OF EDUCATION - PRIMARY LEARNER  - DID NOT GRADUATE HIGH SCHOOL DID NOT GRADUATE HIGH SCHOOL   BARRIERS PRIMARY LEARNER - NONE COGNITIVE   CO-LEARNER CAREGIVER - No No   PRIMARY LANGUAGE ENGLISH ENGLISH ENGLISH   LEARNER PREFERENCE PRIMARY LISTENING DEMONSTRATION DEMONSTRATION   ANSWERED BY patient self Patient   RELATIONSHIP SELF SELF SELF       Abuse Screening:  Abuse Screening Questionnaire 3/30/2018 8/31/2017 7/28/2014   Do you ever feel afraid of your partner? N N N   Are you in a relationship with someone who physically or mentally threatens you? N N N   Is it safe for you to go home? [facility] reviewed and discussed per provider: yes     Coordination of Care:    1. Have you been to the ER, urgent care clinic since your last visit? Hospitalized since your last visit? yes    2. Have you seen or consulted any other health care providers outside of the [address] [place] [address] since your last visit? Include any pap smears or colon screening.  no

## 2019-05-20 NOTE — PATIENT INSTRUCTIONS
Back Pain, Emergency or Urgent Symptoms: Care Instructions  Your Care Instructions    Many people have back pain at one time or another. In most cases, pain gets better with self-care that includes over-the-counter pain medicine, ice, heat, and exercises. Unless you have symptoms of a severe injury or heart attack, you may be able to give yourself a few days before you call a doctor. But some back problems are very serious. Do not ignore symptoms that need to be checked right away. Follow-up care is a key part of your treatment and safety. Be sure to make and go to all appointments, and call your doctor if you are having problems. It's also a good idea to know your test results and keep a list of the medicines you take. How can you care for yourself at home? · Sit or lie in positions that are most comfortable and that reduce your pain. Try one of these positions when you lie down:  ? Lie on your back with your knees bent and supported by large pillows. ? Lie on the floor with your legs on the seat of a sofa or chair. ? Lie on your side with your knees and hips bent and a pillow between your legs. ? Lie on your stomach if it does not make pain worse. · Do not sit up in bed, and avoid soft couches and twisted positions. Bed rest can help relieve pain at first, but it delays healing. Avoid bed rest after the first day. · Change positions every 30 minutes. If you must sit for long periods of time, take breaks from sitting. Get up and walk around, or lie flat. · Try using a heating pad on a low or medium setting, for 15 to 20 minutes every 2 or 3 hours. Try a warm shower in place of one session with the heating pad. You can also buy single-use heat wraps that last up to 8 hours. You can also try ice or cold packs on your back for 10 to 20 minutes at a time, several times a day. (Put a thin cloth between the ice pack and your skin.) This reduces pain and makes it easier to be active and exercise.   · Take pain medicines exactly as directed. ? If the doctor gave you a prescription medicine for pain, take it as prescribed. ? If you are not taking a prescription pain medicine, ask your doctor if you can take an over-the-counter medicine. When should you call for help? Call 911 anytime you think you may need emergency care. For example, call if:    · You are unable to move a leg at all.     · You have back pain with severe belly pain.     · You have symptoms of a heart attack. These may include:  ? Chest pain or pressure, or a strange feeling in the chest.  ? Sweating. ? Shortness of breath. ? Nausea or vomiting. ? Pain, pressure, or a strange feeling in the back, neck, jaw, or upper belly or in one or both shoulders or arms. ? Lightheadedness or sudden weakness. ? A fast or irregular heartbeat. After you call 911, the  may tell you to chew 1 adult-strength or 2 to 4 low-dose aspirin. Wait for an ambulance. Do not try to drive yourself.    Call your doctor now or seek immediate medical care if:    · You have new or worse symptoms in your arms, legs, chest, belly, or buttocks. Symptoms may include:  ? Numbness or tingling. ? Weakness. ? Pain.     · You lose bladder or bowel control.     · You have back pain and:  ? You have injured your back while lifting or doing some other activity. Call if the pain is severe, has not gone away after 1 or 2 days, and you cannot do your normal daily activities. ? You have had a back injury before that needed treatment. ? Your pain has lasted longer than 4 weeks. ? You have had weight loss you cannot explain. ? You have a fever. ? You are age 48 or older. ? You have cancer now or have had it before.    Watch closely for changes in your health, and be sure to contact your doctor if you are not getting better as expected. Where can you learn more? Go to http://zayda-eden.info/.   Enter I428 in the search box to learn more about \"Back Pain, Emergency or Urgent Symptoms: Care Instructions. \"  Current as of: September 23, 2018  Content Version: 11.9  © 2070-9245 [employer]. Care instructions adapted under license by [employer] (which disclaims liability or warranty for this information). If you have questions about a medical condition or this instruction, always ask your healthcare professional. [place] [address] any warranty or liability for your use of this information.

## 2019-05-31 DIAGNOSIS — I25.10 CORONARY ARTERY DISEASE INVOLVING NATIVE CORONARY ARTERY OF NATIVE HEART WITHOUT ANGINA PECTORIS: ICD-10-CM

## 2019-05-31 RX ORDER — ATORVASTATIN CALCIUM 40 MG/1
40 TABLET, FILM COATED ORAL DAILY
Qty: 90 TAB | Refills: 1 | Status: SHIPPED | OUTPATIENT
Start: 2019-05-31 | End: 2020-09-10 | Stop reason: SDUPTHER

## 2019-05-31 RX ORDER — LIDOCAINE 50 MG/G
1 PATCH TOPICAL EVERY 24 HOURS
Qty: 30 PATCH | Refills: 5 | Status: SHIPPED | OUTPATIENT
Start: 2019-05-31 | End: 2020-11-13

## 2019-06-03 DIAGNOSIS — I10 ESSENTIAL HYPERTENSION: ICD-10-CM

## 2019-06-03 DIAGNOSIS — J30.2 SEASONAL ALLERGIC RHINITIS, UNSPECIFIED TRIGGER: ICD-10-CM

## 2019-06-03 DIAGNOSIS — I26.99 OTHER ACUTE PULMONARY EMBOLISM WITHOUT ACUTE COR PULMONALE (HCC): ICD-10-CM

## 2019-06-03 RX ORDER — LORATADINE 10 MG/1
10 TABLET ORAL
Qty: 90 TAB | Refills: 1 | Status: SHIPPED | OUTPATIENT
Start: 2019-06-03 | End: 2020-09-10 | Stop reason: ALTCHOICE

## 2019-06-03 RX ORDER — LOSARTAN POTASSIUM 25 MG/1
25 TABLET ORAL DAILY
Qty: 90 TAB | Refills: 1 | Status: SHIPPED | OUTPATIENT
Start: 2019-06-03 | End: 2019-07-18

## 2019-06-03 RX ORDER — RIVAROXABAN 20 MG/1
TABLET, FILM COATED ORAL
Qty: 90 TAB | Refills: 1 | Status: SHIPPED | OUTPATIENT
Start: 2019-06-03 | End: 2020-09-10 | Stop reason: ALTCHOICE

## 2019-06-17 ENCOUNTER — OFFICE VISIT (OUTPATIENT)
Dept: INTERNAL MEDICINE CLINIC | Age: 54
End: 2019-06-17

## 2019-06-17 VITALS
HEIGHT: 74 IN | OXYGEN SATURATION: 96 % | HEART RATE: 86 BPM | DIASTOLIC BLOOD PRESSURE: 86 MMHG | WEIGHT: 243 LBS | RESPIRATION RATE: 18 BRPM | BODY MASS INDEX: 31.18 KG/M2 | SYSTOLIC BLOOD PRESSURE: 132 MMHG | TEMPERATURE: 96.4 F

## 2019-06-17 DIAGNOSIS — Z87.448 H/O HEMATURIA: ICD-10-CM

## 2019-06-17 DIAGNOSIS — L03.115 CELLULITIS OF RIGHT LOWER EXTREMITY: ICD-10-CM

## 2019-06-17 DIAGNOSIS — F10.21 ALCOHOL DEPENDENCE IN EARLY FULL REMISSION (HCC): ICD-10-CM

## 2019-06-17 DIAGNOSIS — I10 ESSENTIAL HYPERTENSION: Primary | ICD-10-CM

## 2019-06-17 DIAGNOSIS — L98.9 SKIN LESION OF UPPER EXTREMITY: ICD-10-CM

## 2019-06-17 LAB
BILIRUB UR QL STRIP: NEGATIVE
GLUCOSE UR-MCNC: NEGATIVE MG/DL
KETONES P FAST UR STRIP-MCNC: NEGATIVE MG/DL
PH UR STRIP: 6 [PH] (ref 4.6–8)
PROT UR QL STRIP: NEGATIVE
SP GR UR STRIP: 1.02 (ref 1–1.03)
UA UROBILINOGEN AMB POC: NORMAL (ref 0.2–1)
URINALYSIS CLARITY POC: CLEAR
URINALYSIS COLOR POC: YELLOW
URINE BLOOD POC: NEGATIVE
URINE LEUKOCYTES POC: NEGATIVE
URINE NITRITES POC: NEGATIVE

## 2019-06-17 RX ORDER — CEPHALEXIN 500 MG/1
500 CAPSULE ORAL 4 TIMES DAILY
COMMUNITY
End: 2019-09-13 | Stop reason: ALTCHOICE

## 2019-06-17 RX ORDER — DISULFIRAM 250 MG/1
250 TABLET ORAL DAILY
Status: CANCELLED | OUTPATIENT
Start: 2019-06-17

## 2019-06-17 RX ORDER — DISULFIRAM 250 MG/1
250 TABLET ORAL DAILY
COMMUNITY
End: 2019-09-13 | Stop reason: ALTCHOICE

## 2019-06-17 RX ORDER — CLONIDINE HYDROCHLORIDE 0.1 MG/1
0.1 TABLET ORAL 3 TIMES DAILY
Qty: 90 TAB | Refills: 1 | Status: SHIPPED | OUTPATIENT
Start: 2019-06-17 | End: 2019-07-18

## 2019-06-17 NOTE — PATIENT INSTRUCTIONS
Learning About High Blood Pressure  What is high blood pressure? Blood pressure is a measure of how hard the blood pushes against the walls of your arteries. It's normal for blood pressure to go up and down throughout the day, but if it stays up, you have high blood pressure. Another name for high blood pressure is hypertension. Two numbers tell you your blood pressure. The first number is the systolic pressure. It shows how hard the blood pushes when your heart is pumping. The second number is the diastolic pressure. It shows how hard the blood pushes between heartbeats, when your heart is relaxed and filling with blood. Your doctor will give you a goal for your blood pressure. Your goal will be based on your health and your age. High blood pressure (hypertension) means that the top number stays high, or the bottom number stays high, or both. High blood pressure increases the risk of stroke, heart attack, and other problems. You and your doctor will talk about your risks of these problems based on your blood pressure. What happens when you have high blood pressure? · Blood flows through your arteries with too much force. Over time, this damages the walls of your arteries. But you can't feel it. High blood pressure usually doesn't cause symptoms. · Fat and calcium start to build up in your arteries. This buildup is called plaque. Plaque makes your arteries narrower and stiffer. Blood can't flow through them as easily. · This lack of good blood flow starts to damage some of the organs in your body. This can lead to problems such as coronary artery disease and heart attack, heart failure, stroke, kidney failure, and eye damage. How can you prevent high blood pressure? · Stay at a healthy weight. · Try to limit how much sodium you eat to less than 2,300 milligrams (mg) a day. If you limit your sodium to 1,500 mg a day, you can lower your blood pressure even more.   ? Buy foods that are labeled \"unsalted,\" \"sodium-free,\" or \"low-sodium. \" Foods labeled \"reduced-sodium\" and \"light sodium\" may still have too much sodium. ? Flavor your food with garlic, lemon juice, onion, vinegar, herbs, and spices instead of salt. Do not use soy sauce, steak sauce, onion salt, garlic salt, mustard, or ketchup on your food. ? Use less salt (or none) when recipes call for it. You can often use half the salt a recipe calls for without losing flavor. · Be physically active. Get at least 30 minutes of exercise on most days of the week. Walking is a good choice. You also may want to do other activities, such as running, swimming, cycling, or playing tennis or team sports. · Limit alcohol to 2 drinks a day for men and 1 drink a day for women. · Eat plenty of fruits, vegetables, and low-fat dairy products. Eat less saturated and total fats. How is high blood pressure treated? · Your doctor will suggest making lifestyle changes. For example, your doctor may ask you to eat healthy foods, quit smoking, lose extra weight, and be more active. · If lifestyle changes don't help enough, your doctor may recommend that you take medicine. · When blood pressure is very high, medicines are needed to lower it. Follow-up care is a key part of your treatment and safety. Be sure to make and go to all appointments, and call your doctor if you are having problems. It's also a good idea to know your test results and keep a list of the medicines you take. Where can you learn more? Go to http://zayda-eden.info/. Enter P501 in the search box to learn more about \"Learning About High Blood Pressure. \"  Current as of: July 22, 2018  Content Version: 11.9  © 5031-5292 [employer]. Care instructions adapted under license by [employer] (which disclaims liability or warranty for this information).  If you have questions about a medical condition or this instruction, always ask your healthcare professional. [employer] disclaims any warranty or liability for your use of this information.

## 2019-06-17 NOTE — PROGRESS NOTES
HISTORY OF PRESENT ILLNESS  Sera Nguyen is a 47 y.o. male. Here for f/u from recent hospital stay for alcohol detox, cellulitis. No alcohol since hospital. On Antabuse and Campral. States he was discharged from addiction medicine due to too many missed appts. He has appt with psychiatry in one month. Cellulitis has improved. Some residual LE edema  HTN has been relatively well controlled. Some increase in DBP  Hematuria last night after masturbation, none since. Has not had this in past.     Hospital Follow Up   Pertinent negatives include no chest pain, no headaches and no shortness of breath. Review of Systems   Constitutional: Negative for chills, fever and weight loss. HENT: Negative for congestion and ear pain. Eyes: Negative for blurred vision and pain. Respiratory: Negative for cough and shortness of breath. Cardiovascular: Negative for chest pain, palpitations and leg swelling. Gastrointestinal: Negative for nausea and vomiting. Genitourinary: Positive for hematuria. Negative for frequency and urgency. Musculoskeletal: Negative for joint pain and myalgias. Skin: Negative for itching and rash. Neurological: Negative for dizziness, tingling and headaches. Psychiatric/Behavioral: Negative for depression. The patient is not nervous/anxious.       Past Medical History:   Diagnosis Date    Abuse     Anemia NEC     Anxiety     Arthritis     Chronic pain     Colitis     Contact dermatitis and other eczema, due to unspecified cause     Depression     Depression     Headache(784.0)     Heart attack ([address]     Hypercholesterolemia     Hypertension     Liver disease     Low back pain     with left leg pain -- multilevel spondylosis and L4 radiculitis    Neck pain     mild spondylosis    Other ill-defined conditions(799.89)     MS symptoms    Pancreatitis     Psychotic disorder ([address]     Trauma      Current Outpatient Medications on File Prior to Visit   Medication Sig Dispense Refill    disulfiram (ANTABUSE) 250 mg tablet Take 250 mg by mouth daily.  cephALEXin (KEFLEX) 500 mg capsule Take 500 mg by mouth four (4) times daily.  XARELTO 20 mg tab tablet TAKE 1 TAB BY MOUTH DAILY (WITH BREAKFAST). 90 Tab 1    loratadine (CLARITIN) 10 mg tablet TAKE 1 TAB BY MOUTH DAILY AS NEEDED FOR ALLERGIES. 90 Tab 1    losartan (COZAAR) 25 mg tablet TAKE 1 TAB BY MOUTH DAILY. 90 Tab 1    lidocaine (LIDODERM) 5 % 1 PATCH BY TRANSDERMAL ROUTE EVERY TWENTY-FOUR (24) HOURS. 30 Patch 5    atorvastatin (LIPITOR) 40 mg tablet TAKE 1 TAB BY MOUTH DAILY. 90 Tab 1    acamprosate calcium (CAMPRAL PO) Take  by mouth.  tamsulosin (FLOMAX) 0.4 mg capsule Take 0.4 mg by mouth daily.  clonazePAM (KLONOPIN) 1 mg tablet Take  by mouth two (2) times a day.  DULoxetine (CYMBALTA) 60 mg capsule Take 60 mg by mouth daily.  cyclobenzaprine (FLEXERIL) 5 mg tablet Take 1 Tab by mouth two (2) times daily as needed for Muscle Spasm(s). 60 Tab 2    NIFEdipine ER (ADALAT CC) 30 mg ER tablet Take 1 Tab by mouth daily. 90 Tab 3    pregabalin (LYRICA) 150 mg capsule Take 1 Cap by mouth two (2) times a day. Max Daily Amount: 300 mg. 60 Cap 2    clotrimazole-betamethasone (LOTRISONE) topical cream Apply pea-sized amount bid to affecteed area x 10d 45 g 0    fluticasone propionate (FLONASE) 50 mcg/actuation nasal spray 2 SPRAYS BY BOTH NOSTRILS ROUTE DAILY. 16 g 3    potassium chloride (K-DUR, KLOR-CON) 20 mEq tablet 10 mEq.  nicotine (NICODERM CQ) 21 mg/24 hr 1 Patch.  ADULTS MULTIVITAMIN 18 mg iron-400 mcg-25 mcg tab   3    FLUZONE QUAD 5830-2954, PF, syrg injection TO BE ADMINISTERED BY PHARMACIST FOR IMMUNIZATION  0    Blood Pressure Monitor kit Use as directed to check blood pressure daily. 1 Kit 0    carvedilol (COREG) 6.25 mg tablet Take 1 Tab by mouth two (2) times a day. 180 Tab 3    multivitamin (DAILY-CAYLA) tablet Take 1 Tab by mouth daily.  90 Tab 3    naproxen (NAPROSYN) 500 mg tablet Take 1 Tab by mouth two (2) times daily (with meals). 100 Tab 2    isosorbide mononitrate ER (IMDUR) 30 mg tablet Take 1 Tab by mouth daily. 90 Tab 3    thiamine HCL (B-1) 100 mg tablet Take 1 Tab by mouth daily. 90 Tab 3    omeprazole (PRILOSEC) 20 mg capsule Take 1 Cap by mouth daily. 90 Cap 3    folic acid (FOLVITE) 1 mg tablet Take 1 Tab by mouth daily. 90 Tab 3    aspirin delayed-release 81 mg tablet TAKE 1 TAB BY MOUTH DAILY. 90 Tab 3    gabapentin (NEURONTIN) 600 mg tablet Take 1 Tab by mouth two (2) times a day. 180 Tab 0    nitroglycerin (NITROSTAT) 0.4 mg SL tablet TAKE 1 TABLET UNDER TOUNGE EVERY 5 MINS AS NEEDED FOR CHEST PAIN. DO NOT EXCEED 3 DOSES 25 Tab 0    QUEtiapine SR (SEROQUEL XR) 300 mg sr tablet Take 300 mg by mouth nightly.  busPIRone (BUSPAR) 30 mg tablet Take 1 Tab by mouth daily. (Patient taking differently: Take 10 mg by mouth daily.) 90 Tab 0     No current facility-administered medications on file prior to visit. Allergies   Allergen Reactions    Amlodipine Other (comments)    Carbamazepine Unknown (comments)     violent    Lisinopril Unknown (comments)    Prednisone Other (comments)     He stated it hypes him up     Physical Exam   Constitutional: He appears well-developed and well-nourished. No distress. /86 (BP 1 Location: Right arm, BP Patient Position: Sitting)   Pulse 86   Temp 96.4 °F (35.8 °C) (Oral)   Resp 18   Ht 6' 2\" (1.88 m)   Wt 243 lb (110.2 kg)   SpO2 96%   BMI 31.20 kg/m²      Eyes: EOM are normal. Right eye exhibits no discharge. Left eye exhibits no discharge. No scleral icterus. Neck: Neck supple. Cardiovascular: Normal rate, regular rhythm and normal heart sounds. Exam reveals no gallop and no friction rub. No murmur heard. Pulmonary/Chest: Effort normal and breath sounds normal. No respiratory distress. He has no wheezes. He has no rales. Musculoskeletal: He exhibits no edema or tenderness. Lymphadenopathy:     He has no cervical adenopathy. Neurological: He is alert. He exhibits normal muscle tone. Skin: Skin is warm and dry. Psychiatric: He has a normal mood and affect. Lab Results   Component Value Date/Time    Sodium 140 03/13/2019 05:10 AM    Potassium 4.0 03/13/2019 05:10 AM    Chloride 105 03/13/2019 05:10 AM    CO2 29 03/13/2019 05:10 AM    Anion gap 6 03/13/2019 05:10 AM    Glucose 99 03/13/2019 05:10 AM    BUN 9 03/13/2019 05:10 AM    Creatinine 0.71 03/13/2019 05:10 AM    BUN/Creatinine ratio 13 03/13/2019 05:10 AM    GFR est AA >60 03/13/2019 05:10 AM    GFR est non-AA >60 03/13/2019 05:10 AM    Calcium 8.4 (L) 03/13/2019 05:10 AM    Bilirubin, total 0.5 03/12/2019 01:30 PM    AST (SGOT) 34 03/12/2019 01:30 PM    Alk. phosphatase 84 03/12/2019 01:30 PM    Protein, total 7.4 03/12/2019 01:30 PM    Albumin 3.6 03/12/2019 01:30 PM    Globulin 3.8 03/12/2019 01:30 PM    A-G Ratio 0.9 03/12/2019 01:30 PM    ALT (SGPT) 37 03/12/2019 01:30 PM     ASSESSMENT and PLAN    ICD-10-CM ICD-9-CM    1. Essential hypertension I10 401.9    2. Alcohol dependence in early full remission ([facility] F10.21 303.93    3. Skin lesion of upper extremity L98.9 709.9 REFERRAL TO DERMATOLOGY   4. Cellulitis of right lower extremity L03.115 682.6    5. H/O hematuria Z87.448 V13.09 AMB POC URINALYSIS DIP STICK AUTO W/O MICRO     BP stable on manual check . Continue with current medication   Early remission. Discussed that he needs to f/u with psychiatry for his antabuse, Campral refills  Cellulitis resolved  Referral entered to dermatology for f/u. No blood on UA today. Will monitor.

## 2019-06-28 RX ORDER — FLUTICASONE PROPIONATE 50 MCG
2 SPRAY, SUSPENSION (ML) NASAL DAILY
Qty: 16 G | Refills: 3 | Status: SHIPPED | OUTPATIENT
Start: 2019-06-28 | End: 2019-09-13 | Stop reason: ALTCHOICE

## 2019-07-18 ENCOUNTER — OFFICE VISIT (OUTPATIENT)
Dept: INTERNAL MEDICINE CLINIC | Age: 54
End: 2019-07-18

## 2019-07-18 VITALS
RESPIRATION RATE: 18 BRPM | TEMPERATURE: 97 F | OXYGEN SATURATION: 96 % | SYSTOLIC BLOOD PRESSURE: 107 MMHG | HEART RATE: 58 BPM | HEIGHT: 74 IN | BODY MASS INDEX: 30.8 KG/M2 | WEIGHT: 240 LBS | DIASTOLIC BLOOD PRESSURE: 67 MMHG

## 2019-07-18 DIAGNOSIS — I25.10 CORONARY ARTERY DISEASE INVOLVING NATIVE CORONARY ARTERY OF NATIVE HEART WITHOUT ANGINA PECTORIS: ICD-10-CM

## 2019-07-18 DIAGNOSIS — Z12.11 SCREEN FOR COLON CANCER: ICD-10-CM

## 2019-07-18 DIAGNOSIS — I10 ESSENTIAL HYPERTENSION: Primary | ICD-10-CM

## 2019-07-18 DIAGNOSIS — F10.21 ALCOHOL DEPENDENCE IN EARLY FULL REMISSION (HCC): ICD-10-CM

## 2019-07-18 NOTE — PROGRESS NOTES
HISTORY OF PRESENT ILLNESS  Rashida Carlos is a 49 y.o. male. Here for evaluation of low BP. Has been abstinent from alcohol x 6 weeks and BP has been lower. Feels dizzy at time, fatigued. H/o CAD. No CP, SOB. Not able to do Cologuard due to insurance. Hypertension    Associated symptoms include dizziness. Pertinent negatives include no chest pain, no palpitations, no blurred vision, no headaches, no shortness of breath, no nausea and no vomiting. Review of Systems   Constitutional: Negative for chills, fever and weight loss. HENT: Negative for congestion and ear pain. Eyes: Negative for blurred vision and pain. Respiratory: Negative for cough and shortness of breath. Cardiovascular: Negative for chest pain, palpitations and leg swelling. Gastrointestinal: Negative for nausea and vomiting. Genitourinary: Negative for frequency and urgency. Musculoskeletal: Negative for joint pain and myalgias. Skin: Negative for itching and rash. Neurological: Positive for dizziness. Negative for tingling and headaches. Psychiatric/Behavioral: Negative for depression. The patient is not nervous/anxious. Past Medical History:   Diagnosis Date    Abuse     Anemia NEC     Anxiety     Arthritis     Chronic pain     Colitis     Contact dermatitis and other eczema, due to unspecified cause     Depression     Depression     Headache(784.0)     Heart attack ([address]     Hypercholesterolemia     Hypertension     Liver disease     Low back pain     with left leg pain -- multilevel spondylosis and L4 radiculitis    Neck pain     mild spondylosis    Other ill-defined conditions(799.89)     MS symptoms    Pancreatitis     Psychotic disorder ([address]     Trauma      Current Outpatient Medications on File Prior to Visit   Medication Sig Dispense Refill    fluticasone propionate (FLONASE) 50 mcg/actuation nasal spray 2 SPRAYS BY BOTH NOSTRILS ROUTE DAILY.  16 g 3    disulfiram (ANTABUSE) 250 mg tablet Take 250 mg by mouth daily.  cephALEXin (KEFLEX) 500 mg capsule Take 500 mg by mouth four (4) times daily.  XARELTO 20 mg tab tablet TAKE 1 TAB BY MOUTH DAILY (WITH BREAKFAST). 90 Tab 1    loratadine (CLARITIN) 10 mg tablet TAKE 1 TAB BY MOUTH DAILY AS NEEDED FOR ALLERGIES. 90 Tab 1    lidocaine (LIDODERM) 5 % 1 PATCH BY TRANSDERMAL ROUTE EVERY TWENTY-FOUR (24) HOURS. 30 Patch 5    atorvastatin (LIPITOR) 40 mg tablet TAKE 1 TAB BY MOUTH DAILY. 90 Tab 1    acamprosate calcium (CAMPRAL PO) Take  by mouth.  tamsulosin (FLOMAX) 0.4 mg capsule Take 0.4 mg by mouth daily.  clonazePAM (KLONOPIN) 1 mg tablet Take  by mouth two (2) times a day.  DULoxetine (CYMBALTA) 60 mg capsule Take 60 mg by mouth daily.  cyclobenzaprine (FLEXERIL) 5 mg tablet Take 1 Tab by mouth two (2) times daily as needed for Muscle Spasm(s). 60 Tab 2    NIFEdipine ER (ADALAT CC) 30 mg ER tablet Take 1 Tab by mouth daily. 90 Tab 3    pregabalin (LYRICA) 150 mg capsule Take 1 Cap by mouth two (2) times a day. Max Daily Amount: 300 mg. 60 Cap 2    clotrimazole-betamethasone (LOTRISONE) topical cream Apply pea-sized amount bid to affecteed area x 10d 45 g 0    potassium chloride (K-DUR, KLOR-CON) 20 mEq tablet 10 mEq.  nicotine (NICODERM CQ) 21 mg/24 hr 1 Patch.  ADULTS MULTIVITAMIN 18 mg iron-400 mcg-25 mcg tab   3    FLUZONE QUAD 9798-7574, PF, syrg injection TO BE ADMINISTERED BY PHARMACIST FOR IMMUNIZATION  0    Blood Pressure Monitor kit Use as directed to check blood pressure daily. 1 Kit 0    carvedilol (COREG) 6.25 mg tablet Take 1 Tab by mouth two (2) times a day. 180 Tab 3    multivitamin (DAILY-CAYLA) tablet Take 1 Tab by mouth daily. 90 Tab 3    naproxen (NAPROSYN) 500 mg tablet Take 1 Tab by mouth two (2) times daily (with meals). 100 Tab 2    isosorbide mononitrate ER (IMDUR) 30 mg tablet Take 1 Tab by mouth daily.  90 Tab 3    thiamine HCL (B-1) 100 mg tablet Take 1 Tab by mouth daily. 90 Tab 3    omeprazole (PRILOSEC) 20 mg capsule Take 1 Cap by mouth daily. 90 Cap 3    folic acid (FOLVITE) 1 mg tablet Take 1 Tab by mouth daily. 90 Tab 3    aspirin delayed-release 81 mg tablet TAKE 1 TAB BY MOUTH DAILY. 90 Tab 3    gabapentin (NEURONTIN) 600 mg tablet Take 1 Tab by mouth two (2) times a day. 180 Tab 0    nitroglycerin (NITROSTAT) 0.4 mg SL tablet TAKE 1 TABLET UNDER TOUNGE EVERY 5 MINS AS NEEDED FOR CHEST PAIN. DO NOT EXCEED 3 DOSES 25 Tab 0    QUEtiapine SR (SEROQUEL XR) 300 mg sr tablet Take 300 mg by mouth nightly.  busPIRone (BUSPAR) 30 mg tablet Take 1 Tab by mouth daily. (Patient taking differently: Take 10 mg by mouth daily.) 90 Tab 0     No current facility-administered medications on file prior to visit. Physical Exam   Constitutional: He appears well-developed and well-nourished. No distress. /67 (BP 1 Location: Right arm, BP Patient Position: Sitting)   Pulse (!) 58   Temp 97 °F (36.1 °C) (Oral)   Resp 18   Ht 6' 2\" (1.88 m)   Wt 240 lb (108.9 kg)   SpO2 96%   BMI 30.81 kg/m²      Eyes: EOM are normal. Right eye exhibits no discharge. Left eye exhibits no discharge. No scleral icterus. Neck: Neck supple. Cardiovascular: Normal rate, regular rhythm and normal heart sounds. Exam reveals no gallop and no friction rub. No murmur heard. Pulmonary/Chest: Effort normal and breath sounds normal. No respiratory distress. He has no wheezes. He has no rales. Musculoskeletal: He exhibits no edema or tenderness. Lymphadenopathy:     He has no cervical adenopathy. Neurological: He is alert. He exhibits normal muscle tone. Skin: Skin is warm and dry. Psychiatric: He has a normal mood and affect.      Lab Results   Component Value Date/Time    Sodium 140 03/13/2019 05:10 AM    Potassium 4.0 03/13/2019 05:10 AM    Chloride 105 03/13/2019 05:10 AM    CO2 29 03/13/2019 05:10 AM    Anion gap 6 03/13/2019 05:10 AM    Glucose 99 03/13/2019 05:10 AM BUN 9 03/13/2019 05:10 AM    Creatinine 0.71 03/13/2019 05:10 AM    BUN/Creatinine ratio 13 03/13/2019 05:10 AM    GFR est AA >60 03/13/2019 05:10 AM    GFR est non-AA >60 03/13/2019 05:10 AM    Calcium 8.4 (L) 03/13/2019 05:10 AM    Bilirubin, total 0.5 03/12/2019 01:30 PM    AST (SGOT) 34 03/12/2019 01:30 PM    Alk. phosphatase 84 03/12/2019 01:30 PM    Protein, total 7.4 03/12/2019 01:30 PM    Albumin 3.6 03/12/2019 01:30 PM    Globulin 3.8 03/12/2019 01:30 PM    A-G Ratio 0.9 03/12/2019 01:30 PM    ALT (SGPT) 37 03/12/2019 01:30 PM     ASSESSMENT and PLAN    ICD-10-CM ICD-9-CM    1. Essential hypertension I10 401.9    2. Alcohol dependence in early full remission ([facility] F10.21 303.93    3. Screen for colon cancer Z12.11 V76.51 OCCULT BLOOD IMMUNOASSAY,DIAGNOSTIC   4. Coronary artery disease involving native coronary artery of native heart without angina pectoris I25.10 414.01 REFERRAL TO CARDIOLOGY     Will hold losartan for now and continue with coreg. Monitor BP  Referral entered for cardiology f/u  FIT ordered for colon cancer screening.

## 2019-07-18 NOTE — PROGRESS NOTES
Rm: 16    Chief Complaint   Patient presents with    Hypertension     Depression Screening:  [facility]15[facility] 3/30/2018 10/30/2017   PHQ Not Done Active Diagnosis of Depression or Bipolar Disorder Active Diagnosis of Depression or Bipolar Disorder Active Diagnosis of Depression or Bipolar Disorder       Learning Assessment:  Learning Assessment 1/28/2014 12/31/2013 8/20/2013   PRIMARY LEARNER Patient Patient Patient   HIGHEST LEVEL OF EDUCATION - PRIMARY LEARNER  - DID NOT GRADUATE HIGH SCHOOL DID NOT GRADUATE HIGH SCHOOL   BARRIERS PRIMARY LEARNER - NONE COGNITIVE   CO-LEARNER CAREGIVER - No No   PRIMARY LANGUAGE ENGLISH ENGLISH ENGLISH   LEARNER PREFERENCE PRIMARY LISTENING DEMONSTRATION DEMONSTRATION   ANSWERED BY patient self Patient   RELATIONSHIP SELF SELF SELF       Abuse Screening:  Abuse Screening Questionnaire 3/30/2018 8/31/2017 7/28/2014   Do you ever feel afraid of your partner? N N N   Are you in a relationship with someone who physically or mentally threatens you? N N N   Is it safe for you to go home? Carol Bradford       Health Maintenance reviewed and discussed per provider: yes     Coordination of Care:    1. Have you been to the ER, urgent care clinic since your last visit? Hospitalized since your last visit? no    2. Have you seen or consulted any other health care providers outside of the [address] [place] [address] since your last visit? Include any pap smears or colon screening.  no

## 2019-07-18 NOTE — PATIENT INSTRUCTIONS
High Blood Pressure: Care Instructions  Overview    It's normal for blood pressure to go up and down throughout the day. But if it stays up, you have high blood pressure. Another name for high blood pressure is hypertension. Despite what a lot of people think, high blood pressure usually doesn't cause headaches or make you feel dizzy or lightheaded. It usually has no symptoms. But it does increase your risk of stroke, heart attack, and other problems. You and your doctor will talk about your risks of these problems based on your blood pressure. Your doctor will give you a goal for your blood pressure. Your goal will be based on your health and your age. Lifestyle changes, such as eating healthy and being active, are always important to help lower blood pressure. You might also take medicine to reach your blood pressure goal.  Follow-up care is a key part of your treatment and safety. Be sure to make and go to all appointments, and call your doctor if you are having problems. It's also a good idea to know your test results and keep a list of the medicines you take. How can you care for yourself at home? Medical treatment  · If you stop taking your medicine, your blood pressure will go back up. You may take one or more types of medicine to lower your blood pressure. Be safe with medicines. Take your medicine exactly as prescribed. Call your doctor if you think you are having a problem with your medicine. · Talk to your doctor before you start taking aspirin every day. Aspirin can help certain people lower their risk of a heart attack or stroke. But taking aspirin isn't right for everyone, because it can cause serious bleeding. · See your doctor regularly. You may need to see the doctor more often at first or until your blood pressure comes down. · If you are taking blood pressure medicine, talk to your doctor before you take decongestants or anti-inflammatory medicine, such as ibuprofen.  Some of these medicines can raise blood pressure. · Learn how to check your blood pressure at home. Lifestyle changes  · Stay at a healthy weight. This is especially important if you put on weight around the waist. Losing even 10 pounds can help you lower your blood pressure. · If your doctor recommends it, get more exercise. Walking is a good choice. Bit by bit, increase the amount you walk every day. Try for at least 30 minutes on most days of the week. You also may want to swim, bike, or do other activities. · Avoid or limit alcohol. Talk to your doctor about whether you can drink any alcohol. · Try to limit how much sodium you eat to less than 2,300 milligrams (mg) a day. Your doctor may ask you to try to eat less than 1,500 mg a day. · Eat plenty of fruits (such as bananas and oranges), vegetables, legumes, whole grains, and low-fat dairy products. · Lower the amount of saturated fat in your diet. Saturated fat is found in animal products such as milk, cheese, and meat. Limiting these foods may help you lose weight and also lower your risk for heart disease. · Do not smoke. Smoking increases your risk for heart attack and stroke. If you need help quitting, talk to your doctor about stop-smoking programs and medicines. These can increase your chances of quitting for good. When should you call for help? Call 911 anytime you think you may need emergency care. This may mean having symptoms that suggest that your blood pressure is causing a serious heart or blood vessel problem. Your blood pressure may be over 180/120.   For example, call 911 if:    · You have symptoms of a heart attack. These may include:  ? Chest pain or pressure, or a strange feeling in the chest.  ? Sweating. ? Shortness of breath. ? Nausea or vomiting. ? Pain, pressure, or a strange feeling in the back, neck, jaw, or upper belly or in one or both shoulders or arms. ? Lightheadedness or sudden weakness.   ? A fast or irregular heartbeat.     · You have symptoms of a stroke. These may include:  ? Sudden numbness, tingling, weakness, or loss of movement in your face, arm, or leg, especially on only one side of your body. ? Sudden vision changes. ? Sudden trouble speaking. ? Sudden confusion or trouble understanding simple statements. ? Sudden problems with walking or balance. ? A sudden, severe headache that is different from past headaches.     · You have severe back or belly pain.    Do not wait until your blood pressure comes down on its own. Get help right away.   Call your doctor now or seek immediate care if:    · Your blood pressure is much higher than normal (such as 180/120 or higher), but you don't have symptoms.     · You think high blood pressure is causing symptoms, such as:  ? Severe headache.  ? Blurry vision.    Watch closely for changes in your health, and be sure to contact your doctor if:    · Your blood pressure measures higher than your doctor recommends at least 2 times. That means the top number is higher or the bottom number is higher, or both.     · You think you may be having side effects from your blood pressure medicine. Where can you learn more? Go to http://zayda-eden.info/. Enter R920 in the search box to learn more about \"High Blood Pressure: Care Instructions. \"  Current as of: July 22, 2018  Content Version: 11.9  © 5519-0044 [employer]. Care instructions adapted under license by [employer] (which disclaims liability or warranty for this information). If you have questions about a medical condition or this instruction, always ask your healthcare professional. [place] [address] any warranty or liability for your use of this information.

## 2019-08-01 DIAGNOSIS — R19.5 POSITIVE FIT (FECAL IMMUNOCHEMICAL TEST): Primary | ICD-10-CM

## 2019-08-01 LAB — HEMOCCULT STL QL IA: POSITIVE

## 2019-08-07 NOTE — PROGRESS NOTES
Attempted to contact patient at  number, no answer. Lvm for patient to return call to office at [phone]. Will continue to try to contact patient.

## 2019-08-24 ENCOUNTER — HOSPITAL ENCOUNTER (EMERGENCY)
Age: 54
Discharge: HOME | End: 2019-08-24
Attending: EMERGENCY MEDICINE
Payer: MEDICAID

## 2019-08-24 VITALS
RESPIRATION RATE: 16 BRPM | SYSTOLIC BLOOD PRESSURE: 125 MMHG | HEART RATE: 116 BPM | OXYGEN SATURATION: 99 % | TEMPERATURE: 98.3 F | DIASTOLIC BLOOD PRESSURE: 87 MMHG

## 2019-08-24 DIAGNOSIS — R25.1 OCCASIONAL TREMORS: ICD-10-CM

## 2019-08-24 DIAGNOSIS — F10.930 ALCOHOL WITHDRAWAL SYNDROME WITHOUT COMPLICATION (HCC): Primary | ICD-10-CM

## 2019-08-24 LAB
ALBUMIN SERPL-MCNC: 3 G/DL (ref 3.4–5)
ALBUMIN/GLOB SERPL: 0.6 {RATIO} (ref 0.8–1.7)
ALP SERPL-CCNC: 107 U/L (ref 45–117)
ALT SERPL-CCNC: 38 U/L (ref 16–61)
AMPHET UR QL SCN: NEGATIVE
ANION GAP SERPL CALC-SCNC: 6 MMOL/L (ref 3–18)
APPEARANCE UR: CLEAR
AST SERPL-CCNC: 23 U/L (ref 10–38)
BARBITURATES UR QL SCN: NEGATIVE
BASOPHILS # BLD: 0 K/UL (ref 0–0.1)
BASOPHILS NFR BLD: 1 % (ref 0–2)
BENZODIAZ UR QL: NEGATIVE
BILIRUB SERPL-MCNC: 0.5 MG/DL (ref 0.2–1)
BILIRUB UR QL: NEGATIVE
BUN SERPL-MCNC: 8 MG/DL (ref 7–18)
BUN/CREAT SERPL: 9 (ref 12–20)
CALCIUM SERPL-MCNC: 8.9 MG/DL (ref 8.5–10.1)
CANNABINOIDS UR QL SCN: POSITIVE
CHLORIDE SERPL-SCNC: 100 MMOL/L (ref 100–111)
CK MB CFR SERPL CALC: 1.3 % (ref 0–4)
CK MB SERPL-MCNC: 2.2 NG/ML (ref 5–25)
CK SERPL-CCNC: 163 U/L (ref 39–308)
CO2 SERPL-SCNC: 27 MMOL/L (ref 21–32)
COCAINE UR QL SCN: NEGATIVE
COLOR UR: YELLOW
CREAT SERPL-MCNC: 0.86 MG/DL (ref 0.6–1.3)
DIFFERENTIAL METHOD BLD: ABNORMAL
EOSINOPHIL # BLD: 0.5 K/UL (ref 0–0.4)
EOSINOPHIL NFR BLD: 6 % (ref 0–5)
ERYTHROCYTE [DISTWIDTH] IN BLOOD BY AUTOMATED COUNT: 12.9 % (ref 11.6–14.5)
GLOBULIN SER CALC-MCNC: 4.7 G/DL (ref 2–4)
GLUCOSE SERPL-MCNC: 91 MG/DL (ref 74–99)
GLUCOSE UR STRIP.AUTO-MCNC: NEGATIVE MG/DL
HCT VFR BLD AUTO: 38.5 % (ref 36–48)
HDSCOM,HDSCOM: ABNORMAL
HGB BLD-MCNC: 13.4 G/DL (ref 13–16)
HGB UR QL STRIP: NEGATIVE
KETONES UR QL STRIP.AUTO: NEGATIVE MG/DL
LEUKOCYTE ESTERASE UR QL STRIP.AUTO: NEGATIVE
LIPASE SERPL-CCNC: 613 U/L (ref 73–393)
LYMPHOCYTES # BLD: 2.1 K/UL (ref 0.9–3.6)
LYMPHOCYTES NFR BLD: 26 % (ref 21–52)
MCH RBC QN AUTO: 29.8 PG (ref 24–34)
MCHC RBC AUTO-ENTMCNC: 34.8 G/DL (ref 31–37)
MCV RBC AUTO: 85.7 FL (ref 74–97)
METHADONE UR QL: NEGATIVE
MONOCYTES # BLD: 1.3 K/UL (ref 0.05–1.2)
MONOCYTES NFR BLD: 15 % (ref 3–10)
NEUTS SEG # BLD: 4.4 K/UL (ref 1.8–8)
NEUTS SEG NFR BLD: 52 % (ref 40–73)
NITRITE UR QL STRIP.AUTO: NEGATIVE
OPIATES UR QL: NEGATIVE
PCP UR QL: NEGATIVE
PH UR STRIP: 5 [PH] (ref 5–8)
PLATELET # BLD AUTO: 258 K/UL (ref 135–420)
PMV BLD AUTO: 10.2 FL (ref 9.2–11.8)
POTASSIUM SERPL-SCNC: 3.8 MMOL/L (ref 3.5–5.5)
PROT SERPL-MCNC: 7.7 G/DL (ref 6.4–8.2)
PROT UR STRIP-MCNC: NEGATIVE MG/DL
RBC # BLD AUTO: 4.49 M/UL (ref 4.7–5.5)
SODIUM SERPL-SCNC: 133 MMOL/L (ref 136–145)
SP GR UR REFRACTOMETRY: 1.01 (ref 1–1.03)
TROPONIN I SERPL-MCNC: <0.02 NG/ML (ref 0–0.04)
UROBILINOGEN UR QL STRIP.AUTO: 0.2 EU/DL (ref 0.2–1)
WBC # BLD AUTO: 8.4 K/UL (ref 4.6–13.2)

## 2019-08-24 PROCEDURE — 82550 ASSAY OF CK (CPK): CPT

## 2019-08-24 PROCEDURE — 99284 EMERGENCY DEPT VISIT MOD MDM: CPT

## 2019-08-24 PROCEDURE — 80307 DRUG TEST PRSMV CHEM ANLYZR: CPT

## 2019-08-24 PROCEDURE — 81003 URINALYSIS AUTO W/O SCOPE: CPT

## 2019-08-24 PROCEDURE — 80053 COMPREHEN METABOLIC PANEL: CPT

## 2019-08-24 PROCEDURE — 83690 ASSAY OF LIPASE: CPT

## 2019-08-24 PROCEDURE — 85025 COMPLETE CBC W/AUTO DIFF WBC: CPT

## 2019-08-24 RX ORDER — CHLORDIAZEPOXIDE HYDROCHLORIDE 25 MG/1
CAPSULE, GELATIN COATED ORAL
Qty: 18 CAP | Refills: 0 | Status: SHIPPED | OUTPATIENT
Start: 2019-08-24 | End: 2019-09-13 | Stop reason: ALTCHOICE

## 2019-08-24 RX ORDER — DICYCLOMINE HYDROCHLORIDE 20 MG/1
20 TABLET ORAL EVERY 6 HOURS
Qty: 20 TAB | Refills: 0 | Status: SHIPPED | OUTPATIENT
Start: 2019-08-24 | End: 2019-08-29

## 2019-08-24 RX ORDER — CHLORDIAZEPOXIDE HYDROCHLORIDE 25 MG/1
CAPSULE, GELATIN COATED ORAL
Qty: 18 CAP | Refills: 0 | Status: SHIPPED | OUTPATIENT
Start: 2019-08-24 | End: 2019-08-24

## 2019-08-24 NOTE — ED NOTES
Progress notes    4:37 PM    Assumed care of patient at 3 PM shift change from Dr. Pinky De La Rosa, pending UA. UA and UDS results are normal.  Prescribed Librium and Bentyl for symptoms of withdrawal.  Patient plans to go to [place] [address] meeting [facility]. Stable for discharge. Discussed treatment plan, return precautions, symptomatic relief, and expected time to improvement. All questions answered. Patient is stable for discharge and outpatient management.       Omega Harris PA-C

## 2019-08-24 NOTE — ED PROVIDER NOTES
EMERGENCY DEPARTMENT HISTORY AND PHYSICAL EXAM      Date: 8/24/2019  Patient Name: Angela Webber    History of Presenting Illness     Chief Complaint   Patient presents with    Abdominal Pain    Alcohol Problem       History Provided By: Patient      HPI/Chief Complaint: (Context): 55-year-old male presenting for concern for alcohol withdrawal syndrome and increased tremulousness. Patient reports a long history of drinking with recent relapse. Admitted last Saturday to [facility] for acute pancreatitis and alcohol withdrawal.  Discharged yesterday however reports he does not have any withdrawal meds at this time and is unable to see his PCM before next week or his psychiatrist until September. Reports his abdominal pain is improving although he does have some  mild tenderness. Denies nausea or vomiting, denies urinary symptoms, denies fevers or chills, denies bowel movement changes. Does report previous history of alcohol withdrawal seizures possibly delirium tremens. Reports his symptoms have been worsening throughout the day today              PCP: Dre Zhang MD    Current Outpatient Medications   Medication Sig Dispense Refill    chlordiazePOXIDE (LIBRIUM) 25 mg capsule Take 1 cap by mouth 4 times daily for 3 days. Then take 1 cap by mouth twice daily for 2 days. Then take 1 cap by mouth once daily for 2 days 18 Cap 0    fluticasone propionate (FLONASE) 50 mcg/actuation nasal spray 2 SPRAYS BY BOTH NOSTRILS ROUTE DAILY. 16 g 3    disulfiram (ANTABUSE) 250 mg tablet Take 250 mg by mouth daily.  cephALEXin (KEFLEX) 500 mg capsule Take 500 mg by mouth four (4) times daily.  XARELTO 20 mg tab tablet TAKE 1 TAB BY MOUTH DAILY (WITH BREAKFAST). 90 Tab 1    loratadine (CLARITIN) 10 mg tablet TAKE 1 TAB BY MOUTH DAILY AS NEEDED FOR ALLERGIES. 90 Tab 1    lidocaine (LIDODERM) 5 % 1 PATCH BY TRANSDERMAL ROUTE EVERY TWENTY-FOUR (24) HOURS.  30 Patch 5    atorvastatin (LIPITOR) 40 mg tablet TAKE 1 TAB BY MOUTH DAILY. 90 Tab 1    acamprosate calcium (CAMPRAL PO) Take  by mouth.  tamsulosin (FLOMAX) 0.4 mg capsule Take 0.4 mg by mouth daily.  clonazePAM (KLONOPIN) 1 mg tablet Take  by mouth two (2) times a day.  DULoxetine (CYMBALTA) 60 mg capsule Take 60 mg by mouth daily.  cyclobenzaprine (FLEXERIL) 5 mg tablet Take 1 Tab by mouth two (2) times daily as needed for Muscle Spasm(s). 60 Tab 2    NIFEdipine ER (ADALAT CC) 30 mg ER tablet Take 1 Tab by mouth daily. 90 Tab 3    pregabalin (LYRICA) 150 mg capsule Take 1 Cap by mouth two (2) times a day. Max Daily Amount: 300 mg. 60 Cap 2    clotrimazole-betamethasone (LOTRISONE) topical cream Apply pea-sized amount bid to affecteed area x 10d 45 g 0    potassium chloride (K-DUR, KLOR-CON) 20 mEq tablet 10 mEq.  nicotine (NICODERM CQ) 21 mg/24 hr 1 Patch.  ADULTS MULTIVITAMIN 18 mg iron-400 mcg-25 mcg tab   3    FLUZONE QUAD 5387-0516, PF, syrg injection TO BE ADMINISTERED BY PHARMACIST FOR IMMUNIZATION  0    Blood Pressure Monitor kit Use as directed to check blood pressure daily. 1 Kit 0    carvedilol (COREG) 6.25 mg tablet Take 1 Tab by mouth two (2) times a day. 180 Tab 3    multivitamin (DAILY-CAYLA) tablet Take 1 Tab by mouth daily. 90 Tab 3    naproxen (NAPROSYN) 500 mg tablet Take 1 Tab by mouth two (2) times daily (with meals). 100 Tab 2    isosorbide mononitrate ER (IMDUR) 30 mg tablet Take 1 Tab by mouth daily. 90 Tab 3    thiamine HCL (B-1) 100 mg tablet Take 1 Tab by mouth daily. 90 Tab 3    omeprazole (PRILOSEC) 20 mg capsule Take 1 Cap by mouth daily. 90 Cap 3    folic acid (FOLVITE) 1 mg tablet Take 1 Tab by mouth daily. 90 Tab 3    aspirin delayed-release 81 mg tablet TAKE 1 TAB BY MOUTH DAILY. 90 Tab 3    gabapentin (NEURONTIN) 600 mg tablet Take 1 Tab by mouth two (2) times a day. 180 Tab 0    nitroglycerin (NITROSTAT) 0.4 mg SL tablet TAKE 1 TABLET UNDER TOUNGE EVERY 5 MINS AS NEEDED FOR CHEST PAIN. DO NOT EXCEED 3 DOSES 25 Tab 0    QUEtiapine SR (SEROQUEL XR) 300 mg sr tablet Take 300 mg by mouth nightly.  busPIRone (BUSPAR) 30 mg tablet Take 1 Tab by mouth daily. (Patient taking differently: Take 10 mg by mouth daily.) 90 Tab 0       Past History     Past Medical History:  Past Medical History:   Diagnosis Date    Abuse     Anemia NEC     Anxiety     Arthritis     Chronic pain     Colitis     Contact dermatitis and other eczema, due to unspecified cause     Depression     Depression     Headache(784.0)     Heart attack ([address]     Hypercholesterolemia     Hypertension     Liver disease     Low back pain     with left leg pain -- multilevel spondylosis and L4 radiculitis    Neck pain     mild spondylosis    Other ill-defined conditions(799.89)     MS symptoms    Pancreatitis     Psychotic disorder ([address]     Trauma        Past Surgical History:  Past Surgical History:   Procedure Laterality Date    HX CYST REMOVAL         Family History:  Family History   Problem Relation Age of Onset    Psychiatric Disorder Mother     Heart Disease Mother     Arthritis-osteo Father     Alcohol abuse Father     Cancer Father         lung    Elevated Lipids Father     Headache Father     Hypertension Father     Lung Disease Father     Diabetes Father        Social History:  Social History     Tobacco Use    Smoking status: Former Smoker     Packs/day: 0.50     Years: 20.00     Pack years: 10.00     Types: Cigarettes    Smokeless tobacco: Never Used   Substance Use Topics    Alcohol use: Yes     Alcohol/week: 0.0 standard drinks     Comment:  ETOH abuse - quit NOV 2018    Drug use: No       Allergies: Allergies   Allergen Reactions    Amlodipine Other (comments)    Carbamazepine Unknown (comments)     violent    Lisinopril Unknown (comments)    Prednisone Other (comments)     He stated it hypes him up         Review of Systems   Review of Systems   Constitutional: Negative.     HENT: Negative. Eyes: Negative. Respiratory: Negative. Cardiovascular: Negative. Gastrointestinal: Positive for abdominal pain. Negative for constipation, diarrhea, nausea and vomiting. Genitourinary: Negative for dysuria, frequency and urgency. Musculoskeletal: Negative for arthralgias, joint swelling and myalgias. Neurological: Positive for dizziness and tremors. Negative for seizures, syncope, speech difficulty, weakness, light-headedness and numbness. Psychiatric/Behavioral: Positive for behavioral problems and confusion. Negative for decreased concentration and suicidal ideas. Physical Exam     Physical Exam   Constitutional: He is oriented to person, place, and time. He appears well-developed and well-nourished. HENT:   Head: Normocephalic and atraumatic. Eyes: Pupils are equal, round, and reactive to light. Conjunctivae and EOM are normal.   Neck: Normal range of motion. Neck supple. Cardiovascular: Normal rate, regular rhythm and intact distal pulses. Exam reveals no friction rub. Pulmonary/Chest: Effort normal and breath sounds normal. No respiratory distress. Abdominal: Soft. Bowel sounds are normal.   Musculoskeletal: Normal range of motion. He exhibits no edema. Neurological: He is alert and oriented to person, place, and time. He has normal reflexes. No cranial nerve deficit. He exhibits normal muscle tone. Coordination normal.   Pronounced tremor of his hands and abdomen on exam however when viewed discretely no obvious tremor particularly when patient is holding tablet or headphones   Skin: Skin is dry. Psychiatric: He has a normal mood and affect. Judgment normal.       Medical Decision Making   I am the first provider for this patient. I reviewed the vital signs, available nursing notes, past medical history, past surgical history, family history and social history.       Provider Notes (Medical Decision Making): 64-year-old male presenting for tremulous concern for alcohol withdrawal syndrome. Prior heavy intake of alcohol with previous episodes of seizures and DT. Patient with relapse 1 week ago reportedly consuming half a liter of vodka per day. Admitted to outside hospital on Sunday for acute pancreatitis and alcohol withdrawal syndrome discharged yesterday. Patient presented today hemodynamically stable, AAO x4. No acute distress. Concern about increasing tremulousness and says he has required prolonged treatment with Librium in the past for withdrawal symptoms. Also reporting continued abdominal pain that is improving labs today notable for elevated lipase however in the setting of recent pancreatitis did not feel like this is an acute flare. Will discharge today with taper of Librium over the next 7 days to allow the [facility] in behavioral health. Return to clinic precautions discussed regarding worsening abdominal pain. Patient understands and agrees with the plan    Vital Signs-Reviewed the patient's vital signs. Pulse Oximetry Analysis -94 % on room air    Cardiac Monitor:  Rate/Rhythm: Sinus rhythm rate 81           Vitals:    08/24/19 1252   BP: 125/87   Pulse: (!) 116   Resp: 16   Temp: 98.3 °F (36.8 °C)   SpO2: 99%       Records Reviewed: Nursing Notes and Old Medical Records    ED Course:          Procedures:  Procedures    Core Measures:    Critical Care Time:    Diagnostic Study Results     Labs -     Recent Results (from the past 12 hour(s))   CBC WITH AUTOMATED DIFF    Collection Time: 08/24/19  1:30 PM   Result Value Ref Range    WBC 8.4 4.6 - 13.2 K/uL    RBC 4.49 (L) 4.70 - 5.50 M/uL    HGB 13.4 13.0 - 16.0 g/dL    HCT 38.5 36.0 - 48.0 %    MCV 85.7 74.0 - 97.0 FL    MCH 29.8 24.0 - 34.0 PG    MCHC 34.8 31.0 - 37.0 g/dL    RDW 12.9 11.6 - 14.5 %    PLATELET 617 454 - 863 K/uL    MPV 10.2 9.2 - 11.8 FL    NEUTROPHILS 52 40 - 73 %    LYMPHOCYTES 26 21 - 52 %    MONOCYTES 15 (H) 3 - 10 %    EOSINOPHILS 6 (H) 0 - 5 %    BASOPHILS 1 0 - 2 %    ABS. NEUTROPHILS 4.4 1.8 - 8.0 K/UL    ABS. LYMPHOCYTES 2.1 0.9 - 3.6 K/UL    ABS. MONOCYTES 1.3 (H) 0.05 - 1.2 K/UL    ABS. EOSINOPHILS 0.5 (H) 0.0 - 0.4 K/UL    ABS. BASOPHILS 0.0 0.0 - 0.1 K/UL    DF AUTOMATED     METABOLIC PANEL, COMPREHENSIVE    Collection Time: 08/24/19  1:30 PM   Result Value Ref Range    Sodium 133 (L) 136 - 145 mmol/L    Potassium 3.8 3.5 - 5.5 mmol/L    Chloride 100 100 - 111 mmol/L    CO2 27 21 - 32 mmol/L    Anion gap 6 3.0 - 18 mmol/L    Glucose 91 74 - 99 mg/dL    BUN 8 7.0 - 18 MG/DL    Creatinine 0.86 0.6 - 1.3 MG/DL    BUN/Creatinine ratio 9 (L) 12 - 20      GFR est AA >60 >60 ml/min/1.73m2    GFR est non-AA >60 >60 ml/min/1.73m2    Calcium 8.9 8.5 - 10.1 MG/DL    Bilirubin, total 0.5 0.2 - 1.0 MG/DL    ALT (SGPT) 38 16 - 61 U/L    AST (SGOT) 23 10 - 38 U/L    Alk. phosphatase 107 45 - 117 U/L    Protein, total 7.7 6.4 - 8.2 g/dL    Albumin 3.0 (L) 3.4 - 5.0 g/dL    Globulin 4.7 (H) 2.0 - 4.0 g/dL    A-G Ratio 0.6 (L) 0.8 - 1.7     LIPASE    Collection Time: 08/24/19  1:30 PM   Result Value Ref Range    Lipase 613 (H) 73 - 393 U/L   CARDIAC PANEL,(CK, CKMB & TROPONIN)    Collection Time: 08/24/19  1:30 PM   Result Value Ref Range     39 - 308 U/L    CK - MB 2.2 <3.6 ng/ml    CK-MB Index 1.3 0.0 - 4.0 %    Troponin-I, QT <0.02 0.0 - 0.045 NG/ML       Radiologic Studies -   No orders to display     CT Results  (Last 48 hours)    None        CXR Results  (Last 48 hours)    None              Discharge     Clinical Impression:   1. Alcohol withdrawal syndrome without complication ([address]    2. Occasional tremors        Disposition:  Discharge Home  It should be noted that I will be the provider of record for this patient  Augustine Dawson M.D. Follow-up Information    None         Current Discharge Medication List      START taking these medications    Details   chlordiazePOXIDE (LIBRIUM) 25 mg capsule Take 1 cap by mouth 4 times daily for 3 days.   Then take 1 cap by mouth twice daily for 2 days. Then take 1 cap by mouth once daily for 2 days  Qty: 18 Cap, Refills: 0    Associated Diagnoses: Alcohol withdrawal syndrome without complication ([address]         CONTINUE these medications which have NOT CHANGED    Details   fluticasone propionate (FLONASE) 50 mcg/actuation nasal spray 2 SPRAYS BY BOTH NOSTRILS ROUTE DAILY. Qty: 16 g, Refills: 3      disulfiram (ANTABUSE) 250 mg tablet Take 250 mg by mouth daily. cephALEXin (KEFLEX) 500 mg capsule Take 500 mg by mouth four (4) times daily. XARELTO 20 mg tab tablet TAKE 1 TAB BY MOUTH DAILY (WITH BREAKFAST). Qty: 80 Tab, Refills: 1    Associated Diagnoses: Other acute pulmonary embolism without acute cor pulmonale (HCC)      loratadine (CLARITIN) 10 mg tablet TAKE 1 TAB BY MOUTH DAILY AS NEEDED FOR ALLERGIES. Qty: 90 Tab, Refills: 1    Associated Diagnoses: Seasonal allergic rhinitis, unspecified trigger      lidocaine (LIDODERM) 5 % 1 PATCH BY TRANSDERMAL ROUTE EVERY TWENTY-FOUR (24) HOURS. Qty: 30 Patch, Refills: 5      atorvastatin (LIPITOR) 40 mg tablet TAKE 1 TAB BY MOUTH DAILY. Qty: 90 Tab, Refills: 1    Associated Diagnoses: Coronary artery disease involving native coronary artery of native heart without angina pectoris      acamprosate calcium (CAMPRAL PO) Take  by mouth. tamsulosin (FLOMAX) 0.4 mg capsule Take 0.4 mg by mouth daily. clonazePAM (KLONOPIN) 1 mg tablet Take  by mouth two (2) times a day. DULoxetine (CYMBALTA) 60 mg capsule Take 60 mg by mouth daily. cyclobenzaprine (FLEXERIL) 5 mg tablet Take 1 Tab by mouth two (2) times daily as needed for Muscle Spasm(s). Qty: 60 Tab, Refills: 2      NIFEdipine ER (ADALAT CC) 30 mg ER tablet Take 1 Tab by mouth daily. Qty: 90 Tab, Refills: 3      pregabalin (LYRICA) 150 mg capsule Take 1 Cap by mouth two (2) times a day. Max Daily Amount: 300 mg.   Qty: 60 Cap, Refills: 2    Associated Diagnoses: Other chronic pain      clotrimazole-betamethasone (LOTRISONE) topical cream Apply pea-sized amount bid to affecteed area x 10d  Qty: 45 g, Refills: 0      potassium chloride (K-DUR, KLOR-CON) 20 mEq tablet 10 mEq.      nicotine (NICODERM CQ) 21 mg/24 hr 1 Patch. ADULTS MULTIVITAMIN 18 mg iron-400 mcg-25 mcg tab Refills: 3      FLUZONE QUAD [id number], PF, syrg injection TO BE ADMINISTERED BY PHARMACIST FOR IMMUNIZATION  Refills: 0      Blood Pressure Monitor kit Use as directed to check blood pressure daily. Qty: 1 Kit, Refills: 0      carvedilol (COREG) 6.25 mg tablet Take 1 Tab by mouth two (2) times a day. Qty: 180 Tab, Refills: 3    Comments: PT lost meds  Associated Diagnoses: Essential hypertension      multivitamin (DAILY-CAYLA) tablet Take 1 Tab by mouth daily. Qty: 90 Tab, Refills: 3      naproxen (NAPROSYN) 500 mg tablet Take 1 Tab by mouth two (2) times daily (with meals). Qty: 100 Tab, Refills: 2      isosorbide mononitrate ER (IMDUR) 30 mg tablet Take 1 Tab by mouth daily. Qty: 90 Tab, Refills: 3      thiamine HCL (B-1) 100 mg tablet Take 1 Tab by mouth daily. Qty: 90 Tab, Refills: 3      omeprazole (PRILOSEC) 20 mg capsule Take 1 Cap by mouth daily. Qty: 90 Cap, Refills: 3      folic acid (FOLVITE) 1 mg tablet Take 1 Tab by mouth daily. Qty: 90 Tab, Refills: 3      aspirin delayed-release 81 mg tablet TAKE 1 TAB BY MOUTH DAILY. Qty: 90 Tab, Refills: 3      gabapentin (NEURONTIN) 600 mg tablet Take 1 Tab by mouth two (2) times a day. Qty: 180 Tab, Refills: 0      nitroglycerin (NITROSTAT) 0.4 mg SL tablet TAKE 1 TABLET UNDER TOUNGE EVERY 5 MINS AS NEEDED FOR CHEST PAIN. DO NOT EXCEED 3 DOSES  Qty: 25 Tab, Refills: 0    Comments: PER PT  Associated Diagnoses: NSTEMI (non-ST elevated myocardial infarction) (HCC)      QUEtiapine SR (SEROQUEL XR) 300 mg sr tablet Take 300 mg by mouth nightly. busPIRone (BUSPAR) 30 mg tablet Take 1 Tab by mouth daily.   Qty: 90 Tab, Refills: 0

## 2019-08-24 NOTE — DISCHARGE INSTRUCTIONS
Patient Education        Learning About Alcohol Use Disorder  What is alcohol use disorder? Alcohol use disorder means that a person drinks alcohol even though it causes harm to themselves or others. It can range from mild to severe. The more signs of this disorder you have, the more severe it may be. Moderate to severe alcohol use disorder is sometimes called addiction. People who have it may find it hard to control their use of alcohol. People who have this disorder may argue with others about how much they're drinking. Their job may be affected because of drinking. They may drink when it's dangerous or illegal, such as when they drive. They also may have a strong need, or craving, to drink. They may feel like they must drink just to get by. Their drinking may increase their risk of getting hurt or being in a car crash. Over time, drinking too much alcohol may cause health problems. These may include high blood pressure, liver problems, or problems with digestion. What are the signs? Maybe you've wondered about your alcohol habits, or how to tell if your drinking is becoming a problem. Here are some of the signs of alcohol use disorder. You may have it if you have two or more of the following signs:  · You drink larger amounts of alcohol than you ever meant to. Or you've been drinking for a longer time than you ever meant to. · You can't cut down or control your use. Or you constantly wish you could cut down. · You spend a lot of time getting or drinking alcohol or recovering from its effects. · You have strong cravings for alcohol. · You can no longer do your main jobs at work, at school, or at home. · You keep drinking alcohol, even though your use hurts your relationships. · You have stopped doing important activities because of your alcohol use. · You drink alcohol in situations where doing so is dangerous. · You keep drinking alcohol even though you know it's causing health problems.   · You need more and more alcohol to get the same effect, or you get less effect from the same amount over time. This is called tolerance. · You have uncomfortable symptoms when you stop drinking alcohol or use less. This is called withdrawal.  Alcohol use disorder can range from mild to severe. The more signs you have, the more severe the disorder may be. Moderate to severe alcohol use disorder is sometimes called addiction. You might not realize that your drinking is a problem. You might not drink large amounts when you drink. Or you might go for days or weeks between drinking episodes. But even if you don't drink very often, your drinking could still be harmful and put you at risk. How is alcohol use disorder treated? Getting help is up to you. But you don't have to do it alone. There are many people and kinds of treatments that can help. Treatment for alcohol use disorder can include:  · Group therapy, one or more types of counseling, and alcohol education. · Medicines that help to:  ? Reduce withdrawal symptoms and help you safely stop drinking. ? Reduce cravings for alcohol. · Support groups. These groups include Alcoholics Anonymous and [employer] (Self-Management and Recovery Training). Some people are able to stop or cut back on drinking with help from a counselor. People who have moderate to severe alcohol use disorder may need medical treatment. They may need to stay in a hospital or treatment center. You may have a treatment team to help you. This team may include a psychologist or psychiatrist, counselors, doctors, social workers, nurses, and a . A  helps plan and manage your treatment. Follow-up care is a key part of your treatment and safety. Be sure to make and go to all appointments, and call your doctor if you are having problems. It's also a good idea to know your test results and keep a list of the medicines you take. Where can you learn more?   Go to http://zayda-eden.info/. Enter [id number] in the search box to learn more about \"Learning About Alcohol Use Disorder. \"  Current as of: February 5, 2019  Content Version: 12.1  © 2941-7993 [employer]. Care instructions adapted under license by [employer] (which disclaims liability or warranty for this information). If you have questions about a medical condition or this instruction, always ask your healthcare professional. [address] any warranty or liability for your use of this information. Patient Education        Alcohol Detoxification and Withdrawal: Care Instructions  Your Care Instructions    If you drink alcohol regularly and then suddenly stop, you may go through some physical and emotional problems while the alcohol clears out of your system. Clearing the alcohol from your body is called detoxification, or detox. Physical and emotional problems that may happen during detox are called withdrawal.  Symptoms of withdrawal can be scary and dangerous. Mild symptoms include nausea and vomiting, sweating, shakiness, and intense worry. Severe symptoms include being confused and irritable, feeling things on your body that are not there, seeing or hearing things that are not there, and trembling. You may even have seizures. If your symptoms become severe you must see a doctor. People who drink large amounts of alcohol should not try to detox at home. A person can die of severe alcohol withdrawal.  Symptoms of alcohol withdrawal may begin from 4 to 12 hours after you stop drinking. But they may not start for several days after the last drink. They can last a few days. It is hard to stop drinking. But when you have cleared the alcohol from your system, you will be able to start the next part of your life, free from the burden of being dependent. Follow-up care is a key part of your treatment and safety.  Be sure to make and go to all appointments, and call your doctor if you are having problems. It's also a good idea to know your test results and keep a list of the medicines you take. How can you care for yourself at home? · Before you stop drinking, talk to your doctor about how you plan to stop. Be sure to be completely honest with him or her about how much you have been drinking. Your doctor will figure out whether you need to detox in a supervised medical center. · Take your medicines exactly as prescribed. Call your doctor if you think you are having a problem with your medicine. · Make sure someone you trust is with you the whole time. Have friends and family members take turns staying with you until you are done with detox. · Put a list of emergency numbers near the phone. This should include your doctor, the police, the nearest hospital and emergency room, and neighbors who can help if needed. · Make sure all alcohol is removed from the house before you start. This includes beverages as well as medicines, rubbing alcohol, and certain flavorings like vanilla extract. · Keep \"drinking buddies\" away during the time you are going through detox. · Make your surroundings calm. Soft lights, soft music, and a comfortable place to sit or lie down can help make the process easier. · Drink lots of fluids and eat snacks such as fruit, cheese and crackers, and pretzels. Foods high in carbohydrate may help reduce the craving for alcohol. · Understand that detox is going to be hard. · Keep in mind that the people watching over you during detox are there to help. Explain to them before you start that you may not act like yourself until detox is finished. · Consider joining a support group such as Alcoholics Anonymous. Sharing your experiences with other people who face similar challenges may help you feel less overwhelmed.   · Keep the numbers for these national suicide hotlines: 1-830-805-TALK (9-547.556.4514) and 0-816-EWAVKTL (6-624.849.7732). If you or someone you know talks about suicide or feeling hopeless, get help right away. When should you call for help? Call 911 anytime you think you may need emergency care. For example, call if:    · You feel you cannot stop from hurting yourself or someone else.     · You vomit many times and cannot stop.     · You vomit blood or what looks like coffee grounds.     · You have trouble breathing or are breathing very fast.     · Your heart beats more than 120 times a minute and will not slow down.     · You have chest pain.     · You have a seizure.     · You see or feel things that are not there (hallucinate).    If you are caring for someone who is going through detox, call if:    · The person passes out (loses consciousness).     · The person sees or feels things that are not there and sees or hears the same things many times.     · The person is very agitated and will not calm down.     · The person becomes violent or threatens to be violent.     · The person has a seizure.    Call your doctor now or seek immediate medical care if:    · You have a high fever.     · You have severe belly pain.     · You are very shaky.    Watch closely for changes in your health, and be sure to contact your doctor if:    · You do not get better as expected. Where can you learn more? Go to http://zayda-eden.info/. Enter [phone] in the search box to learn more about \"Alcohol Detoxification and Withdrawal: Care Instructions. \"  Current as of: February 5, 2019  Content Version: 12.1  © 4248-5391 [employer]. Care instructions adapted under license by [employer] (which disclaims liability or warranty for this information). If you have questions about a medical condition or this instruction, always ask your healthcare professional. [address] any warranty or liability for your use of this information.

## 2019-08-24 NOTE — ED TRIAGE NOTES
\"I had a pancreatitis pain recently and now I am having alcohol withdrawals. My abdomen is hurting since Sunday. I am not the usual alcohol withdrawal alejandra. You have to load me up with lots of medications. \"

## 2019-08-24 NOTE — ED TRIAGE NOTES
Assume care of patient, patient ambulated to room without difficulty, POC discussed with patient, patient with no signs of DT's at this time

## 2019-08-24 NOTE — ED NOTES
Assuming care of patient at this time. Patient resting quietly in bed, watching his phone with headphones in. No visible tremors, agitation, or sweating, patient ano x 4. CIWA 0, charted in flowsheets.

## 2019-08-28 ENCOUNTER — DOCUMENTATION ONLY (OUTPATIENT)
Dept: INTERNAL MEDICINE CLINIC | Age: 54
End: 2019-08-28

## 2019-08-28 NOTE — PROGRESS NOTES
Faxed ov notes/discharge summary 8/18/19 from [facility] to [facility]. at [phone] on 8/27/19 @ 5:22pm.  Has appt with Dr. Lucius Singleton on 10/1/19.

## 2019-08-30 DIAGNOSIS — G89.29 OTHER CHRONIC PAIN: ICD-10-CM

## 2019-08-30 NOTE — TELEPHONE ENCOUNTER
Patient walked in stating he needs a refill on his medication. States he will be seeing a new doctor at the [facility] office but has not been able to get in yet. Last Ov 7/18/19, no future appt scheduled. Requested Prescriptions     Pending Prescriptions Disp Refills    pregabalin (LYRICA) 150 mg capsule 60 Cap 2     Sig: Take 1 Cap by mouth two (2) times a day. Max Daily Amount: 300 mg.

## 2019-09-02 RX ORDER — CYCLOBENZAPRINE HCL 5 MG
5 TABLET ORAL
Qty: 60 TAB | Refills: 2 | OUTPATIENT
Start: 2019-09-02

## 2019-09-02 NOTE — TELEPHONE ENCOUNTER
Denied:    Requested Prescriptions     Refused Prescriptions Disp Refills    cyclobenzaprine (FLEXERIL) 5 mg tablet [Pharmacy Med Name: CYCLOBENZAPRINE 5 MG TABLET 5 TAB] 60 Tab 2     Sig: TAKE 1 TAB BY MOUTH TWO (2) TIMES DAILY AS NEEDED FOR MUSCLE SPASM(S). Refused By: Vinod Mcintyre     Reason for Refusal: Appt required, please call patient     Pt needs to establish care with new PCP.

## 2019-09-03 RX ORDER — PREGABALIN 150 MG/1
150 CAPSULE ORAL 2 TIMES DAILY
Qty: 60 CAP | Refills: 2 | OUTPATIENT
Start: 2019-09-03

## 2019-09-03 NOTE — TELEPHONE ENCOUNTER
PCP: Horacio Serrano MD    Last appt: 7/18/2019  Future Appointments   Date Time Provider Aristides Ibanez   9/6/2019 10:30 AM Kings Prescott  [facility]   9/6/2019  2:00 PM Franklin Welch  [facility]   10/1/2019  1:00 PM Franklin Welch MD [facility] SCHED       Requested Prescriptions     Pending Prescriptions Disp Refills    pregabalin (LYRICA) 150 mg capsule 60 Cap 2     Sig: Take 1 Cap by mouth two (2) times a day. Max Daily Amount: 300 mg. Request for a 30 or 90 day supply? Provider Discretion    Pharmacy: [facility]    Other Comments:   printed and placed in PCP medication refill review folder.      Last UDS date: 08/24/2019 Dr Arlen Shaw Positive for [facility]  Pain contract signed: none

## 2019-09-03 NOTE — TELEPHONE ENCOUNTER
Patient walked in and informed  he needs a refill on his medication. States he will be seeing a new doctor at the [facility] office but has not been able to get in yet.

## 2019-09-05 ENCOUNTER — OFFICE VISIT (OUTPATIENT)
Dept: FAMILY MEDICINE CLINIC | Age: 54
End: 2019-09-05

## 2019-09-05 VITALS
RESPIRATION RATE: 18 BRPM | DIASTOLIC BLOOD PRESSURE: 69 MMHG | WEIGHT: 236 LBS | HEIGHT: 74 IN | TEMPERATURE: 97.4 F | HEART RATE: 84 BPM | SYSTOLIC BLOOD PRESSURE: 106 MMHG | OXYGEN SATURATION: 94 % | BODY MASS INDEX: 30.29 KG/M2

## 2019-09-05 DIAGNOSIS — L03.116 CELLULITIS OF LEFT LOWER EXTREMITY: Primary | ICD-10-CM

## 2019-09-05 DIAGNOSIS — R60.0 LOCALIZED EDEMA: ICD-10-CM

## 2019-09-05 NOTE — PROGRESS NOTES
Latisha Giang is a 49 y.o. male (: 1965) presenting to address:    Chief Complaint   Patient presents with    Skin Infection     left leg and ankle       Vitals:    19 1208   BP: 106/69   Pulse: 84   Resp: 18   Temp: 97.4 °F (36.3 °C)   TempSrc: Oral   SpO2: 94%   Weight: 236 lb (107 kg)   Height: 6' 2\" (1.88 m)   PainSc:   8   PainLoc: Leg       Hearing/Vision:   No exam data present    Learning Assessment:     Learning Assessment 2014   PRIMARY LEARNER Patient   HIGHEST LEVEL OF EDUCATION - PRIMARY LEARNER  -   BARRIERS PRIMARY LEARNER -   CO-LEARNER CAREGIVER -   PRIMARY LANGUAGE ENGLISH   LEARNER PREFERENCE PRIMARY LISTENING   ANSWERED BY patient   RELATIONSHIP SELF     Depression Screening:     3 most recent PHQ Screens 8/15/2018   PHQ Not Done Active Diagnosis of Depression or Bipolar Disorder     Fall Risk Assessment:     Fall Risk Assessment, last 12 mths 3/30/2018   Able to walk? Yes   Fall in past 12 months? Yes   Fall with injury? No   Number of falls in past 12 months 3   Fall Risk Score 3     Abuse Screening:     Abuse Screening Questionnaire 3/30/2018   Do you ever feel afraid of your partner? N   Are you in a relationship with someone who physically or mentally threatens you? N   Is it safe for you to go home? Y     Coordination of Care Questionaire:   1. Have you been to the ER, urgent care clinic since your last visit? Hospitalized since your last visit? YES    2. Have you seen or consulted any other health care providers outside of the [address] [place] [address] since your last visit? Include any pap smears or colon screening. YES  Dr. Mikael Frank    Advanced Directive:   1. Do you have an Advanced Directive? NO    2. Would you like information on Advanced Directives? YES  Patient DECLINED flu vaccine.

## 2019-09-06 RX ORDER — CYCLOBENZAPRINE HCL 5 MG
5 TABLET ORAL
Qty: 60 TAB | Refills: 0 | Status: SHIPPED | OUTPATIENT
Start: 2019-09-06 | End: 2019-09-13 | Stop reason: ALTCHOICE

## 2019-09-07 NOTE — TELEPHONE ENCOUNTER
Sent electronically:    Requested Prescriptions     Signed Prescriptions Disp Refills    cyclobenzaprine (FLEXERIL) 5 mg tablet 60 Tab 0     Sig: Take 1 Tab by mouth two (2) times daily as needed for Muscle Spasm(s). Authorizing Provider: Jacob Catsillo     Refused Prescriptions Disp Refills    cyclobenzaprine (FLEXERIL) 5 mg tablet [Pharmacy Med Name: CYCLOBENZAPRINE 5 MG TABLET 5 TAB] 60 Tab 2     Sig: TAKE 1 TAB BY MOUTH TWO (2) TIMES DAILY AS NEEDED FOR MUSCLE SPASM(S).      Refused By: Jacob Castillo     Reason for Refusal: Appt required, please call patient

## 2019-09-11 NOTE — PROGRESS NOTES
Lashell Clark is a 49 y.o.  male and presents with    Chief Complaint   Patient presents with    Skin Infection     left leg and ankle         Subjective:  Patient presents for leg swelling left  Leg and ankle with redness, pain and weeping. Patient has hx of cellulitis was recently treated at urgent care for right leg. He does not have a vascular doctor but does get lymphedema regularly he states. Current Outpatient Medications   Medication Sig Dispense Refill    fluticasone propionate (FLONASE) 50 mcg/actuation nasal spray 2 SPRAYS BY BOTH NOSTRILS ROUTE DAILY. 16 g 3    disulfiram (ANTABUSE) 250 mg tablet Take 250 mg by mouth daily.  cephALEXin (KEFLEX) 500 mg capsule Take 500 mg by mouth four (4) times daily.  XARELTO 20 mg tab tablet TAKE 1 TAB BY MOUTH DAILY (WITH BREAKFAST). 90 Tab 1    loratadine (CLARITIN) 10 mg tablet TAKE 1 TAB BY MOUTH DAILY AS NEEDED FOR ALLERGIES. 90 Tab 1    lidocaine (LIDODERM) 5 % 1 PATCH BY TRANSDERMAL ROUTE EVERY TWENTY-FOUR (24) HOURS. 30 Patch 5    atorvastatin (LIPITOR) 40 mg tablet TAKE 1 TAB BY MOUTH DAILY. 90 Tab 1    acamprosate calcium (CAMPRAL PO) Take  by mouth.  DULoxetine (CYMBALTA) 60 mg capsule Take 60 mg by mouth daily.  NIFEdipine ER (ADALAT CC) 30 mg ER tablet Take 1 Tab by mouth daily. 90 Tab 3    pregabalin (LYRICA) 150 mg capsule Take 1 Cap by mouth two (2) times a day. Max Daily Amount: 300 mg. 60 Cap 2    clotrimazole-betamethasone (LOTRISONE) topical cream Apply pea-sized amount bid to affecteed area x 10d 45 g 0    ADULTS MULTIVITAMIN 18 mg iron-400 mcg-25 mcg tab   3    carvedilol (COREG) 6.25 mg tablet Take 1 Tab by mouth two (2) times a day. 180 Tab 3    multivitamin (DAILY-CAYLA) tablet Take 1 Tab by mouth daily. 90 Tab 3    isosorbide mononitrate ER (IMDUR) 30 mg tablet Take 1 Tab by mouth daily. 90 Tab 3    thiamine HCL (B-1) 100 mg tablet Take 1 Tab by mouth daily.  90 Tab 3    omeprazole (PRILOSEC) 20 mg capsule Take 1 Cap by mouth daily. 90 Cap 3    folic acid (FOLVITE) 1 mg tablet Take 1 Tab by mouth daily. 90 Tab 3    aspirin delayed-release 81 mg tablet TAKE 1 TAB BY MOUTH DAILY. 90 Tab 3    nitroglycerin (NITROSTAT) 0.4 mg SL tablet TAKE 1 TABLET UNDER TOUNGE EVERY 5 MINS AS NEEDED FOR CHEST PAIN. DO NOT EXCEED 3 DOSES 25 Tab 0    QUEtiapine SR (SEROQUEL XR) 300 mg sr tablet Take 300 mg by mouth nightly.  busPIRone (BUSPAR) 30 mg tablet Take 1 Tab by mouth daily. (Patient taking differently: Take 10 mg by mouth daily.) 90 Tab 0    cyclobenzaprine (FLEXERIL) 5 mg tablet Take 1 Tab by mouth two (2) times daily as needed for Muscle Spasm(s). 60 Tab 0    chlordiazePOXIDE (LIBRIUM) 25 mg capsule Take 1 cap by mouth 4 times daily for 3 days. Then take 1 cap by mouth twice daily for 2 days. Then take 1 cap by mouth once daily for 2 days 18 Cap 0    tamsulosin (FLOMAX) 0.4 mg capsule Take 0.4 mg by mouth daily.  clonazePAM (KLONOPIN) 1 mg tablet Take  by mouth two (2) times a day.  potassium chloride (K-DUR, KLOR-CON) 20 mEq tablet 10 mEq.  nicotine (NICODERM CQ) 21 mg/24 hr 1 Patch.  FLUZONE QUAD 0918-8432, PF, syrg injection TO BE ADMINISTERED BY PHARMACIST FOR IMMUNIZATION  0    Blood Pressure Monitor kit Use as directed to check blood pressure daily. 1 Kit 0    naproxen (NAPROSYN) 500 mg tablet Take 1 Tab by mouth two (2) times daily (with meals). 100 Tab 2    gabapentin (NEURONTIN) 600 mg tablet Take 1 Tab by mouth two (2) times a day. 180 Tab 0       Review of Systems   Constitutional: Negative for chills and fever. Respiratory: Negative for shortness of breath. Cardiovascular: Negative for chest pain.    Skin:        + redness and swelling with drainage         Objective:  Vitals:    09/05/19 1208   BP: 106/69   Pulse: 84   Resp: 18   Temp: 97.4 °F (36.3 °C)   TempSrc: Oral   SpO2: 94%   Weight: 236 lb (107 kg)   Height: 6' 2\" (1.88 m)   PainSc:   8   PainLoc: Leg General:   Well-groomed, well-nourished, in no distress, pleasant, alert, appropriate    Cardiovasc:   RRR,  no murmur, rubs or gallops. Pulmonary:    Lungs clear bilaterally, no wheezing, rales or rhonchi. Skin:    + for erythema, swelling, and weeping drainage scant. Left leg TTP of pulses although they are presents. Assessment/Plan:      1. Cellulitis of left lower extremity  Antibiotics and see back as needed     2. Localized edema  Monitor follow up with pcp as needed. May want to consider vascular if worsens. I have discussed the diagnosis with the patient and the intended plan as seen in the above orders. The patient has received an after-visit summary and questions were answered concerning future plans. I have discussed medication side effects and warnings with the patient as well. I have reviewed the plan of care with the patient, accepted their input and they are in agreement with the treatment goals. Follow-up and Dispositions    · Return if symptoms worsen or fail to improve. More than 1/2 of this 15 minute visit was spent face to face in counselling and coordination of care, as described above. This document may have been created with the aid of dictation software. Text may contain errors, particularly phonetic errors.      Karen Lema [facility]

## 2019-09-13 ENCOUNTER — OFFICE VISIT (OUTPATIENT)
Dept: FAMILY MEDICINE CLINIC | Age: 54
End: 2019-09-13

## 2019-09-13 VITALS
BODY MASS INDEX: 30.7 KG/M2 | HEART RATE: 93 BPM | SYSTOLIC BLOOD PRESSURE: 114 MMHG | OXYGEN SATURATION: 95 % | DIASTOLIC BLOOD PRESSURE: 77 MMHG | RESPIRATION RATE: 18 BRPM | HEIGHT: 74 IN | WEIGHT: 239.2 LBS | TEMPERATURE: 97.3 F

## 2019-09-13 DIAGNOSIS — E88.09 HYPOALBUMINEMIA: ICD-10-CM

## 2019-09-13 DIAGNOSIS — I10 ESSENTIAL HYPERTENSION: ICD-10-CM

## 2019-09-13 DIAGNOSIS — G89.29 OTHER CHRONIC PAIN: ICD-10-CM

## 2019-09-13 DIAGNOSIS — R60.0 LOCALIZED EDEMA: Primary | ICD-10-CM

## 2019-09-13 DIAGNOSIS — S22.32XA CLOSED FRACTURE OF ONE RIB OF LEFT SIDE, INITIAL ENCOUNTER: ICD-10-CM

## 2019-09-13 RX ORDER — CLONIDINE 0.2MG/24HR
PATCH, TRANSDERMAL WEEKLY TRANSDERMAL
Refills: 1 | COMMUNITY
Start: 2019-08-24 | End: 2020-09-10 | Stop reason: ALTCHOICE

## 2019-09-13 RX ORDER — HYDROCODONE BITARTRATE AND ACETAMINOPHEN 7.5; 325 MG/1; MG/1
1 TABLET ORAL
Qty: 21 TAB | Refills: 0 | Status: SHIPPED | OUTPATIENT
Start: 2019-09-13 | End: 2019-09-20

## 2019-09-13 RX ORDER — PREGABALIN 150 MG/1
CAPSULE ORAL
Qty: 30 CAP | Refills: 0 | Status: SHIPPED | OUTPATIENT
Start: 2019-09-13 | End: 2019-09-23 | Stop reason: ALTCHOICE

## 2019-09-13 RX ORDER — FUROSEMIDE 20 MG/1
20 TABLET ORAL DAILY
Qty: 30 TAB | Refills: 0 | Status: SHIPPED | OUTPATIENT
Start: 2019-09-13 | End: 2020-01-21

## 2019-09-13 RX ORDER — PREGABALIN 150 MG/1
150 CAPSULE ORAL 2 TIMES DAILY
Qty: 60 CAP | Refills: 2 | Status: CANCELLED | OUTPATIENT
Start: 2019-09-13

## 2019-09-13 RX ORDER — DOXYCYCLINE HYCLATE 100 MG
TABLET ORAL
Refills: 0 | COMMUNITY
Start: 2019-08-30 | End: 2020-01-21 | Stop reason: ALTCHOICE

## 2019-09-13 RX ORDER — CARVEDILOL 12.5 MG/1
12.5 TABLET ORAL 2 TIMES DAILY
Qty: 180 TAB | Refills: 0 | Status: SHIPPED | OUTPATIENT
Start: 2019-09-13 | End: 2020-09-10 | Stop reason: SDUPTHER

## 2019-09-13 RX ORDER — FLUTICASONE PROPIONATE 50 MCG
2 SPRAY, SUSPENSION (ML) NASAL DAILY
Qty: 16 G | Refills: 3 | Status: SHIPPED | OUTPATIENT
Start: 2019-09-13 | End: 2020-09-10 | Stop reason: SDUPTHER

## 2019-09-13 NOTE — PROGRESS NOTES
Sampson Hooker is a 49 y.o. male (: 1965) presenting to address:    Chief Complaint   Patient presents with    Medication Refill       Vitals:    19 1313   BP: 114/77   Pulse: 93   Resp: 18   Temp: 97.3 °F (36.3 °C)   TempSrc: Oral   SpO2: 95%   Weight: 239 lb 3.2 oz (108.5 kg)   Height: 6' 2\" (1.88 m)   PainSc:   8   PainLoc: Chest       Hearing/Vision:   No exam data present    Learning Assessment:     Learning Assessment 2014   PRIMARY LEARNER Patient   HIGHEST LEVEL OF EDUCATION - PRIMARY LEARNER  -   BARRIERS PRIMARY LEARNER -   CO-LEARNER CAREGIVER -   PRIMARY LANGUAGE ENGLISH   LEARNER PREFERENCE PRIMARY LISTENING   ANSWERED BY patient   RELATIONSHIP SELF     Depression Screening:     3 most recent PHQ Screens 8/15/2018   PHQ Not Done Active Diagnosis of Depression or Bipolar Disorder     Fall Risk Assessment:     Fall Risk Assessment, last 12 mths 3/30/2018   Able to walk? Yes   Fall in past 12 months? Yes   Fall with injury? No   Number of falls in past 12 months 3   Fall Risk Score 3     Abuse Screening:     Abuse Screening Questionnaire 3/30/2018   Do you ever feel afraid of your partner? N   Are you in a relationship with someone who physically or mentally threatens you? N   Is it safe for you to go home? Y     Coordination of Care Questionaire:   1. Have you been to the ER, urgent care clinic since your last visit? Hospitalized since your last visit? YES [facility] for rib pain. Patient stated that he has broken ribs. 2. Have you seen or consulted any other health care providers outside of the [address] [place] [address] since your last visit? Include any pap smears or colon screening. NO    Advanced Directive:   1. Do you have an Advanced Directive? NO    2. Would you like information on Advanced Directives?  NO
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
## 2020-01-21 ENCOUNTER — OFFICE VISIT (OUTPATIENT)
Dept: FAMILY MEDICINE CLINIC | Age: 55
End: 2020-01-21

## 2020-01-21 VITALS
BODY MASS INDEX: 30.39 KG/M2 | TEMPERATURE: 98.2 F | SYSTOLIC BLOOD PRESSURE: 120 MMHG | WEIGHT: 236.8 LBS | RESPIRATION RATE: 16 BRPM | HEART RATE: 95 BPM | OXYGEN SATURATION: 95 % | DIASTOLIC BLOOD PRESSURE: 78 MMHG | HEIGHT: 74 IN

## 2020-01-21 DIAGNOSIS — R10.9 GASTRIC PAIN: Primary | ICD-10-CM

## 2020-01-21 DIAGNOSIS — F10.930 ALCOHOL WITHDRAWAL SYNDROME WITHOUT COMPLICATION (HCC): ICD-10-CM

## 2020-01-21 DIAGNOSIS — K85.20 ALCOHOL-INDUCED ACUTE PANCREATITIS WITHOUT INFECTION OR NECROSIS: ICD-10-CM

## 2020-01-21 RX ORDER — FAMOTIDINE 20 MG/1
20 TABLET, FILM COATED ORAL 2 TIMES DAILY
Qty: 60 TAB | Refills: 0 | Status: SHIPPED | OUTPATIENT
Start: 2020-01-21 | End: 2020-09-10 | Stop reason: SDUPTHER

## 2020-01-21 RX ORDER — CHLORDIAZEPOXIDE HYDROCHLORIDE 25 MG/1
CAPSULE, GELATIN COATED ORAL
Qty: 18 CAP | Refills: 0 | Status: ON HOLD | OUTPATIENT
Start: 2020-01-21 | End: 2020-06-06 | Stop reason: SDUPTHER

## 2020-01-21 NOTE — PROGRESS NOTES
HISTORY OF PRESENT ILLNESS  Britt Padilla is a 49 y.o. male. Admitted to [facility] on 1/16/2020    HPI  Neel Durant is a 49 y.o. male with history of CAD s/p RCA stent 12/2018, HTN, alcohol abuse, PE on xarelto, depression, anxiety, pancreatitis who presents via EMS due to abdominal pain, n/v/d. Patient states he has had watery diarrhea for the past week (over 6 episodes per day, nonbloody). Woke up this morning with epigastric abdominal pain, describes as sharp and constant that feels similar to his prior episodes of pancreatitis. Also several episodes of nonbloody, nonbilious emesis today. States he went to the  of his apartment building to have them call EMS-that is the last thing he remembers. Per report, patient had 1 seizure in ambulance. Did not get any meds en route. Patient does report history of seizures due to alcohol. Reports intermittent compliance with his Xarelto for history of PE-unsure of last dose. Denies any known trauma or falls. Last drink was around 10 AM this morning, had \"a tall glass of vodka. \" Usually drinks half a gallon of vodka per day. Occasional marijuana use. Denies any fevers, chills, chest pain, shortness of breath. No further complaints today. ED CT HEAD NO CONTRAST   Final Result     CT ABD/PELVIS-IV ONLY   Final Result   1. Acute uncomplicated pancreatitis centered at the pancreatic head. No evidence of pancreatic necrosis or acute peripancreatic fluid collection. 1. No acute intracranial process. No acute infarct, hemorrhage, or mass. Assessment/Differential Diagnosis: 49 y.o. male with history of CAD s/p RCA stent 12/2018, HTN, alcohol abuse, PE on xarelto, depression, anxiety, pancreatitis who presents via EMS due to abdominal pain, n/v/d- reports feels similar to prior episodes of pancreatitis. Also requesting EtoH detox. ED Course/Medical Decision Making:   Afebrile. Tachycardic.  Vitals otherwise unremarkable. Witnessed generalized tonic-clonic seizure during my evaluation, lasted for approximately 30 seconds and resolved on its own. Ativan 1 mg IV given to prevent further seizures and for alcohol withdrawal. Patient reports history of seizures due to alcohol use. Unsure if he had a seizure at home. Will obtain CT head given anticoagulated on Xarelto and possible head trauma in the setting of seizures. Screen labs, EKG troponin. CT a/p. IVF, antiemetics, pain control. EKG without ischemic changes. Trop neg. Lipase 227. LFTs mildly elevated. Bili and alk phos wnl. The patient left the hospital [address] after dispute with nursing staff about his diet    Hospital Follow Up   The history is provided by the patient and medical records. Associated symptoms include abdominal pain (worse with eating). Pertinent negatives include no chest pain, no headaches and no shortness of breath.      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 ([address] F10.24, F32.89    Chronic left-sided low back pain with left-sided sciatica M54.42, G89.29    Coronary artery disease involving native coronary artery of native heart without angina pectoris I25.10    History of non-ST elevation myocardial infarction (NSTEMI) I25.2    Hx pulmonary embolism Z86.711    History of CVA (cerebrovascular accident) Z86.73    Eczema L30.9    Marijuana use F12.90    Recurrent pulmonary emboli (HCC) I26.99    Adenomyomatosis of gallbladder D13.5    Hepatic steatosis K76.0    Alcohol withdrawal seizure (HCC) F10.239, R56.9    SBO (small bowel obstruction) (HCC) K56.609    Localized edema R60.0         Current Outpatient Medications:     NIFEdipine ER (PROCARDIA XL) 30 mg ER tablet, TAKE 1 TAB BY MOUTH DAILY. (Patient taking differently: 50 mg.), Disp: 90 Tab, Rfl: 0    CATAPRES-TTS-2 0.2 mg/24 hr ptwk, , Disp: , Rfl: 1    fluticasone propionate (FLONASE) 50 mcg/actuation nasal spray, 2 Sprays by Both Nostrils route daily. , Disp: 16 g, Rfl: 3    carvedilol (COREG) 12.5 mg tablet, Take 1 Tab by mouth two (2) times a day. Indications: high blood pressure, Disp: 180 Tab, Rfl: 0    XARELTO 20 mg tab tablet, TAKE 1 TAB BY MOUTH DAILY (WITH BREAKFAST). , Disp: 90 Tab, Rfl: 1    loratadine (CLARITIN) 10 mg tablet, TAKE 1 TAB BY MOUTH DAILY AS NEEDED FOR ALLERGIES., Disp: 90 Tab, Rfl: 1    lidocaine (LIDODERM) 5 %, 1 PATCH BY TRANSDERMAL ROUTE EVERY TWENTY-FOUR (24) HOURS., Disp: 30 Patch, Rfl: 5    atorvastatin (LIPITOR) 40 mg tablet, TAKE 1 TAB BY MOUTH DAILY. , Disp: 90 Tab, Rfl: 1    DULoxetine (CYMBALTA) 60 mg capsule, Take 60 mg by mouth daily. , Disp: , Rfl:     clotrimazole-betamethasone (LOTRISONE) topical cream, Apply pea-sized amount bid to affecteed area x 10d, Disp: 45 g, Rfl: 0    multivitamin (DAILY-CAYLA) tablet, Take 1 Tab by mouth daily. , Disp: 90 Tab, Rfl: 3    thiamine HCL (B-1) 100 mg tablet, Take 1 Tab by mouth daily. , Disp: 90 Tab, Rfl: 3    folic acid (FOLVITE) 1 mg tablet, Take 1 Tab by mouth daily. , Disp: 90 Tab, Rfl: 3    nitroglycerin (NITROSTAT) 0.4 mg SL tablet, TAKE 1 TABLET UNDER TOUNGE EVERY 5 MINS AS NEEDED FOR CHEST PAIN. DO NOT EXCEED 3 DOSES, Disp: 25 Tab, Rfl: 0    QUEtiapine SR (SEROQUEL XR) 300 mg sr tablet, Take 300 mg by mouth nightly., Disp: , Rfl:     busPIRone (BUSPAR) 30 mg tablet, Take 1 Tab by mouth daily. (Patient taking differently: Take 10 mg by mouth daily.), Disp: 90 Tab, Rfl: 0  No current facility-administered medications for this visit.       Allergies   Allergen Reactions    Amlodipine Other (comments)    Carbamazepine Unknown (comments)     violent    Lisinopril Unknown (comments)    Prednisone Other (comments)     He stated it hypes him up       Review of Systems   Constitutional: Negative for fever and weight loss. Respiratory: Negative for shortness of breath. Cardiovascular: Negative for chest pain and palpitations. Gastrointestinal: Positive for abdominal pain (worse with eating). Negative for nausea. Musculoskeletal: Positive for myalgias. Neurological: Positive for tremors ( Hands) and seizures ( Denies any seizures since leaving the hospital, concerned that he may not have completed alcohol withdrawal and may still be at risk for seizures). Negative for dizziness, speech change, focal weakness and headaches. Psychiatric/Behavioral: Positive for depression. Negative for suicidal ideas. Reports that he has attended AA since leaving the hospital as a group follow-up with his psychiatrists group later this week       Physical Exam  Vitals signs and nursing note reviewed. Constitutional:       Appearance: He is well-developed. HENT:      Head: Normocephalic. Neck:      Musculoskeletal: Neck supple. Cardiovascular:      Rate and Rhythm: Normal rate and regular rhythm. Heart sounds: Normal heart sounds. Pulmonary:      Effort: Pulmonary effort is normal.      Breath sounds: Normal breath sounds. Abdominal:      General: Bowel sounds are normal. There is distension (Mild). Palpations: Abdomen is soft. Tenderness: There is tenderness ( Diffuse tenderness). There is no guarding or rebound. Skin:     General: Skin is warm and dry. Neurological:      Mental Status: He is alert and oriented to person, place, and time. Comments: Hands with fine rest tremor   Psychiatric:         Mood and Affect: Mood normal.         Behavior: Behavior normal.         Thought Content: Thought content normal.         ASSESSMENT and PLAN    ICD-10-CM ICD-9-CM    1. Gastric pain R10.9 536.8 famotidine (PEPCID) 20 mg tablet   2. Alcohol withdrawal syndrome without complication (HCC) X81.427 291.81 chlordiazePOXIDE (LIBRIUM) 25 mg capsule   3.  Alcohol-induced acute pancreatitis without infection or necrosis K85.20 577.0    Follow tapering dose instruction for chlordiazepoxide  Famotidine 20 mg twice daily  Abstain from alcohol consumption  Schedule routine follow-up with PCP

## 2020-01-21 NOTE — PROGRESS NOTES
Sade Talley is a 49 y.o. male (: 1965) presenting to address:    Chief Complaint   Patient presents with    Abdominal Pain     in hospital for pancreatitis and ETOH withdrawal; left AMA; c/o abdomen swelling; received flu vaccine       Vitals:    20 1403   BP: 120/78   Pulse: 95   Resp: 16   Temp: 98.2 °F (36.8 °C)   TempSrc: Oral   SpO2: 95%   Weight: 236 lb 12.8 oz (107.4 kg)   Height: 6' 2\" (1.88 m)   PainSc:   7   PainLoc: Abdomen       Hearing/Vision:   No exam data present    Learning Assessment:     Learning Assessment 2014   PRIMARY LEARNER Patient   HIGHEST LEVEL OF EDUCATION - PRIMARY LEARNER  -   BARRIERS PRIMARY LEARNER -   CO-LEARNER CAREGIVER -   PRIMARY LANGUAGE ENGLISH   LEARNER PREFERENCE PRIMARY LISTENING   ANSWERED BY patient   RELATIONSHIP SELF     Depression Screening:     3 most recent PHQ Screens 8/15/2018   PHQ Not Done Active Diagnosis of Depression or Bipolar Disorder     Fall Risk Assessment:     Fall Risk Assessment, last 12 mths 3/30/2018   Able to walk? Yes   Fall in past 12 months? Yes   Fall with injury? No   Number of falls in past 12 months 3   Fall Risk Score 3     Abuse Screening:     Abuse Screening Questionnaire 3/30/2018   Do you ever feel afraid of your partner? N   Are you in a relationship with someone who physically or mentally threatens you? N   Is it safe for you to go home? Y     Coordination of Care Questionaire:   1. Have you been to the ER, urgent care clinic since your last visit? Hospitalized since your last visit? YES, left AMA    2. Have you seen or consulted any other health care providers outside of the [address] [place] [address] since your last visit? Include any pap smears or colon screening. YES, psychiatrist    Advanced Directive:   1. Do you have an Advanced Directive? NO    2. Would you like information on Advanced Directives?  NO

## 2020-01-21 NOTE — PATIENT INSTRUCTIONS
Follow tapering dose instruction for chlordiazepoxide  Famotidine 20 mg twice daily  Abstain from alcohol consumption  Schedule routine follow-up with PCP     Learning About Acute Pancreatitis  What is acute pancreatitis? The pancreas is an organ behind the stomach. It makes hormones like insulin that help control how your body uses sugar. It also makes enzymes that help you digest food. Usually these enzymes flow from the pancreas to the intestines. But if they leak into the pancreas, they can irritate it and cause pain and swelling. When this happens suddenly, it's called acute pancreatitis. What causes it? Most of the time, acute pancreatitis is caused by gallstones or by heavy alcohol use. But several other things can cause it, such as:  · High levels of fats in the blood. · An injury. · A problem after a surgery or a procedure. · Certain medicines. What are the symptoms? The main symptom is pain in the upper belly. The pain can be severe. You also may have a fever, nausea, or vomiting. Some people get so sick that they have problems breathing. They also may have low blood pressure. How is it diagnosed? Your doctor will diagnose pancreatitis with an exam and by looking at your lab tests. Your doctor may think that you have this problem based on your symptoms and where you have pain in your belly. You may have blood tests of enzymes called amylase and lipase. In pancreatitis, the level of these enzymes is usually much higher than normal.  You also may have imaging tests of the belly. These may include an ultrasound, a CT scan, or an MRI. A special MRI called MRCP can show images of the bile ducts. This test can be very helpful when gallstones are causing the problem. How is it treated? Treatment of acute pancreatitis usually takes place in the hospital. It focuses on taking care of pain and supporting your body while your pancreas heals.  In severe cases, treatment may occur in an intensive care unit to support breathing, treat serious infections, or help raise very low blood pressure. If a gallstone is causing the problem, the gallstone may need to be removed. This is done during a procedure called ERCP. The doctor puts a scope in your mouth and moves it gently through the stomach and into the ducts from the pancreas and gallbladder. The doctor is then able to see a stone and remove it. Sometimes the gallbladder, which makes gallstones, needs to be removed by surgery. People with pancreatitis often need a lot of fluid to help support their other organs and their blood pressure. They get fluids through a vein (intravenous, or IV). Instead of food by mouth, nutrition is sometimes given through a vein while the pancreas heals. Follow-up care is a key part of your treatment and safety. Be sure to make and go to all appointments, and call your doctor if you are having problems. It's also a good idea to know your test results and keep a list of the medicines you take. Where can you learn more? Go to http://zayda-eden.info/. Enter A768 in the search box to learn more about \"Learning About Acute Pancreatitis. \"  Current as of: November 7, 2018  Content Version: 12.2  © 5596-8410 [employer]. Care instructions adapted under license by [employer] (which disclaims liability or warranty for this information). If you have questions about a medical condition or this instruction, always ask your healthcare professional. [place] [address] any warranty or liability for your use of this information. Learning About Alcohol Withdrawal  What is alcohol withdrawal?    If you drink alcohol regularly (more than a few drinks on most days) and then suddenly stop or cut down, you may go through some physical and emotional problems while the alcohol clears out of your system.  This is called withdrawal. Clearing the alcohol from your body is called detoxification, or detox. What are the symptoms? Symptoms of alcohol withdrawal may start as soon as 4 to 12 hours after you stop drinking. Or they may not start until several days after the last drink. Mild symptoms include:  · Nausea. · Sweating. · Shakiness. · Diarrhea. · Intense worry. · Disturbed sleep. · Headache. More severe symptoms include:  · Vomiting or belly pain. · Being confused, upset, and irritable. · Changed sensations. You might feel things on your body that aren't really there. Or you may see or hear things that aren't there. · Trembling. · Being short of breath or having pain in your chest.  · Having seizures. Symptoms may peak within a few days. Mild symptoms can last for a few weeks. If your symptoms are severe, you'll need to see a doctor. What is the treatment for alcohol withdrawal?  Most people may be able to cut down or stop drinking with only mild withdrawal. They can stay safe by simply resting, drinking lots of fluids, and eating healthy foods. But people who drink large amounts of alcohol or are at risk for severe withdrawal symptoms should not try to detox at home unless they work closely with a doctor to manage it. A person can die of severe alcohol withdrawal.  Before you stop drinking, talk to your doctor about how you plan to stop. Be completely honest about how much you've been drinking. Your doctor will figure out if you need to detox in a medical center. You may get medicine to treat the symptoms whether you are at home or in a medical center. Medicine that treats seizures can also help. Your doctor will explain what types of medicine might help you. You may start with a high dose and then take smaller amounts over several days. There's also medicine that can help you avoid alcohol while you recover. How can you manage your withdrawal and recovery? Here are a few tips that can help you to not start drinking again.   · Make sure there's no alcohol in the house. This includes drinks as well as liquid medicines, rubbing alcohol, and certain flavorings like vanilla extract. · Try not to hang out with people you used to drink with. · Don't go it alone. Spend time with people who support the changes you are making in your life. This includes asking for advice and help from people who have stopped drinking. You might also try mutual support groups such as Alcoholics Anonymous. · Drink lots of fluids. · Eat snacks such as fruit, cheese and crackers, and pretzels. High-carbohydrate foods may help reduce the craving for alcohol. What happens after withdrawal?  It can be hard to stop drinking. But after you clear the alcohol from your system, you can start the next, healthier part of your life. After detox, you will focus on staying alcohol-free. You can learn skills that you can use to stay abstinent (or sober) as you recover. Finding new ways to deal with life's challenges, without drinking, takes time and effort. Recovery is a long-term process. It's not something you can achieve in a few weeks. Most people get some type of therapy, such as group counseling. You also may need medicine to help you stay sober. Treatment doesn't focus on alcohol use alone. It may address other parts of your life, like your relationships, work, medical problems, and home life. Treatment, support, patience, and commitment will help you make the changes you need to live a [place] life without alcohol. You may find, over time, that the process gets easier, life becomes more joyous, and your connections to others becomes more rewarding. Where can you find help? Behavioral Health Treatment Services . This service from the national Substance Abuse and [place] [address] can help you find local alcohol treatment services. Search online at [employer]. samhsa.gov or call 9-892-916-HELP ([phone]), or [employer] 7-515.266.9290. Where can you learn more?   Go to http://zayda-eden.info/. Enter A011 in the search box to learn more about \"Learning About Alcohol Withdrawal.\"  Current as of: February 5, 2019  Content Version: 12.2  © 2545-7128 [employer]. Care instructions adapted under license by [employer] (which disclaims liability or warranty for this information). If you have questions about a medical condition or this instruction, always ask your healthcare professional. [place] [address] any warranty or liability for your use of this information.

## 2020-02-05 ENCOUNTER — TELEPHONE (OUTPATIENT)
Dept: FAMILY MEDICINE CLINIC | Age: 55
End: 2020-02-05

## 2020-02-05 NOTE — TELEPHONE ENCOUNTER
Pt was in on 1/21/2020 and saw Dr Arslan Gutiérrez and he is claiming that 2 medication did not get called in that should have been. He states it was suppose to clonidine patches and also prilosec the name brand not the generic to be sent to the [facility].  Please advise

## 2020-03-26 ENCOUNTER — TELEPHONE (OUTPATIENT)
Dept: FAMILY MEDICINE CLINIC | Age: 55
End: 2020-03-26

## 2020-03-26 NOTE — TELEPHONE ENCOUNTER
Called pt's home, cannot take calls. Called the pt's phone [phone] that he lists as his best contact, no answer, left msg. Pt will need to have his questions addressed at a virtual visit with the . Miley Mane will not be considered without a visit.

## 2020-03-26 NOTE — TELEPHONE ENCOUNTER
Patient called stating that he wanted a refill of his medication. He states that they were medications that he got at the hospital and it included changes to all those medications. Patient tried to list all of his medications that they changed. Patient started naming off at least 30 medications. He also wanted to know if  would fill his psych medications temporarily due to the office being closed. I told the patient that I would get the nurse to give him a call back so he could further discuss those changes to those medications.

## 2020-06-03 ENCOUNTER — HOSPITAL ENCOUNTER (INPATIENT)
Age: 55
LOS: 3 days | Discharge: HOME | End: 2020-06-06
Attending: EMERGENCY MEDICINE | Admitting: HOSPITALIST
Payer: MEDICAID

## 2020-06-03 ENCOUNTER — APPOINTMENT (OUTPATIENT)
Dept: GENERAL RADIOLOGY | Age: 55
End: 2020-06-03
Attending: EMERGENCY MEDICINE
Payer: MEDICAID

## 2020-06-03 DIAGNOSIS — E83.42 HYPOMAGNESEMIA: ICD-10-CM

## 2020-06-03 DIAGNOSIS — R56.9 ALCOHOL WITHDRAWAL SEIZURE WITH COMPLICATION (HCC): ICD-10-CM

## 2020-06-03 DIAGNOSIS — F10.930 ALCOHOL WITHDRAWAL SYNDROME WITHOUT COMPLICATION (HCC): ICD-10-CM

## 2020-06-03 DIAGNOSIS — F10.939 ALCOHOL WITHDRAWAL SEIZURE WITH COMPLICATION (HCC): ICD-10-CM

## 2020-06-03 DIAGNOSIS — F10.921 ACUTE ALCOHOL ABUSE, WITH DELIRIUM (HCC): Primary | ICD-10-CM

## 2020-06-03 DIAGNOSIS — M62.82 NON-TRAUMATIC RHABDOMYOLYSIS: ICD-10-CM

## 2020-06-03 PROBLEM — F10.931 DELIRIUM TREMENS (HCC): Status: ACTIVE | Noted: 2020-06-03

## 2020-06-03 LAB
ALBUMIN SERPL-MCNC: 3.8 G/DL (ref 3.4–5)
ALBUMIN/GLOB SERPL: 0.8 {RATIO} (ref 0.8–1.7)
ALP SERPL-CCNC: 119 U/L (ref 45–117)
ALT SERPL-CCNC: 49 U/L (ref 16–61)
AMPHET UR QL SCN: NEGATIVE
ANION GAP SERPL CALC-SCNC: 16 MMOL/L (ref 3–18)
APAP SERPL-MCNC: <2 UG/ML (ref 10–30)
APPEARANCE UR: CLEAR
ARTERIAL PATENCY WRIST A: YES
AST SERPL-CCNC: 52 U/L (ref 10–38)
ATRIAL RATE: 119 BPM
BACTERIA URNS QL MICRO: ABNORMAL /HPF
BARBITURATES UR QL SCN: POSITIVE
BASE DEFICIT BLD-SCNC: 4 MMOL/L
BASOPHILS # BLD: 0.1 K/UL (ref 0–0.1)
BASOPHILS NFR BLD: 1 % (ref 0–2)
BDY SITE: ABNORMAL
BENZODIAZ UR QL: POSITIVE
BILIRUB SERPL-MCNC: 0.7 MG/DL (ref 0.2–1)
BILIRUB UR QL: ABNORMAL
BODY TEMPERATURE: 98.3
BUN SERPL-MCNC: 10 MG/DL (ref 7–18)
BUN/CREAT SERPL: 10 (ref 12–20)
CALCIUM SERPL-MCNC: 8.8 MG/DL (ref 8.5–10.1)
CALCULATED P AXIS, ECG09: 59 DEGREES
CALCULATED R AXIS, ECG10: 34 DEGREES
CALCULATED T AXIS, ECG11: 67 DEGREES
CANNABINOIDS UR QL SCN: NEGATIVE
CHLORIDE SERPL-SCNC: 99 MMOL/L (ref 100–111)
CK MB CFR SERPL CALC: 0.7 % (ref 0–4)
CK MB SERPL-MCNC: 6.3 NG/ML (ref 5–25)
CK SERPL-CCNC: 929 U/L (ref 39–308)
CO2 SERPL-SCNC: 21 MMOL/L (ref 21–32)
COCAINE UR QL SCN: NEGATIVE
COLOR UR: ABNORMAL
CREAT SERPL-MCNC: 0.98 MG/DL (ref 0.6–1.3)
DIAGNOSIS, 93000: NORMAL
DIFFERENTIAL METHOD BLD: ABNORMAL
EOSINOPHIL # BLD: 0 K/UL (ref 0–0.4)
EOSINOPHIL NFR BLD: 0 % (ref 0–5)
EPITH CASTS URNS QL MICRO: ABNORMAL /LPF (ref 0–5)
ERYTHROCYTE [DISTWIDTH] IN BLOOD BY AUTOMATED COUNT: 15.2 % (ref 11.6–14.5)
ETHANOL SERPL-MCNC: 209 MG/DL (ref 0–3)
GAS FLOW.O2 O2 DELIVERY SYS: ABNORMAL L/MIN
GAS FLOW.O2 SETTING OXYMISER: 4 L/M
GLOBULIN SER CALC-MCNC: 4.6 G/DL (ref 2–4)
GLUCOSE SERPL-MCNC: 115 MG/DL (ref 74–99)
GLUCOSE UR STRIP.AUTO-MCNC: NEGATIVE MG/DL
HCO3 BLD-SCNC: 20.4 MMOL/L (ref 22–26)
HCT VFR BLD AUTO: 44.2 % (ref 36–48)
HDSCOM,HDSCOM: ABNORMAL
HGB BLD-MCNC: 15 G/DL (ref 13–16)
HGB UR QL STRIP: ABNORMAL
HYALINE CASTS URNS QL MICRO: ABNORMAL /LPF (ref 0–2)
INR PPP: 1 (ref 0.8–1.2)
KETONES UR QL STRIP.AUTO: 40 MG/DL
LEUKOCYTE ESTERASE UR QL STRIP.AUTO: ABNORMAL
LIPASE SERPL-CCNC: 33 U/L (ref 73–393)
LYMPHOCYTES # BLD: 2.4 K/UL (ref 0.9–3.6)
LYMPHOCYTES NFR BLD: 24 % (ref 21–52)
MAGNESIUM SERPL-MCNC: 1.5 MG/DL (ref 1.6–2.6)
MCH RBC QN AUTO: 30.5 PG (ref 24–34)
MCHC RBC AUTO-ENTMCNC: 33.9 G/DL (ref 31–37)
MCV RBC AUTO: 90 FL (ref 74–97)
METHADONE UR QL: NEGATIVE
MONOCYTES # BLD: 1.4 K/UL (ref 0.05–1.2)
MONOCYTES NFR BLD: 14 % (ref 3–10)
NEUTS SEG # BLD: 6.4 K/UL (ref 1.8–8)
NEUTS SEG NFR BLD: 61 % (ref 40–73)
NITRITE UR QL STRIP.AUTO: NEGATIVE
O2/TOTAL GAS SETTING VFR VENT: 36 %
OPIATES UR QL: NEGATIVE
P-R INTERVAL, ECG05: 148 MS
PCO2 BLD: 30 MMHG (ref 35–45)
PCP UR QL: NEGATIVE
PH BLD: 7.44 [PH] (ref 7.35–7.45)
PH UR STRIP: 5 [PH] (ref 5–8)
PLATELET # BLD AUTO: 263 K/UL (ref 135–420)
PMV BLD AUTO: 11.1 FL (ref 9.2–11.8)
PO2 BLD: 102 MMHG (ref 80–100)
POTASSIUM SERPL-SCNC: 3.6 MMOL/L (ref 3.5–5.5)
PROT SERPL-MCNC: 8.4 G/DL (ref 6.4–8.2)
PROT UR STRIP-MCNC: 100 MG/DL
PROTHROMBIN TIME: 13 SEC (ref 11.5–15.2)
Q-T INTERVAL, ECG07: 322 MS
QRS DURATION, ECG06: 68 MS
QTC CALCULATION (BEZET), ECG08: 452 MS
RBC # BLD AUTO: 4.91 M/UL (ref 4.7–5.5)
RBC #/AREA URNS HPF: ABNORMAL /HPF (ref 0–5)
SALICYLATES SERPL-MCNC: 1.8 MG/DL (ref 2.8–20)
SAO2 % BLD: 98 % (ref 92–97)
SERVICE CMNT-IMP: ABNORMAL
SODIUM SERPL-SCNC: 136 MMOL/L (ref 136–145)
SP GR UR REFRACTOMETRY: >1.03 (ref 1–1.03)
SPECIMEN TYPE: ABNORMAL
TOTAL RESP. RATE, ITRR: 20
TROPONIN I SERPL-MCNC: 0.07 NG/ML (ref 0–0.04)
TROPONIN I SERPL-MCNC: 0.07 NG/ML (ref 0–0.04)
UROBILINOGEN UR QL STRIP.AUTO: 1 EU/DL (ref 0.2–1)
VENTRICULAR RATE, ECG03: 119 BPM
WBC # BLD AUTO: 10.3 K/UL (ref 4.6–13.2)
WBC URNS QL MICRO: ABNORMAL /HPF (ref 0–4)

## 2020-06-03 PROCEDURE — 74011250637 HC RX REV CODE- 250/637: Performed by: INTERNAL MEDICINE

## 2020-06-03 PROCEDURE — 74011250636 HC RX REV CODE- 250/636: Performed by: HOSPITALIST

## 2020-06-03 PROCEDURE — 83690 ASSAY OF LIPASE: CPT

## 2020-06-03 PROCEDURE — 96361 HYDRATE IV INFUSION ADD-ON: CPT

## 2020-06-03 PROCEDURE — 83735 ASSAY OF MAGNESIUM: CPT

## 2020-06-03 PROCEDURE — 74011000258 HC RX REV CODE- 258: Performed by: EMERGENCY MEDICINE

## 2020-06-03 PROCEDURE — 36600 WITHDRAWAL OF ARTERIAL BLOOD: CPT

## 2020-06-03 PROCEDURE — 77010033678 HC OXYGEN DAILY

## 2020-06-03 PROCEDURE — 74011250636 HC RX REV CODE- 250/636: Performed by: INTERNAL MEDICINE

## 2020-06-03 PROCEDURE — 80307 DRUG TEST PRSMV CHEM ANLYZR: CPT

## 2020-06-03 PROCEDURE — 96376 TX/PRO/DX INJ SAME DRUG ADON: CPT

## 2020-06-03 PROCEDURE — 81001 URINALYSIS AUTO W/SCOPE: CPT

## 2020-06-03 PROCEDURE — 74011250637 HC RX REV CODE- 250/637: Performed by: HOSPITALIST

## 2020-06-03 PROCEDURE — 74011000250 HC RX REV CODE- 250: Performed by: HOSPITALIST

## 2020-06-03 PROCEDURE — 96375 TX/PRO/DX INJ NEW DRUG ADDON: CPT

## 2020-06-03 PROCEDURE — 74011250636 HC RX REV CODE- 250/636: Performed by: EMERGENCY MEDICINE

## 2020-06-03 PROCEDURE — 85025 COMPLETE CBC W/AUTO DIFF WBC: CPT

## 2020-06-03 PROCEDURE — 93005 ELECTROCARDIOGRAM TRACING: CPT

## 2020-06-03 PROCEDURE — 82550 ASSAY OF CK (CPK): CPT

## 2020-06-03 PROCEDURE — 96365 THER/PROPH/DIAG IV INF INIT: CPT

## 2020-06-03 PROCEDURE — 99285 EMERGENCY DEPT VISIT HI MDM: CPT

## 2020-06-03 PROCEDURE — 71045 X-RAY EXAM CHEST 1 VIEW: CPT

## 2020-06-03 PROCEDURE — 77030021352 HC CBL LD SYS DISP COVD -B

## 2020-06-03 PROCEDURE — 65660000000 HC RM CCU STEPDOWN

## 2020-06-03 PROCEDURE — 82803 BLOOD GASES ANY COMBINATION: CPT

## 2020-06-03 PROCEDURE — 94762 N-INVAS EAR/PLS OXIMTRY CONT: CPT

## 2020-06-03 PROCEDURE — 80053 COMPREHEN METABOLIC PANEL: CPT

## 2020-06-03 PROCEDURE — 74011000258 HC RX REV CODE- 258: Performed by: HOSPITALIST

## 2020-06-03 PROCEDURE — 85610 PROTHROMBIN TIME: CPT

## 2020-06-03 RX ORDER — LORAZEPAM 2 MG/ML
1 INJECTION INTRAMUSCULAR ONCE
Status: COMPLETED | OUTPATIENT
Start: 2020-06-03 | End: 2020-06-03

## 2020-06-03 RX ORDER — BUSPIRONE HYDROCHLORIDE 10 MG/1
30 TABLET ORAL DAILY
Status: DISCONTINUED | OUTPATIENT
Start: 2020-06-03 | End: 2020-06-06 | Stop reason: HOSPADM

## 2020-06-03 RX ORDER — MAGNESIUM SULFATE HEPTAHYDRATE 40 MG/ML
2 INJECTION, SOLUTION INTRAVENOUS ONCE
Status: COMPLETED | OUTPATIENT
Start: 2020-06-03 | End: 2020-06-03

## 2020-06-03 RX ORDER — METOPROLOL TARTRATE 25 MG/1
25 TABLET, FILM COATED ORAL ONCE
Status: COMPLETED | OUTPATIENT
Start: 2020-06-03 | End: 2020-06-03

## 2020-06-03 RX ORDER — LORAZEPAM 2 MG/ML
2 INJECTION INTRAMUSCULAR
Status: DISCONTINUED | OUTPATIENT
Start: 2020-06-03 | End: 2020-06-06 | Stop reason: HOSPADM

## 2020-06-03 RX ORDER — LORAZEPAM 1 MG/1
1 TABLET ORAL
Status: DISCONTINUED | OUTPATIENT
Start: 2020-06-03 | End: 2020-06-06 | Stop reason: HOSPADM

## 2020-06-03 RX ORDER — ASPIRIN 325 MG/1
100 TABLET, FILM COATED ORAL DAILY
Status: DISCONTINUED | OUTPATIENT
Start: 2020-06-03 | End: 2020-06-06 | Stop reason: HOSPADM

## 2020-06-03 RX ORDER — BISMUTH SUBSALICYLATE 262 MG
1 TABLET,CHEWABLE ORAL DAILY
Status: DISCONTINUED | OUTPATIENT
Start: 2020-06-03 | End: 2020-06-06 | Stop reason: HOSPADM

## 2020-06-03 RX ORDER — LORAZEPAM 2 MG/ML
2 INJECTION INTRAMUSCULAR
Status: COMPLETED | OUTPATIENT
Start: 2020-06-03 | End: 2020-06-03

## 2020-06-03 RX ORDER — SODIUM CHLORIDE 0.9 % (FLUSH) 0.9 %
5-40 SYRINGE (ML) INJECTION EVERY 8 HOURS
Status: DISCONTINUED | OUTPATIENT
Start: 2020-06-03 | End: 2020-06-06 | Stop reason: HOSPADM

## 2020-06-03 RX ORDER — ACETAMINOPHEN 325 MG/1
650 TABLET ORAL
Status: DISCONTINUED | OUTPATIENT
Start: 2020-06-03 | End: 2020-06-06 | Stop reason: HOSPADM

## 2020-06-03 RX ORDER — ATORVASTATIN CALCIUM 20 MG/1
40 TABLET, FILM COATED ORAL DAILY
Status: DISCONTINUED | OUTPATIENT
Start: 2020-06-03 | End: 2020-06-06 | Stop reason: HOSPADM

## 2020-06-03 RX ORDER — DULOXETIN HYDROCHLORIDE 30 MG/1
60 CAPSULE, DELAYED RELEASE ORAL DAILY
Status: DISCONTINUED | OUTPATIENT
Start: 2020-06-03 | End: 2020-06-06 | Stop reason: HOSPADM

## 2020-06-03 RX ORDER — CARVEDILOL 12.5 MG/1
12.5 TABLET ORAL 2 TIMES DAILY
Status: DISCONTINUED | OUTPATIENT
Start: 2020-06-03 | End: 2020-06-06 | Stop reason: HOSPADM

## 2020-06-03 RX ORDER — SODIUM CHLORIDE 0.9 % (FLUSH) 0.9 %
5-40 SYRINGE (ML) INJECTION AS NEEDED
Status: DISCONTINUED | OUTPATIENT
Start: 2020-06-03 | End: 2020-06-06 | Stop reason: HOSPADM

## 2020-06-03 RX ORDER — SODIUM CHLORIDE 9 MG/ML
75 INJECTION, SOLUTION INTRAVENOUS CONTINUOUS
Status: DISCONTINUED | OUTPATIENT
Start: 2020-06-03 | End: 2020-06-05

## 2020-06-03 RX ORDER — LORAZEPAM 2 MG/ML
1 INJECTION INTRAMUSCULAR
Status: DISCONTINUED | OUTPATIENT
Start: 2020-06-03 | End: 2020-06-06 | Stop reason: HOSPADM

## 2020-06-03 RX ORDER — LORAZEPAM 1 MG/1
2 TABLET ORAL
Status: DISCONTINUED | OUTPATIENT
Start: 2020-06-03 | End: 2020-06-06 | Stop reason: HOSPADM

## 2020-06-03 RX ORDER — FAMOTIDINE 10 MG/ML
20 INJECTION INTRAVENOUS
Status: COMPLETED | OUTPATIENT
Start: 2020-06-03 | End: 2020-06-03

## 2020-06-03 RX ORDER — ONDANSETRON 2 MG/ML
4 INJECTION INTRAMUSCULAR; INTRAVENOUS
Status: DISCONTINUED | OUTPATIENT
Start: 2020-06-03 | End: 2020-06-06 | Stop reason: HOSPADM

## 2020-06-03 RX ORDER — THIAMINE HYDROCHLORIDE 100 MG/ML
200 INJECTION, SOLUTION INTRAMUSCULAR; INTRAVENOUS
Status: COMPLETED | OUTPATIENT
Start: 2020-06-03 | End: 2020-06-03

## 2020-06-03 RX ORDER — LORAZEPAM 2 MG/ML
3 INJECTION INTRAMUSCULAR
Status: DISCONTINUED | OUTPATIENT
Start: 2020-06-03 | End: 2020-06-06 | Stop reason: HOSPADM

## 2020-06-03 RX ORDER — ONDANSETRON 2 MG/ML
4 INJECTION INTRAMUSCULAR; INTRAVENOUS
Status: COMPLETED | OUTPATIENT
Start: 2020-06-03 | End: 2020-06-03

## 2020-06-03 RX ORDER — DIAZEPAM 5 MG/1
10 TABLET ORAL 3 TIMES DAILY
Status: DISCONTINUED | OUTPATIENT
Start: 2020-06-03 | End: 2020-06-06 | Stop reason: HOSPADM

## 2020-06-03 RX ORDER — NIFEDIPINE 60 MG/1
60 TABLET, EXTENDED RELEASE ORAL DAILY
Status: DISCONTINUED | OUTPATIENT
Start: 2020-06-03 | End: 2020-06-06 | Stop reason: HOSPADM

## 2020-06-03 RX ORDER — FAMOTIDINE 20 MG/1
20 TABLET, FILM COATED ORAL 2 TIMES DAILY
Status: DISCONTINUED | OUTPATIENT
Start: 2020-06-03 | End: 2020-06-06 | Stop reason: HOSPADM

## 2020-06-03 RX ORDER — FOLIC ACID 1 MG/1
1 TABLET ORAL DAILY
Status: DISCONTINUED | OUTPATIENT
Start: 2020-06-03 | End: 2020-06-06 | Stop reason: HOSPADM

## 2020-06-03 RX ADMIN — SODIUM CHLORIDE 100 ML/HR: 900 INJECTION, SOLUTION INTRAVENOUS at 12:33

## 2020-06-03 RX ADMIN — LORAZEPAM 1 MG: 1 TABLET ORAL at 16:43

## 2020-06-03 RX ADMIN — MAGNESIUM SULFATE HEPTAHYDRATE 2 G: 40 INJECTION, SOLUTION INTRAVENOUS at 04:15

## 2020-06-03 RX ADMIN — LORAZEPAM 2 MG: 2 INJECTION, SOLUTION INTRAMUSCULAR; INTRAVENOUS at 06:18

## 2020-06-03 RX ADMIN — LORAZEPAM 1 MG: 2 INJECTION, SOLUTION INTRAMUSCULAR; INTRAVENOUS at 23:18

## 2020-06-03 RX ADMIN — FAMOTIDINE 20 MG: 20 TABLET ORAL at 09:18

## 2020-06-03 RX ADMIN — SODIUM CHLORIDE 1000 ML: 900 INJECTION, SOLUTION INTRAVENOUS at 12:31

## 2020-06-03 RX ADMIN — LORAZEPAM 1 MG: 1 TABLET ORAL at 10:51

## 2020-06-03 RX ADMIN — CARVEDILOL 12.5 MG: 12.5 TABLET, FILM COATED ORAL at 17:16

## 2020-06-03 RX ADMIN — FAMOTIDINE 20 MG: 10 INJECTION, SOLUTION INTRAVENOUS at 03:48

## 2020-06-03 RX ADMIN — SODIUM CHLORIDE 100 ML/HR: 900 INJECTION, SOLUTION INTRAVENOUS at 22:56

## 2020-06-03 RX ADMIN — FOLIC ACID 1 MG: 1 TABLET ORAL at 09:18

## 2020-06-03 RX ADMIN — Medication 10 ML: at 22:00

## 2020-06-03 RX ADMIN — LORAZEPAM 1 MG: 1 TABLET ORAL at 18:50

## 2020-06-03 RX ADMIN — MULTIVITAMIN TABLET 1 TABLET: TABLET at 09:18

## 2020-06-03 RX ADMIN — FOLIC ACID: 5 INJECTION, SOLUTION INTRAMUSCULAR; INTRAVENOUS; SUBCUTANEOUS at 09:19

## 2020-06-03 RX ADMIN — SODIUM CHLORIDE 100 ML/HR: 900 INJECTION, SOLUTION INTRAVENOUS at 05:41

## 2020-06-03 RX ADMIN — LEVETIRACETAM 1000 MG: 100 INJECTION INTRAVENOUS at 04:16

## 2020-06-03 RX ADMIN — LORAZEPAM 2 MG: 2 INJECTION, SOLUTION INTRAMUSCULAR; INTRAVENOUS at 04:47

## 2020-06-03 RX ADMIN — THIAMINE HYDROCHLORIDE 200 MG: 100 INJECTION, SOLUTION INTRAMUSCULAR; INTRAVENOUS at 03:57

## 2020-06-03 RX ADMIN — LORAZEPAM 1 MG: 1 TABLET ORAL at 12:29

## 2020-06-03 RX ADMIN — LORAZEPAM 1 MG: 2 INJECTION, SOLUTION INTRAMUSCULAR; INTRAVENOUS at 19:02

## 2020-06-03 RX ADMIN — DULOXETINE HYDROCHLORIDE 60 MG: 30 CAPSULE, DELAYED RELEASE ORAL at 09:19

## 2020-06-03 RX ADMIN — LORAZEPAM 1 MG: 1 TABLET ORAL at 14:40

## 2020-06-03 RX ADMIN — DIAZEPAM 10 MG: 5 TABLET ORAL at 08:30

## 2020-06-03 RX ADMIN — NIFEDIPINE 60 MG: 60 TABLET, FILM COATED, EXTENDED RELEASE ORAL at 09:19

## 2020-06-03 RX ADMIN — METOPROLOL TARTRATE 25 MG: 25 TABLET, FILM COATED ORAL at 13:06

## 2020-06-03 RX ADMIN — DIAZEPAM 10 MG: 5 TABLET ORAL at 16:12

## 2020-06-03 RX ADMIN — LORAZEPAM 1 MG: 2 INJECTION, SOLUTION INTRAMUSCULAR; INTRAVENOUS at 13:08

## 2020-06-03 RX ADMIN — ATORVASTATIN CALCIUM 40 MG: 20 TABLET, FILM COATED ORAL at 08:30

## 2020-06-03 RX ADMIN — RIVAROXABAN 20 MG: 20 TABLET, FILM COATED ORAL at 08:30

## 2020-06-03 RX ADMIN — Medication 100 MG: at 09:18

## 2020-06-03 RX ADMIN — DIAZEPAM 10 MG: 5 TABLET ORAL at 21:15

## 2020-06-03 RX ADMIN — ONDANSETRON 4 MG: 2 INJECTION INTRAMUSCULAR; INTRAVENOUS at 03:48

## 2020-06-03 RX ADMIN — LORAZEPAM 2 MG: 2 INJECTION, SOLUTION INTRAMUSCULAR; INTRAVENOUS at 03:48

## 2020-06-03 RX ADMIN — BUSPIRONE HYDROCHLORIDE 30 MG: 10 TABLET ORAL at 08:30

## 2020-06-03 RX ADMIN — SODIUM CHLORIDE 1000 ML: 900 INJECTION, SOLUTION INTRAVENOUS at 04:15

## 2020-06-03 RX ADMIN — LORAZEPAM 2 MG: 2 INJECTION, SOLUTION INTRAMUSCULAR; INTRAVENOUS at 20:11

## 2020-06-03 RX ADMIN — CARVEDILOL 12.5 MG: 12.5 TABLET, FILM COATED ORAL at 08:30

## 2020-06-03 RX ADMIN — FAMOTIDINE 20 MG: 20 TABLET ORAL at 17:16

## 2020-06-03 RX ADMIN — Medication 10 ML: at 06:00

## 2020-06-03 RX ADMIN — LEVETIRACETAM 1000 MG: 100 INJECTION INTRAVENOUS at 16:14

## 2020-06-03 NOTE — H&P
Medicine History and Physical    Patient: Spencer Vásquez   Age:  49 y.o. Assessment   Active Problems:    Delirium tremens ([facility] [address] (6/3/2020)          Plan     1)  DTs   - scheduled valium   - CIWA   - IVF   - Vitamins   - tele monitor    2)  Hx DVT   - restart xarelto    3)  HTN   - home meds    DISPO  -Pt to be admitted  at this time for reasons addressed above, continued hospitalization for ongoing assessment and treatment indicated     Anticipated Date of Discharge: >2 days  Anticipated Disposition (home, SNF) : home    Chief Complaint:   Chief Complaint   Patient presents with    Delirium Tremens (DTS)         HPI:   Spencer Vásquez is a 49y.o. year old male who presents with DTs. Patient is apparently a regular for alcohol withdrawal at [facility] having been released about a week ago. He was smoking near the hospital today and was noted to be \"shaking all over\". It has been <1 day since last drink and he had an alcohol level on labs today. On previous admissions he has required intubation for this. He is tachycardic hypertensive and tremulous in ED. Review of Systems - positive responses in bold type   Constitutional: Negative for fever, chills, diaphoresis and unexpected weight change. HENT: Negative for ear pain, congestion, sore throat, rhinorrhea, drooling, trouble swallowing, neck pain and tinnitus. Eyes: Negative for photophobia, pain, redness and visual disturbance. Respiratory: negative for shortness of breath, cough, choking, chest tightness, wheezing or stridor. Cardiovascular: Negative for chest pain, palpitations and leg swelling. Gastrointestinal: Negative for nausea, vomiting, abdominal pain, diarrhea, constipation, blood in stool, abdominal distention and anal bleeding. Genitourinary: Negative for dysuria, urgency, frequency, hematuria, flank pain and difficulty urinating. Musculoskeletal: Negative for back pain and arthralgias.    Skin: Negative for color change, rash and wound. Neurological: Negative for dizziness, seizures, syncope, speech difficulty, light-headedness or headaches. Hematological: Does not bruise/bleed easily. Psychiatric/Behavioral: Negative for suicidal ideas, hallucinations, behavioral problems, self-injury or agitation       Past Medical History:  Past Medical History:   Diagnosis Date    Abuse     Anemia NEC     Anxiety     Arthritis     Chronic pain     Colitis     Contact dermatitis and other eczema, due to unspecified cause     Depression     Depression     Headache(784.0)     Heart attack ([address]     Hypercholesterolemia     Hypertension     Liver disease     Low back pain     with left leg pain -- multilevel spondylosis and L4 radiculitis    Neck pain     mild spondylosis    Other ill-defined conditions(799.89)     MS symptoms    Pancreatitis     Psychotic disorder ([address]     Trauma        Past Surgical History:  Past Surgical History:   Procedure Laterality Date    HX CYST REMOVAL         Family History:  Family History   Problem Relation Age of Onset    Psychiatric Disorder Mother     Heart Disease Mother     Arthritis-osteo Father     Alcohol abuse Father     Cancer Father         lung    Elevated Lipids Father     Headache Father     Hypertension Father     Lung Disease Father     Diabetes Father        Social History:  Social History     Socioeconomic History    Marital status:      Spouse name: Not on file    Number of children: Not on file    Years of education: Not on file    Highest education level: Not on file   Tobacco Use    Smoking status: Current Some Day Smoker     Packs/day: 0.50     Years: 20.00     Pack years: 10.00     Types: Cigarettes    Smokeless tobacco: Never Used    Tobacco comment: patient states he vapes daily   Substance and Sexual Activity    Alcohol use:  Yes     Alcohol/week: 0.0 standard drinks     Comment:  ETOH abuse - quit NOV 2018    Drug use: No    Sexual activity: Not Currently     Partners: Female     Birth control/protection: Condom   Social History Narrative    ** Merged History Encounter **            Home Medications:  Prior to Admission medications    Medication Sig Start Date End Date Taking? Authorizing Provider   chlordiazePOXIDE (LIBRIUM) 25 mg capsule Take 1 cap by mouth 4 times daily for 3 days. Then take 1 cap by mouth twice daily for 2 days. Then take 1 cap by mouth once daily for 2 days 1/21/20   Georgana Blizzard, MD   famotidine (PEPCID) 20 mg tablet Take 1 Tab by mouth two (2) times a day. 1/21/20   Georgana Blizzard, MD   NIFEdipine ER (PROCARDIA XL) 30 mg ER tablet TAKE 1 TAB BY MOUTH DAILY. Patient taking differently: 50 mg. 10/1/19   Yokasta Morocho MD   [id number] 0.2 mg/24 hr ptwk  8/24/19   Provider, Historical   fluticasone propionate (FLONASE) 50 mcg/actuation nasal spray 2 Sprays by Both Nostrils route daily. 9/13/19   Henrietta Herrera MD   carvedilol (COREG) 12.5 mg tablet Take 1 Tab by mouth two (2) times a day. Indications: high blood pressure 9/13/19   Tra Jcaobo MD   XARELTO 20 mg tab tablet TAKE 1 TAB BY MOUTH DAILY (WITH BREAKFAST). 6/3/19   Roxie Russell MD   loratadine (CLARITIN) 10 mg tablet TAKE 1 TAB BY MOUTH DAILY AS NEEDED FOR ALLERGIES. 6/3/19   Roxie Russell MD   lidocaine (LIDODERM) 5 % 1 PATCH BY TRANSDERMAL ROUTE EVERY TWENTY-FOUR (24) HOURS. 5/31/19   Roxie Russell MD   atorvastatin (LIPITOR) 40 mg tablet TAKE 1 TAB BY MOUTH DAILY. 5/31/19   Roxie Russell MD   DULoxetine (CYMBALTA) 60 mg capsule Take 60 mg by mouth daily. Provider, Historical   clotrimazole-betamethasone (LOTRISONE) topical cream Apply pea-sized amount bid to affecteed area x 10d 5/20/19   Roxie Russell MD   multivitamin (DAILY-CAYLA) tablet Take 1 Tab by mouth daily. 12/10/18   Roxie Russell MD   thiamine HCL (B-1) 100 mg tablet Take 1 Tab by mouth daily.  12/10/18   Roxie Russell MD   folic acid (FOLVITE) 1 mg tablet Take 1 Tab by mouth daily. 12/10/18   Dorman Spurling, MD   nitroglycerin (NITROSTAT) 0.4 mg SL tablet TAKE 1 TABLET UNDER TOUNGE EVERY 5 MINS AS NEEDED FOR CHEST PAIN. DO NOT EXCEED 3 DOSES 8/13/18   Dean De La O MD   QUEtiapine SR (SEROQUEL XR) 300 mg sr tablet Take 300 mg by mouth nightly. Provider, Historical   busPIRone (BUSPAR) 30 mg tablet Take 1 Tab by mouth daily. Patient taking differently: Take 10 mg by mouth daily. 5/14/18   Carly Salcedo MD       Allergies: Allergies   Allergen Reactions    Amlodipine Other (comments)    Carbamazepine Unknown (comments)     violent    Lisinopril Unknown (comments)    Prednisone Other (comments)     He stated it hypes him up           Physical Exam:     Visit Vitals  BP (!) 143/91   Pulse (!) 114   Temp 98.3 °F (36.8 °C)   Resp 17   Ht 6' 2\" (1.88 m)   Wt 108.9 kg (240 lb)   SpO2 96%   BMI 30.81 kg/m²       Physical Exam:  General appearance: alert, cooperative, no distress, appears stated age, mildly tremulous  Head: Normocephalic, without obvious abnormality, atraumatic  Neck: supple, trachea midline  Lungs: clear to auscultation bilaterally  Heart: regular rate and rhythm, S1, S2 normal, no murmur, click, rub or gallop  Abdomen: soft, non-tender. Bowel sounds normal. No masses,  no organomegaly  Extremities: extremities normal, atraumatic, no cyanosis or edema  Skin: Skin color, texture, turgor normal. No rashes or lesions  Neurologic: Grossly normal, mild tremors    Intake and Output:  Current Shift:  No intake/output data recorded. Last three shifts:  No intake/output data recorded.     Lab/Data Reviewed:  CMP:   Lab Results   Component Value Date/Time     06/03/2020 03:30 AM    K 3.6 06/03/2020 03:30 AM    CL 99 (L) 06/03/2020 03:30 AM    CO2 21 06/03/2020 03:30 AM    AGAP 16 06/03/2020 03:30 AM     (H) 06/03/2020 03:30 AM    BUN 10 06/03/2020 03:30 AM    CREA 0.98 06/03/2020 03:30 AM    GFRAA >60 06/03/2020 03:30 AM    GFRNA >60 06/03/2020 03:30 AM    CA 8.8 06/03/2020 03:30 AM    MG 1.5 (L) 06/03/2020 03:30 AM    ALB 3.8 06/03/2020 03:30 AM    TP 8.4 (H) 06/03/2020 03:30 AM    GLOB 4.6 (H) 06/03/2020 03:30 AM    AGRAT 0.8 06/03/2020 03:30 AM    ALT 49 06/03/2020 03:30 AM     CBC:   Lab Results   Component Value Date/Time    WBC 10.3 06/03/2020 03:30 AM    HGB 15.0 06/03/2020 03:30 AM    HCT 44.2 06/03/2020 03:30 AM     06/03/2020 03:30 AM     All Cardiac Markers in the last 24 hours:   Lab Results   Component Value Date/Time     (H) 06/03/2020 03:30 AM    CKMB 6.3 (H) 06/03/2020 03:30 AM    CKND1 0.7 06/03/2020 03:30 AM    TROIQ 0.07 (H) 06/03/2020 03:30 AM       Melvin Toussaint MD    Eveline 3, 2020

## 2020-06-03 NOTE — PROGRESS NOTES
EPAS    No prior LTSS assessments found for this member.     Bernadette Reynolds RN    Outcomes Manager

## 2020-06-03 NOTE — ED PROVIDER NOTES
Claudetta Perl is a 49 y.o. male with history of alcohol abuse who was recently discharged for alcohol withdrawal syndrome on 28 May with complaints of withdrawal syndromes again tonight. Patient been shaking all over tonight. He states he last drank yesterday. He denies any vomiting. Patient was outside smoking a cigarette and for the hospital and was told to leave and started shaking and was brought in. He has no recent trauma. He denies any current abdominal pain. The history is provided by the patient and medical records.         Past Medical History:   Diagnosis Date    Abuse     Anemia NEC     Anxiety     Arthritis     Chronic pain     Colitis     Contact dermatitis and other eczema, due to unspecified cause     Depression     Depression     Headache(784.0)     Heart attack ([address]     Hypercholesterolemia     Hypertension     Liver disease     Low back pain     with left leg pain -- multilevel spondylosis and L4 radiculitis    Neck pain     mild spondylosis    Other ill-defined conditions(799.89)     MS symptoms    Pancreatitis     Psychotic disorder ([address]     Trauma        Past Surgical History:   Procedure Laterality Date    HX CYST REMOVAL           Family History:   Problem Relation Age of Onset    Psychiatric Disorder Mother     Heart Disease Mother     Arthritis-osteo Father     Alcohol abuse Father     Cancer Father         lung    Elevated Lipids Father     Headache Father     Hypertension Father     Lung Disease Father     Diabetes Father        Social History     Socioeconomic History    Marital status:      Spouse name: Not on file    Number of children: Not on file    Years of education: Not on file    Highest education level: Not on file   Occupational History    Not on file   Social Needs    Financial resource strain: Not on file    Food insecurity     Worry: Not on file     Inability: Not on file    Transportation needs     Medical: Not on file     Non-medical: Not on file   Tobacco Use    Smoking status: Current Some Day Smoker     Packs/day: 0.50     Years: 20.00     Pack years: 10.00     Types: Cigarettes    Smokeless tobacco: Never Used    Tobacco comment: patient states he vapes daily   Substance and Sexual Activity    Alcohol use: Yes     Alcohol/week: 0.0 standard drinks     Comment:  ETOH abuse - quit NOV 2018    Drug use: No    Sexual activity: Not Currently     Partners: Female     Birth control/protection: Condom   Lifestyle    Physical activity     Days per week: Not on file     Minutes per session: Not on file    Stress: Not on file   Relationships    Social connections     Talks on phone: Not on file     Gets together: Not on file     Attends Caodaism service: Not on file     Active member of club or organization: Not on file     Attends meetings of clubs or organizations: Not on file     Relationship status: Not on file    Intimate partner violence     Fear of current or ex partner: Not on file     Emotionally abused: Not on file     Physically abused: Not on file     Forced sexual activity: Not on file   Other Topics Concern    Not on file   Social History Narrative    ** Merged History Encounter **              ALLERGIES: Amlodipine; Carbamazepine; Lisinopril; and Prednisone    Review of Systems   Constitutional: Negative for fever. HENT: Negative for sore throat. Eyes: Negative for visual disturbance. Respiratory: Negative for shortness of breath. Cardiovascular: Negative for chest pain. Gastrointestinal: Negative for abdominal pain. Genitourinary: Negative for difficulty urinating. Musculoskeletal: Positive for gait problem. Skin: Negative for wound. Allergic/Immunologic: Negative for food allergies. Neurological: Positive for seizures. Psychiatric/Behavioral: Positive for sleep disturbance. The patient is nervous/anxious.         Vitals:    06/03/20 0321 06/03/20 0330 06/03/20 0345   BP: (!) 133/96 (!) 166/110    Pulse: (!) 134 (!) 124 (!) 120   Resp: 26 30 (!) 33   Temp: 98.3 °F (36.8 °C)     SpO2: 93% (!) 86% 100%   Weight: 108.9 kg (240 lb)     Height: 6' 2\" (1.88 m)              Physical Exam  Vitals signs and nursing note reviewed. Constitutional:       General: He is in acute distress. Appearance: He is ill-appearing and toxic-appearing. He is not diaphoretic. Comments: Patient shaking in the room   HENT:      Head: Normocephalic and atraumatic. Right Ear: External ear normal.      Left Ear: External ear normal.      Nose: Nose normal.      Mouth/Throat:      Pharynx: No oropharyngeal exudate. Eyes:      Conjunctiva/sclera: Conjunctivae normal.   Neck:      Musculoskeletal: Normal range of motion. Cardiovascular:      Rate and Rhythm: Regular rhythm. Tachycardia present. Heart sounds: Normal heart sounds. Pulmonary:      Effort: Pulmonary effort is normal. No respiratory distress. Breath sounds: Normal breath sounds. Abdominal:      Palpations: Abdomen is soft. There is no mass. Tenderness: There is no abdominal tenderness. Musculoskeletal: Normal range of motion. Right lower leg: No edema. Left lower leg: No edema. Skin:     General: Skin is warm and dry. Capillary Refill: Capillary refill takes less than 2 seconds. Neurological:      General: No focal deficit present. Mental Status: He is alert and oriented to person, place, and time.    Psychiatric:         Behavior: Behavior normal.          MDM       Procedures  Vitals:  Patient Vitals for the past 12 hrs:   Temp Pulse Resp BP SpO2   06/03/20 0345 -- (!) 120 (!) 33 -- 100 %   06/03/20 0330 -- (!) 124 30 (!) 166/110 (!) 86 %   06/03/20 0321 98.3 °F (36.8 °C) (!) 134 26 (!) 133/96 93 %         Medications ordered:   Medications   sodium chloride 0.9 % bolus infusion 1,000 mL (has no administration in time range)   sodium chloride 0.9 % bolus infusion 1,000 mL (1,000 mL IntraVENous New Bag 6/3/20 0418)   0.9% sodium chloride 1,000 mL with mvi, adult no. 4 with vit K 10 mL, thiamine 916 mg, folic acid 1 mg infusion (has no administration in time range)   sodium chloride (NS) flush 5-40 mL (has no administration in time range)   sodium chloride (NS) flush 5-40 mL (has no administration in time range)   LORazepam (ATIVAN) tablet 1 mg (has no administration in time range)     Or   LORazepam (ATIVAN) injection 1 mg (has no administration in time range)   LORazepam (ATIVAN) tablet 2 mg (has no administration in time range)     Or   LORazepam (ATIVAN) injection 2 mg (has no administration in time range)   LORazepam (ATIVAN) injection 3 mg (has no administration in time range)   levETIRAcetam (KEPPRA) 1,000 mg in 0.9% sodium chloride 100 mL IVPB (1,000 mg IntraVENous New Bag 6/3/20 0416)   rivaroxaban (XARELTO) tablet 20 mg (has no administration in time range)   LORazepam (ATIVAN) injection 2 mg (2 mg IntraVENous Given 6/3/20 0348)   ondansetron (ZOFRAN) injection 4 mg (4 mg IntraVENous Given 6/3/20 0348)   thiamine (B-1) injection 200 mg (200 mg IntraMUSCular Given 6/3/20 0357)   famotidine (PF) (PEPCID) injection 20 mg (20 mg IntraVENous Given 6/3/20 0348)   magnesium sulfate 2 g/50 ml IVPB (premix or compounded) (2 g IntraVENous New Bag 6/3/20 041)         Lab findings:  Recent Results (from the past 12 hour(s))   CBC WITH AUTOMATED DIFF    Collection Time: 06/03/20  3:30 AM   Result Value Ref Range    WBC 10.3 4.6 - 13.2 K/uL    RBC 4.91 4.70 - 5.50 M/uL    HGB 15.0 13.0 - 16.0 g/dL    HCT 44.2 36.0 - 48.0 %    MCV 90.0 74.0 - 97.0 FL    MCH 30.5 24.0 - 34.0 PG    MCHC 33.9 31.0 - 37.0 g/dL    RDW 15.2 (H) 11.6 - 14.5 %    PLATELET 406 803 - 645 K/uL    MPV 11.1 9.2 - 11.8 FL    NEUTROPHILS 61 40 - 73 %    LYMPHOCYTES 24 21 - 52 %    MONOCYTES 14 (H) 3 - 10 %    EOSINOPHILS 0 0 - 5 %    BASOPHILS 1 0 - 2 %    ABS. NEUTROPHILS 6.4 1.8 - 8.0 K/UL    ABS. LYMPHOCYTES 2.4 0.9 - 3.6 K/UL    ABS. MONOCYTES 1.4 (H) 0.05 - 1.2 K/UL    ABS. EOSINOPHILS 0.0 0.0 - 0.4 K/UL    ABS. BASOPHILS 0.1 0.0 - 0.1 K/UL    DF AUTOMATED     METABOLIC PANEL, COMPREHENSIVE    Collection Time: 06/03/20  3:30 AM   Result Value Ref Range    Sodium 136 136 - 145 mmol/L    Potassium 3.6 3.5 - 5.5 mmol/L    Chloride 99 (L) 100 - 111 mmol/L    CO2 21 21 - 32 mmol/L    Anion gap 16 3.0 - 18 mmol/L    Glucose 115 (H) 74 - 99 mg/dL    BUN 10 7.0 - 18 MG/DL    Creatinine 0.98 0.6 - 1.3 MG/DL    BUN/Creatinine ratio 10 (L) 12 - 20      GFR est AA >60 >60 ml/min/1.73m2    GFR est non-AA >60 >60 ml/min/1.73m2    Calcium 8.8 8.5 - 10.1 MG/DL    Bilirubin, total 0.7 0.2 - 1.0 MG/DL    ALT (SGPT) 49 16 - 61 U/L    AST (SGOT) 52 (H) 10 - 38 U/L    Alk.  phosphatase 119 (H) 45 - 117 U/L    Protein, total 8.4 (H) 6.4 - 8.2 g/dL    Albumin 3.8 3.4 - 5.0 g/dL    Globulin 4.6 (H) 2.0 - 4.0 g/dL    A-G Ratio 0.8 0.8 - 1.7     ETHYL ALCOHOL    Collection Time: 06/03/20  3:30 AM   Result Value Ref Range    ALCOHOL(ETHYL),SERUM 209 (H) 0 - 3 MG/DL   MAGNESIUM    Collection Time: 06/03/20  3:30 AM   Result Value Ref Range    Magnesium 1.5 (L) 1.6 - 2.6 mg/dL   SALICYLATE    Collection Time: 06/03/20  3:30 AM   Result Value Ref Range    Salicylate level 1.8 (L) 2.8 - 20.0 MG/DL   ACETAMINOPHEN    Collection Time: 06/03/20  3:30 AM   Result Value Ref Range    Acetaminophen level <2 (L) 10.0 - 30.0 ug/mL   CARDIAC PANEL,(CK, CKMB & TROPONIN)    Collection Time: 06/03/20  3:30 AM   Result Value Ref Range    CK - MB 6.3 (H) <3.6 ng/ml    CK-MB Index 0.7 0.0 - 4.0 %     (H) 39 - 308 U/L    Troponin-I, QT 0.07 (H) 0.0 - 0.045 NG/ML   LIPASE    Collection Time: 06/03/20  3:30 AM   Result Value Ref Range    Lipase 33 (L) 73 - 393 U/L   PROTHROMBIN TIME + INR    Collection Time: 06/03/20  3:30 AM   Result Value Ref Range    Prothrombin time 13.0 11.5 - 15.2 sec    INR 1.0 0.8 - 1.2     EKG, 12 LEAD, INITIAL    Collection Time: 06/03/20  3:59 AM   Result Value Ref Range    Ventricular Rate 119 BPM    Atrial Rate 119 BPM    P-R Interval 148 ms    QRS Duration 68 ms    Q-T Interval 322 ms    QTC Calculation (Bezet) 452 ms    Calculated P Axis 59 degrees    Calculated R Axis 34 degrees    Calculated T Axis 67 degrees    Diagnosis       Sinus tachycardia  Otherwise normal ECG  When compared with ECG of 12-MAR-2019 13:40,  No significant change was found     POC G3    Collection Time: 06/03/20  4:10 AM   Result Value Ref Range    Device: NASAL CANNULA      Flow rate (POC) 4 L/M    FIO2 (POC) 36 %    pH (POC) 7.441 7.35 - 7.45      pCO2 (POC) 30.0 (L) 35.0 - 45.0 MMHG    pO2 (POC) 102 (H) 80 - 100 MMHG    HCO3 (POC) 20.4 (L) 22 - 26 MMOL/L    sO2 (POC) 98 (H) 92 - 97 %    Base deficit (POC) 4 mmol/L    Allens test (POC) YES      Total resp. rate 20      Site RIGHT RADIAL      Patient temp. 98.3      Specimen type (POC) ARTERIAL      Performed by Kermit Benavides        EKG interpretation by ED Physician:  Sinus tach with no acute ST or T wave changes  Rate 119 QRS 68   Similar in appearance to other EKGs    Cardiac monitor: Tachycardia with regular rhythm and no ectopy  Pulse ox: 92% room air, low    X-Ray, CT or other radiology findings or impressions:  XR CHEST PORT    (Results Pending)   No acute process per my interpretation    Progress notes, Consult notes or additional Procedure notes:   Patient with history of severe alcohol withdrawal and multiple admissions for same to include intubation.   Patient is very high risk for recurrence and severe complication from alcohol withdrawal    Discussed with Dr. Neetu Samuels on-call for the hospitalist who will admit    ED Critical Care Note    System at risk for life threatening failure: Neuro, cardiac, pulmonary, renal  Associated problems: Alcohol withdrawal, tachycardia, hypo-mag, elevated troponin    Critical Care services provided: Bedside management of alcohol withdrawal syndrome, tachycardia, documentation, consultation, bedside reassessment  Excluded procedures (time not included in critical care): EKG interpretation    Total Critical Care Time (in minutes) 68          Reevaluation of patient:   stable    Disposition:  Diagnosis:   1. Acute alcohol abuse, with delirium ([facility]    2. [facility]    3. Hypomagnesemia    4. Non-traumatic rhabdomyolysis        Disposition: admit    Follow-up Information    None           Patient's Medications   Start Taking    No medications on file   Continue Taking    ATORVASTATIN (LIPITOR) 40 MG TABLET    TAKE 1 TAB BY MOUTH DAILY. BUSPIRONE (BUSPAR) 30 MG TABLET    Take 1 Tab by mouth daily. CARVEDILOL (COREG) 12.5 MG TABLET    Take 1 Tab by mouth two (2) times a day. Indications: high blood pressure    CATAPRES-TTS-2 0.2 MG/24 HR PTWK        CHLORDIAZEPOXIDE (LIBRIUM) 25 MG CAPSULE    Take 1 cap by mouth 4 times daily for 3 days. Then take 1 cap by mouth twice daily for 2 days. Then take 1 cap by mouth once daily for 2 days    CLOTRIMAZOLE-BETAMETHASONE (LOTRISONE) TOPICAL CREAM    Apply pea-sized amount bid to affecteed area x 10d    DULOXETINE (CYMBALTA) 60 MG CAPSULE    Take 60 mg by mouth daily. FAMOTIDINE (PEPCID) 20 MG TABLET    Take 1 Tab by mouth two (2) times a day. FLUTICASONE PROPIONATE (FLONASE) 50 MCG/ACTUATION NASAL SPRAY    2 Sprays by Both Nostrils route daily. FOLIC ACID (FOLVITE) 1 MG TABLET    Take 1 Tab by mouth daily. LIDOCAINE (LIDODERM) 5 %    1 PATCH BY TRANSDERMAL ROUTE EVERY TWENTY-FOUR (24) HOURS. LORATADINE (CLARITIN) 10 MG TABLET    TAKE 1 TAB BY MOUTH DAILY AS NEEDED FOR ALLERGIES. MULTIVITAMIN (DAILY-CAYLA) TABLET    Take 1 Tab by mouth daily. NIFEDIPINE ER (PROCARDIA XL) 30 MG ER TABLET    TAKE 1 TAB BY MOUTH DAILY. NITROGLYCERIN (NITROSTAT) 0.4 MG SL TABLET    TAKE 1 TABLET UNDER TOUNGE EVERY 5 MINS AS NEEDED FOR CHEST PAIN.  DO NOT EXCEED 3 DOSES    QUETIAPINE SR (SEROQUEL XR) 300 MG SR TABLET Take 300 mg by mouth nightly. THIAMINE HCL (B-1) 100 MG TABLET    Take 1 Tab by mouth daily. XARELTO 20 MG TAB TABLET    TAKE 1 TAB BY MOUTH DAILY (WITH BREAKFAST).    These Medications have changed    No medications on file   Stop Taking    No medications on file

## 2020-06-03 NOTE — PROGRESS NOTES
Problem: Falls - Risk of  Goal: *Absence of Falls  Description: Document Gray Flow Fall Risk and appropriate interventions in the flowsheet.   Outcome: Progressing Towards Goal  Note: Fall Risk Interventions:  Mobility Interventions: Assess mobility with egress test, Patient to call before getting OOB         Medication Interventions: Patient to call before getting OOB, Teach patient to arise slowly, Bed/chair exit alarm    Elimination Interventions: Call light in reach, Urinal in reach, Patient to call for help with toileting needs, Bed/chair exit alarm              Problem: Patient Education: Go to Patient Education Activity  Goal: Patient/Family Education  Outcome: Progressing Towards Goal     Problem: Pain  Goal: *Control of Pain  Outcome: Progressing Towards Goal     Problem: Alcohol Withdrawal  Goal: *STG: Participates in treatment plan  Outcome: Progressing Towards Goal  Goal: *STG: Remains safe in hospital  Outcome: Progressing Towards Goal  Goal: *STG: Seeks staff when symptoms of withdrawal increase  Outcome: Progressing Towards Goal  Goal: *STG: Complies with medication therapy  Outcome: Progressing Towards Goal  Goal: *STG: Attends activities and groups  Outcome: Progressing Towards Goal  Goal: *STG: Will identify negative impact of chemical dependency including the use of tobacco, alcohol, and other substances  Outcome: Progressing Towards Goal  Goal: *STG: Verbalizes abstinence as an achievable goal  Outcome: Progressing Towards Goal  Goal: *STG: Agrees to participate in outpatient after care program to support ongoing mental health  Outcome: Progressing Towards Goal  Goal: *STG: Able to indentify relapse triggers including interpersonal/social and familial factors  Outcome: Progressing Towards Goal  Goal: *STG: Identify lifestyle changes to support long term sobriety such as vocation, employment, education, and legal issues  Outcome: Progressing Towards Goal  Goal: *STG: Maintains appropriate nutrition and hydration  Outcome: Progressing Towards Goal  Goal: *STG: Vital signs within defined limits  Outcome: Progressing Towards Goal  Goal: *STG/LTG: Relapse prevention plan in place to include housing/aftercare, leisure activities, and spirituality  Outcome: Progressing Towards Goal  Goal: Interventions  Outcome: Progressing Towards Goal     Problem: Hypertension  Goal: *Blood pressure within specified parameters  Outcome: Progressing Towards Goal  Goal: *Fluid volume balance  Outcome: Progressing Towards Goal  Goal: *Labs within defined limits  Outcome: Progressing Towards Goal

## 2020-06-03 NOTE — PROGRESS NOTES
Admitted this am with DT  CO bad shaking and rotator cuff pains    Oe   He is shaking both UL  Visit Vitals  BP (!) 168/98 (BP 1 Location: Left arm, BP Patient Position: At rest)   Pulse 96   Temp 98.4 °F (36.9 °C)   Resp 16   Ht 6' 2\" (1.88 m)   Wt 108.9 kg (240 lb)   SpO2 94%   BMI 30.81 kg/m²       Plan for another one dose iv ativan and po lopressor for high BP. Continue to follow.

## 2020-06-03 NOTE — ROUTINE PROCESS
Bedside shift report received from [facility] with sbar  Suction placed at bedside  awake and eating breakfast  Ativan 1 mg po  Patient resting  Ativan given po  1235 paged Dr. Maria L Simms, Dr. Mekhi Guillermo here to see patient  Ativan 1 mg iv given per order  Patient sleeping  1440 ativan 1mg po  Tolerated diet well, no nausea  Ativan given po  Patient ciwa, tremors increased spoke with Dr. Mekhi Guillermo okay to give dose if  Ativan 1 mg.   Ativan given iv  Bedside shift report given to [facility] with sbar

## 2020-06-03 NOTE — ROUTINE PROCESS
1077  TRANSFER - IN REPORT:    Verbal report received from Dallas Ybarra RN on Bernabe Magallon  being received from ED for routine progression of care      Report consisted of patients Situation, Background, Assessment and   Recommendations(SBAR). Information from the following report(s) SBAR, Kardex, ED Summary, Intake/Output, MAR, Recent Results and Cardiac Rhythm Sinus Tach was reviewed with the receiving nurse. Opportunity for questions and clarification was provided. Assessment completed upon patients arrival to unit and care assumed. Skin assessment completed with Traci Glover RN. Noted multiple small scars on body and legs. Noted blisters bilaterally on the outer aspect of great toes. Blister on right foot is open. Blister on left foot is not completely intact. No noted open areas. Noted dried blood from penis after straight cath in ED. Patient bathed, new gown, and telemetry applied. Noted SPO2 of 88% on room air. Oxygen at 2 L via nasal cannula. CIWA score of 18. Ativan 2 mg administered intravenously as ordered at 0618. Patient sleeping peacefully. Urinal at bedside. In low bed with side rails times three and bed alarm set. Call bell within reach. 0630 CIWA score 17. No ativan administered at this time. Patient cannot do serial math, does not know what day it is. Able to state name, date of birth, and who is the president. 0720  Bedside and Verbal shift change report given to Joseph Page RN (oncoming nurse) by Hari Skaggs RN, BSN (offgoing nurse). Report included the following information SBAR, Kardex, ED Summary, Intake/Output, MAR, Recent Results and Cardiac Rhythm Sinus Tach.      Hari Skaggs RN, BSN

## 2020-06-03 NOTE — PROGRESS NOTES
conducted an initial consultation and Spiritual Assessment for Joi Weston, who is a 49 y. o.,male. Patients Primary Language is: [place]. According to the patients EMR Amish Affiliation is: [place]. The reason the Patient came to the hospital is:   Patient Active Problem List    Diagnosis Date Noted    Bipolar 1 disorder, depressed, severe ([facility] 02/08/2014     Priority: 1 - One    Delirium tremens ([facility] [facility] 06/03/2020    Localized edema 09/13/2019    Alcohol withdrawal seizure ([facility] 03/12/2019    [facility]) [facility] 03/12/2019    Adenomyomatosis of gallbladder 08/15/2018    Hepatic steatosis 08/15/2018    Marijuana use 06/04/2018    Recurrent pulmonary emboli ([facility] 06/04/2018    Eczema 01/17/2018    History of non-ST elevation myocardial infarction (NSTEMI) 09/22/2017    Hx pulmonary embolism 09/22/2017    History of CVA (cerebrovascular accident) 09/22/2017    Coronary artery disease involving native coronary artery of native heart without angina pectoris 08/31/2017    Chronic left-sided low back pain with left-sided sciatica 07/31/2017    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 Listened empathically as patient shared his story though it seemed he was somewhat confused and unsure of particular things. Patient asked for a pair of reading glasses which may not be available. Patient has shaking hands and was just lying back in the bed as the nurse came to start some medication for  Him. Patient was informed of our video visit possibilities with family, if he was interested.   Provided information about Spiritual Care Services. Offered prayer and assurance of continued prayers on patients behalf. The following outcomes were achieved:  Patient shared limited information about his.medical narrative and spiritual journey/beliefs. Patient processed feeling about current hospitalization. Patient expressed gratitude for pastoral care visit. Assessment:  Patient does not have any Faith/cultural needs that will affect patients preferences in health care. There are no further spiritual or Faith issues which require Spiritual Care Services interventions at this time. Plan:  Chaplains will continue to follow and will provide pastoral care on an as needed/requested basis    . Keegan Saini   Spiritual Care   [phone]

## 2020-06-03 NOTE — PROGRESS NOTES
Problem: Discharge Planning  Goal: *Discharge to safe environment  Outcome: Resolved/Met   Plan home    Reason for Admission:   Tremors, sz etoh withdrawal                  RUR Score:   18%               PCP: First and Last name:  Mary Yao    Name of Practice:    Are you a current patient: Yes/No:  yes   Approximate date of last visit:    Can you participate in a virtual visit if needed:  3mo ago    Do you (patient/family) have any concerns for transition/discharge? Plan for utilizing home health:   no    Current Advanced Directive/Advance Care Plan:  None, has no one to appoint            Transition of Care Plan:   Spoke with pt. A&O x3. Lives alone. Demographics correct states phone not working. Has no one to designate, states has no family. States mu chacon is cm with csb does not want her designated for dcp. He is independent with adls, ambulates without a device. States no dme. Will need marc cab ride home. Patient has designated ______none__________________ to participate in his/her discharge plan and to receive any needed information. Name:   Address:  Phone number:       Care Management Interventions  PCP Verified by CM: Yes  Palliative Care Criteria Met (RRAT>21 & CHF Dx)?: No  Mode of Transport at Discharge:  Other (see comment)  Transition of Care Consult (CM Consult): Discharge Planning  Discharge Durable Medical Equipment: No  Physical Therapy Consult: No  Occupational Therapy Consult: No  Current Support Network: Lives Alone  Confirm Follow Up Transport: Cab  The Patient and/or Patient Representative was Provided with a Choice of Provider and Agrees with the Discharge Plan?: No  Freedom of Choice List was Provided with Basic Dialogue that Supports the Patient's Individualized Plan of Care/Goals, Treatment Preferences and Shares the Quality Data Associated with the Providers?: No  Discharge Location  Discharge Placement: Home

## 2020-06-04 LAB
ANION GAP SERPL CALC-SCNC: 6 MMOL/L (ref 3–18)
BASOPHILS # BLD: 0.1 K/UL (ref 0–0.1)
BASOPHILS NFR BLD: 1 % (ref 0–2)
BUN SERPL-MCNC: 7 MG/DL (ref 7–18)
BUN/CREAT SERPL: 10 (ref 12–20)
CALCIUM SERPL-MCNC: 8.3 MG/DL (ref 8.5–10.1)
CHLORIDE SERPL-SCNC: 104 MMOL/L (ref 100–111)
CO2 SERPL-SCNC: 27 MMOL/L (ref 21–32)
CREAT SERPL-MCNC: 0.68 MG/DL (ref 0.6–1.3)
DIFFERENTIAL METHOD BLD: ABNORMAL
EOSINOPHIL # BLD: 0.3 K/UL (ref 0–0.4)
EOSINOPHIL NFR BLD: 6 % (ref 0–5)
ERYTHROCYTE [DISTWIDTH] IN BLOOD BY AUTOMATED COUNT: 15 % (ref 11.6–14.5)
GLUCOSE SERPL-MCNC: 98 MG/DL (ref 74–99)
HCT VFR BLD AUTO: 40.5 % (ref 36–48)
HGB BLD-MCNC: 13.1 G/DL (ref 13–16)
INR PPP: 1.1 (ref 0.8–1.2)
LYMPHOCYTES # BLD: 1.4 K/UL (ref 0.9–3.6)
LYMPHOCYTES NFR BLD: 32 % (ref 21–52)
MAGNESIUM SERPL-MCNC: 1.9 MG/DL (ref 1.6–2.6)
MCH RBC QN AUTO: 29.4 PG (ref 24–34)
MCHC RBC AUTO-ENTMCNC: 32.3 G/DL (ref 31–37)
MCV RBC AUTO: 91 FL (ref 74–97)
MONOCYTES # BLD: 0.5 K/UL (ref 0.05–1.2)
MONOCYTES NFR BLD: 12 % (ref 3–10)
NEUTS SEG # BLD: 2.1 K/UL (ref 1.8–8)
NEUTS SEG NFR BLD: 49 % (ref 40–73)
PHOSPHATE SERPL-MCNC: 3.1 MG/DL (ref 2.5–4.9)
PLATELET # BLD AUTO: 175 K/UL (ref 135–420)
PMV BLD AUTO: 11.3 FL (ref 9.2–11.8)
POTASSIUM SERPL-SCNC: 3.6 MMOL/L (ref 3.5–5.5)
PROTHROMBIN TIME: 13.5 SEC (ref 11.5–15.2)
RBC # BLD AUTO: 4.45 M/UL (ref 4.7–5.5)
SODIUM SERPL-SCNC: 137 MMOL/L (ref 136–145)
TROPONIN I SERPL-MCNC: 0.02 NG/ML (ref 0–0.04)
WBC # BLD AUTO: 4.4 K/UL (ref 4.6–13.2)

## 2020-06-04 PROCEDURE — 85610 PROTHROMBIN TIME: CPT

## 2020-06-04 PROCEDURE — 80048 BASIC METABOLIC PNL TOTAL CA: CPT

## 2020-06-04 PROCEDURE — 74011250637 HC RX REV CODE- 250/637: Performed by: HOSPITALIST

## 2020-06-04 PROCEDURE — 84100 ASSAY OF PHOSPHORUS: CPT

## 2020-06-04 PROCEDURE — 74011250636 HC RX REV CODE- 250/636: Performed by: HOSPITALIST

## 2020-06-04 PROCEDURE — 83735 ASSAY OF MAGNESIUM: CPT

## 2020-06-04 PROCEDURE — 84484 ASSAY OF TROPONIN QUANT: CPT

## 2020-06-04 PROCEDURE — 65660000000 HC RM CCU STEPDOWN

## 2020-06-04 PROCEDURE — 36415 COLL VENOUS BLD VENIPUNCTURE: CPT

## 2020-06-04 PROCEDURE — 74011000258 HC RX REV CODE- 258: Performed by: HOSPITALIST

## 2020-06-04 PROCEDURE — 85025 COMPLETE CBC W/AUTO DIFF WBC: CPT

## 2020-06-04 PROCEDURE — 74011250636 HC RX REV CODE- 250/636: Performed by: INTERNAL MEDICINE

## 2020-06-04 PROCEDURE — 77010033678 HC OXYGEN DAILY

## 2020-06-04 RX ADMIN — FOLIC ACID 1 MG: 1 TABLET ORAL at 09:45

## 2020-06-04 RX ADMIN — LEVETIRACETAM 1000 MG: 100 INJECTION INTRAVENOUS at 04:32

## 2020-06-04 RX ADMIN — SODIUM CHLORIDE 75 ML/HR: 900 INJECTION, SOLUTION INTRAVENOUS at 17:32

## 2020-06-04 RX ADMIN — BUSPIRONE HYDROCHLORIDE 30 MG: 10 TABLET ORAL at 09:45

## 2020-06-04 RX ADMIN — CARVEDILOL 12.5 MG: 12.5 TABLET, FILM COATED ORAL at 17:32

## 2020-06-04 RX ADMIN — LORAZEPAM 1 MG: 1 TABLET ORAL at 06:38

## 2020-06-04 RX ADMIN — ACETAMINOPHEN 650 MG: 325 TABLET, FILM COATED ORAL at 20:22

## 2020-06-04 RX ADMIN — NIFEDIPINE 60 MG: 60 TABLET, FILM COATED, EXTENDED RELEASE ORAL at 09:45

## 2020-06-04 RX ADMIN — FAMOTIDINE 20 MG: 20 TABLET ORAL at 17:32

## 2020-06-04 RX ADMIN — FAMOTIDINE 20 MG: 20 TABLET ORAL at 09:45

## 2020-06-04 RX ADMIN — SODIUM CHLORIDE 100 ML/HR: 900 INJECTION, SOLUTION INTRAVENOUS at 07:36

## 2020-06-04 RX ADMIN — RIVAROXABAN 20 MG: 20 TABLET, FILM COATED ORAL at 09:45

## 2020-06-04 RX ADMIN — LORAZEPAM 1 MG: 2 INJECTION, SOLUTION INTRAMUSCULAR; INTRAVENOUS at 07:45

## 2020-06-04 RX ADMIN — DIAZEPAM 10 MG: 5 TABLET ORAL at 21:04

## 2020-06-04 RX ADMIN — DIAZEPAM 10 MG: 5 TABLET ORAL at 17:00

## 2020-06-04 RX ADMIN — DULOXETINE HYDROCHLORIDE 60 MG: 30 CAPSULE, DELAYED RELEASE ORAL at 09:45

## 2020-06-04 RX ADMIN — CARVEDILOL 12.5 MG: 12.5 TABLET, FILM COATED ORAL at 09:45

## 2020-06-04 RX ADMIN — DIAZEPAM 10 MG: 5 TABLET ORAL at 09:45

## 2020-06-04 RX ADMIN — Medication 100 MG: at 09:46

## 2020-06-04 RX ADMIN — MULTIVITAMIN TABLET 1 TABLET: TABLET at 09:45

## 2020-06-04 RX ADMIN — Medication 10 ML: at 05:35

## 2020-06-04 RX ADMIN — Medication 10 ML: at 21:05

## 2020-06-04 RX ADMIN — LORAZEPAM 1 MG: 1 TABLET ORAL at 14:45

## 2020-06-04 RX ADMIN — ATORVASTATIN CALCIUM 40 MG: 20 TABLET, FILM COATED ORAL at 09:45

## 2020-06-04 NOTE — PROGRESS NOTES
Internal Medicine Progress Note        NAME: Rno Berger   :  1965  MRM:  [id number]    Date/Time: 2020        ASSESSMENT/PLAN:    Active Problems:    Delirium tremens ([address] (6/3/2020)      #  DTs   - scheduled valium  - CIWA . IVF -  vitamin . Telemetry bed. Patient indicate he drinks half gallon of vodka every day for years, since he broke up with his woman. His ongoing shaking I do not feel is only due to his DT, but also probably due to cerebellar lesion or alcoholic damage to his central nervous system. I will request MRI. He had this shaking for a long time and at once treated with beta-blocker. He is already on Coreg. # History of DVT. Continue Xarelto    # HTN. Control blood pressure. Started on Lopressor. # for discharge planning. Lab Review:     Recent Labs     205 20  0330   WBC 4.4* 10.3   HGB 13.1 15.0   HCT 40.5 44.2    263     Recent Labs     205 20  0330    136   K 3.6 3.6    99*   CO2 27 21   GLU 98 115*   BUN 7 10   CREA 0.68 0.98   CA 8.3* 8.8   MG 1.9 1.5*   PHOS 3.1  --    ALB  --  3.8   TBILI  --  0.7   ALT  --  49   INR 1.1 1.0     No results found for: GLUCPOC  No results for input(s): PH, PCO2, PO2, HCO3, FIO2 in the last 72 hours. Recent Labs     205 20  0330   INR 1.1 1.0       No results found for: SDES  No results found for: CULT    All Cardiac Markers in the last 24 hours: No results found for: CPK, CK, CKMMB, CKMB, RCK3, CKMBT, CKNDX, CKND1, ROHIT, TROPT, TROIQ, JORGE, TROPT, TNIPOC, BNP, BNPP        Intervals noted   Pt s/e @ bedside     Subjective:     Chief Complaint:      Still shaking.    nurses report CIWA score at night    ROS:  (bold if positive,otherwise negative)    Fever/chills ,  Dysuria   Cough , Sputum , SOB/ALBA , Chest Pain     Diarrhea ,Nausea/Vomit , Abd Pain , Constipation     Tolerating Diet                Objective:     Vitals:  Last 24hrs VS reviewed since prior progress note. Most recent are:    Visit Vitals  /80   Pulse 70   Temp 98 °F (36.7 °C)   Resp 18   Ht 6' 2\" (1.88 m)   Wt 108.9 kg (240 lb)   SpO2 98%   BMI 30.81 kg/m²     SpO2 Readings from Last 6 Encounters:   06/04/20 98%   01/21/20 95%   09/13/19 95%   09/05/19 94%   08/24/19 99%   07/18/19 96%    O2 Flow Rate (L/min): 2 l/min       Intake/Output Summary (Last 24 hours) at 6/4/2020 1401  Last data filed at 6/4/2020 1200  Gross per 24 hour   Intake 4378.33 ml   Output 4700 ml   Net -321.67 ml          Physical Exam:   Gen: Well-developed, well-nourished, in no acute distress  HEENT: Head atraumatic, normocephalic ,    hearing intact to voice, moist mucous membranes  Neck:  Trachea midline , No apparent JVD, Supple. Resp: No accessory muscle use, Bilateral BS present,clear breath sounds without wheezes rales or rhonchi  Card:    normal S1, S2 without Gallop . No Significant lower leg peripheral edema. Abd:  Soft, non-tender, non-distended, bowel sounds are present . Musc: No cyanosis or clubbing  Skin: No rashes or ulcers, skin turgor is good   Neuro: Shaking of right hand, some truncal shaking at times.   Cranial nerves are grossly intact, no clear area of focal motor weakness, follows commands appropriately  Psych:  Good insight, oriented to person, place and time, alert       Telemetry reviewed:   normal sinus rhythm    Medications Reviewed: (see below)    Lab Data Reviewed: (see below)    ______________________________________________________________________    Medications:     Current Facility-Administered Medications   Medication Dose Route Frequency    sodium chloride (NS) flush 5-40 mL  5-40 mL IntraVENous Q8H    sodium chloride (NS) flush 5-40 mL  5-40 mL IntraVENous PRN    LORazepam (ATIVAN) tablet 1 mg  1 mg Oral Q1H PRN    Or    LORazepam (ATIVAN) injection 1 mg  1 mg IntraVENous Q1H PRN    LORazepam (ATIVAN) tablet 2 mg  2 mg Oral Q1H PRN    Or    LORazepam (ATIVAN) injection 2 mg  2 mg IntraVENous Q1H PRN    LORazepam (ATIVAN) injection 3 mg  3 mg IntraVENous Q15MIN PRN    levETIRAcetam (KEPPRA) 1,000 mg in 0.9% sodium chloride 100 mL IVPB  1,000 mg IntraVENous Q12H    rivaroxaban (XARELTO) tablet 20 mg  20 mg Oral DAILY WITH BREAKFAST    atorvastatin (LIPITOR) tablet 40 mg  40 mg Oral DAILY    carvediloL (COREG) tablet 12.5 mg  12.5 mg Oral BID    busPIRone (BUSPAR) tablet 30 mg  30 mg Oral DAILY    famotidine (PEPCID) tablet 20 mg  20 mg Oral BID    folic acid (FOLVITE) tablet 1 mg  1 mg Oral DAILY    multivitamin (ONE A DAY) tablet 1 Tab  1 Tab Oral DAILY    thiamine mononitrate (B-1) tablet 100 mg  100 mg Oral DAILY    NIFEdipine ER (PROCARDIA XL) tablet 60 mg  60 mg Oral DAILY    DULoxetine (CYMBALTA) capsule 60 mg  60 mg Oral DAILY    0.9% sodium chloride infusion  100 mL/hr IntraVENous CONTINUOUS    diazePAM (VALIUM) tablet 10 mg  10 mg Oral TID    acetaminophen (TYLENOL) tablet 650 mg  650 mg Oral Q4H PRN    ondansetron (ZOFRAN) injection 4 mg  4 mg IntraVENous Q4H PRN          Total time spent with patient: 35 minutes                  Care Plan discussed with: Patient, Care Manager, Nursing Staff and >50% of time spent in counseling and coordination of care    Discussed:  Care Plan    Prophylaxis:  Hep SQ    Disposition:  Home w/Family           This document in whole or part of it has been produced using voice recognition software. Unrecognized errors in transcription may be present.     Attending Physician: Doris Box MD

## 2020-06-04 NOTE — PROGRESS NOTES
0740  At bedside report. Seen his right hand shaking no other  Part, alert and oriented, skin is warm , com plained of headache , no chest pain and no SOB, denies any heart palpitation, but tele called for a short while his heart rate up to 200, during the time he was shaking on his right hand, me and outgoing RN at bedside report, pt talking to us, maNUAL  CHECK OF pulse was regular 70 and so with his heart rate, instructed to   Take a deep breathe, CIWA done was 10, Ativan 1 mg IV given decided to give IV  Just because he was having  Fast tremors  And getting anxiuos. Dr Katarina Pickens notified by outgoing RN about pt  Heart rate but he  Is asymptomatic, monitor    0810  Sleeping soundly, tremors, disappear, monitor, bell with in reach    0840  Resting, CIWA is 4, continue to monitor. 0940  Waking up, refused breakfast, drinking po fluids, no chest pain, no more headache, monitor. 1200  Called, assisted to the bathroom to void, walk with assist then  AM care and assisted back to the recliner, alert and oriented, talking but with slow response, noted  More shaking again after  Walk  From the bathroom. Rohit Bojorquez in the room and seen the shaking after some  Questions and  Told pt to calm down the  Shaking was less. MD said to check the way the tremors is, at this time the tremors per MD is due to neurological  Situations. Tremors only on the right  Hands and noted  Almost the whole abdomen, pt talking and responding, following  Commands, and very alert at this time, on seizure precaution but not seizing at this time. Per  MD to  Check the  Tremors, should be fine tremors then  It will be due to CIWA. 1237  Feed himself with his right hand, no shaking noted, ate all his lunch, no pain, was asking relevant questions, like his Rayfrankil Breana and mentioning he will need walker when he goes home. no shaking noted so far at this time, speech is clear, moves all extremities. assisted back to bed and  Ask for more foods, given  [place] and some more pudding. 1440  CIWA 10 given oral Ativan    1540  Sleeping CIWA down to 4    1715  Mentioned about his right shoulder sore, that he wants Ativan , explain  that Ativan is not for his pain, offered Tylenol but refused, he wants Ativan, not time for CIWA  Yet, but at this time seems not in category to receive Ativan, no tremors, skin warm, no c/o headache and very alert. .  Given Valium for his shoulder  Pain. Voiding assisted 4x to the bathroom, had sat on the recliner for 2 hours, on and off shaking of the right hand    1900  No seizure pain of the right shoulder less with Valium, assisted to the bathroom to move his bowels.

## 2020-06-04 NOTE — PROGRESS NOTES
Problem: Falls - Risk of  Goal: *Absence of Falls  Description: Document Clydene Bone Fall Risk and appropriate interventions in the flowsheet. Outcome: Progressing Towards Goal  Note: Fall Risk Interventions:  Mobility Interventions: Patient to call before getting OOB         Medication Interventions: Patient to call before getting OOB    Elimination Interventions: Call light in reach    History of Falls Interventions:  Investigate reason for fall         Problem: Patient Education: Go to Patient Education Activity  Goal: Patient/Family Education  Outcome: Progressing Towards Goal     Problem: Pain  Goal: *Control of Pain  Outcome: Progressing Towards Goal     Problem: Pain  Goal: *Control of Pain  Outcome: Progressing Towards Goal     Problem: Patient Education: Go to Patient Education Activity  Goal: Patient/Family Education  Outcome: Progressing Towards Goal     Problem: Alcohol Withdrawal  Goal: *STG: Participates in treatment plan  Outcome: Progressing Towards Goal  Goal: *STG: Remains safe in hospital  Outcome: Progressing Towards Goal  Goal: *STG: Seeks staff when symptoms of withdrawal increase  Outcome: Progressing Towards Goal  Goal: *STG: Complies with medication therapy  Outcome: Progressing Towards Goal  Goal: *STG: Attends activities and groups  Outcome: Progressing Towards Goal  Goal: *STG: Will identify negative impact of chemical dependency including the use of tobacco, alcohol, and other substances  Outcome: Progressing Towards Goal  Goal: *STG: Verbalizes abstinence as an achievable goal  Outcome: Progressing Towards Goal  Goal: *STG: Agrees to participate in outpatient after care program to support ongoing mental health  Outcome: Progressing Towards Goal  Goal: *STG: Able to indentify relapse triggers including interpersonal/social and familial factors  Outcome: Progressing Towards Goal  Goal: *STG: Identify lifestyle changes to support long term sobriety such as vocation, employment, education, and legal issues  Outcome: Progressing Towards Goal  Goal: *STG: Maintains appropriate nutrition and hydration  Outcome: Progressing Towards Goal  Goal: *STG: Vital signs within defined limits  Outcome: Progressing Towards Goal  Goal: *STG/LTG: Relapse prevention plan in place to include housing/aftercare, leisure activities, and spirituality  Outcome: Progressing Towards Goal  Goal: Interventions  Outcome: Progressing Towards Goal     Problem: Hypertension  Goal: *Blood pressure within specified parameters  Outcome: Progressing Towards Goal  Goal: *Fluid volume balance  Outcome: Progressing Towards Goal  Goal: *Labs within defined limits  Outcome: Progressing Towards Goal

## 2020-06-04 NOTE — PROGRESS NOTES
1930  Bedside, Verbal, and Written shift change report given to [address] [place] [address] (oncoming nurse) by Akila Nava RN (offgoing nurse). Report included the following information SBAR, Kardex, and MAR.     2000  Patient is in bed, alert and oriented x 4. Oxygen on at 2L, have been yawning since I entered his room, he complained of pain on the right side he said cause by falling due to being drunk. IV CDI, dressing CDI, no sign of distress. Patient right arm has tremors. Bed low, call bell within reach. Patient requested to go to the bathroom. Advised him that because his tremors was getting worst to just use the bedpan, patient agreed. No BM noted just urine. Changed linens and gown. Gave a partial bath. Changed the electrodes and reattached the pulse oximetry. 2300  Patient is in bed, sleeping. No tremors or sweating noted. No sign of distress. Bed low, call bell within reach. 0000  Patient is in bed, alert and oriented. Oxygen on, IV CDI, dressing CDI, no sign of distress. Bed low, call bell within reach. 0200  Patient is in bed,sleeping. No sign of distress. Bed low, call bell within reach. 0630  Patient insisted on getting up to the bathroom. I strongly advised him not to because he was having strong tremors. Stayed with patient. Patient had a small BM.     0730  Bedside, Verbal, and Written shift change report given to [address] [place], [place] [address] (oncoming nurse) by [address] [place] [address] (offgoing nurse). Report included the following information SBAR, Kardex, and MAR.     4256  Tele called asking what the patient was doing. She said patient was having vtac and looks like about to have a a flutter. Patient was shaking at that time. Mireya Conroy took vitals. Patient is A and O x 4. Denies chest pain or SOB. Paged Dr. Guera Callejas.

## 2020-06-05 ENCOUNTER — APPOINTMENT (OUTPATIENT)
Dept: MRI IMAGING | Age: 55
End: 2020-06-05
Attending: INTERNAL MEDICINE
Payer: MEDICAID

## 2020-06-05 LAB
ALBUMIN SERPL-MCNC: 3.2 G/DL (ref 3.4–5)
ALBUMIN/GLOB SERPL: 0.9 {RATIO} (ref 0.8–1.7)
ALP SERPL-CCNC: 99 U/L (ref 45–117)
ALT SERPL-CCNC: 37 U/L (ref 16–61)
ANION GAP SERPL CALC-SCNC: 7 MMOL/L (ref 3–18)
AST SERPL-CCNC: 35 U/L (ref 10–38)
BILIRUB DIRECT SERPL-MCNC: 0.3 MG/DL (ref 0–0.2)
BILIRUB SERPL-MCNC: 0.8 MG/DL (ref 0.2–1)
BUN SERPL-MCNC: 6 MG/DL (ref 7–18)
BUN/CREAT SERPL: 10 (ref 12–20)
CALCIUM SERPL-MCNC: 8.7 MG/DL (ref 8.5–10.1)
CHLORIDE SERPL-SCNC: 104 MMOL/L (ref 100–111)
CO2 SERPL-SCNC: 26 MMOL/L (ref 21–32)
CREAT SERPL-MCNC: 0.62 MG/DL (ref 0.6–1.3)
GLOBULIN SER CALC-MCNC: 3.4 G/DL (ref 2–4)
GLUCOSE SERPL-MCNC: 100 MG/DL (ref 74–99)
INR PPP: 1.1 (ref 0.8–1.2)
MAGNESIUM SERPL-MCNC: 1.8 MG/DL (ref 1.6–2.6)
PHOSPHATE SERPL-MCNC: 3.1 MG/DL (ref 2.5–4.9)
POTASSIUM SERPL-SCNC: 3.6 MMOL/L (ref 3.5–5.5)
PROT SERPL-MCNC: 6.6 G/DL (ref 6.4–8.2)
PROTHROMBIN TIME: 13.5 SEC (ref 11.5–15.2)
SODIUM SERPL-SCNC: 137 MMOL/L (ref 136–145)

## 2020-06-05 PROCEDURE — 85610 PROTHROMBIN TIME: CPT

## 2020-06-05 PROCEDURE — 83735 ASSAY OF MAGNESIUM: CPT

## 2020-06-05 PROCEDURE — 80076 HEPATIC FUNCTION PANEL: CPT

## 2020-06-05 PROCEDURE — 74011250636 HC RX REV CODE- 250/636: Performed by: HOSPITALIST

## 2020-06-05 PROCEDURE — 70551 MRI BRAIN STEM W/O DYE: CPT

## 2020-06-05 PROCEDURE — 74011250637 HC RX REV CODE- 250/637: Performed by: HOSPITALIST

## 2020-06-05 PROCEDURE — 77010033678 HC OXYGEN DAILY

## 2020-06-05 PROCEDURE — 74011250637 HC RX REV CODE- 250/637: Performed by: INTERNAL MEDICINE

## 2020-06-05 PROCEDURE — 80048 BASIC METABOLIC PNL TOTAL CA: CPT

## 2020-06-05 PROCEDURE — 74011250636 HC RX REV CODE- 250/636: Performed by: INTERNAL MEDICINE

## 2020-06-05 PROCEDURE — 84100 ASSAY OF PHOSPHORUS: CPT

## 2020-06-05 PROCEDURE — 36415 COLL VENOUS BLD VENIPUNCTURE: CPT

## 2020-06-05 PROCEDURE — 65660000000 HC RM CCU STEPDOWN

## 2020-06-05 RX ORDER — IBUPROFEN 200 MG
1 TABLET ORAL DAILY
Status: DISCONTINUED | OUTPATIENT
Start: 2020-06-06 | End: 2020-06-05

## 2020-06-05 RX ORDER — HYDRALAZINE HYDROCHLORIDE 20 MG/ML
10 INJECTION INTRAMUSCULAR; INTRAVENOUS
Status: DISCONTINUED | OUTPATIENT
Start: 2020-06-05 | End: 2020-06-06 | Stop reason: HOSPADM

## 2020-06-05 RX ORDER — IBUPROFEN 200 MG
1 TABLET ORAL DAILY
Status: DISCONTINUED | OUTPATIENT
Start: 2020-06-05 | End: 2020-06-06 | Stop reason: HOSPADM

## 2020-06-05 RX ADMIN — NIFEDIPINE 60 MG: 60 TABLET, FILM COATED, EXTENDED RELEASE ORAL at 09:54

## 2020-06-05 RX ADMIN — RIVAROXABAN 20 MG: 20 TABLET, FILM COATED ORAL at 08:21

## 2020-06-05 RX ADMIN — Medication 100 MG: at 09:54

## 2020-06-05 RX ADMIN — CARVEDILOL 12.5 MG: 12.5 TABLET, FILM COATED ORAL at 08:20

## 2020-06-05 RX ADMIN — DIAZEPAM 10 MG: 5 TABLET ORAL at 09:54

## 2020-06-05 RX ADMIN — Medication 10 ML: at 14:00

## 2020-06-05 RX ADMIN — CARVEDILOL 12.5 MG: 12.5 TABLET, FILM COATED ORAL at 17:38

## 2020-06-05 RX ADMIN — Medication 10 ML: at 05:59

## 2020-06-05 RX ADMIN — Medication 10 ML: at 23:33

## 2020-06-05 RX ADMIN — ONDANSETRON 4 MG: 2 INJECTION INTRAMUSCULAR; INTRAVENOUS at 13:27

## 2020-06-05 RX ADMIN — ATORVASTATIN CALCIUM 40 MG: 20 TABLET, FILM COATED ORAL at 08:20

## 2020-06-05 RX ADMIN — SODIUM CHLORIDE 75 ML/HR: 900 INJECTION, SOLUTION INTRAVENOUS at 05:59

## 2020-06-05 RX ADMIN — BUSPIRONE HYDROCHLORIDE 30 MG: 10 TABLET ORAL at 08:21

## 2020-06-05 RX ADMIN — HYDRALAZINE HYDROCHLORIDE 10 MG: 20 INJECTION INTRAMUSCULAR; INTRAVENOUS at 23:31

## 2020-06-05 RX ADMIN — MULTIVITAMIN TABLET 1 TABLET: TABLET at 09:54

## 2020-06-05 RX ADMIN — FAMOTIDINE 20 MG: 20 TABLET ORAL at 09:54

## 2020-06-05 RX ADMIN — DIAZEPAM 10 MG: 5 TABLET ORAL at 21:27

## 2020-06-05 RX ADMIN — FAMOTIDINE 20 MG: 20 TABLET ORAL at 17:38

## 2020-06-05 RX ADMIN — DULOXETINE HYDROCHLORIDE 60 MG: 30 CAPSULE, DELAYED RELEASE ORAL at 09:54

## 2020-06-05 RX ADMIN — ACETAMINOPHEN 650 MG: 325 TABLET, FILM COATED ORAL at 04:16

## 2020-06-05 RX ADMIN — ACETAMINOPHEN 650 MG: 325 TABLET, FILM COATED ORAL at 20:19

## 2020-06-05 RX ADMIN — DIAZEPAM 10 MG: 5 TABLET ORAL at 15:59

## 2020-06-05 RX ADMIN — FOLIC ACID 1 MG: 1 TABLET ORAL at 09:54

## 2020-06-05 NOTE — PROGRESS NOTES
Problem: Falls - Risk of  Goal: *Absence of Falls  Description: Document Maxine Matias Fall Risk and appropriate interventions in the flowsheet.   Outcome: Progressing Towards Goal  Note: Fall Risk Interventions:  Mobility Interventions: Patient to call before getting OOB         Medication Interventions: Patient to call before getting OOB    Elimination Interventions: Call light in reach    History of Falls Interventions: Door open when patient unattended         Problem: Patient Education: Go to Patient Education Activity  Goal: Patient/Family Education  Outcome: Progressing Towards Goal     Problem: Pain  Goal: *Control of Pain  Outcome: Progressing Towards Goal     Problem: Pain  Goal: *Control of Pain  Outcome: Progressing Towards Goal     Problem: Patient Education: Go to Patient Education Activity  Goal: Patient/Family Education  Outcome: Progressing Towards Goal     Problem: Alcohol Withdrawal  Goal: *STG: Participates in treatment plan  Outcome: Progressing Towards Goal  Goal: *STG: Remains safe in hospital  Outcome: Progressing Towards Goal  Goal: *STG: Seeks staff when symptoms of withdrawal increase  Outcome: Progressing Towards Goal  Goal: *STG: Complies with medication therapy  Outcome: Progressing Towards Goal  Goal: *STG: Attends activities and groups  Outcome: Progressing Towards Goal  Goal: *STG: Will identify negative impact of chemical dependency including the use of tobacco, alcohol, and other substances  Outcome: Progressing Towards Goal  Goal: *STG: Verbalizes abstinence as an achievable goal  Outcome: Progressing Towards Goal  Goal: *STG: Agrees to participate in outpatient after care program to support ongoing mental health  Outcome: Progressing Towards Goal  Goal: *STG: Able to indentify relapse triggers including interpersonal/social and familial factors  Outcome: Progressing Towards Goal  Goal: *STG: Identify lifestyle changes to support long term sobriety such as vocation, employment, education, and legal issues  Outcome: Progressing Towards Goal  Goal: *STG: Maintains appropriate nutrition and hydration  Outcome: Progressing Towards Goal  Goal: *STG: Vital signs within defined limits  Outcome: Progressing Towards Goal  Goal: *STG/LTG: Relapse prevention plan in place to include housing/aftercare, leisure activities, and spirituality  Outcome: Progressing Towards Goal  Goal: Interventions  Outcome: Progressing Towards Goal     Problem: Hypertension  Goal: *Blood pressure within specified parameters  Outcome: Progressing Towards Goal  Goal: *Fluid volume balance  Outcome: Progressing Towards Goal  Goal: *Labs within defined limits  Outcome: Progressing Towards Goal

## 2020-06-05 NOTE — ROUTINE PROCESS
Bedside shift report received from [facility] wit sbar  Alert x4  Mri screening done, notified MRI  oob to bathroom  Dr. Lennox Ponder in to see patient  To MRI  Returned from MRI  Iv restarted 1328  zofran given for nausea  Working with P.T.   Bedside shift report given to [facility] with sbar

## 2020-06-05 NOTE — PROGRESS NOTES
Problem: Discharge Planning  Goal: *Discharge to safe environment  Outcome: Resolved/Met   Plan home    Will need [facility] cab.

## 2020-06-05 NOTE — PROGRESS NOTES
1930  Bedside, Verbal, and Written shift change report given to Terri Aguilar (oncoming nurse) by Rory Ricketts (offgoing nurse). Report included the following information SBAR, Kardex, and MAR. Patient is in bed, he is more alert and oriented tonight. Oxygen on, no visible sign of shaking/tremors. IV verified CDI, SCD on bilaterally, no sign of distress. I discussed with him the doctor's note about his shaking/tremors and that as much as possible we would only give him the ativan if really needed. Bed low, call bell within reach. 2100  Patient is in bed, watching TV, no shaking observed. Patient is calm, no complains. 0000  Patient is in bed, sleeping. Oxygen on. IV CDI, dressing CDI, no sign of distress. Bed low, call bell within reach. 0100  Patient is in bed, resting. No sign of tremors or distress. Bed low, call bell within reach. 0400  Patient was up trying to get to the bathroom. Patient had a steady gait. No complaints. 0730  Bedside, Verbal, and Written shift change report given to [address] [place] [address] (oncoming nurse) by Terri Aguilar (offgoing nurse). Report included the following information SBAR, Kardex, and MAR.

## 2020-06-05 NOTE — PROGRESS NOTES
Internal Medicine Progress Note        NAME: Garret Abad   :  1965  MRM:  [id number]    Date/Time: 2020        ASSESSMENT/PLAN:    Active Problems:    Delirium tremens ([address] (6/3/2020)      #  DTs   - scheduled valium  - CIWA . IVF -  vitamin . Telemetry bed. Improving clinically. Patient indicate he drinks half gallon of vodka every day for years, since he broke up with his woman. His ongoing shaking I do not feel is only due to his DT, but also probably due to cerebellar lesion or alcoholic damage to his central nervous system. Brain MRI. He had this shaking for a long time and at once treated with beta-blocker. He is   on Coreg Neurontin. # Musculoskeletal pains. Has right rotator cuff pain. Control pain. May need orthopedic follow-up after discharge. PT.               # History of DVT. Continue Xarelto    # HTN. Control blood pressure. Started on Lopressor. # for discharge planning. Lab Review:     Recent Labs     20  033   WBC 4.4* 10.3   HGB 13.1 15.0   HCT 40.5 44.2    263     Recent Labs     20  0330    137 136   K 3.6 3.6 3.6    104 99*   CO2 26 27 21   * 98 115*   BUN 6* 7 10   CREA 0.62 0.68 0.98   CA 8.7 8.3* 8.8   MG 1.8 1.9 1.5*   PHOS 3.1 3.1  --    ALB 3.2*  --  3.8   TBILI 0.8  --  0.7   ALT 37  --  49   INR 1.1 1.1 1.0     No results found for: GLUCPOC  No results for input(s): PH, PCO2, PO2, HCO3, FIO2 in the last 72 hours. Recent Labs     20  0330   INR 1.1 1.1 1.0       No results found for: SDES  No results found for: CULT    All Cardiac Markers in the last 24 hours:   Lab Results   Component Value Date/Time    TROIQ 0.02 2020 04:20 PM           Intervals noted   Pt s/e @ bedside     Subjective:     . Feels better shaking is better   Still concerned that he might fall.   Did not see PT yet     ROS:  (bold if positive,otherwise negative)    Fever/chills ,  Dysuria   Cough , Sputum , SOB/ALBA , Chest Pain     Diarrhea ,Nausea/Vomit , Abd Pain , Constipation     Tolerating Diet                Objective:     Vitals:  Last 24hrs VS reviewed since prior progress note. Most recent are:    Visit Vitals  BP (!) 138/96   Pulse (!) 59   Temp 98.2 °F (36.8 °C)   Resp 18   Ht 6' 2\" (1.88 m)   Wt 108.9 kg (240 lb)   SpO2 100%   BMI 30.81 kg/m²     SpO2 Readings from Last 6 Encounters:   06/05/20 100%   01/21/20 95%   09/13/19 95%   09/05/19 94%   08/24/19 99%   07/18/19 96%    O2 Flow Rate (L/min): 2 l/min       Intake/Output Summary (Last 24 hours) at 6/5/2020 1252  Last data filed at 6/5/2020 0630  Gross per 24 hour   Intake 818.75 ml   Output 800 ml   Net 18.75 ml          Physical Exam:   Gen: Well-developed, well-nourished, in no acute distress  HEENT: Head atraumatic, normocephalic ,    hearing intact to voice, moist mucous membranes  Neck:  Trachea midline , No apparent JVD, Supple. Resp: No accessory muscle use, Bilateral BS present,clear breath sounds without wheezes rales or rhonchi  Card:    normal S1, S2 without Gallop . No Significant lower leg peripheral edema. Abd:  Soft, non-tender, non-distended, bowel sounds are present . Musc: No cyanosis or clubbing  Skin: No rashes or ulcers, skin turgor is good   Neuro: Shaking notably is less.   Cranial nerves are grossly intact, no clear area of focal motor weakness, follows commands appropriately  Psych:  Good insight, oriented to person, place and time, alert       Telemetry reviewed:   normal sinus rhythm    Medications Reviewed: (see below)    Lab Data Reviewed: (see below)    ______________________________________________________________________    Medications:     Current Facility-Administered Medications   Medication Dose Route Frequency    sodium chloride (NS) flush 5-40 mL  5-40 mL IntraVENous Q8H    sodium chloride (NS) flush 5-40 mL  5-40 mL IntraVENous PRN  LORazepam (ATIVAN) tablet 1 mg  1 mg Oral Q1H PRN    Or    LORazepam (ATIVAN) injection 1 mg  1 mg IntraVENous Q1H PRN    LORazepam (ATIVAN) tablet 2 mg  2 mg Oral Q1H PRN    Or    LORazepam (ATIVAN) injection 2 mg  2 mg IntraVENous Q1H PRN    LORazepam (ATIVAN) injection 3 mg  3 mg IntraVENous Q15MIN PRN    rivaroxaban (XARELTO) tablet 20 mg  20 mg Oral DAILY WITH BREAKFAST    atorvastatin (LIPITOR) tablet 40 mg  40 mg Oral DAILY    carvediloL (COREG) tablet 12.5 mg  12.5 mg Oral BID    busPIRone (BUSPAR) tablet 30 mg  30 mg Oral DAILY    famotidine (PEPCID) tablet 20 mg  20 mg Oral BID    folic acid (FOLVITE) tablet 1 mg  1 mg Oral DAILY    multivitamin (ONE A DAY) tablet 1 Tab  1 Tab Oral DAILY    thiamine mononitrate (B-1) tablet 100 mg  100 mg Oral DAILY    NIFEdipine ER (PROCARDIA XL) tablet 60 mg  60 mg Oral DAILY    DULoxetine (CYMBALTA) capsule 60 mg  60 mg Oral DAILY    diazePAM (VALIUM) tablet 10 mg  10 mg Oral TID    acetaminophen (TYLENOL) tablet 650 mg  650 mg Oral Q4H PRN    ondansetron (ZOFRAN) injection 4 mg  4 mg IntraVENous Q4H PRN          Total time spent with patient: 35 minutes                  Care Plan discussed with: Patient, Care Manager, Nursing Staff and >50% of time spent in counseling and coordination of care    Discussed:  Care Plan    Prophylaxis:  Hep SQ    Disposition:  Home w/Family           This document in whole or part of it has been produced using voice recognition software. Unrecognized errors in transcription may be present.     Attending Physician: Seamus Nelson MD

## 2020-06-05 NOTE — PROGRESS NOTES
1402: PT orders received and chart reviewed. Patient reports ambulating around hospital room without difficulties. Denies mobility concerns with return home. Reports right shoulder \"rotator cuff\" pain s/p fall 2-3 days prior to admission. Demonstrates decreased AROM on right shoulder. Declines offer for ice. Would recommend outpatient PT consult for right shoulder evaluation and treatment. Formal acute care PT evaluation not indicated.

## 2020-06-06 VITALS
TEMPERATURE: 97.8 F | RESPIRATION RATE: 20 BRPM | WEIGHT: 240 LBS | HEART RATE: 92 BPM | BODY MASS INDEX: 30.8 KG/M2 | DIASTOLIC BLOOD PRESSURE: 80 MMHG | SYSTOLIC BLOOD PRESSURE: 127 MMHG | HEIGHT: 74 IN | OXYGEN SATURATION: 99 %

## 2020-06-06 LAB
INR PPP: 1 (ref 0.8–1.2)
PROTHROMBIN TIME: 12.8 SEC (ref 11.5–15.2)

## 2020-06-06 PROCEDURE — 85610 PROTHROMBIN TIME: CPT

## 2020-06-06 PROCEDURE — 74011250636 HC RX REV CODE- 250/636: Performed by: HOSPITALIST

## 2020-06-06 PROCEDURE — 36415 COLL VENOUS BLD VENIPUNCTURE: CPT

## 2020-06-06 PROCEDURE — 74011250637 HC RX REV CODE- 250/637: Performed by: HOSPITALIST

## 2020-06-06 RX ORDER — IBUPROFEN 200 MG
1 TABLET ORAL EVERY 24 HOURS
Qty: 7 PATCH | Refills: 0 | Status: SHIPPED | OUTPATIENT
Start: 2020-06-06 | End: 2020-06-13

## 2020-06-06 RX ORDER — CHLORDIAZEPOXIDE HYDROCHLORIDE 25 MG/1
25 CAPSULE, GELATIN COATED ORAL
Qty: 18 CAP | Refills: 0 | Status: SHIPPED | OUTPATIENT
Start: 2020-06-06 | End: 2020-06-06

## 2020-06-06 RX ORDER — CHLORDIAZEPOXIDE HYDROCHLORIDE 25 MG/1
25 CAPSULE, GELATIN COATED ORAL
Status: DISCONTINUED | OUTPATIENT
Start: 2020-06-06 | End: 2020-06-06 | Stop reason: HOSPADM

## 2020-06-06 RX ORDER — CHLORDIAZEPOXIDE HYDROCHLORIDE 25 MG/1
25 CAPSULE, GELATIN COATED ORAL
Qty: 15 CAP | Refills: 0 | Status: SHIPPED | OUTPATIENT
Start: 2020-06-06 | End: 2020-11-13

## 2020-06-06 RX ADMIN — FOLIC ACID 1 MG: 1 TABLET ORAL at 09:10

## 2020-06-06 RX ADMIN — DULOXETINE HYDROCHLORIDE 60 MG: 30 CAPSULE, DELAYED RELEASE ORAL at 09:10

## 2020-06-06 RX ADMIN — RIVAROXABAN 20 MG: 20 TABLET, FILM COATED ORAL at 09:10

## 2020-06-06 RX ADMIN — MULTIVITAMIN TABLET 1 TABLET: TABLET at 09:19

## 2020-06-06 RX ADMIN — Medication 100 MG: at 09:10

## 2020-06-06 RX ADMIN — ATORVASTATIN CALCIUM 40 MG: 20 TABLET, FILM COATED ORAL at 09:10

## 2020-06-06 RX ADMIN — DIAZEPAM 10 MG: 5 TABLET ORAL at 09:10

## 2020-06-06 RX ADMIN — HYDRALAZINE HYDROCHLORIDE 10 MG: 20 INJECTION INTRAMUSCULAR; INTRAVENOUS at 09:07

## 2020-06-06 RX ADMIN — FAMOTIDINE 20 MG: 20 TABLET ORAL at 09:10

## 2020-06-06 RX ADMIN — ONDANSETRON 4 MG: 2 INJECTION INTRAMUSCULAR; INTRAVENOUS at 01:14

## 2020-06-06 RX ADMIN — BUSPIRONE HYDROCHLORIDE 30 MG: 10 TABLET ORAL at 09:10

## 2020-06-06 RX ADMIN — NIFEDIPINE 60 MG: 60 TABLET, FILM COATED, EXTENDED RELEASE ORAL at 09:10

## 2020-06-06 RX ADMIN — CARVEDILOL 12.5 MG: 12.5 TABLET, FILM COATED ORAL at 09:10

## 2020-06-06 NOTE — PROGRESS NOTES
Problem: Falls - Risk of  Goal: *Absence of Falls  Description: Document Rey Sol Fall Risk and appropriate interventions in the flowsheet.   Outcome: Progressing Towards Goal  Note: Fall Risk Interventions:  Mobility Interventions: Patient to call before getting OOB, Utilize walker, cane, or other assistive device, Utilize gait belt for transfers/ambulation         Medication Interventions: Patient to call before getting OOB, Teach patient to arise slowly, Utilize gait belt for transfers/ambulation    Elimination Interventions: Call light in reach, Urinal in reach    History of Falls Interventions: Door open when patient unattended         Problem: Patient Education: Go to Patient Education Activity  Goal: Patient/Family Education  Outcome: Progressing Towards Goal     Problem: Pain  Goal: *Control of Pain  Outcome: Progressing Towards Goal     Problem: Alcohol Withdrawal  Goal: *STG: Participates in treatment plan  Outcome: Progressing Towards Goal  Goal: *STG: Remains safe in hospital  Outcome: Progressing Towards Goal  Goal: *STG: Seeks staff when symptoms of withdrawal increase  Outcome: Progressing Towards Goal  Goal: *STG: Complies with medication therapy  Outcome: Progressing Towards Goal  Goal: *STG: Attends activities and groups  Outcome: Progressing Towards Goal  Goal: *STG: Will identify negative impact of chemical dependency including the use of tobacco, alcohol, and other substances  Outcome: Progressing Towards Goal  Goal: *STG: Verbalizes abstinence as an achievable goal  Outcome: Progressing Towards Goal  Goal: *STG: Agrees to participate in outpatient after care program to support ongoing mental health  Outcome: Progressing Towards Goal  Goal: *STG: Able to indentify relapse triggers including interpersonal/social and familial factors  Outcome: Progressing Towards Goal  Goal: *STG: Identify lifestyle changes to support long term sobriety such as vocation, employment, education, and legal issues  Outcome: Progressing Towards Goal  Goal: *STG: Maintains appropriate nutrition and hydration  Outcome: Progressing Towards Goal  Goal: *STG: Vital signs within defined limits  Outcome: Progressing Towards Goal  Goal: *STG/LTG: Relapse prevention plan in place to include housing/aftercare, leisure activities, and spirituality  Outcome: Progressing Towards Goal  Goal: Interventions  Outcome: Progressing Towards Goal     Problem: Hypertension  Goal: *Blood pressure within specified parameters  Outcome: Progressing Towards Goal  Goal: *Fluid volume balance  Outcome: Progressing Towards Goal  Goal: *Labs within defined limits  Outcome: Progressing Towards Goal     Problem: Pain  Goal: *Control of Pain  Outcome: Progressing Towards Goal     Problem: Patient Education: Go to Patient Education Activity  Goal: Patient/Family Education  Outcome: Progressing Towards Goal

## 2020-06-06 NOTE — DISCHARGE INSTRUCTIONS
DISCHARGE SUMMARY from Nurse    PATIENT INSTRUCTIONS:    After general anesthesia or intravenous sedation, for 24 hours or while taking prescription Narcotics:  · Limit your activities  · Do not drive and operate hazardous machinery  · Do not make important personal or business decisions  · Do  not drink alcoholic beverages  · If you have not urinated within 8 hours after discharge, please contact your surgeon on call. Report the following to your surgeon:  · Excessive pain, swelling, redness or odor of or around the surgical area  · Temperature over 100.5  · Nausea and vomiting lasting longer than 4 hours or if unable to take medications  · Any signs of decreased circulation or nerve impairment to extremity: change in color, persistent  numbness, tingling, coldness or increase pain  · Any questions    What to do at Home:  Recommended activity: Activity as tolerated and no driving for today,     If you experience any of the following symptoms like increasing pain  , please follow up with Primary MD  .    *  Please give a list of your current medications to your Primary Care Provider. *  Please update this list whenever your medications are discontinued, doses are      changed, or new medications (including over-the-counter products) are added. *  Please carry medication information at all times in case of emergency situations. These are general instructions for a healthy lifestyle:    No smoking/ No tobacco products/ Avoid exposure to second hand smoke  Surgeon General's Warning:  Quitting smoking now greatly reduces serious risk to your health.     Obesity, smoking, and sedentary lifestyle greatly increases your risk for illness    A healthy diet, regular physical exercise & weight monitoring are important for maintaining a healthy lifestyle    You may be retaining fluid if you have a history of heart failure or if you experience any of the following symptoms:  Weight gain of 3 pounds or more overnight or 5 pounds in a week, increased swelling in our hands or feet or shortness of breath while lying flat in bed. Please call your doctor as soon as you notice any of these symptoms; do not wait until your next office visit. The discharge information has been reviewed with the patient. The patient verbalized understanding. Discharge medications reviewed with the patient and appropriate educational materials and side effects teaching were provided. ___________________________________________________________________________________________________________________________________HOSPITALIST DISCHARGE INSTRUCTIONS  NAME: Bernabe Magallon   :  1965   MRN:  [id number]     Date/Time:  2020 12:56 PM    ADMIT DATE: 6/3/2020     DISCHARGE DATE: 2020     DISCHARGE DIAGNOSIS:    DT  Alcohol abuse , tobacco abuse  HTN    MEDICATIONS:  {Medication reconciliation information is now added to the patient's AVS automatically when it is printed. There is no need to use this SmartLink in discharge instructions. Highlight this text and delete it to clear this message}     · It is important that you take the medication exactly as they are prescribed. · Keep your medication in the bottles provided by the pharmacist and keep a list of the medication names, dosages, and times to be taken in your wallet. · Do not take other medications without consulting your doctor. Pain Management: per above medications    What to do at Home    Recommended diet:  Resume previous diet    Recommended activity: Activity as tolerated   Monitor BP at home and keep log book to PCP. If you experience any of the following symptoms then please call your primary care physician or return to the emergency room if you cannot get hold of your doctor:  Fever, chills, nausea, vomiting, diarrhea, change in mentation, falling, bleeding, shortness of breath. Any new or worsening symptoms. Or call 911 for emergency.     Follow Up:   Nima Urena MD  you are to call and set up an appointment to see them in 7 days. Information obtained by :  I understand that if any problems occur once I am at home I am to contact my physician. I understand and acknowledge receipt of the instructions indicated above.                                                                                                                                            Physician's or R.N.'s Signature                                                                  Date/Time                                                                                                                                              Patient or Representative Signature                                                          Date/Time

## 2020-06-06 NOTE — DISCHARGE SUMMARY
Discharge Summary      [facility] service    Patient ID:  Bailey Neil, 49 y.o., male  : 1965    Admit Date: 6/3/2020  Discharge Date:  2020  Length of stay: 3 day(s)    PCP:  Luis Trinidad MD    Chief Complaint   Patient presents with    Delirium Tremens (DTS)      Discharge Diagnosis:     Hospital Problems  Date Reviewed: 2020          Codes Class Noted POA    Delirium tremens [facility]) ICD-10-CM: F10.231  ICD-9-CM: 291.0  6/3/2020 Yes             Discharge instructions:    Recommended diet:  Resume previous diet    Recommended activity: Activity as tolerated   Monitor BP at home and keep log book to PCP. # Follow-up appointments: Follow-up Information     Follow up With Specialties Details Why Contact Info    Luis Trinidad MD Internal Medicine In 1 week  [address] [facility]  [phone]           HPI on Admission (per admitting physician):  Bailey Neil is a 49y.o. year old male who presents with DTs. Patient is apparently a regular for alcohol withdrawal at [facility] having been released about a week ago. He was smoking near the hospital today and was noted to be \"shaking all over\". It has been <1 day since last drink and he had an alcohol level on labs today. On previous admissions he has required intubation for this. He is tachycardic hypertensive and tremulous in ED. For further details and initial management please refer to H/P. Hospital Course:      Feel good,eager to go home and see his PCP this week (dressed and left the floor but brought in back by the staff) , he requested script for some thing help him with alcohol and nicotine withdrawal until see his PCP next week. He will follow his R shoulder pain with his PCP. By Problems :     #  DTs   - admitted with scheduled valium  - placed on  CIWA . IVF -  vitamin . Telemetry bed. Improved clinically.   Brain MRI with no reported acute changes ( done due to truncal and peripheral shaking , unsteady on feet for concern of cerebellar lesion) . Shaking improved.     # Musculoskeletal pains. It seems he has  right rotator cuff  Lesion , cant bring his R arm above shoulder level because of pain.      # History of DVT. Continue Xarelto     # HTN. Control blood pressure. Resume home regimen on DC and follow with PCP. Monitor BP at home and keep log book to PCP. Discharge condition:  improved and stable    Disposition:  Home    Procedures:   * No surgery found *     Physical Exam on Discharge:  Visit Vitals  /80 (BP 1 Location: Left arm, BP Patient Position: At rest)   Pulse 92   Temp 97.8 °F (36.6 °C)   Resp 20   Ht 6' 2\" (1.88 m)   Wt 108.9 kg (240 lb)   SpO2 99%   BMI 30.81 kg/m²   Gen:    Well-developed, well-nourished, in no acute distress  HEENT: Head atraumatic, normocephalic ,    hearing intact to voice, moist mucous membranes  Neck:   Trachea midline , No apparent JVD, Supple. Resp:  No accessory muscle use, Bilateral BS present,clear breath sounds without wheezes rales or rhonchi  Card:    normal S1, S2 without Gallop . No Significant lower leg peripheral edema. Abd:  Soft, non-tender, non-distended, bowel sounds are present . Musc:  No cyanosis or clubbing  Skin:   No rashes or ulcers, skin turgor is good   Neuro: resting, no shaking. Cranial nerves are grossly intact, no clear area of focal motor weakness, follows commands appropriately  Psych:  Good insight, oriented to person, place and time, alert    Hospitalization and discharge instructions d/c in details with : patient ,  Nursing/CM     Discharge medications :  Current Discharge Medication List      START taking these medications    Details   nicotine (NICODERM CQ) 21 mg/24 hr 1 Patch by TransDERmal route every twenty-four (24) hours for 7 days.   Qty: 7 Patch, Refills: 0         CONTINUE these medications which have CHANGED    Details   chlordiazePOXIDE (LIBRIUM) 25 mg capsule Take 1 Cap by mouth three (3) times daily as needed (withdrawal symptoms and sever craving to alcohol). Max Daily Amount: 75 mg. Qty: 15 Cap, Refills: 0    Associated Diagnoses: Alcohol withdrawal seizure with complication ([facility] [address] Acute alcohol abuse, with delirium ([facility] [address]         CONTINUE these medications which have NOT CHANGED    Details   famotidine (PEPCID) 20 mg tablet Take 1 Tab by mouth two (2) times a day. Qty: 60 Tab, Refills: 0    Associated Diagnoses: Gastric pain      NIFEdipine ER (PROCARDIA XL) 30 mg ER tablet TAKE 1 TAB BY MOUTH DAILY. Qty: 90 Tab, Refills: 0      CATAPRES-TTS-2 0.2 mg/24 hr ptwk Refills: 1      fluticasone propionate (FLONASE) 50 mcg/actuation nasal spray 2 Sprays by Both Nostrils route daily. Qty: 16 g, Refills: 3      carvedilol (COREG) 12.5 mg tablet Take 1 Tab by mouth two (2) times a day. Indications: high blood pressure  Qty: 180 Tab, Refills: 0    Associated Diagnoses: Essential hypertension      XARELTO 20 mg tab tablet TAKE 1 TAB BY MOUTH DAILY (WITH BREAKFAST). Qty: 80 Tab, Refills: 1    Associated Diagnoses: Other acute pulmonary embolism without acute cor pulmonale (HCC)      loratadine (CLARITIN) 10 mg tablet TAKE 1 TAB BY MOUTH DAILY AS NEEDED FOR ALLERGIES. Qty: 90 Tab, Refills: 1    Associated Diagnoses: Seasonal allergic rhinitis, unspecified trigger      lidocaine (LIDODERM) 5 % 1 PATCH BY TRANSDERMAL ROUTE EVERY TWENTY-FOUR (24) HOURS. Qty: 30 Patch, Refills: 5      atorvastatin (LIPITOR) 40 mg tablet TAKE 1 TAB BY MOUTH DAILY. Qty: 80 Tab, Refills: 1    Associated Diagnoses: Coronary artery disease involving native coronary artery of native heart without angina pectoris      DULoxetine (CYMBALTA) 60 mg capsule Take 60 mg by mouth daily. multivitamin (DAILY-CAYLA) tablet Take 1 Tab by mouth daily. Qty: 90 Tab, Refills: 3      thiamine HCL (B-1) 100 mg tablet Take 1 Tab by mouth daily.   Qty: 90 Tab, Refills: 3      folic acid (FOLVITE) 1 mg tablet Take 1 Tab by mouth daily.  Qty: 90 Tab, Refills: 3      nitroglycerin (NITROSTAT) 0.4 mg SL tablet TAKE 1 TABLET UNDER TOUNGE EVERY 5 MINS AS NEEDED FOR CHEST PAIN. DO NOT EXCEED 3 DOSES  Qty: 25 Tab, Refills: 0    Comments: PER PT  Associated Diagnoses: NSTEMI (non-ST elevated myocardial infarction) ([facility])      QUEtiapine SR (SEROQUEL XR) 300 mg sr tablet Take 300 mg by mouth nightly. busPIRone (BUSPAR) 30 mg tablet Take 1 Tab by mouth daily. Qty: 90 Tab, Refills: 0         STOP taking these medications       clotrimazole-betamethasone (LOTRISONE) topical cream Comments:   Reason for Stopping:                   Most Recent Labs:       Recent Labs     06/04/20 0445   WBC 4.4*   HGB 13.1   HCT 40.5        Recent Labs     06/06/20 0455 06/05/20 0400 06/04/20 0445   NA  --  137 137   K  --  3.6 3.6   CL  --  104 104   CO2  --  26 27   GLU  --  100* 98   BUN  --  6* 7   CREA  --  0.62 0.68   CA  --  8.7 8.3*   MG  --  1.8 1.9   PHOS  --  3.1 3.1   ALB  --  3.2*  --    TBILI  --  0.8  --    ALT  --  37  --    INR 1.0 1.1 1.1     No results found for: GLUCPOC  No results for input(s): PH, PCO2, PO2, HCO3, FIO2 in the last 72 hours. Recent Labs     06/06/20 0455 06/05/20 0400 06/04/20 0445   INR 1.0 1.1 1.1       No results found for: SDES  No results found for: CULT    All Cardiac Markers in the last 24 hours: No results found for: CPK, CK, CKMMB, CKMB, RCK3, CKMBT, CKNDX, CKND1, ROHIT, TROPT, TROIQ, JORGE, TROPT, TNIPOC, BNP, BNPP    XR Results:  Results from Hospital Encounter encounter on 06/03/20   XR CHEST PORT    Narrative EXAM: XR CHEST PORT    CLINICAL INDICATION/HISTORY: hypoxia  -Additional: None    COMPARISON: March 12, 2019    TECHNIQUE: Frontal view of the chest    _______________    FINDINGS:    HEART AND MEDIASTINUM: Normal cardiac size and mediastinal contours. LUNGS AND PLEURAL SPACES: Underexpanded lungs are noted without focal pneumonic  opacity, pneumothorax, or pleural effusion.     BONY THORAX AND SOFT TISSUES: No acute osseous abnormality    _______________      Impression IMPRESSION:    Mildly underexpanded lungs without superimposed acute radiographic abnormality        CT Results:  Results from Abstract encounter on 05/29/20   CT HEAD WO CONT       MRI Results:  Results from [place] encounter on 06/03/20   MRI BRAIN WO CONT    Narrative EXAM: MRI BRAIN W/O CONTRAST    INDICATION: truncal tremor    COMPARISON: No prior study in the [email or web address] system, reports outside MR  brain studies dated 5/21/2020 and 5/24/2018 located in [place] Everywhere" section  of electronic medical record (images unavailable for review). TECHNIQUE: Multiplanar multi sequence MR imaging of the brain was performed  utilizing axial T2, FLAIR, diffusion, coronal T2, and multiplanar T1 pre  contrast imaging . Additional dedicated susceptibility weighted imaging was  performed including MIP and filtered phase reconstruction images. No gadolinium  was administered due to specific clinician request.  Imaging performed on wide  bore Discovery UP079k GEM suite 3T magnet at [facility].     _______________________    FINDINGS:     VENTRICLES/EXTRA-AXIAL SPACES: The ventricles and sulci are normal in their size  and configuration. BRAIN PARENCHYMA: Minimal amount of T2/FLAIR hyperintense white matter lesions  are seen within the periventricular and subcortical white matter , including  minimal patchy abnormal signal in the emile, which are nonspecific, but likely  represent chronic small vessel changes. Similar findings described on prior  outside report. No localized abnormal signal is seen involving the midbrain or mammillary  bodies. Symmetric susceptibility artifact along with mildly increased T2/FLAIR signal  and low T1 signal is present involving the globus pallidi. Similar finding is  described on the prior outside report. No evidence of intracranial mass effect, midline shift, or herniation. No abnormal restricted diffusion to suggest acute infarct. No susceptibility artifact to suggest intraparenchymal hemorrhage. VASCULATURE:  The major intracranial vascular flow voids are grossly normal.    ORBITS: The visualized orbits are unremarkable. PARANASAL SINUSES: Visualized paranasal sinuses are clear. MASTOID AIR CELLS: Visualized mastoid air cells are clear. OSSEOUS STRUCTURES: Unremarkable    OTHER: None.      ________________________      Impression IMPRESSION:    1. No acute infarct, hemorrhage, mass effect, or herniation. 2.  Minimal nonspecific white matter disease likely representing chronic small  vessel changes. Finding is grossly stable in comparison to description on prior  outside report (no images available). 3. Symmetric abnormal signal involving the globus pallidi bilaterally without  diffusion signal abnormality. Finding is grossly stable in comparison to  description on prior outside reports dating back to at least 5/24/2018 (no  images available). Finding is nonspecific but potentially represents chronic  sequela of prior metabolic or toxic abnormality, including remote carbon  monoxide poisoning. Total discharge time 35 minutes of which more than 50 % spent on counseling and coordination of care. This document in whole or part of it has been produced using voice recognition software. Unrecognized errors in transcription may be present. Mariajose Belle MD  Hospitalist  [facility]. medical group. Hospitalist Division.   June 6, 2020  12:49 PM

## 2020-06-06 NOTE — PROGRESS NOTES
0805  BP elevated, denies headache, given Hydralazine,  Re check BP in 30  Minutes, alert and oriented no tremors  at this time. CIWA is 4    0830  BP down to 140/84    1130  No  Pain at this time, less tremors. 200  Dietary  Told the desk that pt is not in the room, all staff search for him, security been notified,pt was seen in the parking lot,  Brought back to the room, was alert and oriented but he said he was told  yesterday he is going home today, no  Order yet but Dr Kari Ibarra was been paged. Pt pulled out his IV, his shirt and pants was bloody from the IV. CIWA  Was 4, no tremors, was anxious. 1300  Dr Leann Segura came to see pt, discharge  Order written,     [phone]  Discharge instructions given, alert and oriented, no tremors, noted denies pain, walk well,  notified for a cab.

## 2020-06-07 NOTE — PROGRESS NOTES
Noted orders for discharge, no services ordered. Provided pt with lyft ride to home. Shelia YoonRN,ext [phone]. Care Management Interventions  PCP Verified by CM: Yes  Palliative Care Criteria Met (RRAT>21 & CHF Dx)?: No  Mode of Transport at Discharge:  Other (see comment)  Transition of Care Consult (CM Consult): Discharge Planning  Discharge Durable Medical Equipment: No  Physical Therapy Consult: No  Occupational Therapy Consult: No  Current Support Network: Lives Alone  Confirm Follow Up Transport: Cab  The Patient and/or Patient Representative was Provided with a Choice of Provider and Agrees with the Discharge Plan?: No  Freedom of Choice List was Provided with Basic Dialogue that Supports the Patient's Individualized Plan of Care/Goals, Treatment Preferences and Shares the Quality Data Associated with the Providers?: No  Discharge Location  Discharge Placement: Home

## 2020-09-10 ENCOUNTER — OFFICE VISIT (OUTPATIENT)
Dept: INTERNAL MEDICINE CLINIC | Age: 55
End: 2020-09-10

## 2020-09-10 VITALS
RESPIRATION RATE: 22 BRPM | HEIGHT: 74 IN | OXYGEN SATURATION: 97 % | DIASTOLIC BLOOD PRESSURE: 84 MMHG | SYSTOLIC BLOOD PRESSURE: 123 MMHG | TEMPERATURE: 98.4 F | HEART RATE: 101 BPM | BODY MASS INDEX: 26.19 KG/M2

## 2020-09-10 DIAGNOSIS — F32.A DEPRESSION, UNSPECIFIED DEPRESSION TYPE: Primary | ICD-10-CM

## 2020-09-10 DIAGNOSIS — R10.9 GASTRIC PAIN: ICD-10-CM

## 2020-09-10 DIAGNOSIS — I10 ESSENTIAL HYPERTENSION: ICD-10-CM

## 2020-09-10 DIAGNOSIS — Z76.0 MEDICATION REFILL: Primary | ICD-10-CM

## 2020-09-10 DIAGNOSIS — I25.10 CORONARY ARTERY DISEASE INVOLVING NATIVE CORONARY ARTERY OF NATIVE HEART WITHOUT ANGINA PECTORIS: ICD-10-CM

## 2020-09-10 DIAGNOSIS — G89.4 CHRONIC PAIN SYNDROME: ICD-10-CM

## 2020-09-10 RX ORDER — BISMUTH SUBSALICYLATE 262 MG
1 TABLET,CHEWABLE ORAL DAILY
Qty: 90 TAB | Refills: 3 | Status: SHIPPED | OUTPATIENT
Start: 2020-09-10 | End: 2020-11-13

## 2020-09-10 RX ORDER — CLOPIDOGREL BISULFATE 75 MG/1
75 TABLET ORAL DAILY
Qty: 90 TAB | Refills: 1 | Status: ON HOLD | OUTPATIENT
Start: 2020-09-10 | End: 2020-11-12 | Stop reason: SDUPTHER

## 2020-09-10 RX ORDER — OXYCODONE HYDROCHLORIDE 5 MG/1
5 TABLET ORAL
Qty: 30 TAB | Refills: 0 | Status: SHIPPED | OUTPATIENT
Start: 2020-09-10 | End: 2020-09-10

## 2020-09-10 RX ORDER — DULOXETIN HYDROCHLORIDE 60 MG/1
60 CAPSULE, DELAYED RELEASE ORAL DAILY
Qty: 30 CAP | Refills: 0 | Status: SHIPPED | OUTPATIENT
Start: 2020-09-10 | End: 2020-11-13

## 2020-09-10 RX ORDER — SENNOSIDES 8.6 MG/1
2 TABLET ORAL 2 TIMES DAILY
Qty: 60 TAB | Refills: 3 | Status: SHIPPED | OUTPATIENT
Start: 2020-09-10 | End: 2020-11-13

## 2020-09-10 RX ORDER — CARVEDILOL 12.5 MG/1
12.5 TABLET ORAL 2 TIMES DAILY
Qty: 180 TAB | Refills: 3 | Status: SHIPPED | OUTPATIENT
Start: 2020-09-10 | End: 2020-11-13

## 2020-09-10 RX ORDER — CLONIDINE HYDROCHLORIDE 0.1 MG/1
0.2 TABLET ORAL 2 TIMES DAILY
Qty: 120 TAB | Refills: 1 | Status: SHIPPED | OUTPATIENT
Start: 2020-09-10 | End: 2020-11-13

## 2020-09-10 RX ORDER — LEVETIRACETAM 500 MG/1
500 TABLET ORAL 2 TIMES DAILY
Qty: 60 TAB | Refills: 1 | Status: SHIPPED | OUTPATIENT
Start: 2020-09-10 | End: 2020-11-13

## 2020-09-10 RX ORDER — OXYCODONE HYDROCHLORIDE 5 MG/1
5 TABLET ORAL
COMMUNITY
End: 2020-09-10 | Stop reason: SDUPTHER

## 2020-09-10 RX ORDER — GUAIFENESIN 100 MG/5ML
81 LIQUID (ML) ORAL DAILY
COMMUNITY
End: 2020-09-10 | Stop reason: SDUPTHER

## 2020-09-10 RX ORDER — BUSPIRONE HYDROCHLORIDE 10 MG/1
10 TABLET ORAL DAILY
Qty: 270 TAB | Refills: 1 | OUTPATIENT
Start: 2020-09-10 | End: 2020-11-13

## 2020-09-10 RX ORDER — GABAPENTIN 800 MG/1
TABLET ORAL 3 TIMES DAILY
COMMUNITY
End: 2020-09-10 | Stop reason: SDUPTHER

## 2020-09-10 RX ORDER — TAMSULOSIN HYDROCHLORIDE 0.4 MG/1
0.4 CAPSULE ORAL DAILY
COMMUNITY
End: 2020-09-10 | Stop reason: SDUPTHER

## 2020-09-10 RX ORDER — KETOCONAZOLE 20 MG/ML
SHAMPOO TOPICAL
Qty: 1 BOTTLE | Refills: 5 | Status: SHIPPED | OUTPATIENT
Start: 2020-09-10 | End: 2020-11-13

## 2020-09-10 RX ORDER — FLUTICASONE PROPIONATE 50 MCG
2 SPRAY, SUSPENSION (ML) NASAL DAILY
Qty: 16 G | Refills: 3 | Status: SHIPPED | OUTPATIENT
Start: 2020-09-10 | End: 2020-11-13

## 2020-09-10 RX ORDER — CLOPIDOGREL BISULFATE 75 MG/1
TABLET ORAL
COMMUNITY
End: 2020-09-10 | Stop reason: SDUPTHER

## 2020-09-10 RX ORDER — SENNOSIDES 8.6 MG/1
1 TABLET ORAL DAILY
COMMUNITY
End: 2020-09-10 | Stop reason: SDUPTHER

## 2020-09-10 RX ORDER — GABAPENTIN 800 MG/1
800 TABLET ORAL 3 TIMES DAILY
Qty: 90 TAB | Refills: 1 | Status: SHIPPED | OUTPATIENT
Start: 2020-09-10 | End: 2020-09-10

## 2020-09-10 RX ORDER — CYCLOBENZAPRINE HCL 5 MG
5 TABLET ORAL
COMMUNITY
End: 2020-09-10 | Stop reason: ALTCHOICE

## 2020-09-10 RX ORDER — ATORVASTATIN CALCIUM 40 MG/1
40 TABLET, FILM COATED ORAL DAILY
Qty: 90 TAB | Refills: 1 | Status: SHIPPED | OUTPATIENT
Start: 2020-09-10 | End: 2020-11-13

## 2020-09-10 RX ORDER — OXYCODONE HYDROCHLORIDE 5 MG/1
5 TABLET ORAL
Qty: 30 TAB | Refills: 0 | Status: SHIPPED | OUTPATIENT
Start: 2020-09-10 | End: 2020-09-20

## 2020-09-10 RX ORDER — ONDANSETRON 4 MG/1
4 TABLET, FILM COATED ORAL
COMMUNITY
End: 2020-09-10 | Stop reason: ALTCHOICE

## 2020-09-10 RX ORDER — FAMOTIDINE 20 MG/1
20 TABLET, FILM COATED ORAL 2 TIMES DAILY
Qty: 60 TAB | Refills: 0 | Status: SHIPPED | OUTPATIENT
Start: 2020-09-10 | End: 2020-11-13

## 2020-09-10 RX ORDER — QUETIAPINE 300 MG/1
300 TABLET, FILM COATED, EXTENDED RELEASE ORAL
Qty: 30 TAB | Refills: 1 | Status: SHIPPED | OUTPATIENT
Start: 2020-09-10 | End: 2020-11-13

## 2020-09-10 RX ORDER — GUAIFENESIN 100 MG/5ML
81 LIQUID (ML) ORAL DAILY
Qty: 90 TAB | Refills: 3 | Status: SHIPPED | OUTPATIENT
Start: 2020-09-10 | End: 2021-03-28

## 2020-09-10 RX ORDER — LEVETIRACETAM 500 MG/1
500 TABLET ORAL
COMMUNITY
Start: 2020-08-31 | End: 2020-09-10 | Stop reason: SDUPTHER

## 2020-09-10 RX ORDER — TAMSULOSIN HYDROCHLORIDE 0.4 MG/1
0.4 CAPSULE ORAL DAILY
Qty: 90 CAP | Refills: 2 | Status: SHIPPED | OUTPATIENT
Start: 2020-09-10 | End: 2020-11-13

## 2020-09-10 RX ORDER — CLONIDINE HYDROCHLORIDE 0.1 MG/1
TABLET ORAL 2 TIMES DAILY
COMMUNITY
End: 2020-09-10 | Stop reason: SDUPTHER

## 2020-09-10 RX ORDER — GABAPENTIN 800 MG/1
800 TABLET ORAL 3 TIMES DAILY
Qty: 90 TAB | Refills: 1 | Status: SHIPPED | OUTPATIENT
Start: 2020-09-10 | End: 2020-11-13

## 2020-09-10 RX ORDER — KETOCONAZOLE 20 MG/ML
SHAMPOO TOPICAL DAILY PRN
COMMUNITY
End: 2020-09-10 | Stop reason: SDUPTHER

## 2020-09-10 RX ORDER — ACETAMINOPHEN 500 MG
TABLET ORAL
COMMUNITY
End: 2020-11-13

## 2020-09-10 NOTE — PROGRESS NOTES
Progress Note    Patient: Nicola Guillermo               Sex: male                  YOB: 1965      Age:  48 y. o.                    HPI:     Nicola Guillermo is a 48 y.o. male who has been seen for  followup after [facility] admission for seizures alcohol withdrawal, Anemia, depression, ASCVD, chronic pain. Ramon Douglas He   sustained  A fracture of tibia. He has  Not had   A drink August 17 th. . He is seeing AA  Past Medical History:   Diagnosis Date    Abuse     Anemia NEC     Anxiety     Arthritis     Chronic pain     Colitis     Contact dermatitis and other eczema, due to unspecified cause     Depression     Depression     Headache(784.0)     Heart attack ([address]     Hypercholesterolemia     Hypertension     Liver disease     Low back pain     with left leg pain -- multilevel spondylosis and L4 radiculitis    Neck pain     mild spondylosis    Other ill-defined conditions(799.89)     MS symptoms    Pancreatitis     Psychotic disorder ([address]     Trauma        Past Surgical History:   Procedure Laterality Date    HX CYST REMOVAL         Family History   Problem Relation Age of Onset    Psychiatric Disorder Mother     Heart Disease Mother     Arthritis-osteo Father     Alcohol abuse Father     Cancer Father         lung    Elevated Lipids Father     Headache Father     Hypertension Father     Lung Disease Father     Diabetes Father        Social History     Socioeconomic History    Marital status:      Spouse name: Not on file    Number of children: Not on file    Years of education: Not on file    Highest education level: Not on file   Tobacco Use    Smoking status: Current Some Day Smoker     Packs/day: 0.50     Years: 20.00     Pack years: 10.00     Types: Cigarettes    Smokeless tobacco: Never Used    Tobacco comment: patient states he vapes daily   Substance and Sexual Activity    Alcohol use: Not Currently     Alcohol/week: 0.0 standard drinks     Comment:  ETOH abuse - quit NOV 2018    Drug use: No    Sexual activity: Not Currently     Partners: Female     Birth control/protection: Condom   Social History Narrative    ** Merged History Encounter **              Current Outpatient Medications:     gabapentin (NEURONTIN) 800 mg tablet, Take  by mouth three (3) times daily. , Disp: , Rfl:     oxyCODONE IR (ROXICODONE) 5 mg immediate release tablet, Take 5 mg by mouth every four (4) hours as needed for Pain., Disp: , Rfl:     cyclobenzaprine (FLEXERIL) 5 mg tablet, Take 5 mg by mouth., Disp: , Rfl:     levETIRAcetam (KEPPRA) 500 mg tablet, 500 mg., Disp: , Rfl:     acetaminophen (TYLENOL) 500 mg tablet, Take  by mouth every six (6) hours as needed for Pain., Disp: , Rfl:     cloNIDine HCL (CATAPRES) 0.1 mg tablet, Take  by mouth two (2) times a day., Disp: , Rfl:     tamsulosin (FLOMAX) 0.4 mg capsule, Take 0.4 mg by mouth daily. , Disp: , Rfl:     aspirin 81 mg chewable tablet, Take 81 mg by mouth daily. , Disp: , Rfl:     lipase-protease-amylase (CREON 24,000) 24,000-76,000 -120,000 unit capsule, Take  by mouth three (3) times daily (with meals). , Disp: , Rfl:     ondansetron hcl (ZOFRAN) 4 mg tablet, Take 4 mg by mouth every eight (8) hours as needed for Nausea or Vomiting., Disp: , Rfl:     potassium gluconate 595 mg capsule, Take  by mouth three (3) times daily (with meals). , Disp: , Rfl:     senna (Senna-Gen) 8.6 mg tablet, Take 1 Tab by mouth daily. , Disp: , Rfl:     clopidogreL (PLAVIX) 75 mg tab, Take  by mouth., Disp: , Rfl:     ketoconazole (NIZORAL) 2 % shampoo, Apply  to affected area daily as needed for Itching., Disp: , Rfl:     OTHER, , Disp: , Rfl:     famotidine (PEPCID) 20 mg tablet, Take 1 Tab by mouth two (2) times a day., Disp: 60 Tab, Rfl: 0    CATAPRES-TTS-2 0.2 mg/24 hr ptwk, , Disp: , Rfl: 1    fluticasone propionate (FLONASE) 50 mcg/actuation nasal spray, 2 Sprays by Both Nostrils route daily. , Disp: 16 g, Rfl: 3    carvedilol (COREG) 12.5 mg tablet, Take 1 Tab by mouth two (2) times a day. Indications: high blood pressure, Disp: 180 Tab, Rfl: 0    loratadine (CLARITIN) 10 mg tablet, TAKE 1 TAB BY MOUTH DAILY AS NEEDED FOR ALLERGIES., Disp: 90 Tab, Rfl: 1    lidocaine (LIDODERM) 5 %, 1 PATCH BY TRANSDERMAL ROUTE EVERY TWENTY-FOUR (24) HOURS., Disp: 30 Patch, Rfl: 5    atorvastatin (LIPITOR) 40 mg tablet, TAKE 1 TAB BY MOUTH DAILY. , Disp: 90 Tab, Rfl: 1    DULoxetine (CYMBALTA) 60 mg capsule, Take 60 mg by mouth daily. , Disp: , Rfl:     multivitamin (DAILY-CAYLA) tablet, Take 1 Tab by mouth daily. , Disp: 90 Tab, Rfl: 3    thiamine HCL (B-1) 100 mg tablet, Take 1 Tab by mouth daily. , Disp: 90 Tab, Rfl: 3    folic acid (FOLVITE) 1 mg tablet, Take 1 Tab by mouth daily. , Disp: 90 Tab, Rfl: 3    nitroglycerin (NITROSTAT) 0.4 mg SL tablet, TAKE 1 TABLET UNDER TOUNGE EVERY 5 MINS AS NEEDED FOR CHEST PAIN. DO NOT EXCEED 3 DOSES, Disp: 25 Tab, Rfl: 0    QUEtiapine SR (SEROQUEL XR) 300 mg sr tablet, Take 300 mg by mouth nightly., Disp: , Rfl:     busPIRone (BUSPAR) 30 mg tablet, Take 1 Tab by mouth daily. (Patient taking differently: Take 10 mg by mouth daily.), Disp: 90 Tab, Rfl: 0    chlordiazePOXIDE (LIBRIUM) 25 mg capsule, Take 1 Cap by mouth three (3) times daily as needed (withdrawal symptoms and sever craving to alcohol). Max Daily Amount: 75 mg., Disp: 15 Cap, Rfl: 0    NIFEdipine ER (PROCARDIA XL) 30 mg ER tablet, TAKE 1 TAB BY MOUTH DAILY. (Patient taking differently: 50 mg.), Disp: 90 Tab, Rfl: 0    XARELTO 20 mg tab tablet, TAKE 1 TAB BY MOUTH DAILY (WITH BREAKFAST). , Disp: 90 Tab, Rfl: 1     Allergies   Allergen Reactions    Amlodipine Other (comments)    Carbamazepine Unknown (comments)     violent    Lisinopril Unknown (comments)    Prednisone Other (comments)     He stated it hypes him up       Review of Systems   Constitutional: Negative for chills and fever. HENT: Positive for hearing loss. Negative for ear pain.          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 Cardiovascular: Negative. Gastrointestinal: Positive for constipation and nausea. Uses pepcid occas   Genitourinary:        Slow urination at times . He has BPH   Musculoskeletal:        Has rt rotator cuff issues due to using a walker   Neurological: Negative for dizziness and loss of consciousness. Psychiatric/Behavioral: Positive for depression. The patient is nervous/anxious. Physical Exam:      Visit Vitals  /84 (BP 1 Location: Right arm, BP Patient Position: Sitting)   Pulse (!) 101   Temp 98.4 °F (36.9 °C) (Oral)   Resp 22   Ht 6' 2\" (1.88 m)   SpO2 97%   BMI 26.19 kg/m²       Physical Exam     Labs Reviewed:  Lab results reviewed. For significant abnormal values and values requiring intervention, see assessment and plan.     Assessment/Plan      chronic alcoholism  Hypertension   seizure  disorder alcohol withdrawal       refilled 17 meds  Jesús Adams MD

## 2020-09-10 NOTE — PROGRESS NOTES
Edelmira Lunsford is a 48 y.o. male (: 1965) presenting to address:    Chief Complaint   Patient presents with   [facility] Follow Up       Vitals:    09/10/20 1302   BP: 123/84   Pulse: (!) 101   Resp: 22   Temp: 98.4 °F (36.9 °C)   TempSrc: Oral   SpO2: 97%   Height: 6' 2\" (1.88 m)   PainSc:   9   PainLoc: Ankle       Hearing/Vision:   No exam data present    Learning Assessment:     Learning Assessment 2014   PRIMARY LEARNER Patient   HIGHEST LEVEL OF EDUCATION - PRIMARY LEARNER  -   BARRIERS PRIMARY LEARNER -   CO-LEARNER CAREGIVER -   PRIMARY LANGUAGE ENGLISH   LEARNER PREFERENCE PRIMARY LISTENING   ANSWERED BY patient   RELATIONSHIP SELF     Depression Screening:     3 most recent PHQ Screens 8/15/2018   PHQ Not Done Active Diagnosis of Depression or Bipolar Disorder     Fall Risk Assessment:     Fall Risk Assessment, last 12 mths 3/30/2018   Able to walk? Yes   Fall in past 12 months? Yes   Fall with injury? No   Number of falls in past 12 months 3   Fall Risk Score 3     Abuse Screening:     Abuse Screening Questionnaire 3/30/2018   Do you ever feel afraid of your partner? N   Are you in a relationship with someone who physically or mentally threatens you? N   Is it safe for you to go home? Y     Coordination of Care Questionaire:   1. Have you been to the ER, urgent care clinic since your last visit? Hospitalized since your last visit? YES gi    2. Have you seen or consulted any other health care providers outside of the [address] [place] [address] [facility] since your last visit? Include any pap smears or colon screening. NO    Advanced Directive:   1. Do you have an Advanced Directive? NO    2. Would you like information on Advanced Directives?  NO

## 2020-09-10 NOTE — LETTER
9/10/2020 3:22 PM 
 
Mr. Renée Correa [facility] [address] [facility] [phone] To Whom  It may concern, Please be advised that this patient needs the following items due to his disabilities. Shower  Chair Shower wand Handicap toilet and Shower bar Sincerely, Rachelle Mathew MD

## 2020-11-01 ENCOUNTER — HOSPITAL ENCOUNTER (EMERGENCY)
Age: 55
Discharge: ANOTHER HOSPITAL | End: 2020-11-02
Attending: EMERGENCY MEDICINE
Payer: MEDICAID

## 2020-11-01 DIAGNOSIS — F10.10 ALCOHOL ABUSE: ICD-10-CM

## 2020-11-01 DIAGNOSIS — R45.851 SUICIDAL IDEATIONS: ICD-10-CM

## 2020-11-01 DIAGNOSIS — F10.930 ALCOHOL WITHDRAWAL SYNDROME WITHOUT COMPLICATION (HCC): Primary | ICD-10-CM

## 2020-11-01 LAB
ALBUMIN SERPL-MCNC: 3.4 G/DL (ref 3.4–5)
ALBUMIN/GLOB SERPL: 0.9 {RATIO} (ref 0.8–1.7)
ALP SERPL-CCNC: 115 U/L (ref 45–117)
ALT SERPL-CCNC: 20 U/L (ref 16–61)
AMPHET UR QL SCN: NEGATIVE
ANION GAP SERPL CALC-SCNC: 9 MMOL/L (ref 3–18)
APPEARANCE UR: CLEAR
AST SERPL-CCNC: 21 U/L (ref 10–38)
BARBITURATES UR QL SCN: POSITIVE
BASOPHILS # BLD: 0.1 K/UL (ref 0–0.1)
BASOPHILS NFR BLD: 1 % (ref 0–2)
BENZODIAZ UR QL: NEGATIVE
BILIRUB SERPL-MCNC: 0.2 MG/DL (ref 0.2–1)
BILIRUB UR QL: NEGATIVE
BUN SERPL-MCNC: 6 MG/DL (ref 7–18)
BUN/CREAT SERPL: 7 (ref 12–20)
CALCIUM SERPL-MCNC: 9 MG/DL (ref 8.5–10.1)
CANNABINOIDS UR QL SCN: NEGATIVE
CHLORIDE SERPL-SCNC: 100 MMOL/L (ref 100–111)
CO2 SERPL-SCNC: 26 MMOL/L (ref 21–32)
COCAINE UR QL SCN: NEGATIVE
COLOR UR: YELLOW
COVID-19 RAPID TEST, COVR: NOT DETECTED
CREAT SERPL-MCNC: 0.82 MG/DL (ref 0.6–1.3)
DIFFERENTIAL METHOD BLD: ABNORMAL
EOSINOPHIL # BLD: 0.1 K/UL (ref 0–0.4)
EOSINOPHIL NFR BLD: 2 % (ref 0–5)
ERYTHROCYTE [DISTWIDTH] IN BLOOD BY AUTOMATED COUNT: 15.1 % (ref 11.6–14.5)
ETHANOL SERPL-MCNC: 36 MG/DL (ref 0–3)
GLOBULIN SER CALC-MCNC: 3.9 G/DL (ref 2–4)
GLUCOSE SERPL-MCNC: 120 MG/DL (ref 74–99)
GLUCOSE UR STRIP.AUTO-MCNC: NEGATIVE MG/DL
HCT VFR BLD AUTO: 39.6 % (ref 36–48)
HDSCOM,HDSCOM: ABNORMAL
HEALTH STATUS, XMCV2T: NORMAL
HGB BLD-MCNC: 13 G/DL (ref 13–16)
HGB UR QL STRIP: NEGATIVE
KETONES UR QL STRIP.AUTO: NEGATIVE MG/DL
LEUKOCYTE ESTERASE UR QL STRIP.AUTO: NEGATIVE
LIPASE SERPL-CCNC: 202 U/L (ref 73–393)
LYMPHOCYTES # BLD: 1.3 K/UL (ref 0.9–3.6)
LYMPHOCYTES NFR BLD: 23 % (ref 21–52)
MCH RBC QN AUTO: 29.3 PG (ref 24–34)
MCHC RBC AUTO-ENTMCNC: 32.8 G/DL (ref 31–37)
MCV RBC AUTO: 89.4 FL (ref 74–97)
METHADONE UR QL: NEGATIVE
MONOCYTES # BLD: 0.5 K/UL (ref 0.05–1.2)
MONOCYTES NFR BLD: 9 % (ref 3–10)
NEUTS SEG # BLD: 3.5 K/UL (ref 1.8–8)
NEUTS SEG NFR BLD: 65 % (ref 40–73)
NITRITE UR QL STRIP.AUTO: NEGATIVE
OPIATES UR QL: NEGATIVE
PCP UR QL: NEGATIVE
PH UR STRIP: 5 [PH] (ref 5–8)
PLATELET # BLD AUTO: 221 K/UL (ref 135–420)
PMV BLD AUTO: 10.6 FL (ref 9.2–11.8)
POTASSIUM SERPL-SCNC: 3.5 MMOL/L (ref 3.5–5.5)
PROT SERPL-MCNC: 7.3 G/DL (ref 6.4–8.2)
PROT UR STRIP-MCNC: NEGATIVE MG/DL
RBC # BLD AUTO: 4.43 M/UL (ref 4.7–5.5)
SODIUM SERPL-SCNC: 135 MMOL/L (ref 136–145)
SOURCE, COVRS: NORMAL
SP GR UR REFRACTOMETRY: 1.02 (ref 1–1.03)
SPECIMEN TYPE, XMCV1T: NORMAL
UROBILINOGEN UR QL STRIP.AUTO: 1 EU/DL (ref 0.2–1)
WBC # BLD AUTO: 5.4 K/UL (ref 4.6–13.2)

## 2020-11-01 PROCEDURE — 85025 COMPLETE CBC W/AUTO DIFF WBC: CPT

## 2020-11-01 PROCEDURE — 93005 ELECTROCARDIOGRAM TRACING: CPT

## 2020-11-01 PROCEDURE — 81003 URINALYSIS AUTO W/O SCOPE: CPT

## 2020-11-01 PROCEDURE — 99285 EMERGENCY DEPT VISIT HI MDM: CPT

## 2020-11-01 PROCEDURE — 74011250636 HC RX REV CODE- 250/636: Performed by: EMERGENCY MEDICINE

## 2020-11-01 PROCEDURE — 96376 TX/PRO/DX INJ SAME DRUG ADON: CPT

## 2020-11-01 PROCEDURE — 96366 THER/PROPH/DIAG IV INF ADDON: CPT

## 2020-11-01 PROCEDURE — 83690 ASSAY OF LIPASE: CPT

## 2020-11-01 PROCEDURE — 80307 DRUG TEST PRSMV CHEM ANLYZR: CPT

## 2020-11-01 PROCEDURE — 96365 THER/PROPH/DIAG IV INF INIT: CPT

## 2020-11-01 PROCEDURE — 74011000250 HC RX REV CODE- 250: Performed by: EMERGENCY MEDICINE

## 2020-11-01 PROCEDURE — 74011250637 HC RX REV CODE- 250/637: Performed by: EMERGENCY MEDICINE

## 2020-11-01 PROCEDURE — 87635 SARS-COV-2 COVID-19 AMP PRB: CPT

## 2020-11-01 PROCEDURE — 80053 COMPREHEN METABOLIC PANEL: CPT

## 2020-11-01 PROCEDURE — 96375 TX/PRO/DX INJ NEW DRUG ADDON: CPT

## 2020-11-01 PROCEDURE — 96361 HYDRATE IV INFUSION ADD-ON: CPT

## 2020-11-01 RX ORDER — LORAZEPAM 2 MG/ML
1 INJECTION INTRAMUSCULAR
Status: COMPLETED | OUTPATIENT
Start: 2020-11-01 | End: 2020-11-01

## 2020-11-01 RX ORDER — LORAZEPAM 2 MG/ML
2 INJECTION INTRAMUSCULAR
Status: DISCONTINUED | OUTPATIENT
Start: 2020-11-01 | End: 2020-11-02 | Stop reason: HOSPADM

## 2020-11-01 RX ORDER — LORAZEPAM 1 MG/1
2 TABLET ORAL
Status: DISCONTINUED | OUTPATIENT
Start: 2020-11-01 | End: 2020-11-01 | Stop reason: SDUPTHER

## 2020-11-01 RX ORDER — LORAZEPAM 2 MG/ML
2 INJECTION INTRAMUSCULAR
Status: DISCONTINUED | OUTPATIENT
Start: 2020-11-01 | End: 2020-11-01 | Stop reason: SDUPTHER

## 2020-11-01 RX ORDER — LORAZEPAM 1 MG/1
1 TABLET ORAL
Status: DISCONTINUED | OUTPATIENT
Start: 2020-11-01 | End: 2020-11-01 | Stop reason: SDUPTHER

## 2020-11-01 RX ORDER — LORAZEPAM 1 MG/1
1 TABLET ORAL
Status: DISCONTINUED | OUTPATIENT
Start: 2020-11-01 | End: 2020-11-02 | Stop reason: HOSPADM

## 2020-11-01 RX ORDER — LORAZEPAM 2 MG/ML
3 INJECTION INTRAMUSCULAR
Status: DISCONTINUED | OUTPATIENT
Start: 2020-11-01 | End: 2020-11-02 | Stop reason: HOSPADM

## 2020-11-01 RX ORDER — SODIUM CHLORIDE 0.9 % (FLUSH) 0.9 %
5-40 SYRINGE (ML) INJECTION AS NEEDED
Status: DISCONTINUED | OUTPATIENT
Start: 2020-11-01 | End: 2020-11-02 | Stop reason: HOSPADM

## 2020-11-01 RX ORDER — FAMOTIDINE 10 MG/ML
20 INJECTION INTRAVENOUS
Status: COMPLETED | OUTPATIENT
Start: 2020-11-01 | End: 2020-11-01

## 2020-11-01 RX ORDER — SODIUM CHLORIDE 0.9 % (FLUSH) 0.9 %
5-40 SYRINGE (ML) INJECTION EVERY 8 HOURS
Status: DISCONTINUED | OUTPATIENT
Start: 2020-11-01 | End: 2020-11-02 | Stop reason: HOSPADM

## 2020-11-01 RX ORDER — LORAZEPAM 2 MG/ML
1 INJECTION INTRAMUSCULAR
Status: DISCONTINUED | OUTPATIENT
Start: 2020-11-01 | End: 2020-11-02 | Stop reason: HOSPADM

## 2020-11-01 RX ORDER — LORAZEPAM 1 MG/1
2 TABLET ORAL
Status: DISCONTINUED | OUTPATIENT
Start: 2020-11-01 | End: 2020-11-02 | Stop reason: HOSPADM

## 2020-11-01 RX ORDER — ONDANSETRON 2 MG/ML
4 INJECTION INTRAMUSCULAR; INTRAVENOUS
Status: COMPLETED | OUTPATIENT
Start: 2020-11-01 | End: 2020-11-01

## 2020-11-01 RX ORDER — MORPHINE SULFATE 4 MG/ML
4 INJECTION, SOLUTION INTRAMUSCULAR; INTRAVENOUS
Status: COMPLETED | OUTPATIENT
Start: 2020-11-01 | End: 2020-11-01

## 2020-11-01 RX ADMIN — SODIUM CHLORIDE 1000 ML: 900 INJECTION, SOLUTION INTRAVENOUS at 09:00

## 2020-11-01 RX ADMIN — FAMOTIDINE 20 MG: 10 INJECTION INTRAVENOUS at 15:51

## 2020-11-01 RX ADMIN — LORAZEPAM 2 MG: 2 INJECTION, SOLUTION INTRAMUSCULAR; INTRAVENOUS at 21:13

## 2020-11-01 RX ADMIN — LORAZEPAM 2 MG: 2 INJECTION, SOLUTION INTRAMUSCULAR; INTRAVENOUS at 22:33

## 2020-11-01 RX ADMIN — LORAZEPAM 2 MG: 1 TABLET ORAL at 19:37

## 2020-11-01 RX ADMIN — ONDANSETRON 4 MG: 2 INJECTION INTRAMUSCULAR; INTRAVENOUS at 09:14

## 2020-11-01 RX ADMIN — LORAZEPAM 2 MG: 2 INJECTION, SOLUTION INTRAMUSCULAR; INTRAVENOUS at 18:14

## 2020-11-01 RX ADMIN — MORPHINE SULFATE 4 MG: 4 INJECTION, SOLUTION INTRAMUSCULAR; INTRAVENOUS at 09:16

## 2020-11-01 RX ADMIN — LORAZEPAM 1 MG: 2 INJECTION, SOLUTION INTRAMUSCULAR; INTRAVENOUS at 09:16

## 2020-11-01 RX ADMIN — LORAZEPAM 2 MG: 2 INJECTION, SOLUTION INTRAMUSCULAR; INTRAVENOUS at 15:52

## 2020-11-01 RX ADMIN — FOLIC ACID: 5 INJECTION, SOLUTION INTRAMUSCULAR; INTRAVENOUS; SUBCUTANEOUS at 11:38

## 2020-11-01 RX ADMIN — Medication 10 ML: at 15:51

## 2020-11-01 RX ADMIN — LORAZEPAM 2 MG: 2 INJECTION, SOLUTION INTRAMUSCULAR; INTRAVENOUS at 23:38

## 2020-11-01 RX ADMIN — LORAZEPAM 3 MG: 2 INJECTION, SOLUTION INTRAMUSCULAR; INTRAVENOUS at 12:27

## 2020-11-01 NOTE — ED PROVIDER NOTES
[facility] EMERGENCY DEPT      8:56 AM    Date: 11/1/2020  Patient Name: Katerine Harris    History of Presenting Illness     Chief Complaint   Patient presents with    Abdominal Pain    Suicidal    Alcohol Problem       48 y.o. male with noted past medical history who presents to the emergency department with diffuse abdominal pain and alcohol withdrawal.    The patient states he is a known alcoholic and drinks at least a gallon of vodka per day. He states that he stopped drinking alcohol about 23 hours last night secondary to having some diffuse abdominal pain that began at 21 hours last night. He also stopped drinking because he states he ran out of liquor. On arrival in the ER the patient has diffuse abdominal pain that is worse in epigastric region he states it feels like his prior pancreatitis. In addition to that the patient also states that he is now having the shakes and feels he is going through alcohol withdrawal.  He currently has no nausea vomiting diarrhea fever chills. The patient denies recent travel outside [facility] and denies recent travel to areas large social gatherings. The patient denies any known history of Covid 19 exposure. In addition to those physical complaints, the patient also complains that he had suicidal thoughts last night and his plan would be to overdose on medications. He is voluntary for admission if deemed appropriate for his suicidal ideations. Patient denies any other associated signs or symptoms. Patient denies any other complaints. Nursing notes regarding the HPI and triage nursing notes were reviewed. Prior medical records were reviewed. Current Facility-Administered Medications   Medication Dose Route Frequency Provider Last Rate Last Dose    0.9% sodium chloride 1,000 mL with mvi, adult no. 4 with vit K 10 mL, thiamine 326 mg, folic acid 1 mg infusion   IntraVENous Ana Fung MD         Current Outpatient Medications   Medication Sig Dispense Refill    acetaminophen (TYLENOL) 500 mg tablet Take  by mouth every six (6) hours as needed for Pain.  potassium gluconate 595 mg capsule Take  by mouth three (3) times daily (with meals).  OTHER       busPIRone (BUSPAR) 10 mg tablet Take 1 Tab by mouth daily for 90 days. 270 Tab 1    QUEtiapine SR (SEROquel XR) 300 mg sr tablet Take 1 Tab by mouth nightly. 30 Tab 1    DULoxetine (Cymbalta) 60 mg capsule Take 1 Cap by mouth daily. Indications: anxiousness associated with depression 30 Cap 0    famotidine (PEPCID) 20 mg tablet Take 1 Tab by mouth two (2) times a day. 60 Tab 0    levETIRAcetam (KEPPRA) 500 mg tablet Take 1 Tab by mouth two (2) times a day. 60 Tab 1    senna (Senna-Gen) 8.6 mg tablet Take 2 Tabs by mouth two (2) times a day. 60 Tab 3    cloNIDine HCL (CATAPRES) 0.1 mg tablet Take 2 Tabs by mouth two (2) times a day. 120 Tab 1    aspirin 81 mg chewable tablet Take 1 Tab by mouth daily. 90 Tab 3    tamsulosin (FLOMAX) 0.4 mg capsule Take 1 Cap by mouth daily. 90 Cap 2    atorvastatin (LIPITOR) 40 mg tablet Take 1 Tab by mouth daily. Indications: high cholesterol 90 Tab 1    carvediloL (COREG) 12.5 mg tablet Take 1 Tab by mouth two (2) times a day. Indications: high blood pressure 180 Tab 3    clopidogreL (PLAVIX) 75 mg tab Take 1 Tab by mouth daily. 90 Tab 1    multivitamin (Daily-Ojseph) tablet Take 1 Tab by mouth daily. 90 Tab 3    fluticasone propionate (FLONASE) 50 mcg/actuation nasal spray 2 Sprays by Both Nostrils route daily. 16 g 3    ketoconazole (NIZORAL) 2 % shampoo Use qod 1 Bottle 5    gabapentin (NEURONTIN) 800 mg tablet Take 1 Tab by mouth three (3) times daily. Max Daily Amount: 2,400 mg. 90 Tab 1    chlordiazePOXIDE (LIBRIUM) 25 mg capsule Take 1 Cap by mouth three (3) times daily as needed (withdrawal symptoms and sever craving to alcohol).  Max Daily Amount: 75 mg. 15 Cap 0    NIFEdipine ER (PROCARDIA XL) 30 mg ER tablet TAKE 1 TAB BY MOUTH DAILY. (Patient taking differently: 50 mg.) 90 Tab 0    lidocaine (LIDODERM) 5 % 1 PATCH BY TRANSDERMAL ROUTE EVERY TWENTY-FOUR (24) HOURS. 30 Patch 5    thiamine HCL (B-1) 100 mg tablet Take 1 Tab by mouth daily. 90 Tab 3    folic acid (FOLVITE) 1 mg tablet Take 1 Tab by mouth daily. 90 Tab 3    nitroglycerin (NITROSTAT) 0.4 mg SL tablet TAKE 1 TABLET UNDER TOUNGE EVERY 5 MINS AS NEEDED FOR CHEST PAIN. DO NOT EXCEED 3 DOSES 25 Tab 0       Past History     Past Medical History:  Past Medical History:   Diagnosis Date    Abuse     Anemia NEC     Anxiety     Arthritis     Chronic pain     Colitis     Contact dermatitis and other eczema, due to unspecified cause     Depression     Depression     Headache(784.0)     Heart attack ([address]     Hypercholesterolemia     Hypertension     Liver disease     Low back pain     with left leg pain -- multilevel spondylosis and L4 radiculitis    Neck pain     mild spondylosis    Other ill-defined conditions(799.89)     MS symptoms    Pancreatitis     Psychotic disorder ([address]     Trauma        Past Surgical History:  Past Surgical History:   Procedure Laterality Date    HX CYST REMOVAL         Family History:  Family History   Problem Relation Age of Onset    Psychiatric Disorder Mother     Heart Disease Mother     Arthritis-osteo Father     Alcohol abuse Father     Cancer Father         lung    Elevated Lipids Father     Headache Father     Hypertension Father     Lung Disease Father     Diabetes Father        Social History:  Social History     Tobacco Use    Smoking status: Current Some Day Smoker     Packs/day: 0.50     Years: 20.00     Pack years: 10.00     Types: Cigarettes    Smokeless tobacco: Never Used    Tobacco comment: patient states he vapes daily   Substance Use Topics    Alcohol use: Not Currently     Alcohol/week: 0.0 standard drinks     Comment:  ETOH abuse - quit NOV 2018    Drug use:  No Allergies: Allergies   Allergen Reactions    Amlodipine Other (comments)    Carbamazepine Unknown (comments)     violent    Lisinopril Unknown (comments)    Prednisone Other (comments)     He stated it hypes him up       Patient's primary care provider (as noted in EPIC): Other, MD [place]    Review of Systems   Constitutional: Negative for diaphoresis. HENT: Negative for congestion. Eyes: Negative for discharge. Respiratory: Negative for stridor. Cardiovascular: Negative for palpitations. Gastrointestinal: Negative for diarrhea. Genitourinary: Negative for flank pain. Musculoskeletal: Negative for back pain. Neurological: Negative for weakness. Psychiatric/Behavioral: Negative for hallucinations. All other systems reviewed and are negative. Visit Vitals  /78   Pulse (!) 127   Temp 98.7 °F (37.1 °C)   Resp 18   Ht 6' 2\" (1.88 m)   Wt 104.3 kg (230 lb)   SpO2 95%   BMI 29.53 kg/m²       PHYSICAL EXAM:    CONSTITUTIONAL:  Alert, in no apparent distress;  well developed;  well nourished. HEAD:  Normocephalic, atraumatic. EYES:  EOMI. Non-icteric sclera. Normal conjunctiva. ENTM:  Nose:  no rhinorrhea. Throat:  no erythema or exudate, mucous membranes moist.  NECK:  No JVD. Supple  RESPIRATORY:  Chest clear, equal breath sounds, good air movement. CARDIOVASCULAR:  Regular rate and rhythm. No murmurs, rubs, or gallops. GI:  Normal bowel sounds, abdomen soft and non-tender. No rebound or guarding. BACK:  Non-tender. UPPER EXT:  Normal inspection. LOWER EXT:  No edema, no calf tenderness. Distal pulses intact. NEURO:  Moves all four extremities, and grossly normal motor exam.  SKIN:  No rashes;  Normal for age. PSYCH:  Alert and normal affect. DIFFERENTIAL DIAGNOSES/ MEDICAL DECISION MAKING:   Differential diagnoses/impression: Need to rule out obvious organic causes versus psychological etiology.      Based on patient's presentation and lab work, I do not believe that there is an obvious organic etiology for the patient's suicidal ideation. I believe the patient needs psychiatric evaluation and treatment for the suicidal ideation.       ED COURSE:      Diagnostic Study Results     Abnormal lab results from this emergency department encounter:  Labs Reviewed   CBC WITH AUTOMATED DIFF - Abnormal; Notable for the following components:       Result Value    RBC 4.43 (*)     RDW 15.1 (*)     All other components within normal limits   METABOLIC PANEL, COMPREHENSIVE - Abnormal; Notable for the following components:    Sodium 135 (*)     Glucose 120 (*)     BUN 6 (*)     BUN/Creatinine ratio 7 (*)     All other components within normal limits   ETHYL ALCOHOL - Abnormal; Notable for the following components:    ALCOHOL(ETHYL),SERUM 36 (*)     All other components within normal limits   DRUG SCREEN, URINE - Abnormal; Notable for the following components:    BARBITURATES Positive (*)     All other components within normal limits   URINALYSIS W/ RFLX MICROSCOPIC   LIPASE       Lab values for this patient within approximately the last 12 hours:  Recent Results (from the past 12 hour(s))   URINALYSIS W/ RFLX MICROSCOPIC    Collection Time: 11/01/20  8:20 AM   Result Value Ref Range    Color YELLOW      Appearance CLEAR      Specific gravity 1.018 1.005 - 1.030      pH (UA) 5.0 5.0 - 8.0      Protein Negative NEG mg/dL    Glucose Negative NEG mg/dL    Ketone Negative NEG mg/dL    Bilirubin Negative NEG      Blood Negative NEG      Urobilinogen 1.0 0.2 - 1.0 EU/dL    Nitrites Negative NEG      Leukocyte Esterase Negative NEG     DRUG SCREEN, URINE    Collection Time: 11/01/20  8:20 AM   Result Value Ref Range    BENZODIAZEPINES Negative NEG      BARBITURATES Positive (A) NEG      THC (TH-CANNABINOL) Negative NEG      OPIATES Negative NEG      PCP(PHENCYCLIDINE) Negative NEG      COCAINE Negative NEG      AMPHETAMINES Negative NEG      METHADONE Negative NEG      HDSCOM (NOTE) EKG, 12 LEAD, INITIAL    Collection Time: 11/01/20  8:40 AM   Result Value Ref Range    Ventricular Rate 138 BPM    Atrial Rate 138 BPM    P-R Interval 142 ms    QRS Duration 68 ms    Q-T Interval 290 ms    QTC Calculation (Bezet) 439 ms    Calculated P Axis 53 degrees    Calculated R Axis 24 degrees    Calculated T Axis 48 degrees    Diagnosis       Sinus tachycardia with occasional premature ventricular complexes  Otherwise normal ECG  When compared with ECG of 03-JUN-2020 03:59,  premature ventricular complexes are now present     CBC WITH AUTOMATED DIFF    Collection Time: 11/01/20  8:56 AM   Result Value Ref Range    WBC 5.4 4.6 - 13.2 K/uL    RBC 4.43 (L) 4.70 - 5.50 M/uL    HGB 13.0 13.0 - 16.0 g/dL    HCT 39.6 36.0 - 48.0 %    MCV 89.4 74.0 - 97.0 FL    MCH 29.3 24.0 - 34.0 PG    MCHC 32.8 31.0 - 37.0 g/dL    RDW 15.1 (H) 11.6 - 14.5 %    PLATELET 934 624 - 997 K/uL    MPV 10.6 9.2 - 11.8 FL    NEUTROPHILS 65 40 - 73 %    LYMPHOCYTES 23 21 - 52 %    MONOCYTES 9 3 - 10 %    EOSINOPHILS 2 0 - 5 %    BASOPHILS 1 0 - 2 %    ABS. NEUTROPHILS 3.5 1.8 - 8.0 K/UL    ABS. LYMPHOCYTES 1.3 0.9 - 3.6 K/UL    ABS. MONOCYTES 0.5 0.05 - 1.2 K/UL    ABS. EOSINOPHILS 0.1 0.0 - 0.4 K/UL    ABS. BASOPHILS 0.1 0.0 - 0.1 K/UL    DF AUTOMATED     METABOLIC PANEL, COMPREHENSIVE    Collection Time: 11/01/20  8:56 AM   Result Value Ref Range    Sodium 135 (L) 136 - 145 mmol/L    Potassium 3.5 3.5 - 5.5 mmol/L    Chloride 100 100 - 111 mmol/L    CO2 26 21 - 32 mmol/L    Anion gap 9 3.0 - 18 mmol/L    Glucose 120 (H) 74 - 99 mg/dL    BUN 6 (L) 7.0 - 18 MG/DL    Creatinine 0.82 0.6 - 1.3 MG/DL    BUN/Creatinine ratio 7 (L) 12 - 20      GFR est AA >60 >60 ml/min/1.73m2    GFR est non-AA >60 >60 ml/min/1.73m2    Calcium 9.0 8.5 - 10.1 MG/DL    Bilirubin, total 0.2 0.2 - 1.0 MG/DL    ALT (SGPT) 20 16 - 61 U/L    AST (SGOT) 21 10 - 38 U/L    Alk.  phosphatase 115 45 - 117 U/L    Protein, total 7.3 6.4 - 8.2 g/dL    Albumin 3.4 3.4 - 5.0 g/dL    Globulin 3.9 2.0 - 4.0 g/dL    A-G Ratio 0.9 0.8 - 1.7     LIPASE    Collection Time: 11/01/20  8:56 AM   Result Value Ref Range    Lipase 202 73 - 393 U/L   ETHYL ALCOHOL    Collection Time: 11/01/20  8:56 AM   Result Value Ref Range    ALCOHOL(ETHYL),SERUM 36 (H) 0 - 3 MG/DL       Radiologist and cardiologist interpretations if available at time of this note:  No results found. Emergency physician interpretation of EKG: Sinus tachycardia about 140 bpm.    Medication(s) ordered for patient during this emergency visit encounter:  Medications   0.9% sodium chloride 1,000 mL with mvi, adult no. 4 with vit K 10 mL, thiamine 299 mg, folic acid 1 mg infusion (has no administration in time range)   sodium chloride 0.9 % bolus infusion 1,000 mL (1,000 mL IntraVENous New Bag 11/1/20 0900)   LORazepam (ATIVAN) injection 1 mg (1 mg IntraVENous Given 11/1/20 0916)   morphine injection 4 mg (4 mg IntraVENous Given 11/1/20 0916)   ondansetron (ZOFRAN) injection 4 mg (4 mg IntraVENous Given 11/1/20 0914)       Medical Decision Making     I am the first provider for this patient. I reviewed the vital signs, available nursing notes, past medical history, past surgical history, family history and social history. Vital Signs:  Reviewed the patient's vital signs. ED COURSE:  The patient has no active medical issues. I believe that the patient is medically cleared for admission to a psychiatric unit if this is deemed appropriate by the crisis staff after their evaluation of the patient. IMPRESSION AND MEDICAL DECISION MAKING:  Based upon the patient's presentation with noted HPI and PE, along with the work up done in the emergency department, I believe that the patient is having suicidal ideation that MAY require admission and further evaluation on a psychiatric/ behavioral medicine unit. THE PATIENT IS MEDICALLY CLEARED FOR ADMISSION TO A PSYCHIATRIC UNIT.     Consult Tele-Psychiatry    The on call tele-psychiatry was called and the patient was presented for psychiatry consult. I personally spoke with Dr. Gela Garzon, in the tele-psychiatry group, about the patient's presentation and management. Final disposition of the patient will be determined based on the recommendation. Condition:  Stable    Signout Note      Pt care transferred to Dr. Jericho Castellanos  ,ED provider. History of patient complaint(s), available diagnostic reports and current treatment plan has been discussed thoroughly. Bedside rounding on patient occured : no . Intended disposition of patient : Transfer to an inpatient behavioral medicine facility. Pending diagnostics reports and/or labs (please list): [facility] case management transfer of a voluntary suicidal ideation patient who is an alcoholic on CIWA protocol to an inpatient behavioral medicine facility. Coding Diagnoses     Clinical Impression:   1. Alcohol withdrawal syndrome without complication ([facility] [address]    2. Alcohol abuse    3. Suicidal ideations        Disposition     Disposition: Transfer to an inpatient behavioral medicine facility    Brian L. Raenette Hodgkin, M.D. ADEOLA Board Certified Emergency Physician    Provider Attestation:  If a scribe was utilized in generation of this patient record, I personally performed the services described in the documentation, reviewed the documentation, as recorded by the scribe in my presence, and it accurately records the patient's history of presenting illness, review of systems, patient physical examination, and procedures performed by me as the attending physician. Coretta Leitz L. Raenette Hodgkin, M.D. ABEM Board Certified Emergency Physician  11/1/2020.  9:03 AM

## 2020-11-01 NOTE — PROGRESS NOTES
11/1/2020    I got consult for pt who needs in Patient Psych placement. I went and discussed with pt that I would call all the area facilities and then will call the [facility] facilities for placenment. I let him know that I we may find an accepting bed close or thrughout the [facility] , and that we may need to place him in [facility], even if that is not prefererred. 1:13- I called to [facility], where pt had mentioned he would like- no open beds. 1:15 I called to [address] [place] [address]- they said to fax it over. 1:20- I called to [facility] [address]- they said they have a lot of charts to review but we can fax his over. 1:23 I called [facility], but they have no beds. 1:29- I called to [facility]- they are full.  2:00 I called to [facility]- the only bed they have is at [facility] which do not take Alcohol detox pts. I paged [facility] to my number. 2:15 I called to [facility]- had to wait on hold for longer than  5 minutes so I left message and faxed info to them. 2:40 I called to [address] [facility] but they have no beds. Bernardino Durant.  Princess Subramanian, BSN  [facility]  [phone], Pager [phone]  [email or web address]
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
## 2020-11-01 NOTE — ED TRIAGE NOTES
Pt reports abdominal pains, nausea and vomiting since yesterday.,  Pt also reports tremors and states he is withdrawing from alcohol. Last drink 2300 last night. Pt also reports suicidal ideations with a plan of overdose \"on medications\".

## 2020-11-02 ENCOUNTER — HOSPITAL ENCOUNTER (INPATIENT)
Age: 55
LOS: 11 days | Discharge: NURSING, REHAB OR LONG-TERM CARE | DRG: 751 | End: 2020-11-13
Attending: PSYCHIATRY & NEUROLOGY | Admitting: PSYCHIATRY & NEUROLOGY
Payer: MEDICAID

## 2020-11-02 VITALS
WEIGHT: 230 LBS | TEMPERATURE: 98 F | HEIGHT: 74 IN | OXYGEN SATURATION: 94 % | BODY MASS INDEX: 29.52 KG/M2 | RESPIRATION RATE: 18 BRPM | HEART RATE: 102 BPM | DIASTOLIC BLOOD PRESSURE: 113 MMHG | SYSTOLIC BLOOD PRESSURE: 154 MMHG

## 2020-11-02 DIAGNOSIS — G89.29 CHRONIC LEFT-SIDED LOW BACK PAIN WITH LEFT-SIDED SCIATICA: Primary | ICD-10-CM

## 2020-11-02 DIAGNOSIS — M54.42 CHRONIC LEFT-SIDED LOW BACK PAIN WITH LEFT-SIDED SCIATICA: Primary | ICD-10-CM

## 2020-11-02 PROBLEM — F32.9 MDD (MAJOR DEPRESSIVE DISORDER): Status: ACTIVE | Noted: 2020-11-02

## 2020-11-02 PROCEDURE — 74011250637 HC RX REV CODE- 250/637: Performed by: PSYCHIATRY & NEUROLOGY

## 2020-11-02 PROCEDURE — 74011250637 HC RX REV CODE- 250/637: Performed by: EMERGENCY MEDICINE

## 2020-11-02 PROCEDURE — 74011250637 HC RX REV CODE- 250/637: Performed by: PHYSICIAN ASSISTANT

## 2020-11-02 PROCEDURE — 65220000003 HC RM SEMIPRIVATE PSYCH

## 2020-11-02 RX ORDER — DULOXETIN HYDROCHLORIDE 60 MG/1
60 CAPSULE, DELAYED RELEASE ORAL DAILY
Status: DISCONTINUED | OUTPATIENT
Start: 2020-11-03 | End: 2020-11-13 | Stop reason: HOSPADM

## 2020-11-02 RX ORDER — GUAIFENESIN 100 MG/5ML
81 LIQUID (ML) ORAL DAILY
Status: DISCONTINUED | OUTPATIENT
Start: 2020-11-03 | End: 2020-11-13 | Stop reason: HOSPADM

## 2020-11-02 RX ORDER — LORAZEPAM 1 MG/1
1 TABLET ORAL
Status: DISCONTINUED | OUTPATIENT
Start: 2020-11-02 | End: 2020-11-13 | Stop reason: HOSPADM

## 2020-11-02 RX ORDER — QUETIAPINE 300 MG/1
300 TABLET, FILM COATED, EXTENDED RELEASE ORAL
Status: DISCONTINUED | OUTPATIENT
Start: 2020-11-02 | End: 2020-11-13 | Stop reason: HOSPADM

## 2020-11-02 RX ORDER — DULOXETIN HYDROCHLORIDE 30 MG/1
60 CAPSULE, DELAYED RELEASE ORAL DAILY
Status: DISCONTINUED | OUTPATIENT
Start: 2020-11-02 | End: 2020-11-02 | Stop reason: HOSPADM

## 2020-11-02 RX ORDER — ONDANSETRON 4 MG/1
4 TABLET, ORALLY DISINTEGRATING ORAL
Status: DISCONTINUED | OUTPATIENT
Start: 2020-11-02 | End: 2020-11-13 | Stop reason: HOSPADM

## 2020-11-02 RX ORDER — MUPIROCIN 20 MG/G
OINTMENT TOPICAL 3 TIMES DAILY
Status: DISPENSED | OUTPATIENT
Start: 2020-11-02 | End: 2020-11-12

## 2020-11-02 RX ORDER — FOLIC ACID 1 MG/1
1 TABLET ORAL DAILY
Status: DISCONTINUED | OUTPATIENT
Start: 2020-11-03 | End: 2020-11-13 | Stop reason: HOSPADM

## 2020-11-02 RX ORDER — LOPERAMIDE HYDROCHLORIDE 2 MG/1
2-4 CAPSULE ORAL AS NEEDED
Status: DISCONTINUED | OUTPATIENT
Start: 2020-11-02 | End: 2020-11-13 | Stop reason: HOSPADM

## 2020-11-02 RX ORDER — BUSPIRONE HYDROCHLORIDE 5 MG/1
10 TABLET ORAL 3 TIMES DAILY
Status: DISCONTINUED | OUTPATIENT
Start: 2020-11-02 | End: 2020-11-02 | Stop reason: HOSPADM

## 2020-11-02 RX ORDER — THERA TABS 400 MCG
1 TAB ORAL DAILY
Status: DISCONTINUED | OUTPATIENT
Start: 2020-11-03 | End: 2020-11-13 | Stop reason: HOSPADM

## 2020-11-02 RX ORDER — TAMSULOSIN HYDROCHLORIDE 0.4 MG/1
0.4 CAPSULE ORAL DAILY
Status: DISCONTINUED | OUTPATIENT
Start: 2020-11-03 | End: 2020-11-13 | Stop reason: HOSPADM

## 2020-11-02 RX ORDER — CARVEDILOL 12.5 MG/1
12.5 TABLET ORAL 2 TIMES DAILY WITH MEALS
Status: DISCONTINUED | OUTPATIENT
Start: 2020-11-02 | End: 2020-11-03

## 2020-11-02 RX ORDER — BUSPIRONE HYDROCHLORIDE 10 MG/1
10 TABLET ORAL DAILY
Status: DISCONTINUED | OUTPATIENT
Start: 2020-11-03 | End: 2020-11-09

## 2020-11-02 RX ORDER — CLONIDINE HYDROCHLORIDE 0.1 MG/1
0.1 TABLET ORAL 2 TIMES DAILY
Status: DISCONTINUED | OUTPATIENT
Start: 2020-11-02 | End: 2020-11-13 | Stop reason: HOSPADM

## 2020-11-02 RX ORDER — LEVETIRACETAM 500 MG/1
500 TABLET ORAL 2 TIMES DAILY
Status: DISCONTINUED | OUTPATIENT
Start: 2020-11-02 | End: 2020-11-13 | Stop reason: HOSPADM

## 2020-11-02 RX ORDER — LORAZEPAM 1 MG/1
2 TABLET ORAL
Status: DISCONTINUED | OUTPATIENT
Start: 2020-11-02 | End: 2020-11-13 | Stop reason: HOSPADM

## 2020-11-02 RX ORDER — LANOLIN ALCOHOL/MO/W.PET/CERES
100 CREAM (GRAM) TOPICAL DAILY
Status: DISCONTINUED | OUTPATIENT
Start: 2020-11-03 | End: 2020-11-13 | Stop reason: HOSPADM

## 2020-11-02 RX ORDER — TRAZODONE HYDROCHLORIDE 50 MG/1
50 TABLET ORAL
Status: DISCONTINUED | OUTPATIENT
Start: 2020-11-02 | End: 2020-11-05

## 2020-11-02 RX ORDER — ATORVASTATIN CALCIUM 40 MG/1
40 TABLET, FILM COATED ORAL
Status: DISCONTINUED | OUTPATIENT
Start: 2020-11-02 | End: 2020-11-13 | Stop reason: HOSPADM

## 2020-11-02 RX ORDER — CLOPIDOGREL BISULFATE 75 MG/1
75 TABLET ORAL DAILY
Status: DISCONTINUED | OUTPATIENT
Start: 2020-11-03 | End: 2020-11-13 | Stop reason: HOSPADM

## 2020-11-02 RX ORDER — QUETIAPINE FUMARATE 100 MG/1
300 TABLET, FILM COATED ORAL
Status: DISCONTINUED | OUTPATIENT
Start: 2020-11-02 | End: 2020-11-02 | Stop reason: HOSPADM

## 2020-11-02 RX ORDER — HYDROXYZINE PAMOATE 50 MG/1
50 CAPSULE ORAL
Status: DISCONTINUED | OUTPATIENT
Start: 2020-11-02 | End: 2020-11-13 | Stop reason: HOSPADM

## 2020-11-02 RX ORDER — GABAPENTIN 400 MG/1
800 CAPSULE ORAL 3 TIMES DAILY
Status: DISCONTINUED | OUTPATIENT
Start: 2020-11-02 | End: 2020-11-13 | Stop reason: HOSPADM

## 2020-11-02 RX ORDER — ONDANSETRON 4 MG/1
4 TABLET, ORALLY DISINTEGRATING ORAL
Status: DISCONTINUED | OUTPATIENT
Start: 2020-11-02 | End: 2020-11-02 | Stop reason: HOSPADM

## 2020-11-02 RX ORDER — ACETAMINOPHEN 500 MG
500 TABLET ORAL
Status: DISCONTINUED | OUTPATIENT
Start: 2020-11-02 | End: 2020-11-09

## 2020-11-02 RX ORDER — FAMOTIDINE 20 MG/1
20 TABLET, FILM COATED ORAL 2 TIMES DAILY
Status: DISCONTINUED | OUTPATIENT
Start: 2020-11-02 | End: 2020-11-07

## 2020-11-02 RX ADMIN — LORAZEPAM 1 MG: 1 TABLET ORAL at 02:12

## 2020-11-02 RX ADMIN — LORAZEPAM 1 MG: 1 TABLET ORAL at 08:21

## 2020-11-02 RX ADMIN — ONDANSETRON 4 MG: 4 TABLET, ORALLY DISINTEGRATING ORAL at 08:22

## 2020-11-02 RX ADMIN — LORAZEPAM 1 MG: 1 TABLET ORAL at 10:58

## 2020-11-02 RX ADMIN — PANCRELIPASE 2 CAPSULE: 60000; 12000; 38000 CAPSULE, DELAYED RELEASE PELLETS ORAL at 16:20

## 2020-11-02 RX ADMIN — GABAPENTIN 800 MG: 300 CAPSULE ORAL at 15:21

## 2020-11-02 RX ADMIN — CLONIDINE HYDROCHLORIDE 0.1 MG: 0.1 TABLET ORAL at 20:27

## 2020-11-02 RX ADMIN — HYDROXYZINE PAMOATE 50 MG: 50 CAPSULE ORAL at 17:20

## 2020-11-02 RX ADMIN — MUPIROCIN: 20 OINTMENT TOPICAL at 20:45

## 2020-11-02 RX ADMIN — LORAZEPAM 2 MG: 1 TABLET ORAL at 15:21

## 2020-11-02 RX ADMIN — QUETIAPINE FUMARATE 300 MG: 300 TABLET, EXTENDED RELEASE ORAL at 20:27

## 2020-11-02 RX ADMIN — FAMOTIDINE 20 MG: 20 TABLET ORAL at 20:27

## 2020-11-02 RX ADMIN — LOPERAMIDE HYDROCHLORIDE 2 MG: 2 CAPSULE ORAL at 15:21

## 2020-11-02 RX ADMIN — ATORVASTATIN CALCIUM 40 MG: 40 TABLET, FILM COATED ORAL at 20:27

## 2020-11-02 RX ADMIN — LORAZEPAM 1 MG: 1 TABLET ORAL at 05:28

## 2020-11-02 RX ADMIN — LEVETIRACETAM 500 MG: 500 TABLET ORAL at 20:28

## 2020-11-02 RX ADMIN — DULOXETINE HYDROCHLORIDE 60 MG: 30 CAPSULE, DELAYED RELEASE ORAL at 08:21

## 2020-11-02 RX ADMIN — GABAPENTIN 800 MG: 300 CAPSULE ORAL at 20:27

## 2020-11-02 RX ADMIN — GABAPENTIN 800 MG: 100 CAPSULE ORAL at 08:19

## 2020-11-02 RX ADMIN — LORAZEPAM 2 MG: 1 TABLET ORAL at 19:23

## 2020-11-02 RX ADMIN — BUSPIRONE HYDROCHLORIDE 10 MG: 5 TABLET ORAL at 08:22

## 2020-11-02 RX ADMIN — LORAZEPAM 1 MG: 1 TABLET ORAL at 13:10

## 2020-11-02 RX ADMIN — LORAZEPAM 1 MG: 1 TABLET ORAL at 00:47

## 2020-11-02 NOTE — ED NOTES
Patient endorsed to me as a signout from Dr. Darlin Xie at [phone] pending placement in an inpatient psych facility for suicidal ideation. He is resting comfortably. He remains on CIWA protocol and last received 2 mg of Ativan at 2100. Will continue to monitor. Patient remained stable overnight. Patient signed out to Dr. Sara Castro at 0600 pending placement in a psychiatric facility for suicidal ideation.     Blanca Tomas, DO

## 2020-11-02 NOTE — ED NOTES
Verbal shift change report given to Brandi (oncoming nurse) by Rudy Brown (offgoing nurse). Report included the following information SBAR, ED Summary and MAR.

## 2020-11-02 NOTE — ED NOTES
Note:  Assuming care of patient at beginning of shift    6:19 AM  I, Angelica Frey MD, assumed care of patient at the beginning of my shift. 6:19 AM    Date: 11/1/2020  Patient Name: Ever Augustin    History of Presenting Illness     Chief Complaint   Patient presents with    Abdominal Pain    Suicidal    Alcohol Problem       Nursing notes regarding the HPI and triage nursing notes were reviewed. Prior medical records were reviewed. Current Facility-Administered Medications   Medication Dose Route Frequency Provider Last Rate Last Dose    busPIRone (BUSPAR) tablet 10 mg  10 mg Oral TID Ofe Dove MD        QUEtiapine (SEROquel) tablet 300 mg  300 mg Oral QHS Ofe Dove MD        DULoxetine (CYMBALTA) capsule 60 mg  60 mg Oral DAILY Ofe Dove MD        gabapentin (NEURONTIN) capsule 800 mg  800 mg Oral TID Ofe Dove MD        sodium chloride (NS) flush 5-40 mL  5-40 mL IntraVENous Q8H Ofe Dove MD   10 mL at 11/01/20 1551    sodium chloride (NS) flush 5-40 mL  5-40 mL IntraVENous PRN Ofe Dove MD        LORazepam (ATIVAN) tablet 1 mg  1 mg Oral Q1H PRN Ofe Dove MD   1 mg at 11/02/20 3427    Or    LORazepam (ATIVAN) injection 1 mg  1 mg IntraVENous Q1H PRN Ofe Dove MD        LORazepam (ATIVAN) tablet 2 mg  2 mg Oral Q1H PRN Ofe Dove MD   2 mg at 11/01/20 0477    Or    LORazepam (ATIVAN) injection 2 mg  2 mg IntraVENous Q1H PRN Ofe Dove MD   2 mg at 11/01/20 2338    LORazepam (ATIVAN) injection 3 mg  3 mg IntraVENous Q15MIN PRN Ofe Dove MD   3 mg at 11/01/20 1227     Current Outpatient Medications   Medication Sig Dispense Refill    acetaminophen (TYLENOL) 500 mg tablet Take  by mouth every six (6) hours as needed for Pain.  potassium gluconate 595 mg capsule Take  by mouth three (3) times daily (with meals).       OTHER       busPIRone (BUSPAR) 10 mg tablet Take 1 Tab by mouth daily for 90 days. 270 Tab 1    QUEtiapine SR (SEROquel XR) 300 mg sr tablet Take 1 Tab by mouth nightly. 30 Tab 1    DULoxetine (Cymbalta) 60 mg capsule Take 1 Cap by mouth daily. Indications: anxiousness associated with depression 30 Cap 0    famotidine (PEPCID) 20 mg tablet Take 1 Tab by mouth two (2) times a day. 60 Tab 0    levETIRAcetam (KEPPRA) 500 mg tablet Take 1 Tab by mouth two (2) times a day. 60 Tab 1    senna (Senna-Gen) 8.6 mg tablet Take 2 Tabs by mouth two (2) times a day. 60 Tab 3    cloNIDine HCL (CATAPRES) 0.1 mg tablet Take 2 Tabs by mouth two (2) times a day. 120 Tab 1    aspirin 81 mg chewable tablet Take 1 Tab by mouth daily. 90 Tab 3    tamsulosin (FLOMAX) 0.4 mg capsule Take 1 Cap by mouth daily. 90 Cap 2    atorvastatin (LIPITOR) 40 mg tablet Take 1 Tab by mouth daily. Indications: high cholesterol 90 Tab 1    carvediloL (COREG) 12.5 mg tablet Take 1 Tab by mouth two (2) times a day. Indications: high blood pressure 180 Tab 3    clopidogreL (PLAVIX) 75 mg tab Take 1 Tab by mouth daily. 90 Tab 1    multivitamin (Daily-Joseph) tablet Take 1 Tab by mouth daily. 90 Tab 3    fluticasone propionate (FLONASE) 50 mcg/actuation nasal spray 2 Sprays by Both Nostrils route daily. 16 g 3    ketoconazole (NIZORAL) 2 % shampoo Use qod 1 Bottle 5    gabapentin (NEURONTIN) 800 mg tablet Take 1 Tab by mouth three (3) times daily. Max Daily Amount: 2,400 mg. 90 Tab 1    chlordiazePOXIDE (LIBRIUM) 25 mg capsule Take 1 Cap by mouth three (3) times daily as needed (withdrawal symptoms and sever craving to alcohol). Max Daily Amount: 75 mg. 15 Cap 0    NIFEdipine ER (PROCARDIA XL) 30 mg ER tablet TAKE 1 TAB BY MOUTH DAILY. (Patient taking differently: 50 mg.) 90 Tab 0    lidocaine (LIDODERM) 5 % 1 PATCH BY TRANSDERMAL ROUTE EVERY TWENTY-FOUR (24) HOURS. 30 Patch 5    thiamine HCL (B-1) 100 mg tablet Take 1 Tab by mouth daily.  90 Tab 3    folic acid (FOLVITE) 1 mg tablet Take 1 Tab by mouth daily. 90 Tab 3    nitroglycerin (NITROSTAT) 0.4 mg SL tablet TAKE 1 TABLET UNDER TOUNGE EVERY 5 MINS AS NEEDED FOR CHEST PAIN. DO NOT EXCEED 3 DOSES 25 Tab 0       Past History     Past Medical History:  Past Medical History:   Diagnosis Date    Abuse     Anemia NEC     Anxiety     Arthritis     Chronic pain     Colitis     Contact dermatitis and other eczema, due to unspecified cause     Depression     Depression     Headache(784.0)     Heart attack ([address]     Hypercholesterolemia     Hypertension     Liver disease     Low back pain     with left leg pain -- multilevel spondylosis and L4 radiculitis    Neck pain     mild spondylosis    Other ill-defined conditions(799.89)     MS symptoms    Pancreatitis     Psychotic disorder ([address]     Trauma        Past Surgical History:  Past Surgical History:   Procedure Laterality Date    HX CYST REMOVAL         Family History:  Family History   Problem Relation Age of Onset    Psychiatric Disorder Mother     Heart Disease Mother     Arthritis-osteo Father     Alcohol abuse Father     Cancer Father         lung    Elevated Lipids Father     Headache Father     Hypertension Father     Lung Disease Father     Diabetes Father        Social History:  Social History     Tobacco Use    Smoking status: Current Some Day Smoker     Packs/day: 0.50     Years: 20.00     Pack years: 10.00     Types: Cigarettes    Smokeless tobacco: Never Used    Tobacco comment: patient states he vapes daily   Substance Use Topics    Alcohol use: Not Currently     Alcohol/week: 0.0 standard drinks     Comment:  ETOH abuse - quit NOV 2018    Drug use: No       Allergies: Allergies   Allergen Reactions    Amlodipine Other (comments)    Carbamazepine Unknown (comments)     violent    Lisinopril Unknown (comments)    Prednisone Other (comments)     He stated it hypes him up       Patient's primary care provider (as noted in EPIC):   Other, MD Wesly    Abnormal lab results from this emergency department encounter:  Labs Reviewed   CBC WITH AUTOMATED DIFF - Abnormal; Notable for the following components:       Result Value    RBC 4.43 (*)     RDW 15.1 (*)     All other components within normal limits   METABOLIC PANEL, COMPREHENSIVE - Abnormal; Notable for the following components:    Sodium 135 (*)     Glucose 120 (*)     BUN 6 (*)     BUN/Creatinine ratio 7 (*)     All other components within normal limits   ETHYL ALCOHOL - Abnormal; Notable for the following components:    ALCOHOL(ETHYL),SERUM 36 (*)     All other components within normal limits   DRUG SCREEN, URINE - Abnormal; Notable for the following components:    BARBITURATES Positive (*)     All other components within normal limits   URINALYSIS W/ RFLX MICROSCOPIC   LIPASE   SARS-COV-2       Lab values for this patient within approximately the last 12 hours:  No results found for this or any previous visit (from the past 12 hour(s)). Radiologist and cardiologist interpretations if available at time of this note:  Radiology results:  No results found.       Medication(s) ordered for patient during this emergency visit encounter:  Medications   sodium chloride (NS) flush 5-40 mL ( IntraVENous Canceled Entry 11/1/20 2200)   sodium chloride (NS) flush 5-40 mL (has no administration in time range)   LORazepam (ATIVAN) tablet 1 mg (1 mg Oral Given 11/2/20 0528)     Or   LORazepam (ATIVAN) injection 1 mg ( IntraVENous See Alternative 11/2/20 0528)   LORazepam (ATIVAN) tablet 2 mg ( Oral See Alternative 11/1/20 2338)     Or   LORazepam (ATIVAN) injection 2 mg (2 mg IntraVENous Given 11/1/20 2338)   LORazepam (ATIVAN) injection 3 mg (3 mg IntraVENous Given 11/1/20 1227)   busPIRone (BUSPAR) tablet 10 mg (has no administration in time range)   QUEtiapine (SEROquel) tablet 300 mg (has no administration in time range)   DULoxetine (CYMBALTA) capsule 60 mg (has no administration in time range)   gabapentin (NEURONTIN) capsule 800 mg (has no administration in time range)   sodium chloride 0.9 % bolus infusion 1,000 mL (0 mL IntraVENous IV Completed 11/1/20 1137)   LORazepam (ATIVAN) injection 1 mg (1 mg IntraVENous Given 11/1/20 0916)   0.9% sodium chloride 1,000 mL with mvi, adult no. 4 with vit K 10 mL, thiamine 998 mg, folic acid 1 mg infusion ( IntraVENous IV Completed 11/1/20 1537)   morphine injection 4 mg (4 mg IntraVENous Given 11/1/20 0916)   ondansetron (ZOFRAN) injection 4 mg (4 mg IntraVENous Given 11/1/20 0914)   famotidine (PF) (PEPCID) injection 20 mg (20 mg IntraVENous Given 11/1/20 1551)       Pt care assumed from Dr. Geno Elizabeth , ED provider. Pt complaint(s), current treatment plan, progression and available diagnostic results have been discussed thoroughly. Rounding occurred: no  Intended Disposition: Transfer. Pending diagnostic reports and/or labs (please list): [facility] voluntary suicidal ideation patient who is an alcoholic on CIWA protocol to an inpatient behavioral medicine facility. 6:21 AM  Review of the psychiatrist consultation reveals that he had recommended some medications. These were added to the patient's regimen in the emergency department. Patient still continued on CIWA protocol. 1:06 PM  Patient has been accepted at [facility] adult unit. Dictation disclaimer:  Please note that this dictation was completed with [employer], the computer voice recognition software. Quite often unanticipated grammatical, syntax, homophones, and other interpretive errors are inadvertently transcribed by the computer software. Please disregard these errors. Please excuse any errors that have escaped final proofreading. Coding Diagnoses     Clinical Impression:   1. Alcohol withdrawal syndrome without complication ([facility] [address] [facility]    2. Alcohol abuse    3.  Suicidal ideations        Disposition     Disposition: Transfer to an inpatient behavioral medicine facility. Vane Jacobs M.D.   [facility] Board Certified Emergency Physician

## 2020-11-02 NOTE — ED NOTES
Patient requesting to have a bedside commode. Provided. 20 g inserted by EMT has potential for infiltration another access would have to be inserted.

## 2020-11-02 NOTE — PROGRESS NOTES
11/2/2020  Patient still needing inpatient Psych. I went and told patient I was still working on it today. 09:09- Tracy Mullins may get discharges, they will look at patients chart. 9:14 I Paged [facility] Resident  9:17 Zeno Rinne may have beds later- info faxed to them. 09:26 [facility] Resident called back, may get a dc later- info faxed to them at [phone] ( per resident's request to use this fax). 09:41- James Guerrier 12- said I could fax info over to them for possible discharges later. 10:38 [facility]-  Orlean Shone they may get dc's later, said I could fax info over. 10:48 I called to [address] [place] [address] and had to leave message with pt's name and MRN number , asking them to look at chart . 10:52- I called [address]  [phone] and they do not currently have beds. They said not to fax info, but call them later to see if bed is open. 1123- [facility] wants EKG's on all potential patients due to sych meds and drugs can cause issues. Pt has EKG that is preliminary that may contain PVC's. When it is resulted properly, I call and see if exclusionary. 11:38- [facility] says might have discharges later, we can fax info over. 11:41 [facility] [phone] says they do not have open beds now, slight possibility might have one later so we can call later to check. 11:47- April Hilario will have some discharges leave later, she will give patient info to her MD to review to see if might be a candidate for them. 11:52 [facility] does not have an adult bed. 12:39 Onesimo Rodríguez From [place] called-  Patient to go to Adult Unit [address] Bed # 2  Receiving doctor to be Dr Khadar Reyes  PHone number to  call for report [phone]    Discussed with Jasper Botello. Eulogio Flowers.  Lady Schwarz, BSN  [facility]  [phone], Pager [phone]  [email or web address]

## 2020-11-02 NOTE — ED NOTES
EMTALA form completed, sent with medical transport. Patient transferred at this time to [facility].

## 2020-11-02 NOTE — ED NOTES
Report received from Brandi SALAZAR, all questions answered. Pt sleeping at present, no signs of distress noted.

## 2020-11-02 NOTE — ED NOTES
Patient in bed asking for more ativan. Advised patient he is not allowed another dose this soon. Will continue to monitor.

## 2020-11-02 NOTE — BH NOTES
Patient arrived on [facility] via [place] accompanied by medics and hospital security on voluntary status. Patient pleasant on approach and cooperative with admission assessment. Patient states he is a \"heavy\" drinker and states he normally consumes a half gallon of vodka daily. He states due to Covid, the only thing he does is walk across the street and buy alcohol. He reports he stopped drinking 24 hours ago because he ran out of alcohol and he began having severe abdominal cramping. Patient states he felt suicidal and was \"seeing things\"; he denies +VH/HI on admission. Patient states he is in \"withdrawal\" with some notable hand tremors at times. Contracts for safety on unit. Oriented to unit policies and procedures and to assigned room. Patient noted with a walking brace on his right leg stating, he fell at home and fractured his ankle. Patient c/o having a blister on that same leg; this writer noted circular irritation of skin. This area cleaned and dressed with a dry bulky dressing to aide in preventing additional rubbing and brace readjusted. Patient states he usually uses a \"walking stick\", four wheel walker provided for assistance. RN will initiate, develop, implement, review and revise treatment plan.

## 2020-11-02 NOTE — BH NOTES
TONO Note: Pt was checked for contraband upon admission on the unit. Pt was given a copy of the rules upon admission.

## 2020-11-02 NOTE — ED NOTES
TRANSFER - OUT REPORT:    Verbal report given to Holly Serna RN  on Scott Barros  being transferred to [facility] adult behavioral unit  for routine progression of care       Report consisted of ED summary, MAR, recent results and patient condition     Lines:       Opportunity for questions and clarification was provided.       Patient transported viamedical transport   transportation  patient belongings

## 2020-11-02 NOTE — ED NOTES
Report of patient received from [facility]  for continued care.  Assumed care of patient at this time
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
## 2020-11-03 PROBLEM — S82.841S ANKLE FRACTURE, BIMALLEOLAR, CLOSED, RIGHT, SEQUELA: Status: ACTIVE | Noted: 2020-11-03

## 2020-11-03 PROBLEM — R60.0 LOCALIZED EDEMA: Status: RESOLVED | Noted: 2019-09-13 | Resolved: 2020-11-03

## 2020-11-03 PROBLEM — F32.9 MDD (MAJOR DEPRESSIVE DISORDER): Status: RESOLVED | Noted: 2020-11-02 | Resolved: 2020-11-03

## 2020-11-03 LAB
ATRIAL RATE: 138 BPM
CALCULATED P AXIS, ECG09: 53 DEGREES
CALCULATED R AXIS, ECG10: 24 DEGREES
CALCULATED T AXIS, ECG11: 48 DEGREES
DIAGNOSIS, 93000: NORMAL
P-R INTERVAL, ECG05: 142 MS
Q-T INTERVAL, ECG07: 290 MS
QRS DURATION, ECG06: 68 MS
QTC CALCULATION (BEZET), ECG08: 439 MS
VENTRICULAR RATE, ECG03: 138 BPM

## 2020-11-03 PROCEDURE — 65220000003 HC RM SEMIPRIVATE PSYCH

## 2020-11-03 PROCEDURE — 99222 1ST HOSP IP/OBS MODERATE 55: CPT | Performed by: PSYCHIATRY & NEUROLOGY

## 2020-11-03 PROCEDURE — 74011250637 HC RX REV CODE- 250/637: Performed by: PSYCHIATRY & NEUROLOGY

## 2020-11-03 RX ORDER — IBUPROFEN 200 MG
1 TABLET ORAL DAILY
Status: DISCONTINUED | OUTPATIENT
Start: 2020-11-03 | End: 2020-11-13 | Stop reason: HOSPADM

## 2020-11-03 RX ORDER — PHENOBARBITAL 64.8 MG/1
64.8 TABLET ORAL 4 TIMES DAILY
Status: COMPLETED | OUTPATIENT
Start: 2020-11-03 | End: 2020-11-05

## 2020-11-03 RX ORDER — CARVEDILOL 3.12 MG/1
6.25 TABLET ORAL 2 TIMES DAILY WITH MEALS
Status: DISCONTINUED | OUTPATIENT
Start: 2020-11-03 | End: 2020-11-13 | Stop reason: HOSPADM

## 2020-11-03 RX ADMIN — MUPIROCIN: 20 OINTMENT TOPICAL at 20:17

## 2020-11-03 RX ADMIN — LORAZEPAM 2 MG: 1 TABLET ORAL at 11:44

## 2020-11-03 RX ADMIN — BUSPIRONE HYDROCHLORIDE 10 MG: 10 TABLET ORAL at 08:38

## 2020-11-03 RX ADMIN — GABAPENTIN 800 MG: 300 CAPSULE ORAL at 20:13

## 2020-11-03 RX ADMIN — FOLIC ACID 1 MG: 1 TABLET ORAL at 08:37

## 2020-11-03 RX ADMIN — PANCRELIPASE 2 CAPSULE: 60000; 12000; 38000 CAPSULE, DELAYED RELEASE PELLETS ORAL at 08:38

## 2020-11-03 RX ADMIN — HYDROXYZINE PAMOATE 50 MG: 50 CAPSULE ORAL at 23:17

## 2020-11-03 RX ADMIN — ACETAMINOPHEN 500 MG: 500 TABLET, FILM COATED ORAL at 19:20

## 2020-11-03 RX ADMIN — CLONIDINE HYDROCHLORIDE 0.1 MG: 0.1 TABLET ORAL at 20:13

## 2020-11-03 RX ADMIN — ATORVASTATIN CALCIUM 40 MG: 40 TABLET, FILM COATED ORAL at 20:13

## 2020-11-03 RX ADMIN — LEVETIRACETAM 500 MG: 500 TABLET ORAL at 20:13

## 2020-11-03 RX ADMIN — MUPIROCIN: 20 OINTMENT TOPICAL at 08:00

## 2020-11-03 RX ADMIN — PHENOBARBITAL 64.8 MG: 64.8 TABLET ORAL at 13:59

## 2020-11-03 RX ADMIN — CLOPIDOGREL BISULFATE 75 MG: 75 TABLET ORAL at 08:37

## 2020-11-03 RX ADMIN — ACETAMINOPHEN 500 MG: 500 TABLET, FILM COATED ORAL at 08:38

## 2020-11-03 RX ADMIN — TAMSULOSIN HYDROCHLORIDE 0.4 MG: 0.4 CAPSULE ORAL at 08:38

## 2020-11-03 RX ADMIN — FAMOTIDINE 20 MG: 20 TABLET ORAL at 08:38

## 2020-11-03 RX ADMIN — PHENOBARBITAL 64.8 MG: 64.8 TABLET ORAL at 16:47

## 2020-11-03 RX ADMIN — CLONIDINE HYDROCHLORIDE 0.1 MG: 0.1 TABLET ORAL at 08:39

## 2020-11-03 RX ADMIN — PANCRELIPASE 2 CAPSULE: 60000; 12000; 38000 CAPSULE, DELAYED RELEASE PELLETS ORAL at 16:47

## 2020-11-03 RX ADMIN — ASPIRIN 81 MG CHEWABLE TABLET 81 MG: 81 TABLET CHEWABLE at 08:37

## 2020-11-03 RX ADMIN — CARVEDILOL 12.5 MG: 12.5 TABLET, FILM COATED ORAL at 11:43

## 2020-11-03 RX ADMIN — PANCRELIPASE 2 CAPSULE: 60000; 12000; 38000 CAPSULE, DELAYED RELEASE PELLETS ORAL at 11:43

## 2020-11-03 RX ADMIN — FAMOTIDINE 20 MG: 20 TABLET ORAL at 20:13

## 2020-11-03 RX ADMIN — DULOXETINE HYDROCHLORIDE 60 MG: 60 CAPSULE, DELAYED RELEASE PELLETS ORAL at 08:37

## 2020-11-03 RX ADMIN — LORAZEPAM 2 MG: 1 TABLET ORAL at 08:38

## 2020-11-03 RX ADMIN — LEVETIRACETAM 500 MG: 500 TABLET ORAL at 08:38

## 2020-11-03 RX ADMIN — GABAPENTIN 800 MG: 300 CAPSULE ORAL at 08:38

## 2020-11-03 RX ADMIN — GABAPENTIN 800 MG: 300 CAPSULE ORAL at 15:28

## 2020-11-03 RX ADMIN — ONDANSETRON 4 MG: 4 TABLET, ORALLY DISINTEGRATING ORAL at 16:47

## 2020-11-03 RX ADMIN — THERA TABS 1 TABLET: TAB at 08:37

## 2020-11-03 RX ADMIN — PHENOBARBITAL 64.8 MG: 64.8 TABLET ORAL at 20:13

## 2020-11-03 RX ADMIN — LORAZEPAM 2 MG: 1 TABLET ORAL at 23:54

## 2020-11-03 RX ADMIN — Medication 100 MG: at 08:37

## 2020-11-03 RX ADMIN — CARVEDILOL 6.25 MG: 3.12 TABLET, FILM COATED ORAL at 16:47

## 2020-11-03 NOTE — GROUP NOTE
SUSAN  GROUP DOCUMENTATION INDIVIDUAL Group Therapy Note Date: 11/2/2020 Group Start Time: 1 Group End Time: 2000 Group Topic: Nursing [facility] SPECIAL TRT 1 Jose Yusuf RN 
 
[facility] GROUP DOCUMENTATION GROUP Group Therapy Note Attendees: 4 Attendance: Did not attend Additional Notes:  Pt. Decline to join the group.  
 
Kym Greenfield RN

## 2020-11-03 NOTE — BSMART NOTE
[facility] Biopsychosocial Assessment Current Level of Psychosocial Functioning  
 
[x]Independent 
[]Dependent []Minimal Assist 
 
 
Comments:   
 
Psychosocial High Risk Factors (check all that apply) []Unable to obtain meds                                                              
[x]Chronic illness/pain   
[x]Substance abuse  
[x]Lack of Family Support []Financial stress []Isolation  
[x]Inadequate [employer] [x]Suicide attempt(s) [x]Not taking medications []Victim of crime []Developmental Delay 
[]Unable to manage personal needs []Age 72 or older  
[x]  Homeless []Ai transportation []Readmission within 30 days []Unemployment []Traumatic Event Psychiatric Advanced Directive: None Family to involve in treatment: None Sexual Orientation: NA 
  
Patient Strengths: Pt. is willing to seek treatment Patient Barriers: Pt has history of non-compliant with medications and self-medicates with alcohol Substance Abuse: Yes. Pt. abuses cocaine. SW provided pt. with substance abuse education Safety plan: SW discussed safety plan. Pt contracts for safety CMHC/ history: Please refer to the psychiatrist and NP or PA note Bipolar Depression , Alcohol Dependence Plan of Care: ANA discussed and encouraged pt. to participate in the following activities: medication management, group/individual therapies, family meetings, psycho education, treatment team meetings to assist with stabilization Initial Discharge Plan: 3-5 days Clinical Summary:   Pt. is a 67-year-old homeless male with history of Bipolar Depression , PTSD, TBI and Alcohol Dependence. Pt was admitted to this facility for ideations to harm self and alcohol detox. Pt. s case was discussed in treatment team this a.m. Pt. has past admission at this facility and hospitals in the area.  ANA met with pt. to discuss this admission. Pt reports he has been feeling depressed and has been on a drinking binge for a month. Pt reports to drinking a half of gallon of Vodka daily. I have just given up on hope.   SW discussed safety plan. Pt contracts for safety. SW discussed relapse prevention to include SA residential> pt expressed he is willing to attend a SA residential program. Rock Gonzalez will assist pt with exploring his options. Pt. appears depressed ,has fair insight and poor judgement. ANA will continue to assist the pt with dc planning.   
 
 
Adrian Estrada MA, LMHP-R

## 2020-11-03 NOTE — BSMART NOTE
OCCUPATIONAL THERAPY PROGRESS NOTE Group Time:  1400 Attendance: The patient attended full group. Participation: The patient participated with moderate elaboration in the activity. Conchetta Peaks Attention: The patient needed redirection to activity at least once. Interaction: The patient occasionally  interacts with others. Participated when called on. States he is still feeling effects of detox/.
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
## 2020-11-03 NOTE — BH NOTES
CIWA score of 14. Pt received PRN Ativan 2 mg as well as PRN Tylenol. Will continue to monitor for effectiveness.

## 2020-11-03 NOTE — PROGRESS NOTES
Pt. Has a high CIWA score, ativan 2 mg given. Pt. Is pleasant and complaint with his medications. Will continue to monitor for safety, CIWA and effectiveness of the ativan and watch for adverse reaction.

## 2020-11-03 NOTE — GROUP NOTE
SUSAN  GROUP DOCUMENTATION INDIVIDUAL Group Therapy Note Date: 11/3/2020 Group Start Time: 1000 Group End Time: 5098 Group Topic: Nursing [facility] 1 ADULT CHEM DEP Jak DAVIS  GROUP DOCUMENTATION GROUP Group Therapy Note Attendees: 6 Attendance: Attended Patient's Goal:  Positive Coping Mechanisms Interventions/techniques: Informed Follows Directions: Followed directions Interactions: Interacted appropriately Mental Status: Calm Behavior/appearance: Cooperative Goals Achieved: Able to give feedback to another Jeyson Rivas

## 2020-11-03 NOTE — PROGRESS NOTES
Problem: Suicide  Goal: *STG: Remains safe in hospital  Description: Patient  will not harm himself or others daily while in hospital.   Outcome: Progressing Towards Goal  Goal: *STG/LTG: Complies with medication therapy  Description: Patient will comply with medication regimen daily while in hospital.  Outcome: Progressing Towards Goal     Problem: Falls - Risk of  Goal: *Absence of Falls  Description: Document Xochilt Nelson Fall Risk and appropriate interventions in the flowsheet daily. Outcome: Progressing Towards Goal  Note: Fall Risk Interventions:  Mobility Interventions: Utilize walker, cane, or other assistive device         Medication Interventions: Teach patient to arise slowly         History of Falls Interventions: Room close to nurse's station       Pt presents with dull affect, depressed mood. Pt has been sociable with his peers on the unit. Pt is being treated per CIWA protocol scoring 14 and 12 today. Pt is participative in groups and is adherent with unit guidelines. Pt denies SI/HI at this time. Pt is medication compliant and PRN Ativan 2 mg twice during this shift per CIWA protocol. Will continue to monitor.

## 2020-11-04 PROCEDURE — 65220000003 HC RM SEMIPRIVATE PSYCH

## 2020-11-04 PROCEDURE — 74011250637 HC RX REV CODE- 250/637: Performed by: PSYCHIATRY & NEUROLOGY

## 2020-11-04 PROCEDURE — 99232 SBSQ HOSP IP/OBS MODERATE 35: CPT | Performed by: PSYCHIATRY & NEUROLOGY

## 2020-11-04 RX ORDER — DOCUSATE SODIUM 100 MG/1
100 CAPSULE, LIQUID FILLED ORAL
Status: DISCONTINUED | OUTPATIENT
Start: 2020-11-04 | End: 2020-11-05

## 2020-11-04 RX ORDER — LANOLIN ALCOHOL/MO/W.PET/CERES
6 CREAM (GRAM) TOPICAL
Status: DISCONTINUED | OUTPATIENT
Start: 2020-11-04 | End: 2020-11-05

## 2020-11-04 RX ORDER — FLUTICASONE PROPIONATE 50 MCG
2 SPRAY, SUSPENSION (ML) NASAL DAILY
Status: DISCONTINUED | OUTPATIENT
Start: 2020-11-05 | End: 2020-11-13 | Stop reason: HOSPADM

## 2020-11-04 RX ADMIN — DULOXETINE HYDROCHLORIDE 60 MG: 60 CAPSULE, DELAYED RELEASE PELLETS ORAL at 08:22

## 2020-11-04 RX ADMIN — Medication 100 MG: at 08:22

## 2020-11-04 RX ADMIN — TAMSULOSIN HYDROCHLORIDE 0.4 MG: 0.4 CAPSULE ORAL at 08:22

## 2020-11-04 RX ADMIN — PHENOBARBITAL 64.8 MG: 64.8 TABLET ORAL at 12:41

## 2020-11-04 RX ADMIN — PHENOBARBITAL 64.8 MG: 64.8 TABLET ORAL at 08:22

## 2020-11-04 RX ADMIN — BUSPIRONE HYDROCHLORIDE 10 MG: 10 TABLET ORAL at 08:20

## 2020-11-04 RX ADMIN — CARVEDILOL 6.25 MG: 3.12 TABLET, FILM COATED ORAL at 08:22

## 2020-11-04 RX ADMIN — ATORVASTATIN CALCIUM 40 MG: 40 TABLET, FILM COATED ORAL at 20:56

## 2020-11-04 RX ADMIN — FOLIC ACID 1 MG: 1 TABLET ORAL at 08:22

## 2020-11-04 RX ADMIN — CLONIDINE HYDROCHLORIDE 0.1 MG: 0.1 TABLET ORAL at 08:22

## 2020-11-04 RX ADMIN — PHENOBARBITAL 64.8 MG: 64.8 TABLET ORAL at 17:17

## 2020-11-04 RX ADMIN — GABAPENTIN 800 MG: 300 CAPSULE ORAL at 08:20

## 2020-11-04 RX ADMIN — LEVETIRACETAM 500 MG: 500 TABLET ORAL at 20:55

## 2020-11-04 RX ADMIN — CARVEDILOL 6.25 MG: 3.12 TABLET, FILM COATED ORAL at 17:16

## 2020-11-04 RX ADMIN — CLOPIDOGREL BISULFATE 75 MG: 75 TABLET ORAL at 08:23

## 2020-11-04 RX ADMIN — ONDANSETRON 4 MG: 4 TABLET, ORALLY DISINTEGRATING ORAL at 08:25

## 2020-11-04 RX ADMIN — GABAPENTIN 800 MG: 300 CAPSULE ORAL at 20:56

## 2020-11-04 RX ADMIN — DOCUSATE SODIUM 100 MG: 100 CAPSULE, LIQUID FILLED ORAL at 21:49

## 2020-11-04 RX ADMIN — PHENOBARBITAL 64.8 MG: 64.8 TABLET ORAL at 20:56

## 2020-11-04 RX ADMIN — ASPIRIN 81 MG CHEWABLE TABLET 81 MG: 81 TABLET CHEWABLE at 08:22

## 2020-11-04 RX ADMIN — CLONIDINE HYDROCHLORIDE 0.1 MG: 0.1 TABLET ORAL at 20:55

## 2020-11-04 RX ADMIN — FAMOTIDINE 20 MG: 20 TABLET ORAL at 20:55

## 2020-11-04 RX ADMIN — ACETAMINOPHEN 500 MG: 500 TABLET, FILM COATED ORAL at 17:16

## 2020-11-04 RX ADMIN — GABAPENTIN 800 MG: 300 CAPSULE ORAL at 13:32

## 2020-11-04 RX ADMIN — MUPIROCIN: 20 OINTMENT TOPICAL at 08:29

## 2020-11-04 RX ADMIN — LEVETIRACETAM 500 MG: 500 TABLET ORAL at 08:22

## 2020-11-04 RX ADMIN — PANCRELIPASE 1 CAPSULE: 60000; 12000; 38000 CAPSULE, DELAYED RELEASE PELLETS ORAL at 08:22

## 2020-11-04 RX ADMIN — PANCRELIPASE 2 CAPSULE: 60000; 12000; 38000 CAPSULE, DELAYED RELEASE PELLETS ORAL at 17:16

## 2020-11-04 RX ADMIN — THERA TABS 1 TABLET: TAB at 08:22

## 2020-11-04 RX ADMIN — TRAZODONE HYDROCHLORIDE 50 MG: 50 TABLET ORAL at 20:56

## 2020-11-04 RX ADMIN — FAMOTIDINE 20 MG: 20 TABLET ORAL at 08:22

## 2020-11-04 RX ADMIN — PANCRELIPASE 2 CAPSULE: 60000; 12000; 38000 CAPSULE, DELAYED RELEASE PELLETS ORAL at 12:40

## 2020-11-04 NOTE — BSMART NOTE
Pt. is a 80-year-old homeless male with history of Bipolar Depression , PTSD, TBI and Alcohol Dependence. Pt was admitted to this facility for ideations to harm self and alcohol detox. Pt. s case was discussed in staffing this a.m  Pt. Had multiple seizures on yesterday. Pt. Needs close monitoring due to severe withdrawals . Pt was placed on a pheno douglas withdrawal protocol per psychiatrist. Gio Harvey will assist pt once stable with a referral to [facility] residential program.  
 
SW Contact:  SW met with pt to discuss dc planning. Pt. Expressed how his constant drinking has  Been an on going issue for him. Pt expressed how he is ready to go to a rehab program. Pt had insight about challenges he has faced with being an alcoholic: physical , delayed reactions and poor decision making. SW provide pt with positive feedback. Pt.contracts for safety . Pt  Expressed feelings of suicide but contracts for safety, Pt. Also admits to admits  hallucination but not current. Pt. Is anxious, hopeless and has little insight. SW discussed safety plan and engaged pt with positive coping strategies. Pt is willing to go to rehab program as apart of hsi relapse prevention plan. SW will continue to assist pt with the dc process.   
 
 
Shai Martines MA, LMHP-R

## 2020-11-04 NOTE — BH NOTES
On 11/3/20 during the 3-11 pm shift, patient spends some of this shift lying in bed and sitting in the milieu. Patient is polite and manner-able. Patient is quiet for the most part but will engage in a conversation here and there. Patient ate all of his dinner and did have snacks during snack time. Patient attended all groups today and participated. This patient has asked this writer when making rounds to please make sure his door is closed so the light won't bother him and this writer assured him she will and will pass on the message as he asked. This writer will continue to monitor patient for safety.

## 2020-11-04 NOTE — BH NOTES
Pt appeared to have slept for 5.75 hours thus far. See previous notes. Will continue to monitor for safety.

## 2020-11-04 NOTE — PROGRESS NOTES
conducted an Spirituality Group for [place], who is a 48 y.o.,male. Patient's Primary Language is: [place]. According to the patient's EMR Episcopal Affiliation is: Adeline Mahan. The reason the Patient came to the hospital is:   Patient Active Problem List    Diagnosis Date Noted    Bipolar 1 disorder, depressed, severe ([address] 02/08/2014     Priority: 1 - One    Ankle fracture, bimalleolar, closed, right, sequela 11/03/2020    Delirium tremens ([address] 06/03/2020    Alcohol withdrawal seizure ([address] 03/12/2019    SBO (small bowel obstruction) ([address] 03/12/2019    Adenomyomatosis of gallbladder 08/15/2018    Hepatic steatosis 08/15/2018    Marijuana use 06/04/2018    Recurrent pulmonary emboli ([address] 06/04/2018    Eczema 01/17/2018    History of non-ST elevation myocardial infarction (NSTEMI) 09/22/2017    Hx pulmonary embolism 09/22/2017    History of CVA (cerebrovascular accident) 09/22/2017    Coronary artery disease involving native coronary artery of native heart without angina pectoris 08/31/2017    Chronic left-sided low back pain with left-sided sciatica 07/31/2017    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 The  provided the following Interventions:  Introduced ourselves(chaplains) and continued the relationship of care and support. Listened empathically, expressed pastoral care, compassion and support.    Discussed on the various definitions/meanings of courage, the different levels of courage and the challenges of being courageous. Expressed a group prayer for hope, courage, sara, peace and strength    The following outcomes were achieved:  Patient expressed gratitude for 's visit. Patient actively participated in the group discussion        Assessment:  There are no further spiritual or [employer]ybeterian issues which require Spiritual Care Services interventions at this time. Plan:  Chaplains will continue to follow and will provide pastoral care on an as needed/requested basis.         [address] [place] [address]   [phone]

## 2020-11-04 NOTE — BSMART NOTE
OCCUPATIONAL THERAPY PROGRESS NOTE Group Time:  0873 Attendance: The patient attended full group. Participation: The patient participated fully in the activity. Attention: The patient was able to focus on the activity. Interaction: The patient occasionally  interacts with others. Mood brighter, talkative at times.
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
## 2020-11-04 NOTE — PROGRESS NOTES
Pt. Is anxious and c/o of unable to go to sleep, PRN vistaril given. Will continue to monitor for safety and effectiveness of the medication.

## 2020-11-04 NOTE — BH NOTES
At approximately [phone], this writer overheard a a loud noise down the blankenship. Pt was found lying on the floor,in his room (upper body rapidly shaking and his eyes closed). Pt walker was turned on it's side. This writer called out to the charge nurse and a pillow was placed under his head. The charge nurse and this writer continuously attempted to arouse Pt and he slowly started to come around. Vital signs were assessed and the charge nurse informed the supervisor about the situation. Staff x 2 assisted Pt to his bed. Pt stated, \"I was watching Mateo Multani on the television (pointing to a wall in his room) and I think I rolled off the bed\". Pt was encouraged to call out to staff if he needs to ambulate. Will monitor closely for safety.

## 2020-11-04 NOTE — PROGRESS NOTES
[facility]  Fall Assessment Note    Patient: Omaira Rico MRN: [id number]   SSN: [id number]  YOB: 1965   Age: 48 y.o. Sex: male    Admit date: 11/2/2020 Length of stay:  LOS: 2 days    PCP: Wesly Urrutia MD PP: Severe episode of recurrent major depressive disorder ([facility]     Subjective:   Called to bedside by nursing staff for fall evaluation. Objective:   Vital Signs:  Visit Vitals  /75   Pulse 85   Temp 97.6 °F (36.4 °C)   Resp 16   Ht 6' 2\" (1.88 m)   Wt 101.6 kg (224 lb)   SpO2 98%   BMI 28.76 kg/m²       Physical Exam:  General appearance: alert, cooperative, no distress, appears stated age  Lungs: clear to auscultation bilaterally  Heart: regular rate and rhythm, S1, S2 normal, no murmur, click, rub or gallop  Abdomen: soft, non-tender. Bowel sounds normal. No masses,  no organomegaly  Pulses: 2+ and symmetric  Skin: Skin color, texture, turgor normal. No rashes or lesions  Neuro:  Pt at baseline has pain in left leg and weaker than right side, but pt was able to move all four extermity  mental status, speech normal, alert and oriented x iii      Assessment and Plan:   48 y.o. yo male admitted for Severe episode of recurrent major depressive disorder ([facility] s/p fall in house. Patient was without complications caused from this event. Per patient report, nursing, and my assessment, patient is stable and at baseline condition as prior to the fall. There are no neurologic findings on my physical exam, no neck tenderness to palpation, nor are there structural abnormalities. Pt reports no change in mental status/function and a mini mental status exam was without abnormality. Pt has baseline ROM, muscle strength, and is without new paraesthesias. Lastly, there is no lesion or gross trauma with inspection of the skin and area of contact with the floor. It is this writers assessment that the patient is without complications caused from this event.  I recommend the patient be on fall precautions, if agreed upon by the primary physician. Nursing has been instructed to notify the attending of this event and my clearance of this patient.      Kyaw Rocha PGY-1   [address]   Senior Pager: [phone]   November 4, 2020, 12:12 AM
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
## 2020-11-04 NOTE — GROUP NOTE
SUSAN  GROUP DOCUMENTATION INDIVIDUAL Group Therapy Note Date: 11/3/2020 Group Start Time: 2000 Group End Time: 2030 Group Topic: Medication [facility] 1 ADULT CHEM DEP Baudilio Cardenas [facility] GROUP DOCUMENTATION GROUP Group Therapy Note Attendees: 10 Attendance: Attended Interventions/techniques: Informed Follows Directions: Followed directions Interactions: Interacted appropriately Mental Status: Congruent Behavior/appearance: Attentive Goals Achieved: Able to receive feedback Ronda Woods

## 2020-11-04 NOTE — PROGRESS NOTES
Pt. Found by Rafa Young T in the floor not responding to verbal sitmuli, eyes closed and right arm shaking vigorously that lasted x 8 minutes. 2353 Pt. Woke up , alert and oriented x4. He said that he does not remember what happened. Physical assessment done, no bruises or obvious injury noted, VS taken, stable. [place] Resident on call notified about the fall and Dr. Murtaza Montenegro made aware via phone conversation. 2354 Pt's right hand continue to shakes,ativan 2 mg. Given. Pt. Took his medication without a problem. 0012 Dr.E. Bushra Donnelly came and assesed the Patient. Fall and seizure precaution enforced. 0030 Pt. Is in bed, eyes closed, no shaking noted, respiration easy and unlabored. Will continue to closely monitor for safety, fall precaution and seizure precaution. 0200 Pt. Remain appears to be asleep, resp. Easy and unlabored.

## 2020-11-04 NOTE — BSMART NOTE
Social Work Positive Change Group Therapy Group Social work Attendance   attended Number of participants 6 Time in 2:30 Time out 1:15 Total Time 45 Interventions/techniques Informed and encouraged  
   Pt. Actively participated, provided feedback, listen

## 2020-11-05 PROCEDURE — 74011250637 HC RX REV CODE- 250/637: Performed by: PSYCHIATRY & NEUROLOGY

## 2020-11-05 PROCEDURE — 65220000003 HC RM SEMIPRIVATE PSYCH

## 2020-11-05 PROCEDURE — 99232 SBSQ HOSP IP/OBS MODERATE 35: CPT | Performed by: PSYCHIATRY & NEUROLOGY

## 2020-11-05 RX ORDER — DOCUSATE SODIUM 100 MG/1
100 CAPSULE, LIQUID FILLED ORAL DAILY
Status: DISCONTINUED | OUTPATIENT
Start: 2020-11-06 | End: 2020-11-09

## 2020-11-05 RX ORDER — PHENOBARBITAL 64.8 MG/1
60 TABLET ORAL 3 TIMES DAILY
Status: DISCONTINUED | OUTPATIENT
Start: 2020-11-05 | End: 2020-11-06

## 2020-11-05 RX ORDER — TRAZODONE HYDROCHLORIDE 50 MG/1
50 TABLET ORAL
Status: DISCONTINUED | OUTPATIENT
Start: 2020-11-06 | End: 2020-11-13 | Stop reason: HOSPADM

## 2020-11-05 RX ADMIN — CARVEDILOL 6.25 MG: 3.12 TABLET, FILM COATED ORAL at 08:10

## 2020-11-05 RX ADMIN — FAMOTIDINE 20 MG: 20 TABLET ORAL at 08:09

## 2020-11-05 RX ADMIN — ONDANSETRON 4 MG: 4 TABLET, ORALLY DISINTEGRATING ORAL at 16:24

## 2020-11-05 RX ADMIN — HYDROXYZINE PAMOATE 50 MG: 50 CAPSULE ORAL at 12:35

## 2020-11-05 RX ADMIN — CARVEDILOL 6.25 MG: 3.12 TABLET, FILM COATED ORAL at 16:23

## 2020-11-05 RX ADMIN — ONDANSETRON 4 MG: 4 TABLET, ORALLY DISINTEGRATING ORAL at 08:09

## 2020-11-05 RX ADMIN — CLOPIDOGREL BISULFATE 75 MG: 75 TABLET ORAL at 08:09

## 2020-11-05 RX ADMIN — PANCRELIPASE 2 CAPSULE: 60000; 12000; 38000 CAPSULE, DELAYED RELEASE PELLETS ORAL at 16:23

## 2020-11-05 RX ADMIN — GABAPENTIN 800 MG: 300 CAPSULE ORAL at 08:09

## 2020-11-05 RX ADMIN — LEVETIRACETAM 500 MG: 500 TABLET ORAL at 20:04

## 2020-11-05 RX ADMIN — LEVETIRACETAM 500 MG: 500 TABLET ORAL at 08:09

## 2020-11-05 RX ADMIN — TRAZODONE HYDROCHLORIDE 50 MG: 50 TABLET ORAL at 20:04

## 2020-11-05 RX ADMIN — FLUTICASONE PROPIONATE 2 SPRAY: 50 SPRAY, METERED NASAL at 08:13

## 2020-11-05 RX ADMIN — Medication 6 MG: at 20:04

## 2020-11-05 RX ADMIN — GABAPENTIN 800 MG: 300 CAPSULE ORAL at 20:04

## 2020-11-05 RX ADMIN — Medication 100 MG: at 08:09

## 2020-11-05 RX ADMIN — PANCRELIPASE 2 CAPSULE: 60000; 12000; 38000 CAPSULE, DELAYED RELEASE PELLETS ORAL at 12:35

## 2020-11-05 RX ADMIN — FOLIC ACID 1 MG: 1 TABLET ORAL at 08:10

## 2020-11-05 RX ADMIN — PHENOBARBITAL 64.8 MG: 64.8 TABLET ORAL at 16:23

## 2020-11-05 RX ADMIN — PHENOBARBITAL 64.8 MG: 64.8 TABLET ORAL at 08:09

## 2020-11-05 RX ADMIN — ATORVASTATIN CALCIUM 40 MG: 40 TABLET, FILM COATED ORAL at 20:04

## 2020-11-05 RX ADMIN — PANCRELIPASE 2 CAPSULE: 60000; 12000; 38000 CAPSULE, DELAYED RELEASE PELLETS ORAL at 08:09

## 2020-11-05 RX ADMIN — FAMOTIDINE 20 MG: 20 TABLET ORAL at 20:04

## 2020-11-05 RX ADMIN — DULOXETINE HYDROCHLORIDE 60 MG: 60 CAPSULE, DELAYED RELEASE PELLETS ORAL at 09:00

## 2020-11-05 RX ADMIN — BUSPIRONE HYDROCHLORIDE 10 MG: 10 TABLET ORAL at 08:09

## 2020-11-05 RX ADMIN — CLONIDINE HYDROCHLORIDE 0.1 MG: 0.1 TABLET ORAL at 20:04

## 2020-11-05 RX ADMIN — CLONIDINE HYDROCHLORIDE 0.1 MG: 0.1 TABLET ORAL at 08:09

## 2020-11-05 RX ADMIN — PHENOBARBITAL 64.8 MG: 64.8 TABLET ORAL at 20:04

## 2020-11-05 RX ADMIN — ASPIRIN 81 MG CHEWABLE TABLET 81 MG: 81 TABLET CHEWABLE at 08:10

## 2020-11-05 RX ADMIN — THERA TABS 1 TABLET: TAB at 08:09

## 2020-11-05 RX ADMIN — GABAPENTIN 800 MG: 300 CAPSULE ORAL at 14:32

## 2020-11-05 RX ADMIN — TAMSULOSIN HYDROCHLORIDE 0.4 MG: 0.4 CAPSULE ORAL at 08:10

## 2020-11-05 NOTE — BSMART NOTE
OCCUPATIONAL THERAPY PROGRESS NOTE Group Time:  7302 Attendance: The patient attended full group. Participation: The patient participated fully in the activity. Attention: The patient needed redirection to activity at least once. Interaction: The patient occasionally  interacts with others. Initial focus on issue with staff he believes he had. Able to concentrate on activity eventually and identify some positive self talk to use. Encouraged to talk to his doctor about the various issues he mentioned.

## 2020-11-05 NOTE — BSMART NOTE
SOCIAL WORK GROUP THERAPY PROGRESS NOTE Group Time:  10:30am    
 
Group Topic:  Coping Skills    C D Issues Group Participation:   
 
Pt mostly listened during group discussion but remained attentive. Mood dysphoric. \"Seven Steps\" for taking responsibility for our Happiness was reviewed including commitment to change, self-care, setting limits, goal setting & letting go. Reviewed strategies to keep a \"Journal\" for moods, cognitions, behavior & outcome. Differences between Assertive, Aggressive & Non-Assertive Behaviors

## 2020-11-05 NOTE — WOUND CARE
Physical Exam 
Musculoskeletal:  
     Legs: 
 
 
Room 122/01: Focused wound assess Discussed care with primary nurse Soy Cardenas. Pt wearing a [employer] boot for right bilateral ankle fx. Topical treatment orders per nursing unit skin care protocol using silicone dressings for healing & padding. Education provided to pt at this time. Will turn over care to nursing staff at this time.  
Selvin REINAN, RN, [facility] [employer], [address]

## 2020-11-05 NOTE — BH NOTES
Patient c/o constipation on call physician Eileen Fontaine notified of patient status. Telephone order given for patient to receive Colace 100mg twice a day PRN.
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
## 2020-11-05 NOTE — BH NOTES
Patient refused Melatonin, patient given Trazodone for insomnia per patient request will continue to follow current POC and interventions per policies/protocols. Jane Haddad

## 2020-11-05 NOTE — GROUP NOTE
[facility] GROUP DOCUMENTATION INDIVIDUAL Group Therapy Note Date: 11/4/2020 Group Start Time: 1 Group End Time: 1945 Group Topic: Medication [address] ADULT CHEM DEP Rebecca Lovelace RN 
 
[facility] GROUP DOCUMENTATION GROUP Group Therapy Note Attendees: 3 Attendance: Attended Interventions/techniques: Informed Follows Directions: Followed directions Interactions: Interacted appropriately Mental Status: Calm Behavior/appearance: Cooperative Goals Achieved: Able to listen to others Francoise Wilson RN

## 2020-11-05 NOTE — BH NOTES
TONO Note: The above pt has been alert and has been cooperative majority of the shift. He at times has been agitated at times this shift with staff and peers he required re-direction at times this shift. He appeared remorseful for his treatment towards staff and peers this shift. He verbalized \"I have just been so shaky and I am waiting for my doctor to come so I can get some medication. \" He was educated on safety and staying seated and utilizing his walker when ambulating around in the day area this shift. He has not experienced any falls this shift. He has attended all scheduled groups this shift with concerns of speaking with the  he was educated on utilized resources and being his own advocate this shift while in group. He has been compliant with medications this shift. He verbally contract for safety and denies any self-harm at this time.

## 2020-11-05 NOTE — BH NOTES
Patient received Colace for constipation per patient request will continue to follow current POC and interventions per policies/protocols.

## 2020-11-05 NOTE — BSMART NOTE
Pt. is a 67-year-old homeless male with history of Bipolar Depression , PTSD, TBI and Alcohol Dependence. Pt was admitted to this facility for ideations to harm self and alcohol detox.  
  
 
ANA referred pt to [facility] residential program this a.m   
 
ANA Contact: SW met with pt. SW assisted pt with contacting [facility] case manager at [employer]' apartment @ [phone] ext [phone]. SW along with pt provided the CM with an update on pt.'s treatment and care. SW and pt. Discussed treatment goals. Pt expressed being unable to eat meals due to acid reflux. Pt. expressed concerns about his physical health. Pt acknowledge he has to get into sobriety because he realizes how his drinking is effecting his health. Pt. Discussed losses due to drinking. SW provided pt with support . Sw and pt reviewed smart tool change recovery worksheets and discussed recovery goal. Pt continues to be motivated to go toe  residential program. Pt. Contracts for safety and denies ideations and hallucinations. Pt.'s mood is improving, anxious, goal oriented and has fair insight.    ANA will continue to assist pt with the dc process.  
  
  
Robb Ansari MA, LMHP-R
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
## 2020-11-06 LAB
AMYLASE SERPL-CCNC: 39 U/L (ref 25–115)
LIPASE SERPL-CCNC: 44 U/L (ref 73–393)

## 2020-11-06 PROCEDURE — 74011250637 HC RX REV CODE- 250/637: Performed by: PSYCHIATRY & NEUROLOGY

## 2020-11-06 PROCEDURE — 82150 ASSAY OF AMYLASE: CPT

## 2020-11-06 PROCEDURE — 83690 ASSAY OF LIPASE: CPT

## 2020-11-06 PROCEDURE — 65220000003 HC RM SEMIPRIVATE PSYCH

## 2020-11-06 PROCEDURE — 36415 COLL VENOUS BLD VENIPUNCTURE: CPT

## 2020-11-06 PROCEDURE — 99232 SBSQ HOSP IP/OBS MODERATE 35: CPT | Performed by: PSYCHIATRY & NEUROLOGY

## 2020-11-06 RX ORDER — PENICILLIN V POTASSIUM 250 MG/1
250 TABLET, FILM COATED ORAL
Status: DISCONTINUED | OUTPATIENT
Start: 2020-11-06 | End: 2020-11-13 | Stop reason: HOSPADM

## 2020-11-06 RX ORDER — PHENOBARBITAL 32.4 MG/1
32.4 TABLET ORAL 3 TIMES DAILY
Status: COMPLETED | OUTPATIENT
Start: 2020-11-07 | End: 2020-11-09

## 2020-11-06 RX ORDER — POTASSIUM CHLORIDE 750 MG/1
10 TABLET, FILM COATED, EXTENDED RELEASE ORAL DAILY
Status: DISCONTINUED | OUTPATIENT
Start: 2020-11-06 | End: 2020-11-13 | Stop reason: HOSPADM

## 2020-11-06 RX ORDER — PHENOBARBITAL 64.8 MG/1
64.8 TABLET ORAL
Status: COMPLETED | OUTPATIENT
Start: 2020-11-06 | End: 2020-11-06

## 2020-11-06 RX ADMIN — BUSPIRONE HYDROCHLORIDE 10 MG: 10 TABLET ORAL at 08:47

## 2020-11-06 RX ADMIN — DOCUSATE SODIUM 100 MG: 100 CAPSULE, LIQUID FILLED ORAL at 09:00

## 2020-11-06 RX ADMIN — ONDANSETRON 4 MG: 4 TABLET, ORALLY DISINTEGRATING ORAL at 06:32

## 2020-11-06 RX ADMIN — CLOPIDOGREL BISULFATE 75 MG: 75 TABLET ORAL at 08:47

## 2020-11-06 RX ADMIN — LEVETIRACETAM 500 MG: 500 TABLET ORAL at 08:47

## 2020-11-06 RX ADMIN — PHENOBARBITAL 64.8 MG: 64.8 TABLET ORAL at 08:47

## 2020-11-06 RX ADMIN — Medication 100 MG: at 08:47

## 2020-11-06 RX ADMIN — FAMOTIDINE 20 MG: 20 TABLET ORAL at 20:24

## 2020-11-06 RX ADMIN — PANCRELIPASE 2 CAPSULE: 60000; 12000; 38000 CAPSULE, DELAYED RELEASE PELLETS ORAL at 16:15

## 2020-11-06 RX ADMIN — FLUTICASONE PROPIONATE 2 SPRAY: 50 SPRAY, METERED NASAL at 08:52

## 2020-11-06 RX ADMIN — ACETAMINOPHEN 500 MG: 500 TABLET, FILM COATED ORAL at 08:51

## 2020-11-06 RX ADMIN — DULOXETINE HYDROCHLORIDE 60 MG: 60 CAPSULE, DELAYED RELEASE PELLETS ORAL at 08:48

## 2020-11-06 RX ADMIN — PENICILLIN V POTASSIUM 250 MG: 250 TABLET, FILM COATED ORAL at 21:04

## 2020-11-06 RX ADMIN — ASPIRIN 81 MG CHEWABLE TABLET 81 MG: 81 TABLET CHEWABLE at 08:47

## 2020-11-06 RX ADMIN — CLONIDINE HYDROCHLORIDE 0.1 MG: 0.1 TABLET ORAL at 08:47

## 2020-11-06 RX ADMIN — POTASSIUM CHLORIDE 10 MEQ: 750 TABLET, EXTENDED RELEASE ORAL at 16:17

## 2020-11-06 RX ADMIN — FOLIC ACID 1 MG: 1 TABLET ORAL at 08:47

## 2020-11-06 RX ADMIN — TAMSULOSIN HYDROCHLORIDE 0.4 MG: 0.4 CAPSULE ORAL at 08:47

## 2020-11-06 RX ADMIN — GABAPENTIN 800 MG: 300 CAPSULE ORAL at 08:47

## 2020-11-06 RX ADMIN — ACETAMINOPHEN 500 MG: 500 TABLET, FILM COATED ORAL at 19:20

## 2020-11-06 RX ADMIN — ATORVASTATIN CALCIUM 40 MG: 40 TABLET, FILM COATED ORAL at 20:24

## 2020-11-06 RX ADMIN — GABAPENTIN 800 MG: 300 CAPSULE ORAL at 13:44

## 2020-11-06 RX ADMIN — CARVEDILOL 6.25 MG: 3.12 TABLET, FILM COATED ORAL at 08:47

## 2020-11-06 RX ADMIN — QUETIAPINE FUMARATE 300 MG: 300 TABLET, EXTENDED RELEASE ORAL at 20:24

## 2020-11-06 RX ADMIN — CARVEDILOL 6.25 MG: 3.12 TABLET, FILM COATED ORAL at 16:15

## 2020-11-06 RX ADMIN — PHENOBARBITAL 64.8 MG: 64.8 TABLET ORAL at 13:44

## 2020-11-06 RX ADMIN — PANCRELIPASE 2 CAPSULE: 60000; 12000; 38000 CAPSULE, DELAYED RELEASE PELLETS ORAL at 08:47

## 2020-11-06 RX ADMIN — PHENOBARBITAL 64.8 MG: 64.8 TABLET ORAL at 20:26

## 2020-11-06 RX ADMIN — PENICILLIN V POTASSIUM 250 MG: 250 TABLET, FILM COATED ORAL at 16:15

## 2020-11-06 RX ADMIN — ONDANSETRON 4 MG: 4 TABLET, ORALLY DISINTEGRATING ORAL at 16:15

## 2020-11-06 RX ADMIN — LEVETIRACETAM 500 MG: 500 TABLET ORAL at 20:24

## 2020-11-06 RX ADMIN — GABAPENTIN 800 MG: 300 CAPSULE ORAL at 20:24

## 2020-11-06 RX ADMIN — FAMOTIDINE 20 MG: 20 TABLET ORAL at 08:47

## 2020-11-06 RX ADMIN — CLONIDINE HYDROCHLORIDE 0.1 MG: 0.1 TABLET ORAL at 20:23

## 2020-11-06 RX ADMIN — THERA TABS 1 TABLET: TAB at 08:47

## 2020-11-06 NOTE — PROGRESS NOTES
Behavioral Health Progress Note    Admit Date: 11/2/2020  Hospital day 3    Vitals :   Patient Vitals for the past 8 hrs:   BP Pulse Resp SpO2   11/05/20 1651 110/62 84 16 97 %   11/05/20 1235 132/82        Labs:  No results found for this or any previous visit (from the past 24 hour(s)).   Meds:   Current Facility-Administered Medications   Medication Dose Route Frequency    PHENobarbitaL (LUMINAL) tablet 64.8 mg  64.8 mg Oral TID    melatonin tablet 6 mg  6 mg Oral QHS    fluticasone propionate (FLONASE) 50 mcg/actuation nasal spray 2 Spray  2 Spray Both Nostrils DAILY    docusate sodium (COLACE) capsule 100 mg  100 mg Oral BID PRN    nicotine (NICODERM CQ) 21 mg/24 hr patch 1 Patch  1 Patch TransDERmal DAILY    carvediloL (COREG) tablet 6.25 mg  6.25 mg Oral BID WITH MEALS    LORazepam (ATIVAN) tablet 1 mg  1 mg Oral Q4H PRN    LORazepam (ATIVAN) tablet 2 mg  2 mg Oral Q4H PRN    acetaminophen (TYLENOL) tablet 500 mg  500 mg Oral F2H PRN    folic acid (FOLVITE) tablet 1 mg  1 mg Oral DAILY    loperamide (IMODIUM) capsule 2-4 mg  2-4 mg Oral PRN    therapeutic multivitamin (THERAGRAN) tablet 1 Tab  1 Tab Oral DAILY    thiamine HCL (B-1) tablet 100 mg  100 mg Oral DAILY    hydrOXYzine pamoate (VISTARIL) capsule 50 mg  50 mg Oral Q4H PRN    traZODone (DESYREL) tablet 50 mg  50 mg Oral QHS PRN    busPIRone (BUSPAR) tablet 10 mg  10 mg Oral DAILY    [Held by provider] QUEtiapine SR (SEROquel XR) tablet 300 mg  300 mg Oral QHS    DULoxetine (CYMBALTA) capsule 60 mg  60 mg Oral DAILY    gabapentin (NEURONTIN) capsule 800 mg  800 mg Oral TID    levETIRAcetam (KEPPRA) tablet 500 mg  500 mg Oral BID    cloNIDine HCL (CATAPRES) tablet 0.1 mg  0.1 mg Oral BID    tamsulosin (FLOMAX) capsule 0.4 mg  0.4 mg Oral DAILY    aspirin chewable tablet 81 mg  81 mg Oral DAILY    lipase-protease-amylase (CREON 12,000) capsule 2 Cap  2 Cap Oral TID WITH MEALS    ondansetron (ZOFRAN ODT) tablet 4 mg  4 mg Oral Q8H PRN    clopidogreL (PLAVIX) tablet 75 mg  75 mg Oral DAILY    famotidine (PEPCID) tablet 20 mg  20 mg Oral BID    atorvastatin (LIPITOR) tablet 40 mg  40 mg Oral QHS    mupirocin (BACTROBAN) 2 % ointment   Topical TID      Hospital Problems: Principal Problem:    Severe episode of recurrent major depressive disorder ([facility] (11/14/2011)      Overview: Overview:       Most probable SIMD    Active Problems:    Tobacco dependence (8/20/2013)      HTN (hypertension) (8/20/2013)      Alcohol use disorder, severe, dependence ([facility] (4/22/2014)      Overview: Numerous hospitalization of intoxication/suicidal ideation      Coronary artery disease involving native coronary artery of native heart without angina pectoris (8/31/2017)      Overview: NSTEMI 8/28, cath possible occlusion of small distal Cx culprit, OM 50%,       RCA 50%       S/p stent 2/2020      History of non-ST elevation myocardial infarction (NSTEMI) (9/22/2017)      History of CVA (cerebrovascular accident) (9/22/2017)      Delirium tremens ([facility] (6/3/2020)      Ankle fracture, bimalleolar, closed, right, sequela (11/3/2020)        Subjective:   Medication side effects: fatigue/weakness  shaking    Mental Status Exam  Sensorium: alert  Orientation: only aware of  time, place and person  Relations: cooperative  Eye Contact: appropriate  Appearance: is tense  Thought Process: slow rate of thoughts and fair abstract reasoning/computation   Thought Content: centered on physical health, denies halluc today   Suicidal: ideas   Homicidal: none   Mood: is anxious and is withdrawn   Affect: stable  Memory: is impaired and is remote     Concentration: distractable  Abstraction: concrete  Insight: The patient shows little insight    OR Fair  Judgement: is psychologically impaired OR  Fair    Assessment/Plan:   not changed    Continue close observation    Nurses report that the patient was quite shaky by noon time.   He had had his last regular scheduled phenobarbital in the morning and was in significant withdrawal.  When I had seen him I ordered the phenobarbital to resume at 60 mg 3 times daily for the next several days. I will anticipate will probably end up using it twice daily over the course of the weekend. He was asking for Ensure with each meal which I will write. He also was complaining of mid abdominal pain and does have a history of pancreatitis so we will write for lipase and amylase place to check to make sure he is not having the pancreatitis. We will resume the Seroquel tomorrow night. He is asking for the trazodone to be given every night since the melatonin did not work for him. We will discontinue the melatonin. He complained of constipation on a regular basis we will write for Colace. He now says that he had drank rubbing alcohol several weeks ago and gone to the hospital for this. He apparently had been treated for this. He was concerned thinking that we were giving him the carvedilol at 12 mg twice a day but I did reassure him that he was taking the 6.25 mg twice daily as scheduled. He now says that he had been using oxycodone routinely that he had gotten for his ankle fracture and had actually been snorting it much of the time. He also was getting Librium routinely when he was leaving the hospitals. He would then continue to take the Librium and would go into withdrawal whenever the Librium was started to run out. It appears he was also addicted to benzodiazepines as well as he was to the alcohol. We will continue with the phenobarbital for detoxification and try to use as little benzodiazepine as possible. I have discussed case with  and they are trying to get him into the [facility] program.  e he cannot go to [facility]. He also may not be able to go to the [address] [place] [address] [facility] since he has been there also.   He does say he has a payee would who would continue to pay his bills in his apartment so he would not lose his apartment while he was away at a 28-day treatment program.  He is very interested in going to a 28-day program saying that he relapses just as soon as he gets out of the hospital each time.

## 2020-11-06 NOTE — BSMART NOTE
OCCUPATIONAL THERAPY PROGRESS NOTE Group Time:  3257 Attendance: The patient attended full group. Participation: The patient participated fully in the activity. Attention: The patient was able to focus on the activity. Interaction: The patient frequently interacts with others. Continues talkative. Calmer than yesterday and says he feels better and has some resolution to those issues from yesterday. Participated in discussion on stress management.
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
## 2020-11-06 NOTE — BH NOTES
Writer obtained patient' q4 vitals. Patient complained of tremors and upset stomach. He stated that he was having difficulty eating due to his nausea. Patient was given scheduled medications and ate 100% of his dinner. Patient stated that he is here primarily due to his lack of support through his  AAA meeting he was attending 5 days a week. Patient stated that he is hopeful that he will find placement at a facility to continue his treatment. Patient is monitored for safety and therapeutic support.

## 2020-11-06 NOTE — BSMART NOTE
ART THERAPY GROUP PROGRESS NOTE PATIENT SCHEDULED FOR GROUP AT: 417 ATTENDANCE: 3/4 PARTICIPATION LEVEL: Participates fully in the art process ATTENTION LEVEL : Able to focus on task FOCUS: cognitive distortions and negative thinking patterns SYMBOLIC & THEMATIC CONTENT AS NOTED IN IMAGERY: He was initially meeting with his SW and joined afterwards. He was calm and polite. He was invested in the task at hand and participated in group discussion. He expressed worry regarding his recent blood test, claiming the doctor told him he \"may have pancreatitis again\" and he was waiting on the results.

## 2020-11-06 NOTE — BSMART NOTE
Pt. is a 27-year-old homeless male with history of Bipolar Depression , PTSD, TBI and Alcohol Dependence. Pt was admitted to this facility for ideations to harm self and alcohol detox. Pt.'s case was dicussed in treatment team this a. Pt. continues to go through detox. SW informed the team pt. was referred to [facility] residential program. [facility] is waiting  For the facility to finish reviewing pt.'s information. SW also is waiting for a scheduled phone assessment. SW Contact: SW met with pt to discuss dc planning. Pt. Reflected on the his physical challenges detox off of alcohol. Pt admits to abusing rubbing alcohol, oxi codeine and librium from his past hospitalization.  Pt expressed AA/NA meetings have helped him the past. Pt states with [employer] attending meetings have limited his support. SW discussed supportive services -per  and [employer]. SW provided pt with positive feedback regarding his relapse prevention plan and self-awareness. SW discussed safety plan and the above dc plan. Pt. continues to want to transition to a SA rehab program.  Pt. contracts for safety. Pt. Is cooperative, goal oriented and mood is improving.   
 
 
 
Krystal Taylor MA LMHP-R

## 2020-11-06 NOTE — PROGRESS NOTES
Problem: Suicide  Goal: *STG: Remains safe in hospital  Description: Patient  will not harm himself or others daily while in hospital.   Outcome: Progressing Towards Goal  Goal: *STG: Attends activities and groups  Description: Patient will attend at least two groups daily while hospitalized. Outcome: Progressing Towards Goal  Goal: *STG/LTG: Complies with medication therapy  Description: Patient will comply with medication regimen daily while in hospital.  Outcome: Progressing Towards Goal    RN Note:     Sitting in common area. Eating food. Denies concerns at this time. Participates in group. To continue with current tx plan per provider orders. Staff to assist and guide as needed.

## 2020-11-07 LAB
ANION GAP SERPL CALC-SCNC: 5 MMOL/L (ref 3–18)
BUN SERPL-MCNC: 11 MG/DL (ref 7–18)
BUN/CREAT SERPL: 14 (ref 12–20)
CALCIUM SERPL-MCNC: 8.9 MG/DL (ref 8.5–10.1)
CHLORIDE SERPL-SCNC: 109 MMOL/L (ref 100–111)
CO2 SERPL-SCNC: 28 MMOL/L (ref 21–32)
CREAT SERPL-MCNC: 0.76 MG/DL (ref 0.6–1.3)
GLUCOSE SERPL-MCNC: 96 MG/DL (ref 74–99)
POTASSIUM SERPL-SCNC: 3.9 MMOL/L (ref 3.5–5.5)
SODIUM SERPL-SCNC: 142 MMOL/L (ref 136–145)

## 2020-11-07 PROCEDURE — 80048 BASIC METABOLIC PNL TOTAL CA: CPT

## 2020-11-07 PROCEDURE — 74011250637 HC RX REV CODE- 250/637: Performed by: PSYCHIATRY & NEUROLOGY

## 2020-11-07 PROCEDURE — 65220000003 HC RM SEMIPRIVATE PSYCH

## 2020-11-07 PROCEDURE — 36415 COLL VENOUS BLD VENIPUNCTURE: CPT

## 2020-11-07 PROCEDURE — 99232 SBSQ HOSP IP/OBS MODERATE 35: CPT | Performed by: PSYCHIATRY & NEUROLOGY

## 2020-11-07 RX ORDER — PANTOPRAZOLE SODIUM 40 MG/1
40 TABLET, DELAYED RELEASE ORAL
Status: DISCONTINUED | OUTPATIENT
Start: 2020-11-07 | End: 2020-11-13 | Stop reason: HOSPADM

## 2020-11-07 RX ORDER — MAG HYDROX/ALUMINUM HYD/SIMETH 200-200-20
30 SUSPENSION, ORAL (FINAL DOSE FORM) ORAL
Status: DISCONTINUED | OUTPATIENT
Start: 2020-11-07 | End: 2020-11-13 | Stop reason: HOSPADM

## 2020-11-07 RX ADMIN — HYDROXYZINE PAMOATE 50 MG: 50 CAPSULE ORAL at 16:44

## 2020-11-07 RX ADMIN — CARVEDILOL 6.25 MG: 3.12 TABLET, FILM COATED ORAL at 08:12

## 2020-11-07 RX ADMIN — FOLIC ACID 1 MG: 1 TABLET ORAL at 08:12

## 2020-11-07 RX ADMIN — CLONIDINE HYDROCHLORIDE 0.1 MG: 0.1 TABLET ORAL at 20:26

## 2020-11-07 RX ADMIN — TRAZODONE HYDROCHLORIDE 50 MG: 50 TABLET ORAL at 20:26

## 2020-11-07 RX ADMIN — PANTOPRAZOLE SODIUM 40 MG: 40 TABLET, DELAYED RELEASE ORAL at 17:28

## 2020-11-07 RX ADMIN — BUSPIRONE HYDROCHLORIDE 10 MG: 10 TABLET ORAL at 08:12

## 2020-11-07 RX ADMIN — PENICILLIN V POTASSIUM 250 MG: 250 TABLET, FILM COATED ORAL at 16:36

## 2020-11-07 RX ADMIN — FAMOTIDINE 20 MG: 20 TABLET ORAL at 08:12

## 2020-11-07 RX ADMIN — GABAPENTIN 800 MG: 300 CAPSULE ORAL at 20:26

## 2020-11-07 RX ADMIN — FLUTICASONE PROPIONATE 2 SPRAY: 50 SPRAY, METERED NASAL at 08:23

## 2020-11-07 RX ADMIN — LEVETIRACETAM 500 MG: 500 TABLET ORAL at 20:26

## 2020-11-07 RX ADMIN — PENICILLIN V POTASSIUM 250 MG: 250 TABLET, FILM COATED ORAL at 11:32

## 2020-11-07 RX ADMIN — ACETAMINOPHEN 500 MG: 500 TABLET, FILM COATED ORAL at 08:23

## 2020-11-07 RX ADMIN — ONDANSETRON 4 MG: 4 TABLET, ORALLY DISINTEGRATING ORAL at 06:49

## 2020-11-07 RX ADMIN — THERA TABS 1 TABLET: TAB at 08:11

## 2020-11-07 RX ADMIN — ONDANSETRON 4 MG: 4 TABLET, ORALLY DISINTEGRATING ORAL at 16:02

## 2020-11-07 RX ADMIN — PANCRELIPASE 2 CAPSULE: 60000; 12000; 38000 CAPSULE, DELAYED RELEASE PELLETS ORAL at 16:36

## 2020-11-07 RX ADMIN — ACETAMINOPHEN 500 MG: 500 TABLET, FILM COATED ORAL at 20:26

## 2020-11-07 RX ADMIN — MUPIROCIN: 20 OINTMENT TOPICAL at 09:19

## 2020-11-07 RX ADMIN — DOCUSATE SODIUM 100 MG: 100 CAPSULE, LIQUID FILLED ORAL at 08:12

## 2020-11-07 RX ADMIN — LEVETIRACETAM 500 MG: 500 TABLET ORAL at 08:12

## 2020-11-07 RX ADMIN — DULOXETINE HYDROCHLORIDE 60 MG: 60 CAPSULE, DELAYED RELEASE PELLETS ORAL at 08:11

## 2020-11-07 RX ADMIN — PHENOBARBITAL 32.4 MG: 32.4 TABLET ORAL at 08:12

## 2020-11-07 RX ADMIN — ATORVASTATIN CALCIUM 40 MG: 40 TABLET, FILM COATED ORAL at 20:27

## 2020-11-07 RX ADMIN — TAMSULOSIN HYDROCHLORIDE 0.4 MG: 0.4 CAPSULE ORAL at 08:12

## 2020-11-07 RX ADMIN — GABAPENTIN 800 MG: 300 CAPSULE ORAL at 08:11

## 2020-11-07 RX ADMIN — GABAPENTIN 800 MG: 300 CAPSULE ORAL at 13:20

## 2020-11-07 RX ADMIN — CLOPIDOGREL BISULFATE 75 MG: 75 TABLET ORAL at 08:11

## 2020-11-07 RX ADMIN — POTASSIUM CHLORIDE 10 MEQ: 750 TABLET, EXTENDED RELEASE ORAL at 08:12

## 2020-11-07 RX ADMIN — PHENOBARBITAL 32.4 MG: 32.4 TABLET ORAL at 20:26

## 2020-11-07 RX ADMIN — PENICILLIN V POTASSIUM 250 MG: 250 TABLET, FILM COATED ORAL at 21:36

## 2020-11-07 RX ADMIN — Medication 100 MG: at 08:12

## 2020-11-07 RX ADMIN — PHENOBARBITAL 32.4 MG: 32.4 TABLET ORAL at 13:20

## 2020-11-07 RX ADMIN — ASPIRIN 81 MG CHEWABLE TABLET 81 MG: 81 TABLET CHEWABLE at 08:11

## 2020-11-07 RX ADMIN — CARVEDILOL 6.25 MG: 3.12 TABLET, FILM COATED ORAL at 16:36

## 2020-11-07 RX ADMIN — CLONIDINE HYDROCHLORIDE 0.1 MG: 0.1 TABLET ORAL at 08:11

## 2020-11-07 RX ADMIN — PENICILLIN V POTASSIUM 250 MG: 250 TABLET, FILM COATED ORAL at 06:49

## 2020-11-07 RX ADMIN — QUETIAPINE FUMARATE 300 MG: 300 TABLET, EXTENDED RELEASE ORAL at 20:26

## 2020-11-07 RX ADMIN — PANCRELIPASE 2 CAPSULE: 60000; 12000; 38000 CAPSULE, DELAYED RELEASE PELLETS ORAL at 11:32

## 2020-11-07 RX ADMIN — PANCRELIPASE 2 CAPSULE: 60000; 12000; 38000 CAPSULE, DELAYED RELEASE PELLETS ORAL at 08:11

## 2020-11-07 NOTE — PROGRESS NOTES
Behavioral Health Progress Note    Admit Date: 11/2/2020  Hospital day 4    Vitals : No data found.   Labs:    Recent Results (from the past 24 hour(s))   METABOLIC PANEL, BASIC    Collection Time: 11/07/20  6:20 AM   Result Value Ref Range    Sodium 142 136 - 145 mmol/L    Potassium 3.9 3.5 - 5.5 mmol/L    Chloride 109 100 - 111 mmol/L    CO2 28 21 - 32 mmol/L    Anion gap 5 3.0 - 18 mmol/L    Glucose 96 74 - 99 mg/dL    BUN 11 7.0 - 18 MG/DL    Creatinine 0.76 0.6 - 1.3 MG/DL    BUN/Creatinine ratio 14 12 - 20      GFR est AA >60 >60 ml/min/1.73m2    GFR est non-AA >60 >60 ml/min/1.73m2    Calcium 8.9 8.5 - 10.1 MG/DL     Meds:   Current Facility-Administered Medications   Medication Dose Route Frequency    pantoprazole (PROTONIX) tablet 40 mg  40 mg Oral ACB&D    alum-mag hydroxide-simeth (MYLANTA) oral suspension 30 mL  30 mL Oral Q4H PRN    PHENobarbitaL (LUMINAL) tablet 32.4 mg  32.4 mg Oral TID    potassium chloride SR (KLOR-CON 10) tablet 10 mEq  10 mEq Oral DAILY    penicillin v potassium (VEETID) tablet 250 mg  250 mg Oral AC&HS    traZODone (DESYREL) tablet 50 mg  50 mg Oral QHS    docusate sodium (COLACE) capsule 100 mg  100 mg Oral DAILY    fluticasone propionate (FLONASE) 50 mcg/actuation nasal spray 2 Spray  2 Spray Both Nostrils DAILY    nicotine (NICODERM CQ) 21 mg/24 hr patch 1 Patch  1 Patch TransDERmal DAILY    carvediloL (COREG) tablet 6.25 mg  6.25 mg Oral BID WITH MEALS    LORazepam (ATIVAN) tablet 1 mg  1 mg Oral Q4H PRN    LORazepam (ATIVAN) tablet 2 mg  2 mg Oral Q4H PRN    acetaminophen (TYLENOL) tablet 500 mg  500 mg Oral U8O PRN    folic acid (FOLVITE) tablet 1 mg  1 mg Oral DAILY    loperamide (IMODIUM) capsule 2-4 mg  2-4 mg Oral PRN    therapeutic multivitamin (THERAGRAN) tablet 1 Tab  1 Tab Oral DAILY    thiamine HCL (B-1) tablet 100 mg  100 mg Oral DAILY    hydrOXYzine pamoate (VISTARIL) capsule 50 mg  50 mg Oral Q4H PRN    busPIRone (BUSPAR) tablet 10 mg  10 mg Oral DAILY    QUEtiapine SR (SEROquel XR) tablet 300 mg  300 mg Oral QHS    DULoxetine (CYMBALTA) capsule 60 mg  60 mg Oral DAILY    gabapentin (NEURONTIN) capsule 800 mg  800 mg Oral TID    levETIRAcetam (KEPPRA) tablet 500 mg  500 mg Oral BID    cloNIDine HCL (CATAPRES) tablet 0.1 mg  0.1 mg Oral BID    tamsulosin (FLOMAX) capsule 0.4 mg  0.4 mg Oral DAILY    aspirin chewable tablet 81 mg  81 mg Oral DAILY    lipase-protease-amylase (CREON 12,000) capsule 2 Cap  2 Cap Oral TID WITH MEALS    ondansetron (ZOFRAN ODT) tablet 4 mg  4 mg Oral Q8H PRN    clopidogreL (PLAVIX) tablet 75 mg  75 mg Oral DAILY    atorvastatin (LIPITOR) tablet 40 mg  40 mg Oral QHS    mupirocin (BACTROBAN) 2 % ointment   Topical TID      Hospital Problems: Principal Problem:    Severe episode of recurrent major depressive disorder ([facility] (11/14/2011)      Overview: Overview:       Most probable SIMD    Active Problems:    Tobacco dependence (8/20/2013)      HTN (hypertension) (8/20/2013)      Alcohol use disorder, severe, dependence ([facility] (4/22/2014)      Overview: Numerous hospitalization of intoxication/suicidal ideation      Coronary artery disease involving native coronary artery of native heart without angina pectoris (8/31/2017)      Overview: NSTEMI 8/28, cath possible occlusion of small distal Cx culprit, OM 50%,       RCA 50%       S/p stent 2/2020      History of non-ST elevation myocardial infarction (NSTEMI) (9/22/2017)      History of CVA (cerebrovascular accident) (9/22/2017)      Delirium tremens ([facility] (6/3/2020)      Ankle fracture, bimalleolar, closed, right, sequela (11/3/2020)        Subjective:   Medication side effects: fatigue/weakness  GI disturbance    Mental Status Exam  Sensorium: alert  Orientation: only aware of  time, place and person  Relations: cooperative  Eye Contact: appropriate  Appearance: shows no evidence of impairment, walker  Thought Process: normal rate of thoughts and fair abstract reasoning/computation   Thought Content: no evidence of impairment   Suicidal: denies   Homicidal: non   Mood: is anxious   Affect: stable  Memory: shows no evidence of impairment     Concentration: fair  Abstraction: concrete  Insight: The patient's insight shows no evidence of impairment    OR Fair  Judgement: shows no evidence of impairment OR  Fair    Assessment/Plan:   improved       Continue close observation,       Nurses report that the patient is cooperative. He is feeling better and no longer shaking his bed but continues to have gastrointestinal complaints. We are going to switch him from the Pepcid to Protonix twice daily with food. I have also written for Mylanta since he says that it feels like severe heartburn. He denies any feelings like he may have had seizures either in his sleep or any type of other problems. We are continuing to taper down the phenobarbital going to 30 mg 3 times daily today and Sunday with probable discontinuance on Monday.

## 2020-11-07 NOTE — PROGRESS NOTES
Problem: Suicide  Goal: *STG: Attends activities and groups  Description: Patient will attend at least two groups daily while hospitalized. Outcome: Progressing Towards Goal     Problem: Depressed Mood (Adult/Pediatric)  Goal: *STG: Attends activities and groups  Description: Patient will attend group activities daily while hospitalized. Outcome: Progressing Towards Goal     Problem: Falls - Risk of  Goal: *Absence of Falls  Description: Document Kd Metcalf Fall Risk and appropriate interventions in the flowsheet daily. 11/7/2020 1153 by Rea Nuñez RN  Outcome: Progressing Towards Goal  Note: Fall Risk Interventions:  Mobility Interventions: Utilize walker, cane, or other assistive device  Medication Interventions: Teach patient to arise slowly  History of Falls Interventions: Room close to nurse's station      11/7/2020 1151 by Rea Nuñez RN  Note: Fall Risk Interventions:  -Mobility Interventions: Utilize walker, cane, or other assistive device  -Medication Interventions: Teach patient to arise slowly  -History of Falls Interventions: Room close to nurse's station    Patient has been in day room most of day shift. Patient appeared quite irritable at start of shift, arguing with MHT but has been more pleasant as shift has progressed. Patient has attended all groups and has been active participant. Patient was given PRN Tylenol tab PO for complaint of right ankle pain earlier in shift. Patient voiced \"helped enough\" when asked about effectiveness of medication. Patient has been compliant with scheduled medications and has eaten all meals and snacks. Patient has been compliant with unit guidelines, free from falls and harm. Will continue to monitor and provide interventions as appropriate.

## 2020-11-07 NOTE — BH NOTES
Patient spend the evening in the milieu  Interacting with peers talking about different life events. Patient attended group and participated appropriately. Patient has a walker that he has been using to ambulate. Staff will continue to monitor patient for safety.

## 2020-11-07 NOTE — BH NOTES
Pt's phenobarbital D/C'd after 2 doses today. Received telephone orders from Dr. Sarah Waters to add a 60 mg dose for tonight.

## 2020-11-07 NOTE — BH NOTES
Patient received dressing change by this nurse to right lateral calf. Yellow drainage noted on removed dressings and minimal amount. This nurse cleansed area and applied Optiform Dressing (x3) to cover each site. Patient tolerated dressing change with no issues. Patient assisted with resuming use of boot for stability. Will continue to monitor and provide interventions as appropriate per doctor order.

## 2020-11-08 PROCEDURE — 74011250637 HC RX REV CODE- 250/637: Performed by: PSYCHIATRY & NEUROLOGY

## 2020-11-08 PROCEDURE — 99232 SBSQ HOSP IP/OBS MODERATE 35: CPT | Performed by: PSYCHIATRY & NEUROLOGY

## 2020-11-08 PROCEDURE — 65220000003 HC RM SEMIPRIVATE PSYCH

## 2020-11-08 RX ADMIN — ACETAMINOPHEN 500 MG: 500 TABLET, FILM COATED ORAL at 20:25

## 2020-11-08 RX ADMIN — PHENOBARBITAL 32.4 MG: 32.4 TABLET ORAL at 13:32

## 2020-11-08 RX ADMIN — GABAPENTIN 800 MG: 300 CAPSULE ORAL at 09:37

## 2020-11-08 RX ADMIN — FOLIC ACID 1 MG: 1 TABLET ORAL at 09:36

## 2020-11-08 RX ADMIN — ASPIRIN 81 MG CHEWABLE TABLET 81 MG: 81 TABLET CHEWABLE at 09:00

## 2020-11-08 RX ADMIN — BUSPIRONE HYDROCHLORIDE 10 MG: 10 TABLET ORAL at 09:34

## 2020-11-08 RX ADMIN — THERA TABS 1 TABLET: TAB at 09:33

## 2020-11-08 RX ADMIN — TRAZODONE HYDROCHLORIDE 50 MG: 50 TABLET ORAL at 20:24

## 2020-11-08 RX ADMIN — LEVETIRACETAM 500 MG: 500 TABLET ORAL at 20:24

## 2020-11-08 RX ADMIN — PANCRELIPASE 2 CAPSULE: 60000; 12000; 38000 CAPSULE, DELAYED RELEASE PELLETS ORAL at 09:36

## 2020-11-08 RX ADMIN — PENICILLIN V POTASSIUM 250 MG: 250 TABLET, FILM COATED ORAL at 11:06

## 2020-11-08 RX ADMIN — PHENOBARBITAL 32.4 MG: 32.4 TABLET ORAL at 20:24

## 2020-11-08 RX ADMIN — GABAPENTIN 800 MG: 300 CAPSULE ORAL at 20:23

## 2020-11-08 RX ADMIN — POTASSIUM CHLORIDE 10 MEQ: 750 TABLET, EXTENDED RELEASE ORAL at 09:00

## 2020-11-08 RX ADMIN — MUPIROCIN: 20 OINTMENT TOPICAL at 09:45

## 2020-11-08 RX ADMIN — PENICILLIN V POTASSIUM 250 MG: 250 TABLET, FILM COATED ORAL at 16:13

## 2020-11-08 RX ADMIN — PANTOPRAZOLE SODIUM 40 MG: 40 TABLET, DELAYED RELEASE ORAL at 16:13

## 2020-11-08 RX ADMIN — ALUMINUM HYDROXIDE, MAGNESIUM HYDROXIDE, AND SIMETHICONE 30 ML: 200; 200; 20 SUSPENSION ORAL at 11:06

## 2020-11-08 RX ADMIN — PANCRELIPASE 2 CAPSULE: 60000; 12000; 38000 CAPSULE, DELAYED RELEASE PELLETS ORAL at 11:06

## 2020-11-08 RX ADMIN — PENICILLIN V POTASSIUM 250 MG: 250 TABLET, FILM COATED ORAL at 22:41

## 2020-11-08 RX ADMIN — FLUTICASONE PROPIONATE 2 SPRAY: 50 SPRAY, METERED NASAL at 09:41

## 2020-11-08 RX ADMIN — QUETIAPINE FUMARATE 300 MG: 300 TABLET, EXTENDED RELEASE ORAL at 20:24

## 2020-11-08 RX ADMIN — LEVETIRACETAM 500 MG: 500 TABLET ORAL at 09:32

## 2020-11-08 RX ADMIN — PHENOBARBITAL 32.4 MG: 32.4 TABLET ORAL at 09:31

## 2020-11-08 RX ADMIN — PANCRELIPASE 2 CAPSULE: 60000; 12000; 38000 CAPSULE, DELAYED RELEASE PELLETS ORAL at 16:13

## 2020-11-08 RX ADMIN — CARVEDILOL 6.25 MG: 3.12 TABLET, FILM COATED ORAL at 16:13

## 2020-11-08 RX ADMIN — CLONIDINE HYDROCHLORIDE 0.1 MG: 0.1 TABLET ORAL at 20:24

## 2020-11-08 RX ADMIN — ATORVASTATIN CALCIUM 40 MG: 40 TABLET, FILM COATED ORAL at 20:23

## 2020-11-08 RX ADMIN — PANTOPRAZOLE SODIUM 40 MG: 40 TABLET, DELAYED RELEASE ORAL at 06:36

## 2020-11-08 RX ADMIN — ONDANSETRON 4 MG: 4 TABLET, ORALLY DISINTEGRATING ORAL at 07:59

## 2020-11-08 RX ADMIN — DULOXETINE HYDROCHLORIDE 60 MG: 60 CAPSULE, DELAYED RELEASE PELLETS ORAL at 09:00

## 2020-11-08 RX ADMIN — ALUMINUM HYDROXIDE, MAGNESIUM HYDROXIDE, AND SIMETHICONE 30 ML: 200; 200; 20 SUSPENSION ORAL at 16:13

## 2020-11-08 RX ADMIN — CLOPIDOGREL BISULFATE 75 MG: 75 TABLET ORAL at 09:36

## 2020-11-08 RX ADMIN — ONDANSETRON 4 MG: 4 TABLET, ORALLY DISINTEGRATING ORAL at 18:51

## 2020-11-08 RX ADMIN — TAMSULOSIN HYDROCHLORIDE 0.4 MG: 0.4 CAPSULE ORAL at 09:36

## 2020-11-08 RX ADMIN — CLONIDINE HYDROCHLORIDE 0.1 MG: 0.1 TABLET ORAL at 09:32

## 2020-11-08 RX ADMIN — DOCUSATE SODIUM 100 MG: 100 CAPSULE, LIQUID FILLED ORAL at 09:35

## 2020-11-08 RX ADMIN — PENICILLIN V POTASSIUM 250 MG: 250 TABLET, FILM COATED ORAL at 06:36

## 2020-11-08 RX ADMIN — ACETAMINOPHEN 500 MG: 500 TABLET, FILM COATED ORAL at 11:06

## 2020-11-08 RX ADMIN — GABAPENTIN 800 MG: 300 CAPSULE ORAL at 13:31

## 2020-11-08 RX ADMIN — Medication 100 MG: at 09:34

## 2020-11-08 RX ADMIN — CARVEDILOL 6.25 MG: 3.12 TABLET, FILM COATED ORAL at 09:34

## 2020-11-08 NOTE — GROUP NOTE
[facility]US[facility]  GROUP DOCUMENTATION INDIVIDUAL Group Therapy Note Date: 11/8/2020 Group Start Time: 1300 Group End Time: 2157 Group Topic: Nursing [facility] ADULT CHEM Modesta Silva, RN 
 
[facility] GROUP DOCUMENTATION GROUP Group Therapy Note Attendees: 9 Attendance: Attended Patient's Goal:  Safety Plan completion Interventions/techniques: Challenged Follows Directions: Followed directions Interactions: Interacted appropriately Mental Status: Calm Behavior/appearance: Attentive Goals Achieved: Able to listen to others and Able to reflect/comment on own behavior Additional Notes:   
 
Laura Hauser RN

## 2020-11-08 NOTE — PROGRESS NOTES
Problem: Suicide  Goal: *STG: Remains safe in hospital  Description: Patient  will not harm himself or others daily while in hospital.   Outcome: Progressing Towards Goal  Note: Patient will remain safe in the hospital daily  Goal: *STG: Attends activities and groups  Description: Patient will attend at least two groups daily while hospitalized. Note: Patient will attend scheduled activities and groups daily  Goal: *STG/LTG: Complies with medication therapy  Description: Patient will comply with medication regimen daily while in hospital.  Outcome: Progressing Towards Goal  Note: Patient will comply with medication therapy daily     Problem: Depressed Mood (Adult/Pediatric)  Goal: *STG: Participates in treatment plan  Description: Patient will participate in treatment planning daily while in hospital.  Outcome: Progressing Towards Goal  Note: Patient will participate in treatment plan daily     Patient has been visible and active in the day area during shift. He has been compliant with medications, meals, and engaging with peers and staff. Attended groups, and required conversation regarding his complaints on the unit. Educated patient on boundaries and rules of the unit. Completed his safety plan. Patient dressings changed on right calf due to it coming off today. Scheduled for Q2 days. Denies current thoughts of self harm. Patient required PRNs (zofran in am, mylanta near noon, and tylenol near noon) Will continue to monitor for safety and support.

## 2020-11-08 NOTE — GROUP NOTE
IP  GROUP DOCUMENTATION INDIVIDUAL Group Therapy Note Date: 11/7/2020 Group Start Time: 2030 Group End Time: 2045 Group Topic: Nursing [facility] 1 ADULT CHEM DEP Sudheer Chris, MARIE 
 
[facility] GROUP DOCUMENTATION GROUP Group Therapy Note Attendees: 10 Attendance: Attended Interventions/techniques: Informed Follows Directions: Followed directions Interactions: Interacted appropriately Mental Status: Happy Behavior/appearance: Attentive and Cooperative Goals Achieved: Able to engage in interactions and Able to listen to others Rosy Zuñiga RN

## 2020-11-08 NOTE — GROUP NOTE
SUSAN  GROUP DOCUMENTATION INDIVIDUAL Group Therapy Note Date: 11/8/2020 Group Start Time: 1800 Group End Time: 1815 Group Topic: Nursing [facility] ADULT CHEM DEP Andres York, RN 
 
SUSAN  GROUP DOCUMENTATION GROUP Group Therapy Note Attendees: 6 Attendance: Did not attend group. Bello Cho.  Radha

## 2020-11-08 NOTE — PROGRESS NOTES
Behavioral Health Progress Note    Admit Date: 11/2/2020  Hospital day 5    Vitals :   Patient Vitals for the past 8 hrs:   BP Temp Pulse Resp   11/08/20 1257 119/79 97.1 °F (36.2 °C) 84 20     Labs:  No results found for this or any previous visit (from the past 24 hour(s)).   Meds:   Current Facility-Administered Medications   Medication Dose Route Frequency    pantoprazole (PROTONIX) tablet 40 mg  40 mg Oral ACB&D    alum-mag hydroxide-simeth (MYLANTA) oral suspension 30 mL  30 mL Oral Q4H PRN    PHENobarbitaL (LUMINAL) tablet 32.4 mg  32.4 mg Oral TID    potassium chloride SR (KLOR-CON 10) tablet 10 mEq  10 mEq Oral DAILY    penicillin v potassium (VEETID) tablet 250 mg  250 mg Oral AC&HS    traZODone (DESYREL) tablet 50 mg  50 mg Oral QHS    docusate sodium (COLACE) capsule 100 mg  100 mg Oral DAILY    fluticasone propionate (FLONASE) 50 mcg/actuation nasal spray 2 Spray  2 Spray Both Nostrils DAILY    nicotine (NICODERM CQ) 21 mg/24 hr patch 1 Patch  1 Patch TransDERmal DAILY    carvediloL (COREG) tablet 6.25 mg  6.25 mg Oral BID WITH MEALS    LORazepam (ATIVAN) tablet 1 mg  1 mg Oral Q4H PRN    LORazepam (ATIVAN) tablet 2 mg  2 mg Oral Q4H PRN    acetaminophen (TYLENOL) tablet 500 mg  500 mg Oral W2Z PRN    folic acid (FOLVITE) tablet 1 mg  1 mg Oral DAILY    loperamide (IMODIUM) capsule 2-4 mg  2-4 mg Oral PRN    therapeutic multivitamin (THERAGRAN) tablet 1 Tab  1 Tab Oral DAILY    thiamine HCL (B-1) tablet 100 mg  100 mg Oral DAILY    hydrOXYzine pamoate (VISTARIL) capsule 50 mg  50 mg Oral Q4H PRN    busPIRone (BUSPAR) tablet 10 mg  10 mg Oral DAILY    QUEtiapine SR (SEROquel XR) tablet 300 mg  300 mg Oral QHS    DULoxetine (CYMBALTA) capsule 60 mg  60 mg Oral DAILY    gabapentin (NEURONTIN) capsule 800 mg  800 mg Oral TID    levETIRAcetam (KEPPRA) tablet 500 mg  500 mg Oral BID    cloNIDine HCL (CATAPRES) tablet 0.1 mg  0.1 mg Oral BID    tamsulosin (FLOMAX) capsule 0.4 mg  0.4 mg Oral DAILY    aspirin chewable tablet 81 mg  81 mg Oral DAILY    lipase-protease-amylase (CREON 12,000) capsule 2 Cap  2 Cap Oral TID WITH MEALS    ondansetron (ZOFRAN ODT) tablet 4 mg  4 mg Oral Q8H PRN    clopidogreL (PLAVIX) tablet 75 mg  75 mg Oral DAILY    atorvastatin (LIPITOR) tablet 40 mg  40 mg Oral QHS    mupirocin (BACTROBAN) 2 % ointment   Topical TID      Hospital Problems: Principal Problem:    Severe episode of recurrent major depressive disorder ([facility] (11/14/2011)      Overview: Overview:       Most probable SIMD    Active Problems:    Tobacco dependence (8/20/2013)      HTN (hypertension) (8/20/2013)      Alcohol use disorder, severe, dependence ([facility] (4/22/2014)      Overview: Numerous hospitalization of intoxication/suicidal ideation      Coronary artery disease involving native coronary artery of native heart without angina pectoris (8/31/2017)      Overview: NSTEMI 8/28, cath possible occlusion of small distal Cx culprit, OM 50%,       RCA 50%       S/p stent 2/2020      History of non-ST elevation myocardial infarction (NSTEMI) (9/22/2017)      History of CVA (cerebrovascular accident) (9/22/2017)      Delirium tremens ([facility] (6/3/2020)      Ankle fracture, bimalleolar, closed, right, sequela (11/3/2020)        Subjective:   Medication side effects: fatigue/weakness  GI disturbance    Mental Status Exam  Sensorium: alert  Orientation: only aware of  time, place and person  Relations: cooperative  Eye Contact: poor  Appearance: odd  Thought Process: slow rate of thoughts and fair abstract reasoning/computation   Thought Content: no evidence of impairment   Suicidal: denies   Homicidal: none   Mood: is anxious and unhappy   Affect: stable  Memory: shows no evidence of impairment     Concentration: distractable  Abstraction: concrete  Insight: The patient shows little insight    OR Fair  Judgement: is psychologically impaired OR  Fair    Assessment/Plan:   improved    Continue close observation,    Patient is continuing on detoxification with the phenobarbital.  He is now down to the phenobarbital 30 mg 3 times daily. He should finish this tomorrow. He is now saying he is uncertain if he really does wish to go to a 28-day treatment program.  He is wavering on this saying that he could go to [facility] with them visiting him during the daytime, follow-up with Sandi Chairez MD psychiatrist at [facility] and follow-up with primary care provider at [address] [place] [address]. He is continuing to complain of heartburn and taking the Mylanta and was switched over to Protonix. He did have wound looked at and had a special dressing to show up that had been ordered by the wound care nurse. They will be putting it on when it shows up later in the afternoon. He is denying hallucinations or delusions. Mood is improved and he is sheron for safety denying suicidal ideas now.

## 2020-11-08 NOTE — BH NOTES
On 11/7/20 during the 3-11 pm shift, patient spent the majority of this shift in the milieu socializing, playing cards and watching TV with peers and staff. Patient can be quiet when he wants and real talkative when he wants. Patient attended both community and nursing groups and participated. Patient ate all of his dinner and had snacks during snack time. Patient gets along well with peers. This writer noticed no issues with patient during this shift. This writer will continue to monitor patient for safety.

## 2020-11-09 PROCEDURE — 65220000003 HC RM SEMIPRIVATE PSYCH

## 2020-11-09 PROCEDURE — 74011250637 HC RX REV CODE- 250/637: Performed by: PSYCHIATRY & NEUROLOGY

## 2020-11-09 PROCEDURE — 99232 SBSQ HOSP IP/OBS MODERATE 35: CPT | Performed by: PSYCHIATRY & NEUROLOGY

## 2020-11-09 RX ORDER — ACETAMINOPHEN 325 MG/1
650 TABLET ORAL
Status: DISCONTINUED | OUTPATIENT
Start: 2020-11-09 | End: 2020-11-13 | Stop reason: HOSPADM

## 2020-11-09 RX ORDER — DOCUSATE SODIUM 100 MG/1
100 CAPSULE, LIQUID FILLED ORAL 2 TIMES DAILY
Status: DISCONTINUED | OUTPATIENT
Start: 2020-11-09 | End: 2020-11-13 | Stop reason: HOSPADM

## 2020-11-09 RX ORDER — BUSPIRONE HYDROCHLORIDE 10 MG/1
10 TABLET ORAL 3 TIMES DAILY
Status: DISCONTINUED | OUTPATIENT
Start: 2020-11-09 | End: 2020-11-13 | Stop reason: HOSPADM

## 2020-11-09 RX ADMIN — GABAPENTIN 800 MG: 300 CAPSULE ORAL at 08:42

## 2020-11-09 RX ADMIN — CARVEDILOL 6.25 MG: 3.12 TABLET, FILM COATED ORAL at 08:41

## 2020-11-09 RX ADMIN — GABAPENTIN 800 MG: 300 CAPSULE ORAL at 20:15

## 2020-11-09 RX ADMIN — PHENOBARBITAL 32.4 MG: 32.4 TABLET ORAL at 14:12

## 2020-11-09 RX ADMIN — BUSPIRONE HYDROCHLORIDE 10 MG: 10 TABLET ORAL at 14:12

## 2020-11-09 RX ADMIN — PENICILLIN V POTASSIUM 250 MG: 250 TABLET, FILM COATED ORAL at 06:36

## 2020-11-09 RX ADMIN — BUSPIRONE HYDROCHLORIDE 10 MG: 10 TABLET ORAL at 08:41

## 2020-11-09 RX ADMIN — BUSPIRONE HYDROCHLORIDE 10 MG: 10 TABLET ORAL at 20:15

## 2020-11-09 RX ADMIN — CARVEDILOL 6.25 MG: 3.12 TABLET, FILM COATED ORAL at 17:44

## 2020-11-09 RX ADMIN — ONDANSETRON 4 MG: 4 TABLET, ORALLY DISINTEGRATING ORAL at 16:40

## 2020-11-09 RX ADMIN — PENICILLIN V POTASSIUM 250 MG: 250 TABLET, FILM COATED ORAL at 11:30

## 2020-11-09 RX ADMIN — POTASSIUM CHLORIDE 10 MEQ: 750 TABLET, EXTENDED RELEASE ORAL at 08:41

## 2020-11-09 RX ADMIN — DOCUSATE SODIUM 100 MG: 100 CAPSULE, LIQUID FILLED ORAL at 21:45

## 2020-11-09 RX ADMIN — ALUMINUM HYDROXIDE, MAGNESIUM HYDROXIDE, AND SIMETHICONE 30 ML: 200; 200; 20 SUSPENSION ORAL at 08:55

## 2020-11-09 RX ADMIN — ATORVASTATIN CALCIUM 40 MG: 40 TABLET, FILM COATED ORAL at 20:16

## 2020-11-09 RX ADMIN — PANTOPRAZOLE SODIUM 40 MG: 40 TABLET, DELAYED RELEASE ORAL at 17:44

## 2020-11-09 RX ADMIN — QUETIAPINE FUMARATE 300 MG: 300 TABLET, EXTENDED RELEASE ORAL at 20:15

## 2020-11-09 RX ADMIN — FLUTICASONE PROPIONATE 2 SPRAY: 50 SPRAY, METERED NASAL at 09:00

## 2020-11-09 RX ADMIN — ACETAMINOPHEN 500 MG: 500 TABLET, FILM COATED ORAL at 08:40

## 2020-11-09 RX ADMIN — PHENOBARBITAL 32.4 MG: 32.4 TABLET ORAL at 08:41

## 2020-11-09 RX ADMIN — DOCUSATE SODIUM 100 MG: 100 CAPSULE, LIQUID FILLED ORAL at 08:41

## 2020-11-09 RX ADMIN — PENICILLIN V POTASSIUM 250 MG: 250 TABLET, FILM COATED ORAL at 21:45

## 2020-11-09 RX ADMIN — CLONIDINE HYDROCHLORIDE 0.1 MG: 0.1 TABLET ORAL at 20:16

## 2020-11-09 RX ADMIN — PANCRELIPASE 2 CAPSULE: 60000; 12000; 38000 CAPSULE, DELAYED RELEASE PELLETS ORAL at 08:42

## 2020-11-09 RX ADMIN — Medication 100 MG: at 08:41

## 2020-11-09 RX ADMIN — LEVETIRACETAM 500 MG: 500 TABLET ORAL at 08:41

## 2020-11-09 RX ADMIN — PANTOPRAZOLE SODIUM 40 MG: 40 TABLET, DELAYED RELEASE ORAL at 06:36

## 2020-11-09 RX ADMIN — CLONIDINE HYDROCHLORIDE 0.1 MG: 0.1 TABLET ORAL at 08:42

## 2020-11-09 RX ADMIN — TAMSULOSIN HYDROCHLORIDE 0.4 MG: 0.4 CAPSULE ORAL at 08:41

## 2020-11-09 RX ADMIN — GABAPENTIN 800 MG: 300 CAPSULE ORAL at 14:12

## 2020-11-09 RX ADMIN — CLOPIDOGREL BISULFATE 75 MG: 75 TABLET ORAL at 08:41

## 2020-11-09 RX ADMIN — DULOXETINE HYDROCHLORIDE 60 MG: 60 CAPSULE, DELAYED RELEASE PELLETS ORAL at 08:41

## 2020-11-09 RX ADMIN — LEVETIRACETAM 500 MG: 500 TABLET ORAL at 20:15

## 2020-11-09 RX ADMIN — PENICILLIN V POTASSIUM 250 MG: 250 TABLET, FILM COATED ORAL at 16:38

## 2020-11-09 RX ADMIN — FOLIC ACID 1 MG: 1 TABLET ORAL at 08:42

## 2020-11-09 RX ADMIN — ASPIRIN 81 MG CHEWABLE TABLET 81 MG: 81 TABLET CHEWABLE at 08:42

## 2020-11-09 RX ADMIN — ACETAMINOPHEN 650 MG: 325 TABLET ORAL at 17:46

## 2020-11-09 RX ADMIN — ALUMINUM HYDROXIDE, MAGNESIUM HYDROXIDE, AND SIMETHICONE 30 ML: 200; 200; 20 SUSPENSION ORAL at 16:38

## 2020-11-09 RX ADMIN — THERA TABS 1 TABLET: TAB at 08:41

## 2020-11-09 RX ADMIN — TRAZODONE HYDROCHLORIDE 50 MG: 50 TABLET ORAL at 20:15

## 2020-11-09 NOTE — PROGRESS NOTES
Comprehensive Nutrition Assessment    Type and Reason for Visit: Initial, RD nutrition re-screen/LOS    Nutrition Recommendations/Plan:   - Decrease frequency of supplements to BID.  - Monitor po intake, encourage limited intake of additional snacks/calories. Nutrition Assessment:  Pt with good intake of double portions and Ensure supplements. Discussed decreasing to BID to prevent wt gain. Malnutrition Assessment:  Malnutrition Status:  No malnutrition      Nutrition History and Allergies: Presented to ED in severe alcohol withdrawal, threating suicide. PMHx- MI with stending, HTN, seizures, alcoholic liver disease and pancreatitis. Wt fluctuations noted per chart, denies changes in appetite or meal intake PTA. NKFA    Estimated Daily Nutrient Needs:  Energy (kcal): 8934-5166; Weight Used for Energy Requirements: Current(102 kg)  Protein (g): ; Weight Used for Protein Requirements: Current(x0.8-1.2)  Fluid (ml/day): 7081-2814; Method Used for Fluid Requirements: 1 ml/kcal    Nutrition Related Findings:  Pt reported BM 11/8. Maalox prn, Colace daily, folic acid, MVI, thiamine, 10 mEq KCL. Wounds:    None       Current Nutrition Therapies:  DIET REGULAR Double Portions  DIET NUTRITIONAL SUPPLEMENTS All Meals; Ensure Enlive    Anthropometric Measures:  · Height:  6' 2\" (188 cm)  · Current Body Wt:  101.6 kg (224 lb)   · Admission Body Wt:  230 lb    · Usual Body Wt:  104.3 kg (230 lb)     · Ideal Body Wt:  190 lbs:  117.9 %   · BMI Category: Overweight (BMI 25.0-29. 9)       Nutrition Diagnosis:   · (Excessive energy intake) related to (current diet & supplement order in relation to pt's estimated needs) as evidenced by (pt receiving excessive amount of calories)    Nutrition Interventions:   Food and/or Nutrient Delivery: Continue current diet, Modify oral nutrition supplement  Nutrition Education and Counseling: Survival skills/brief education completed(discussed importance of not overeating)  Coordination of Nutrition Care: Continue to monitor while inpatient    Goals:  PO nutrition intake will meet >75% of patient estimated nutritional needs within the next 7 days.        Nutrition Monitoring and Evaluation:   Behavioral-Environmental Outcomes: Beliefs and attitudes, Readiness for change  Food/Nutrient Intake Outcomes: Food and nutrient intake, Supplement intake  Physical Signs/Symptoms Outcomes: Meal time behavior, Nutrition focused physical findings, Weight    Discharge Planning:    No discharge needs at this time     Electronically signed by Jacob Blizzard, RD on 11/9/2020 at 11:08 AM    Contact: [phone]

## 2020-11-09 NOTE — PROGRESS NOTES
Behavioral Health Progress Note    Admit Date: 11/2/2020  Hospital day 6    Vitals :   Patient Vitals for the past 8 hrs:   BP Temp Pulse Resp SpO2 Height   11/09/20 1104      6' 2\" (1.88 m)   11/09/20 0814 116/83 98 °F (36.7 °C) 82 18 99 %      Labs:  No results found for this or any previous visit (from the past 24 hour(s)).   Meds:   Current Facility-Administered Medications   Medication Dose Route Frequency    busPIRone (BUSPAR) tablet 10 mg  10 mg Oral TID    acetaminophen (TYLENOL) tablet 650 mg  650 mg Oral Q6H PRN    docusate sodium (COLACE) capsule 100 mg  100 mg Oral BID    pantoprazole (PROTONIX) tablet 40 mg  40 mg Oral ACB&D    alum-mag hydroxide-simeth (MYLANTA) oral suspension 30 mL  30 mL Oral Q4H PRN    potassium chloride SR (KLOR-CON 10) tablet 10 mEq  10 mEq Oral DAILY    penicillin v potassium (VEETID) tablet 250 mg  250 mg Oral AC&HS    traZODone (DESYREL) tablet 50 mg  50 mg Oral QHS    fluticasone propionate (FLONASE) 50 mcg/actuation nasal spray 2 Spray  2 Spray Both Nostrils DAILY    nicotine (NICODERM CQ) 21 mg/24 hr patch 1 Patch  1 Patch TransDERmal DAILY    carvediloL (COREG) tablet 6.25 mg  6.25 mg Oral BID WITH MEALS    LORazepam (ATIVAN) tablet 1 mg  1 mg Oral Q4H PRN    LORazepam (ATIVAN) tablet 2 mg  2 mg Oral L6Q PRN    folic acid (FOLVITE) tablet 1 mg  1 mg Oral DAILY    loperamide (IMODIUM) capsule 2-4 mg  2-4 mg Oral PRN    therapeutic multivitamin (THERAGRAN) tablet 1 Tab  1 Tab Oral DAILY    thiamine HCL (B-1) tablet 100 mg  100 mg Oral DAILY    hydrOXYzine pamoate (VISTARIL) capsule 50 mg  50 mg Oral Q4H PRN    QUEtiapine SR (SEROquel XR) tablet 300 mg  300 mg Oral QHS    DULoxetine (CYMBALTA) capsule 60 mg  60 mg Oral DAILY    gabapentin (NEURONTIN) capsule 800 mg  800 mg Oral TID    levETIRAcetam (KEPPRA) tablet 500 mg  500 mg Oral BID    cloNIDine HCL (CATAPRES) tablet 0.1 mg  0.1 mg Oral BID    tamsulosin (FLOMAX) capsule 0.4 mg  0.4 mg Oral DAILY    aspirin chewable tablet 81 mg  81 mg Oral DAILY    ondansetron (ZOFRAN ODT) tablet 4 mg  4 mg Oral Q8H PRN    clopidogreL (PLAVIX) tablet 75 mg  75 mg Oral DAILY    atorvastatin (LIPITOR) tablet 40 mg  40 mg Oral QHS    mupirocin (BACTROBAN) 2 % ointment   Topical TID      Hospital Problems: Principal Problem:    Severe episode of recurrent major depressive disorder ([facility] (11/14/2011)      Overview: Overview:       Most probable SIMD    Active Problems:    Tobacco dependence (8/20/2013)      HTN (hypertension) (8/20/2013)      Alcohol use disorder, severe, dependence ([facility] (4/22/2014)      Overview: Numerous hospitalization of intoxication/suicidal ideation      Coronary artery disease involving native coronary artery of native heart without angina pectoris (8/31/2017)      Overview: NSTEMI 8/28, cath possible occlusion of small distal Cx culprit, OM 50%,       RCA 50%       S/p stent 2/2020      History of non-ST elevation myocardial infarction (NSTEMI) (9/22/2017)      History of CVA (cerebrovascular accident) (9/22/2017)      Delirium tremens ([facility] (6/3/2020)      Ankle fracture, bimalleolar, closed, right, sequela (11/3/2020)        Subjective:   Medication side effects: none  none    Mental Status Exam  Sensorium: alert  Orientation: only aware of  time, place and person  Relations: cooperative  Eye Contact: appropriate  Appearance: shows no evidence of impairment  Thought Process: normal rate of thoughts and fair abstract reasoning/computation   Thought Content: no evidence of impairment   Suicidal: denies   Homicidal: none   Mood: is anxious   Affect: stable  Memory: is impaired and is remote     Concentration: fair  Abstraction: concrete  Insight: The patient's insight shows no evidence of impairment    OR Fair  Judgement: shows no evidence of impairment OR  Fair    Assessment/Plan:   improved    Continue close observation    The patient reports that he is still having pain and did wish to have an increase of the Tylenol up to 650 mg every 4 hours. He did have small amount of blood in his stool and had hard stools. This appeared to be more related to medication effect. I have written for the Colace for twice a day. He is still anxious and we will increase the BuSpar up to 10 mg 3 times a day. He is not having any difficulties with the once a day dose but is probably not sufficient dosing. He does finish the phenobarbital today. He is not having significant withdrawal symptoms we will discontinue the withdrawal protocol at that point. He is still having stomach problems and receives Zofran. This appears to be more gastritis. He does actually want to go to the [facility] and the information was sent by social work. We await word from them on whether he would be accepted there.

## 2020-11-09 NOTE — BH NOTES
Patient moving about unit with walker assist.  Patient wearing his soft cast/boot on lower leg for support; states the cast is rubbing his leg and notes he has \"inserts and a long sock at home that stops it from rubbing\". Compliant with medications. Appetite good. Attending group exercises. Mood improving. Deies +SI/HI/AH. Contracts for safety on the unit. Will continue to monitor and offer support as needed.

## 2020-11-09 NOTE — GROUP NOTE
SUSAN MARTINEZ GROUP DOCUMENTATION INDIVIDUAL Group Therapy Note Date: 11/9/2020 Group Start Time: 0830 Group End Time: 0900 Group Topic: Nursing [facility] 1 ADULT CHEM AMARA DAVIS  GROUP DOCUMENTATION GROUP Group Therapy Note Attendees: 15 Attendance: Attended Patient's Goal: Unit Guidelines Interventions/techniques: Informed Follows Directions: Followed directions Interactions: Interacted appropriately Mental Status: Calm Behavior/appearance: Cooperative Goals Achieved: Able to give feedback to another Nieves Bound

## 2020-11-09 NOTE — BH NOTES
Pt received PRN Tylenol for ankle pain 5/10. Pt also received PRN Mylanta for indigestion. Will continue to monitor for effectiveness.

## 2020-11-09 NOTE — BSMART NOTE
Pt. is a 52-year-old homeless male with history of Bipolar Depression , PTSD, TBI and Alcohol Dependence. Pt was admitted to this facility for ideations to harm self and alcohol detox. Pt.'s case was dicussed in treatment team. Pt. Is thinking about being dc to his home. SW will talk to pt to encourage pt to follow through with going to [facility]. SW will follow-up with [facility] today. ANA Collateral: ANA talked to [facility] admission staff at [facility]. SW was informed Krystian Nicole admission's counselor left a message trying to talk to pt over the weekend on the unit for a phone assessment. ANA called [facility] and left a message with Krystian Nicole. ANA Contact: SW met with pt to discuss dc planning. Pt. Expressed to feeling better today. Pt was provided the above report. pt states she wishes to move forward with going to a rehab program oppose to waiting here in the hospital.  SW provide pros and cons of transitioning to rehab oppose to going home than transitioning to rehab. SW explained to pt given his history of addiction and past hospitalization, he needs to go form the hospital to rehab. Pt reflected and agreed. Pt. 's mood is starting to improve, has poor judgement but insight is better. SW will assist pt with dc plan. ANA will assist pt with contacting Krystian Nicole for phone assessment.   
 
Breana Villalpando MA, [facility]

## 2020-11-09 NOTE — PROGRESS NOTES
Problem: Suicide  Goal: *STG: Remains safe in hospital  Description: Patient  will not harm himself or others daily while in hospital.   Outcome: Progressing Towards Goal  Goal: *STG: Attends activities and groups  Description: Patient will attend at least two groups daily while hospitalized. Outcome: Progressing Towards Goal  Goal: *STG/LTG: Complies with medication therapy  Description: Patient will comply with medication regimen daily while in hospital.  Outcome: Progressing Towards Goal     Problem: Falls - Risk of  Goal: *Absence of Falls  Description: Document Richard Merlin Fall Risk and appropriate interventions in the flowsheet daily. Outcome: Progressing Towards Goal  Note: Fall Risk Interventions:  Mobility Interventions: Utilize walker, cane, or other assistive device         Medication Interventions: Teach patient to arise slowly         History of Falls Interventions: Door open when patient unattended, Investigate reason for fall, Room close to nurse's station      Pt presents with dull affect, anxious mood. Pt has been sociable with his peers on the unit. Pt is participative in groups and is adherent with unit guidelines. Pt denies SI/HI at this time. Pt denies experiencing hallucinations of all types today. Pt c/o moderate amount of blood in stool, formed. MD made aware. Pt is medication compliant, and received PRN Mylanta. Will continue to monitor.

## 2020-11-09 NOTE — BSMART NOTE
SOCIAL WORK GROUP THERAPY PROGRESS NOTE Group Time:  10:30am 
 
Group Topic:  Coping Skills    C D Issues Group Participation:   
 
Pt moderately involved during group discussion & seemed to remain attentive. As group explored \"my body's\" anger warning signs as well as common triggers, pt made effort to read parts of handout. admitted how needs more insight into physical & emotional warning signs. When differences between Assertive, Aggressive & Non-Assertive Behaviors pt seemed to pay extra attention to those comments

## 2020-11-09 NOTE — BSMART NOTE
OCCUPATIONAL THERAPY PROGRESS NOTE Group Time:  4494 Attendance: The patient attended full group. Participation: The patient participated fully in the activity. Attention: The patient was able to focus on the activity. Interaction: The patient frequently interacts with others. Mood bright. Hoping to go to rehab.

## 2020-11-10 PROCEDURE — 99232 SBSQ HOSP IP/OBS MODERATE 35: CPT | Performed by: PSYCHIATRY & NEUROLOGY

## 2020-11-10 PROCEDURE — 65220000003 HC RM SEMIPRIVATE PSYCH

## 2020-11-10 PROCEDURE — 74011250637 HC RX REV CODE- 250/637: Performed by: PSYCHIATRY & NEUROLOGY

## 2020-11-10 RX ORDER — KETOCONAZOLE 20 MG/ML
SHAMPOO TOPICAL DAILY
Status: DISCONTINUED | OUTPATIENT
Start: 2020-11-11 | End: 2020-11-13 | Stop reason: HOSPADM

## 2020-11-10 RX ADMIN — PENICILLIN V POTASSIUM 250 MG: 250 TABLET, FILM COATED ORAL at 06:31

## 2020-11-10 RX ADMIN — ASPIRIN 81 MG CHEWABLE TABLET 81 MG: 81 TABLET CHEWABLE at 08:53

## 2020-11-10 RX ADMIN — BUSPIRONE HYDROCHLORIDE 10 MG: 10 TABLET ORAL at 14:51

## 2020-11-10 RX ADMIN — DOCUSATE SODIUM 100 MG: 100 CAPSULE, LIQUID FILLED ORAL at 20:59

## 2020-11-10 RX ADMIN — LEVETIRACETAM 500 MG: 500 TABLET ORAL at 08:53

## 2020-11-10 RX ADMIN — FLUTICASONE PROPIONATE 2 SPRAY: 50 SPRAY, METERED NASAL at 09:00

## 2020-11-10 RX ADMIN — PANTOPRAZOLE SODIUM 40 MG: 40 TABLET, DELAYED RELEASE ORAL at 16:46

## 2020-11-10 RX ADMIN — TRAZODONE HYDROCHLORIDE 50 MG: 50 TABLET ORAL at 20:59

## 2020-11-10 RX ADMIN — FOLIC ACID 1 MG: 1 TABLET ORAL at 08:53

## 2020-11-10 RX ADMIN — PANTOPRAZOLE SODIUM 40 MG: 40 TABLET, DELAYED RELEASE ORAL at 06:31

## 2020-11-10 RX ADMIN — CLONIDINE HYDROCHLORIDE 0.1 MG: 0.1 TABLET ORAL at 08:53

## 2020-11-10 RX ADMIN — QUETIAPINE FUMARATE 300 MG: 300 TABLET, EXTENDED RELEASE ORAL at 20:58

## 2020-11-10 RX ADMIN — ACETAMINOPHEN 650 MG: 325 TABLET ORAL at 17:18

## 2020-11-10 RX ADMIN — GABAPENTIN 800 MG: 300 CAPSULE ORAL at 20:59

## 2020-11-10 RX ADMIN — GABAPENTIN 800 MG: 300 CAPSULE ORAL at 14:50

## 2020-11-10 RX ADMIN — LEVETIRACETAM 500 MG: 500 TABLET ORAL at 20:59

## 2020-11-10 RX ADMIN — TAMSULOSIN HYDROCHLORIDE 0.4 MG: 0.4 CAPSULE ORAL at 08:53

## 2020-11-10 RX ADMIN — PENICILLIN V POTASSIUM 250 MG: 250 TABLET, FILM COATED ORAL at 11:42

## 2020-11-10 RX ADMIN — PENICILLIN V POTASSIUM 250 MG: 250 TABLET, FILM COATED ORAL at 21:03

## 2020-11-10 RX ADMIN — CLONIDINE HYDROCHLORIDE 0.1 MG: 0.1 TABLET ORAL at 20:59

## 2020-11-10 RX ADMIN — CARVEDILOL 6.25 MG: 3.12 TABLET, FILM COATED ORAL at 08:53

## 2020-11-10 RX ADMIN — HYDROXYZINE PAMOATE 50 MG: 50 CAPSULE ORAL at 12:54

## 2020-11-10 RX ADMIN — CLOPIDOGREL BISULFATE 75 MG: 75 TABLET ORAL at 08:53

## 2020-11-10 RX ADMIN — DOCUSATE SODIUM 100 MG: 100 CAPSULE, LIQUID FILLED ORAL at 08:53

## 2020-11-10 RX ADMIN — BUSPIRONE HYDROCHLORIDE 10 MG: 10 TABLET ORAL at 08:53

## 2020-11-10 RX ADMIN — ATORVASTATIN CALCIUM 40 MG: 40 TABLET, FILM COATED ORAL at 20:59

## 2020-11-10 RX ADMIN — GABAPENTIN 800 MG: 300 CAPSULE ORAL at 08:53

## 2020-11-10 RX ADMIN — ONDANSETRON 4 MG: 4 TABLET, ORALLY DISINTEGRATING ORAL at 11:43

## 2020-11-10 RX ADMIN — THERA TABS 1 TABLET: TAB at 08:53

## 2020-11-10 RX ADMIN — DULOXETINE HYDROCHLORIDE 60 MG: 60 CAPSULE, DELAYED RELEASE PELLETS ORAL at 08:53

## 2020-11-10 RX ADMIN — PENICILLIN V POTASSIUM 250 MG: 250 TABLET, FILM COATED ORAL at 16:46

## 2020-11-10 RX ADMIN — CARVEDILOL 6.25 MG: 3.12 TABLET, FILM COATED ORAL at 16:46

## 2020-11-10 RX ADMIN — BUSPIRONE HYDROCHLORIDE 10 MG: 10 TABLET ORAL at 20:58

## 2020-11-10 RX ADMIN — Medication 100 MG: at 08:53

## 2020-11-10 RX ADMIN — POTASSIUM CHLORIDE 10 MEQ: 750 TABLET, EXTENDED RELEASE ORAL at 08:59

## 2020-11-10 NOTE — BSMART NOTE
SOCIAL WORK GROUP THERAPY PROGRESS NOTE Group Time:  10:30am 
 
Group Topic:  Coping Skills    C D Issues Group Participation:   
 
Pt moderately involved during group discussion but remained attentive. Part of session was on the variety of \"normal\" physical & emotional recovery symptoms that can possibly be experienced for several months and even up to (2) yrs. \"Seven Steps\" for taking responsibility for our Happiness was reviewed including commitment to change, self-care, setting limits, goal setting & letting go. Taught client about relationship between mood disorders & self medicating them.

## 2020-11-10 NOTE — BSMART NOTE
ART THERAPY GROUP PROGRESS NOTE PATIENT SCHEDULED FOR GROUP AT: 650 ATTENDANCE: Full PARTICIPATION LEVEL: Participates fully in the art process ATTENTION LEVEL : Able to focus on task FOCUS: Grounding SYMBOLIC & THEMATIC CONTENT AS NOTED IN IMAGERY: He was calm, compliant, and alert. He presented with a cheerful mood and was invested in the task at hand. He was hopeful in his journey to recovery regarding his addiction and quoted some phrases that helped push and motivate him from [place].

## 2020-11-10 NOTE — BH NOTES
Pt coming up to desk c/o anxiety 5/10. Pt received PRN Vistaril. Pt reports improvement in nausea after receiving PRN Zofran. Will continue to monitor for effectiveness.

## 2020-11-10 NOTE — PROGRESS NOTES
Problem: Suicide  Goal: *STG: Remains safe in hospital  Description: Patient  will not harm himself or others daily while in hospital.   Outcome: Progressing Towards Goal  Goal: *STG/LTG: Complies with medication therapy  Description: Patient will comply with medication regimen daily while in hospital.  Outcome: Progressing Towards Goal     Problem: Depressed Mood (Adult/Pediatric)  Goal: *STG: Participates in treatment plan  Description: Patient will participate in treatment planning daily while in hospital.  Outcome: Progressing Towards Goal     Pt presents with dull affect, depressed mood, anxious at times. Pt has been sociable with his peers on the unit. Pt states, \"I feel like my stomach is a little better today. I haven't seen any blood in my stool today. \" Pt is participative in groups and is adherent with unit guidelines. Pt denies SI/HI at this time. Pt is medication compliant and received PRN Zofran and Vistaril during this shift. Will continue to monitor.

## 2020-11-10 NOTE — GROUP NOTE
SUSAN  GROUP DOCUMENTATION INDIVIDUAL Group Therapy Note Date: 11/9/2020 Group Start Time: 2000 Group End Time: 2015 Group Topic: Medication [facility] 1 ADULT CHEM DEP Elaina Vallejo RN 
 
[facility] GROUP DOCUMENTATION GROUP Group Therapy Note Attendees: 9 Attendance: Attended Interventions/techniques: Informed Follows Directions: Followed directions Interactions: Interacted appropriately Mental Status: Calm Behavior/appearance: Cooperative Goals Achieved: Able to engage in interactions and Able to listen to others Meron Fairbanks RN

## 2020-11-10 NOTE — PROGRESS NOTES
Behavioral Health Progress Note    Admit Date: 11/2/2020  Hospital day 8    Vitals :   Patient Vitals for the past 8 hrs:   BP Temp Pulse Resp   11/10/20 0822 118/79 97.2 °F (36.2 °C) 73 18     Labs:  No results found for this or any previous visit (from the past 24 hour(s)).   Meds:   Current Facility-Administered Medications   Medication Dose Route Frequency    [START ON 11/11/2020] ketoconazole (NIZORAL) 2 % shampoo   Topical DAILY    busPIRone (BUSPAR) tablet 10 mg  10 mg Oral TID    acetaminophen (TYLENOL) tablet 650 mg  650 mg Oral Q6H PRN    docusate sodium (COLACE) capsule 100 mg  100 mg Oral BID    pantoprazole (PROTONIX) tablet 40 mg  40 mg Oral ACB&D    alum-mag hydroxide-simeth (MYLANTA) oral suspension 30 mL  30 mL Oral Q4H PRN    potassium chloride SR (KLOR-CON 10) tablet 10 mEq  10 mEq Oral DAILY    penicillin v potassium (VEETID) tablet 250 mg  250 mg Oral AC&HS    traZODone (DESYREL) tablet 50 mg  50 mg Oral QHS    fluticasone propionate (FLONASE) 50 mcg/actuation nasal spray 2 Spray  2 Spray Both Nostrils DAILY    nicotine (NICODERM CQ) 21 mg/24 hr patch 1 Patch  1 Patch TransDERmal DAILY    carvediloL (COREG) tablet 6.25 mg  6.25 mg Oral BID WITH MEALS    LORazepam (ATIVAN) tablet 1 mg  1 mg Oral Q4H PRN    LORazepam (ATIVAN) tablet 2 mg  2 mg Oral F2Z PRN    folic acid (FOLVITE) tablet 1 mg  1 mg Oral DAILY    loperamide (IMODIUM) capsule 2-4 mg  2-4 mg Oral PRN    therapeutic multivitamin (THERAGRAN) tablet 1 Tab  1 Tab Oral DAILY    thiamine HCL (B-1) tablet 100 mg  100 mg Oral DAILY    hydrOXYzine pamoate (VISTARIL) capsule 50 mg  50 mg Oral Q4H PRN    QUEtiapine SR (SEROquel XR) tablet 300 mg  300 mg Oral QHS    DULoxetine (CYMBALTA) capsule 60 mg  60 mg Oral DAILY    gabapentin (NEURONTIN) capsule 800 mg  800 mg Oral TID    levETIRAcetam (KEPPRA) tablet 500 mg  500 mg Oral BID    cloNIDine HCL (CATAPRES) tablet 0.1 mg  0.1 mg Oral BID    tamsulosin (FLOMAX) capsule 0.4 mg  0.4 mg Oral DAILY    aspirin chewable tablet 81 mg  81 mg Oral DAILY    ondansetron (ZOFRAN ODT) tablet 4 mg  4 mg Oral Q8H PRN    clopidogreL (PLAVIX) tablet 75 mg  75 mg Oral DAILY    atorvastatin (LIPITOR) tablet 40 mg  40 mg Oral QHS    mupirocin (BACTROBAN) 2 % ointment   Topical TID      Hospital Problems: Principal Problem:    Severe episode of recurrent major depressive disorder ([facility] (11/14/2011)      Overview: Overview:       Most probable SIMD    Active Problems:    Tobacco dependence (8/20/2013)      HTN (hypertension) (8/20/2013)      Alcohol use disorder, severe, dependence ([facility] (4/22/2014)      Overview: Numerous hospitalization of intoxication/suicidal ideation      Coronary artery disease involving native coronary artery of native heart without angina pectoris (8/31/2017)      Overview: NSTEMI 8/28, cath possible occlusion of small distal Cx culprit, OM 50%,       RCA 50%       S/p stent 2/2020      History of non-ST elevation myocardial infarction (NSTEMI) (9/22/2017)      History of CVA (cerebrovascular accident) (9/22/2017)      Delirium tremens ([facility] (6/3/2020)      Ankle fracture, bimalleolar, closed, right, sequela (11/3/2020)        Subjective:   Medication side effects: none  none    Mental Status Exam  Sensorium: alert  Orientation: only aware of  time, place and person  Relations: guarded  Eye Contact: appropriate  Appearance: shows no evidence of impairment, walker, lessened shaking, no sweats  Thought Process: normal rate of thoughts and fair abstract reasoning/computation   Thought Content: no evidence of impairment   Suicidal: denies   Homicidal: none   Mood: is anxious   Affect: stable  Memory: shows no evidence of impairment     Concentration: distractable  Abstraction: concrete  Insight: The patient's insight shows no evidence of impairment    OR Fair  Judgement: is psychologically impaired OR  Fair    Assessment/Plan:   improved    Continue close observation,  Nurses report that the patient has been more cooperative. He still continues to have problems with several particular staff members is an ongoing thing. I have recommended to him that he try to deal with the other staff more and stay away from those individuals. He now says that he wants to follow-up at the [facility] our center and [facility] 1946 with Dr. Shonda Spain if he cannot get into a acute care inpatient substance abuse treatment program or to follow-up with him afterwards even if he does get into an acute care program.  He also wants to be able to walk on his walking cast as tolerated without the walker which we have written for. He says the Protonix is working more for his stomach problems and works better than the Prilosec did. He was to be scheduled to speak to Sb at [facility] this morning but she had apparently not called as of yet. We await that phone call. He was requesting a shampoo for his dandruff and had been on denies overall or triamcinolone in the past.  I written for Nizoral shampoo. We will continue with his other medications and supportive care. We are recommending inpatient substance abuse treatment program for 28 days if possible but are uncertain if this will actually work. He had been given referral to the [facility] but apparently knows these individuals personally and says he cannot go there as a result. He also says he would be unable to go to the [facility] rehabilitation program since they have them work and he could not do so with his walker or cane.   No further blood in the stool with use of Colace

## 2020-11-10 NOTE — BSMART NOTE
Pt. is a 64-year-old homeless male with history of Bipolar Depression , PTSD, TBI and Alcohol Dependence. Pt was admitted to this facility for ideations to harm self and alcohol detox. 
  
 Pt.'s case was discussed in staffing . SW informed the team , ANA talked to [place] [facility]. The provider will contact on the unit  For an assessment. [facility] has a 1 month wait list per admission's staff. The staff provided SW with a referral resource to [facility]. SW will follow with referral resource. [facility] @ [phone] informed SW  They take insurance to cover medication but the program is patient private pay . ANA will assist pt with exploring other rehab programs SW Contact: ANA met with pt . ANA discussed the above. Pt informed ANA [facility]. 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 Pt. States he would like to go to a program directly from the hospital SW will continue to assist pt with exploring rehab options. Pt. 's mood is starting to improve, has poor judgement but insight is better. SW will assist pt with dc plan. ANA will assist pt with contacting [facility] for phone assessment. ANA talked to pt.'  ARTS coordinator @ [phone] ext [phone]. ANA discussed case and ask for additional referral resources.  The provider informed Geneva Trinh, [employer] [facility] , [address] [facility] and [place]. SW will assist pt with exploring the mentioned options.  
 
  
John Chan MA, LMHP-R

## 2020-11-10 NOTE — BH NOTES
Patient presented himself as being polite, calm, and engaging. Today, patient requested to take a shower in the isolation room due to, as per patient, not having showered in the past few days. Patient was very expressive and engaging during a Positive Affirmation group. He expressed some of his traumas, mistakes, and what he is willing to do differently to avoid relapse. Patient will continue to be monitored by staff for any remarkable change of behavior.

## 2020-11-10 NOTE — BSMART NOTE
OCCUPATIONAL THERAPY PROGRESS NOTE Group Time:  8622 Attendance: The patient attended full group. Participation: The patient participated fully in the activity. Nessa Messing Attention: The patient was able to focus on the activity. Interaction: The patient frequently interacts with others. Remains talkative. Mood bright.

## 2020-11-11 LAB
ANION GAP SERPL CALC-SCNC: 5 MMOL/L (ref 3–18)
BUN SERPL-MCNC: 17 MG/DL (ref 7–18)
BUN/CREAT SERPL: 18 (ref 12–20)
CALCIUM SERPL-MCNC: 9.3 MG/DL (ref 8.5–10.1)
CHLORIDE SERPL-SCNC: 107 MMOL/L (ref 100–111)
CO2 SERPL-SCNC: 29 MMOL/L (ref 21–32)
CREAT SERPL-MCNC: 0.93 MG/DL (ref 0.6–1.3)
GLUCOSE SERPL-MCNC: 108 MG/DL (ref 74–99)
POTASSIUM SERPL-SCNC: 3.9 MMOL/L (ref 3.5–5.5)
SODIUM SERPL-SCNC: 141 MMOL/L (ref 136–145)

## 2020-11-11 PROCEDURE — 65220000003 HC RM SEMIPRIVATE PSYCH

## 2020-11-11 PROCEDURE — 80048 BASIC METABOLIC PNL TOTAL CA: CPT

## 2020-11-11 PROCEDURE — 74011250637 HC RX REV CODE- 250/637: Performed by: PSYCHIATRY & NEUROLOGY

## 2020-11-11 RX ORDER — BISACODYL 5 MG
10 TABLET, DELAYED RELEASE (ENTERIC COATED) ORAL
Status: COMPLETED | OUTPATIENT
Start: 2020-11-11 | End: 2020-11-11

## 2020-11-11 RX ADMIN — CLOPIDOGREL BISULFATE 75 MG: 75 TABLET ORAL at 08:22

## 2020-11-11 RX ADMIN — BUSPIRONE HYDROCHLORIDE 10 MG: 10 TABLET ORAL at 20:12

## 2020-11-11 RX ADMIN — DOCUSATE SODIUM 100 MG: 100 CAPSULE, LIQUID FILLED ORAL at 08:22

## 2020-11-11 RX ADMIN — PENICILLIN V POTASSIUM 250 MG: 250 TABLET, FILM COATED ORAL at 12:11

## 2020-11-11 RX ADMIN — DOCUSATE SODIUM 100 MG: 100 CAPSULE, LIQUID FILLED ORAL at 20:13

## 2020-11-11 RX ADMIN — GABAPENTIN 800 MG: 300 CAPSULE ORAL at 20:11

## 2020-11-11 RX ADMIN — LEVETIRACETAM 500 MG: 500 TABLET ORAL at 08:22

## 2020-11-11 RX ADMIN — LEVETIRACETAM 500 MG: 500 TABLET ORAL at 20:12

## 2020-11-11 RX ADMIN — ACETAMINOPHEN 650 MG: 325 TABLET ORAL at 20:15

## 2020-11-11 RX ADMIN — FOLIC ACID 1 MG: 1 TABLET ORAL at 08:22

## 2020-11-11 RX ADMIN — MUPIROCIN: 20 OINTMENT TOPICAL at 21:00

## 2020-11-11 RX ADMIN — ALUMINUM HYDROXIDE, MAGNESIUM HYDROXIDE, AND SIMETHICONE 30 ML: 200; 200; 20 SUSPENSION ORAL at 09:06

## 2020-11-11 RX ADMIN — ASPIRIN 81 MG CHEWABLE TABLET 81 MG: 81 TABLET CHEWABLE at 08:21

## 2020-11-11 RX ADMIN — DULOXETINE HYDROCHLORIDE 60 MG: 60 CAPSULE, DELAYED RELEASE PELLETS ORAL at 08:29

## 2020-11-11 RX ADMIN — QUETIAPINE FUMARATE 300 MG: 300 TABLET, EXTENDED RELEASE ORAL at 20:13

## 2020-11-11 RX ADMIN — CARVEDILOL 6.25 MG: 3.12 TABLET, FILM COATED ORAL at 16:15

## 2020-11-11 RX ADMIN — KETOCONAZOLE: 20 SHAMPOO, SUSPENSION TOPICAL at 17:15

## 2020-11-11 RX ADMIN — PENICILLIN V POTASSIUM 250 MG: 250 TABLET, FILM COATED ORAL at 21:05

## 2020-11-11 RX ADMIN — PENICILLIN V POTASSIUM 250 MG: 250 TABLET, FILM COATED ORAL at 06:40

## 2020-11-11 RX ADMIN — PANTOPRAZOLE SODIUM 40 MG: 40 TABLET, DELAYED RELEASE ORAL at 16:15

## 2020-11-11 RX ADMIN — CLONIDINE HYDROCHLORIDE 0.1 MG: 0.1 TABLET ORAL at 20:12

## 2020-11-11 RX ADMIN — BUSPIRONE HYDROCHLORIDE 10 MG: 10 TABLET ORAL at 08:21

## 2020-11-11 RX ADMIN — Medication 100 MG: at 08:21

## 2020-11-11 RX ADMIN — ACETAMINOPHEN 650 MG: 325 TABLET ORAL at 08:29

## 2020-11-11 RX ADMIN — BISACODYL 10 MG: 5 TABLET, COATED ORAL at 14:46

## 2020-11-11 RX ADMIN — GABAPENTIN 800 MG: 300 CAPSULE ORAL at 08:22

## 2020-11-11 RX ADMIN — BUSPIRONE HYDROCHLORIDE 10 MG: 10 TABLET ORAL at 14:47

## 2020-11-11 RX ADMIN — PENICILLIN V POTASSIUM 250 MG: 250 TABLET, FILM COATED ORAL at 16:16

## 2020-11-11 RX ADMIN — CARVEDILOL 6.25 MG: 3.12 TABLET, FILM COATED ORAL at 08:21

## 2020-11-11 RX ADMIN — THERA TABS 1 TABLET: TAB at 08:22

## 2020-11-11 RX ADMIN — POTASSIUM CHLORIDE 10 MEQ: 750 TABLET, EXTENDED RELEASE ORAL at 08:22

## 2020-11-11 RX ADMIN — GABAPENTIN 800 MG: 300 CAPSULE ORAL at 14:46

## 2020-11-11 RX ADMIN — TAMSULOSIN HYDROCHLORIDE 0.4 MG: 0.4 CAPSULE ORAL at 08:21

## 2020-11-11 RX ADMIN — HYDROXYZINE PAMOATE 50 MG: 50 CAPSULE ORAL at 14:46

## 2020-11-11 RX ADMIN — CLONIDINE HYDROCHLORIDE 0.1 MG: 0.1 TABLET ORAL at 08:21

## 2020-11-11 RX ADMIN — HYDROXYZINE PAMOATE 50 MG: 50 CAPSULE ORAL at 21:49

## 2020-11-11 RX ADMIN — TRAZODONE HYDROCHLORIDE 50 MG: 50 TABLET ORAL at 20:13

## 2020-11-11 RX ADMIN — ATORVASTATIN CALCIUM 40 MG: 40 TABLET, FILM COATED ORAL at 20:13

## 2020-11-11 RX ADMIN — FLUTICASONE PROPIONATE 2 SPRAY: 50 SPRAY, METERED NASAL at 08:31

## 2020-11-11 RX ADMIN — PANTOPRAZOLE SODIUM 40 MG: 40 TABLET, DELAYED RELEASE ORAL at 06:40

## 2020-11-11 NOTE — GROUP NOTE
SUSAN  GROUP DOCUMENTATION INDIVIDUAL Group Therapy Note Date: 11/10/2020 Group Start Time: 2000 Group End Time: 2030 Group Topic: Nursing [facility] ADULT CHEM DEP Crew, [facility] Group Therapy Note Attendees: 7 Attendance: Attended Patient's Goal: Interventions/techniques: Informed Follows Directions: Followed directions Interactions: Interacted appropriately Mental Status: Calm Behavior/appearance: Attentive Goals Achieved: Able to listen to others Additional Notes:   
 
 
Thermon Furbish Crew

## 2020-11-11 NOTE — PROGRESS NOTES
conducted an Spirituality Group for [employer], who is a 48 y.o.,male. Patient's Primary Language is: [place]. According to the patient's EMR Shinto Affiliation is: Kathya Tapia. The reason the Patient came to the hospital is:   Patient Active Problem List    Diagnosis Date Noted    Bipolar 1 disorder, depressed, severe ([address] 02/08/2014     Priority: 1 - One    Ankle fracture, bimalleolar, closed, right, sequela 11/03/2020    Delirium tremens ([address] 06/03/2020    Alcohol withdrawal seizure ([address] 03/12/2019    SBO (small bowel obstruction) ([address] 03/12/2019    Adenomyomatosis of gallbladder 08/15/2018    Hepatic steatosis 08/15/2018    Marijuana use 06/04/2018    Recurrent pulmonary emboli ([address] 06/04/2018    Eczema 01/17/2018    History of non-ST elevation myocardial infarction (NSTEMI) 09/22/2017    Hx pulmonary embolism 09/22/2017    History of CVA (cerebrovascular accident) 09/22/2017    Coronary artery disease involving native coronary artery of native heart without angina pectoris 08/31/2017    Chronic left-sided low back pain with left-sided sciatica 07/31/2017    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 The  provided the following Interventions:  Continued a relationship of care and support. Listened empathically. Discussed challenges of life situations and possible opportunities of being in treatment and spiritual care. Offered prayer and assurance of continued prayer on patient's behalf.      The following outcomes were achieved:  Patient shared in group discussion life concerns.  provided spiritual ideas in life coping skills. Patient expressed gratitude for chaplains' visit and prayer. Assessment:  There are no known further spiritual or Religion issues which require Spiritual Care Services interventions at this time. Plan:  Chaplains will continue to follow and will provide pastoral care on an as needed/requested basis.  recommends bedside caregivers page  on duty if patient shows signs of acute spiritual or emotional distress.      [address] [place] [address]   [phone]

## 2020-11-11 NOTE — BSMART NOTE
ART THERAPY GROUP PROGRESS NOTE PATIENT SCHEDULED FOR GROUP AT: 0853 ATTENDANCE: Full PARTICIPATION LEVEL:Participates fully in the art process ATTENTION LEVEL : Able to focus on task FOCUS: Balance SYMBOLIC & THEMATIC CONTENT AS NOTED IN IMAGERY: He was calm, compliant, and invested in the task at hand. He actively participated in group discussion and was able to identify both healthy vs unhealthy means to cope with stressors. He identified his need to [facility] plans ahead of time,\" I.e. in order to schedule and make doctor's appointments, and utlize his resources.

## 2020-11-11 NOTE — PROGRESS NOTES
Behavioral Health Progress Note    Admit Date: 11/2/2020  Hospital day 9    Vitals :   Patient Vitals for the past 8 hrs:   BP Temp Pulse Resp   11/11/20 0806 (!) 139/90 97.4 °F (36.3 °C) 77 18     Labs:    Recent Results (from the past 24 hour(s))   METABOLIC PANEL, BASIC    Collection Time: 11/11/20  5:39 AM   Result Value Ref Range    Sodium 141 136 - 145 mmol/L    Potassium 3.9 3.5 - 5.5 mmol/L    Chloride 107 100 - 111 mmol/L    CO2 29 21 - 32 mmol/L    Anion gap 5 3.0 - 18 mmol/L    Glucose 108 (H) 74 - 99 mg/dL    BUN 17 7.0 - 18 MG/DL    Creatinine 0.93 0.6 - 1.3 MG/DL    BUN/Creatinine ratio 18 12 - 20      GFR est AA >60 >60 ml/min/1.73m2    GFR est non-AA >60 >60 ml/min/1.73m2    Calcium 9.3 8.5 - 10.1 MG/DL     Meds:   Current Facility-Administered Medications   Medication Dose Route Frequency    ketoconazole (NIZORAL) 2 % shampoo   Topical DAILY    busPIRone (BUSPAR) tablet 10 mg  10 mg Oral TID    acetaminophen (TYLENOL) tablet 650 mg  650 mg Oral Q6H PRN    docusate sodium (COLACE) capsule 100 mg  100 mg Oral BID    pantoprazole (PROTONIX) tablet 40 mg  40 mg Oral ACB&D    alum-mag hydroxide-simeth (MYLANTA) oral suspension 30 mL  30 mL Oral Q4H PRN    potassium chloride SR (KLOR-CON 10) tablet 10 mEq  10 mEq Oral DAILY    penicillin v potassium (VEETID) tablet 250 mg  250 mg Oral AC&HS    traZODone (DESYREL) tablet 50 mg  50 mg Oral QHS    fluticasone propionate (FLONASE) 50 mcg/actuation nasal spray 2 Spray  2 Spray Both Nostrils DAILY    nicotine (NICODERM CQ) 21 mg/24 hr patch 1 Patch  1 Patch TransDERmal DAILY    carvediloL (COREG) tablet 6.25 mg  6.25 mg Oral BID WITH MEALS    LORazepam (ATIVAN) tablet 1 mg  1 mg Oral Q4H PRN    LORazepam (ATIVAN) tablet 2 mg  2 mg Oral N2F PRN    folic acid (FOLVITE) tablet 1 mg  1 mg Oral DAILY    loperamide (IMODIUM) capsule 2-4 mg  2-4 mg Oral PRN    therapeutic multivitamin (THERAGRAN) tablet 1 Tab  1 Tab Oral DAILY    thiamine HCL (B-1) tablet 100 mg  100 mg Oral DAILY    hydrOXYzine pamoate (VISTARIL) capsule 50 mg  50 mg Oral Q4H PRN    QUEtiapine SR (SEROquel XR) tablet 300 mg  300 mg Oral QHS    DULoxetine (CYMBALTA) capsule 60 mg  60 mg Oral DAILY    gabapentin (NEURONTIN) capsule 800 mg  800 mg Oral TID    levETIRAcetam (KEPPRA) tablet 500 mg  500 mg Oral BID    cloNIDine HCL (CATAPRES) tablet 0.1 mg  0.1 mg Oral BID    tamsulosin (FLOMAX) capsule 0.4 mg  0.4 mg Oral DAILY    aspirin chewable tablet 81 mg  81 mg Oral DAILY    ondansetron (ZOFRAN ODT) tablet 4 mg  4 mg Oral Q8H PRN    clopidogreL (PLAVIX) tablet 75 mg  75 mg Oral DAILY    atorvastatin (LIPITOR) tablet 40 mg  40 mg Oral QHS    mupirocin (BACTROBAN) 2 % ointment   Topical TID      Hospital Problems: Principal Problem:    Severe episode of recurrent major depressive disorder ([facility] (11/14/2011)      Overview: Overview:       Most probable SIMD    Active Problems:    Tobacco dependence (8/20/2013)      HTN (hypertension) (8/20/2013)      Alcohol use disorder, severe, dependence ([facility] (4/22/2014)      Overview: Numerous hospitalization of intoxication/suicidal ideation      Coronary artery disease involving native coronary artery of native heart without angina pectoris (8/31/2017)      Overview: NSTEMI 8/28, cath possible occlusion of small distal Cx culprit, OM 50%,       RCA 50%       S/p stent 2/2020      History of non-ST elevation myocardial infarction (NSTEMI) (9/22/2017)      History of CVA (cerebrovascular accident) (9/22/2017)      Delirium tremens ([facility] (6/3/2020)      Ankle fracture, bimalleolar, closed, right, sequela (11/3/2020)        Subjective:   Medication side effects: anxiety  none    Mental Status Exam  Sensorium: alert  Orientation: only aware of  time, place and person  Relations: cooperative  Eye Contact: appropriate  Appearance: walking cast  Thought Process: normal rate of thoughts and fair abstract reasoning/computation   Thought Content: no evidence of impairment   Suicidal: denies   Homicidal: none   Mood: is anxious   Affect: stable  Memory: shows no evidence of impairment     Concentration: fair  Abstraction: concrete  Insight: The patient's insight shows no evidence of impairment    OR Fair  Judgement: is psychologically impaired OR  Fair    Assessment/Plan:   improved      Continue close observation,      Patient had treatment team attended by physician nurse  worker, nursing administration and patient. Nurses report that he has been out walking without the walker as tolerated. He confirms this and says that he is feeling good. He has been told to advance his walking as tolerated was feeling that he is about ready to come off of the boot, especially since the boot does rub on his leg requiring him to have a dressing. He denies any withdrawal symptoms. His blood pressures been about 133/90. Social work had talked to his care managers from [employer] and they had given the names of 5 other residential substance abuse programs which are being contacted. These are in [place], [place] and [place]. This was discussed with patient who is interested in this. He also has a scheduled phone call with the [facility] in [facility] but unfortunately they have a 1 month wait. He also has a phone call with [employer]. He has been there before when it was [address]. He had blood retaken and his potassium is up and his kidney functions are now normal.  He is requiring Tylenol for his ankle pain. He still has some degree of heartburn but feels better with the Protonix and as needed Mylanta. Mood is improved and he is able to contract for safety but continues to be concerned about relapsing to alcohol when he goes out the door. He has relapsed repeatedly.   If we cannot get any other treatments, it may be necessary for him to go home that we would first try crisis stabilization units as a possibility until he could get a 28-day rehabilitation program.  Approximately 30 minutes was spent with patient in including 20-minute face-to-face

## 2020-11-11 NOTE — BSMART NOTE
Social Work Positive Self-Reflection    
Group Therapy Group Social work Attendance   attended Number of participants 7 Time in 2:45 Time out 3:20 Total Time 35 Interventions/techniques Informed, Supported and encouraged  
   Pt.  Actively participated, provided feedback, listen  
  
  
Rosemarie Todd MA, LMHP-R

## 2020-11-11 NOTE — GROUP NOTE
SUSAN  GROUP DOCUMENTATION INDIVIDUAL Group Therapy Note Date: 11/10/2020 Group Start Time: 1300 Group End Time: 0970 Group Topic: Nursing [facility] ADULT CHEM DEP Fernando DAVIS  GROUP DOCUMENTATION GROUP Group Therapy Note Attendees: 6 Attendance: Attended Patient's Goal:  Stress Management Interventions/techniques: Informed Follows Directions: Followed directions Interactions: Interacted appropriately Mental Status: Calm Behavior/appearance: Cooperative Goals Achieved: Able to reflect/comment on own behavior Frank Guerra

## 2020-11-11 NOTE — BH NOTES
Pt appeared to have slept for about 7.5 hours thus far. Will continue to monitor Pt for safety and behavior.

## 2020-11-11 NOTE — BSMART NOTE
OCCUPATIONAL THERAPY PROGRESS NOTE Group Time:  5884 Attendance: The patient attended full group. Participation: The patient participated fully in the activity. Mari Alan Attention: The patient was able to focus on the activity. Interaction: The patient frequently interacts with others. Mood brighter, chatting easily with peers and staff. Talkative.

## 2020-11-11 NOTE — PROGRESS NOTES
Problem: Suicide  Goal: *STG: Attends activities and groups  Description: Patient will attend at least two groups daily while hospitalized. Outcome: Progressing Towards Goal as evidence by attending all groups and activities during this shift. Problem: Depressed Mood (Adult/Pediatric)  Goal: *STG: Participates in treatment plan  Description: Patient will participate in treatment planning daily while in hospital.  Outcome: Progressing Towards Goal as evidence by attending/participating in treatment team meeting this shift. Patient has been in day room since this nurse arrived onto unit this morning. Patient no longer using walker for transitioning at all times, stating \"he told me to just use it if I feel like I need it. \"  patient affect brighter this shift than last shift with this nurse. Patient voices feeling \"so much better. I haven't felt this clear in a real LONG time. \"  patient continues to voice desire for intensive substance use treatment. Patient has demonstrated interest in groups and has shown encouragement toward others. Patient has not received medicated shampoo from pharmacy and was reassured \"I checked with them and they had to order it so once it arrives today, they'll bring it over. \"  patient voiced thankfulness and asked to wait to shower once shampoo has arrived. Patient has eaten all meals and snacks and has been compliant with scheduled medications. Patient has been compliant with unit guidelines, free from falls and harm. Will continue to monitor and provide encouragement and/or interventions as appropriate.

## 2020-11-11 NOTE — BSMART NOTE
Pt. is a 61-year-old homeless male with history of Bipolar Depression , PTSD, TBI and Alcohol Dependence. Pt was admitted to this facility for ideations to harm self and alcohol detox. Pt.'s case was discussed in treatment team this a.m ANA informed the team about conversation with  pt.' s  ARTS coordinator @ [phone] ext [phone]. The  Insurance CM informed SW [facility], [id number] [facility] [employer]. SW will assist pt with exploring the mentioned options. SW discuss the above with pt . Pt informed the team he feeling better. Pt. States he is not objected to go home for a couple of days while awaiting admission to one of the above facilities. The team discussed relapse prevention  Due to pt has been high risk to relapse. The team agreed Sw will explore crisis stabilizations such as [address] and [facility] treatment programs as a stepped down for pt to go , while he awaits admission to any of the above facilities. Pt. mood is improving goal oriented and has fair insight. SW will assist pt with dc plan.  
 
  
Kathrine Luis MA, LMHP-R

## 2020-11-11 NOTE — BH NOTES
Patient appear to be intrusive and in everyone's conversations. Patient has boundaries issues. Patient ate dinner and attended group. Patient interacted with other patients. Patient will be monitored for safety.

## 2020-11-11 NOTE — PROGRESS NOTES
conducted a Follow up consultation and Spiritual Assessment for Miranda Laytno, who is a 48 y.o.,male. The  provided the following Interventions:  Left Spiritual Material as requested by patient (Callie Reich 4 Printout (NIV LargePrint) with nurses; patients were in group session and unavailable. The following outcomes were achieved:  Nurses expressed gratitude for 's visit and spiritual material.    Assessment:  There are no further spiritual or Oriental orthodox issues which require Spiritual Care Services interventions at this time. Plan:  Chaplains will continue to follow and will provide pastoral care on an as needed/requested basis.  recommends bedside caregivers page  on duty if patient shows signs of acute spiritual or emotional distress.        [facility]   [phone]

## 2020-11-12 PROCEDURE — 65220000003 HC RM SEMIPRIVATE PSYCH

## 2020-11-12 PROCEDURE — 74011250637 HC RX REV CODE- 250/637: Performed by: PSYCHIATRY & NEUROLOGY

## 2020-11-12 PROCEDURE — 99232 SBSQ HOSP IP/OBS MODERATE 35: CPT | Performed by: PSYCHIATRY & NEUROLOGY

## 2020-11-12 RX ORDER — QUETIAPINE 300 MG/1
300 TABLET, FILM COATED, EXTENDED RELEASE ORAL
Qty: 30 TAB | Refills: 0 | Status: SHIPPED | OUTPATIENT
Start: 2020-11-12 | End: 2021-04-07

## 2020-11-12 RX ORDER — ATORVASTATIN CALCIUM 40 MG/1
40 TABLET, FILM COATED ORAL
Qty: 30 TAB | Refills: 0 | Status: SHIPPED | OUTPATIENT
Start: 2020-11-12

## 2020-11-12 RX ORDER — TAMSULOSIN HYDROCHLORIDE 0.4 MG/1
0.4 CAPSULE ORAL DAILY
Qty: 30 CAP | Refills: 0 | Status: SHIPPED | OUTPATIENT
Start: 2020-11-13 | End: 2020-12-30

## 2020-11-12 RX ORDER — GABAPENTIN 400 MG/1
800 CAPSULE ORAL 3 TIMES DAILY
Qty: 180 CAP | Refills: 0 | Status: SHIPPED | OUTPATIENT
Start: 2020-11-12 | End: 2021-03-28

## 2020-11-12 RX ORDER — PENICILLIN V POTASSIUM 250 MG/1
250 TABLET, FILM COATED ORAL
Qty: 4 TAB | Refills: 0 | Status: SHIPPED | OUTPATIENT
Start: 2020-11-12 | End: 2021-03-28

## 2020-11-12 RX ORDER — DOCUSATE SODIUM 100 MG/1
100 CAPSULE, LIQUID FILLED ORAL 2 TIMES DAILY
Qty: 60 CAP | Refills: 0 | Status: SHIPPED | OUTPATIENT
Start: 2020-11-12 | End: 2021-02-10

## 2020-11-12 RX ORDER — DULOXETIN HYDROCHLORIDE 60 MG/1
60 CAPSULE, DELAYED RELEASE ORAL DAILY
Qty: 30 CAP | Refills: 0 | Status: SHIPPED | OUTPATIENT
Start: 2020-11-13 | End: 2021-04-07

## 2020-11-12 RX ORDER — CARVEDILOL 6.25 MG/1
6.25 TABLET ORAL 2 TIMES DAILY WITH MEALS
Qty: 60 TAB | Refills: 0 | Status: SHIPPED | OUTPATIENT
Start: 2020-11-13 | End: 2021-03-28

## 2020-11-12 RX ORDER — LEVETIRACETAM 500 MG/1
500 TABLET ORAL 2 TIMES DAILY
Qty: 60 TAB | Refills: 0 | Status: SHIPPED | OUTPATIENT
Start: 2020-11-12 | End: 2021-04-07

## 2020-11-12 RX ORDER — CLONIDINE HYDROCHLORIDE 0.1 MG/1
0.1 TABLET ORAL 2 TIMES DAILY
Qty: 60 TAB | Refills: 0 | Status: SHIPPED | OUTPATIENT
Start: 2020-11-12 | End: 2020-12-22 | Stop reason: SDUPTHER

## 2020-11-12 RX ORDER — BUSPIRONE HYDROCHLORIDE 10 MG/1
10 TABLET ORAL 3 TIMES DAILY
Qty: 90 TAB | Refills: 0 | Status: ON HOLD | OUTPATIENT
Start: 2020-11-12 | End: 2021-04-07 | Stop reason: SDUPTHER

## 2020-11-12 RX ORDER — CLOPIDOGREL BISULFATE 75 MG/1
75 TABLET ORAL DAILY
Qty: 30 TAB | Refills: 0 | Status: SHIPPED | OUTPATIENT
Start: 2020-11-12 | End: 2021-04-01

## 2020-11-12 RX ORDER — PANTOPRAZOLE SODIUM 40 MG/1
40 TABLET, DELAYED RELEASE ORAL
Qty: 60 TAB | Refills: 0 | Status: SHIPPED | OUTPATIENT
Start: 2020-11-13 | End: 2020-12-22 | Stop reason: SDUPTHER

## 2020-11-12 RX ORDER — FLUTICASONE PROPIONATE 50 MCG
SPRAY, SUSPENSION (ML) NASAL
Qty: 1 BOTTLE | Refills: 0 | Status: SHIPPED | OUTPATIENT
Start: 2020-11-13 | End: 2020-12-10 | Stop reason: SDUPTHER

## 2020-11-12 RX ORDER — KETOCONAZOLE 20 MG/ML
5 SHAMPOO TOPICAL DAILY
Qty: 1 BOTTLE | Refills: 0 | Status: SHIPPED | OUTPATIENT
Start: 2020-11-13 | End: 2021-04-07

## 2020-11-12 RX ADMIN — THERA TABS 1 TABLET: TAB at 08:08

## 2020-11-12 RX ADMIN — PANTOPRAZOLE SODIUM 40 MG: 40 TABLET, DELAYED RELEASE ORAL at 06:34

## 2020-11-12 RX ADMIN — CLONIDINE HYDROCHLORIDE 0.1 MG: 0.1 TABLET ORAL at 20:50

## 2020-11-12 RX ADMIN — QUETIAPINE FUMARATE 300 MG: 300 TABLET, EXTENDED RELEASE ORAL at 20:51

## 2020-11-12 RX ADMIN — TAMSULOSIN HYDROCHLORIDE 0.4 MG: 0.4 CAPSULE ORAL at 08:08

## 2020-11-12 RX ADMIN — CARVEDILOL 6.25 MG: 3.12 TABLET, FILM COATED ORAL at 08:07

## 2020-11-12 RX ADMIN — PENICILLIN V POTASSIUM 250 MG: 250 TABLET, FILM COATED ORAL at 11:39

## 2020-11-12 RX ADMIN — ASPIRIN 81 MG CHEWABLE TABLET 81 MG: 81 TABLET CHEWABLE at 08:08

## 2020-11-12 RX ADMIN — DULOXETINE HYDROCHLORIDE 60 MG: 60 CAPSULE, DELAYED RELEASE PELLETS ORAL at 08:08

## 2020-11-12 RX ADMIN — FOLIC ACID 1 MG: 1 TABLET ORAL at 08:07

## 2020-11-12 RX ADMIN — ACETAMINOPHEN 650 MG: 325 TABLET ORAL at 14:54

## 2020-11-12 RX ADMIN — GABAPENTIN 800 MG: 300 CAPSULE ORAL at 14:54

## 2020-11-12 RX ADMIN — HYDROXYZINE PAMOATE 50 MG: 50 CAPSULE ORAL at 18:11

## 2020-11-12 RX ADMIN — GABAPENTIN 800 MG: 300 CAPSULE ORAL at 08:07

## 2020-11-12 RX ADMIN — CLOPIDOGREL BISULFATE 75 MG: 75 TABLET ORAL at 08:08

## 2020-11-12 RX ADMIN — DOCUSATE SODIUM 100 MG: 100 CAPSULE, LIQUID FILLED ORAL at 08:07

## 2020-11-12 RX ADMIN — TRAZODONE HYDROCHLORIDE 50 MG: 50 TABLET ORAL at 20:51

## 2020-11-12 RX ADMIN — PENICILLIN V POTASSIUM 250 MG: 250 TABLET, FILM COATED ORAL at 06:34

## 2020-11-12 RX ADMIN — PENICILLIN V POTASSIUM 250 MG: 250 TABLET, FILM COATED ORAL at 17:00

## 2020-11-12 RX ADMIN — LEVETIRACETAM 500 MG: 500 TABLET ORAL at 08:07

## 2020-11-12 RX ADMIN — GABAPENTIN 800 MG: 300 CAPSULE ORAL at 20:51

## 2020-11-12 RX ADMIN — ACETAMINOPHEN 650 MG: 325 TABLET ORAL at 08:08

## 2020-11-12 RX ADMIN — POTASSIUM CHLORIDE 10 MEQ: 750 TABLET, EXTENDED RELEASE ORAL at 08:07

## 2020-11-12 RX ADMIN — Medication 100 MG: at 08:07

## 2020-11-12 RX ADMIN — ATORVASTATIN CALCIUM 40 MG: 40 TABLET, FILM COATED ORAL at 20:50

## 2020-11-12 RX ADMIN — ACETAMINOPHEN 650 MG: 325 TABLET ORAL at 20:55

## 2020-11-12 RX ADMIN — CLONIDINE HYDROCHLORIDE 0.1 MG: 0.1 TABLET ORAL at 08:08

## 2020-11-12 RX ADMIN — ALUMINUM HYDROXIDE, MAGNESIUM HYDROXIDE, AND SIMETHICONE 30 ML: 200; 200; 20 SUSPENSION ORAL at 20:52

## 2020-11-12 RX ADMIN — PENICILLIN V POTASSIUM 250 MG: 250 TABLET, FILM COATED ORAL at 21:06

## 2020-11-12 RX ADMIN — LEVETIRACETAM 500 MG: 500 TABLET ORAL at 20:51

## 2020-11-12 RX ADMIN — PANTOPRAZOLE SODIUM 40 MG: 40 TABLET, DELAYED RELEASE ORAL at 17:00

## 2020-11-12 RX ADMIN — BUSPIRONE HYDROCHLORIDE 10 MG: 10 TABLET ORAL at 08:08

## 2020-11-12 RX ADMIN — BUSPIRONE HYDROCHLORIDE 10 MG: 10 TABLET ORAL at 14:54

## 2020-11-12 RX ADMIN — ALUMINUM HYDROXIDE, MAGNESIUM HYDROXIDE, AND SIMETHICONE 30 ML: 200; 200; 20 SUSPENSION ORAL at 08:09

## 2020-11-12 RX ADMIN — DOCUSATE SODIUM 100 MG: 100 CAPSULE, LIQUID FILLED ORAL at 20:50

## 2020-11-12 RX ADMIN — BUSPIRONE HYDROCHLORIDE 10 MG: 10 TABLET ORAL at 20:50

## 2020-11-12 RX ADMIN — CARVEDILOL 6.25 MG: 3.12 TABLET, FILM COATED ORAL at 17:00

## 2020-11-12 NOTE — BSMART NOTE
SOCIAL WORK GROUP THERAPY PROGRESS NOTE Group Time:  10:30am 
 
Group Topic:  Coping Skills    C D Issues Group Participation:   
 
Pt moderately involved during group discussion but remained attentive. Did better job listening while also supportive of peers. He was less dysphoric. Reviewed strategies to keep a \"Journal\" for moods, cognitions, behavior & outcome. Did handout on  x25 ways to be better in managing \"anxiety\". Pt admitted needs to take \"short breaks\" during the day as well as add other leisure activities.
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
## 2020-11-12 NOTE — BSMART NOTE
ART THERAPY GROUP PROGRESS NOTE PATIENT SCHEDULED FOR GROUP AT: 058 ATTENDANCE: Full PARTICIPATION LEVEL: Participates fully in the art process ATTENTION LEVEL : Able to focus on task FOCUS: Positive affirmations SYMBOLIC & THEMATIC CONTENT AS NOTED IN IMAGERY: He was calm, compliant, and polite. He presented with a bright affect and was invested in the task at hand. He actively participated in group discussion and offered others encouragement. He claimed that he hopes to be back on track with his sobriety and looks forward to [facility] and offering insight and encouragement to others.

## 2020-11-12 NOTE — BSMART NOTE
OCCUPATIONAL THERAPY PROGRESS NOTE Group Time:  8698 Attendance: The patient attended full group. Participation: The patient participated fully in the activity. Adriana Johns Attention: The patient was able to focus on the activity. Interaction: The patient frequently interacts with others.

## 2020-11-12 NOTE — BH NOTES
Patient ate dinner ans had snack. Patient very argumentative when being re-directed by staff. Patient took nighttime medications. Patient appeared to be  very intrusive and very  disrespectful. Patient will be monitored for safety.

## 2020-11-12 NOTE — PROGRESS NOTES
Pt. Was upset, angry and  anxious after he had an argument with one of the Mental health Tech. Vistaril PRN given and this writer was able to deescalate the situation. Will continue to monitor for safety and effectiveness of the medication.

## 2020-11-12 NOTE — PROGRESS NOTES
Problem: Suicide  Goal: *STG: Remains safe in hospital  Description: Patient  will not harm himself or others daily while in hospital.   Outcome: Progressing Towards Goal     Problem: Suicide  Goal: *STG: Attends activities and groups  Description: Patient will attend at least two groups daily while hospitalized. Outcome: Progressing Towards Goal     Problem: Suicide  Goal: *STG/LTG: Complies with medication therapy  Description: Patient will comply with medication regimen daily while in hospital.  Outcome: Progressing Towards Goal     Problem: Depressed Mood (Adult/Pediatric)  Goal: *STG: Attends activities and groups  Description: Patient will attend group activities daily while hospitalized. Outcome: Progressing Towards Goal     Problem: Depressed Mood (Adult/Pediatric)  Goal: *STG: Remains safe in hospital  Description: Patient will not harm himself or anyone else daily while in the hospital.  Outcome: Progressing Towards Goal   Patient cooperative and pleasant, smiling but anxious about discharge tomorrow. Patient stated that he is a little worried about getting to his final destination in time because he needs to cash some checks tomorrow in order to have some money on him and at the same time does not want to lose his [employer]. Patient spent most of the shift in the day area, interacted with peers and staff. Patient ate 100% dinner, received medications as scheduled, and participated in groups. Patient remained free of falls this shift.  Will continue to monitor for safety

## 2020-11-12 NOTE — BH NOTES
Patient informed this nurse \"I have been totally irresponsible with my medications. I don't have ANY medications at home, would you tell him that so he can write prescriptions for all of my meds? \"  Patient reassured this nurse would pass along message and reminded doctor comes in later in the afternoon on Thursdays.    made aware of request.

## 2020-11-12 NOTE — BH NOTES
TONO Note: The above pt has been cooperative and alert this shift. He has has been quiet and but cooperative this shift. He has been less entitled, loud, and has not required any re-direction this shift. He has been less selective with his walker however he has been educated on safety and has not experienced any falls this shift. He verbally contract for safety and denies any self-harm at this time.

## 2020-11-13 VITALS
WEIGHT: 224 LBS | HEART RATE: 95 BPM | BODY MASS INDEX: 28.75 KG/M2 | TEMPERATURE: 97.3 F | OXYGEN SATURATION: 97 % | DIASTOLIC BLOOD PRESSURE: 93 MMHG | RESPIRATION RATE: 17 BRPM | HEIGHT: 74 IN | SYSTOLIC BLOOD PRESSURE: 141 MMHG

## 2020-11-13 PROCEDURE — 99238 HOSP IP/OBS DSCHRG MGMT 30/<: CPT | Performed by: PSYCHIATRY & NEUROLOGY

## 2020-11-13 PROCEDURE — 74011250637 HC RX REV CODE- 250/637: Performed by: PSYCHIATRY & NEUROLOGY

## 2020-11-13 RX ADMIN — FOLIC ACID 1 MG: 1 TABLET ORAL at 08:11

## 2020-11-13 RX ADMIN — CLONIDINE HYDROCHLORIDE 0.1 MG: 0.1 TABLET ORAL at 08:11

## 2020-11-13 RX ADMIN — TAMSULOSIN HYDROCHLORIDE 0.4 MG: 0.4 CAPSULE ORAL at 08:12

## 2020-11-13 RX ADMIN — Medication 100 MG: at 08:11

## 2020-11-13 RX ADMIN — GABAPENTIN 800 MG: 300 CAPSULE ORAL at 08:11

## 2020-11-13 RX ADMIN — PANTOPRAZOLE SODIUM 40 MG: 40 TABLET, DELAYED RELEASE ORAL at 06:30

## 2020-11-13 RX ADMIN — THERA TABS 1 TABLET: TAB at 08:11

## 2020-11-13 RX ADMIN — FLUTICASONE PROPIONATE 2 SPRAY: 50 SPRAY, METERED NASAL at 09:00

## 2020-11-13 RX ADMIN — POTASSIUM CHLORIDE 10 MEQ: 750 TABLET, EXTENDED RELEASE ORAL at 08:12

## 2020-11-13 RX ADMIN — DULOXETINE HYDROCHLORIDE 60 MG: 60 CAPSULE, DELAYED RELEASE PELLETS ORAL at 08:12

## 2020-11-13 RX ADMIN — ASPIRIN 81 MG CHEWABLE TABLET 81 MG: 81 TABLET CHEWABLE at 08:11

## 2020-11-13 RX ADMIN — DOCUSATE SODIUM 100 MG: 100 CAPSULE, LIQUID FILLED ORAL at 08:11

## 2020-11-13 RX ADMIN — ACETAMINOPHEN 650 MG: 325 TABLET ORAL at 08:17

## 2020-11-13 RX ADMIN — BUSPIRONE HYDROCHLORIDE 10 MG: 10 TABLET ORAL at 08:11

## 2020-11-13 RX ADMIN — LEVETIRACETAM 500 MG: 500 TABLET ORAL at 08:12

## 2020-11-13 RX ADMIN — PENICILLIN V POTASSIUM 250 MG: 250 TABLET, FILM COATED ORAL at 06:30

## 2020-11-13 RX ADMIN — CLOPIDOGREL BISULFATE 75 MG: 75 TABLET ORAL at 08:12

## 2020-11-13 RX ADMIN — CARVEDILOL 6.25 MG: 3.12 TABLET, FILM COATED ORAL at 08:12

## 2020-11-13 RX ADMIN — ALUMINUM HYDROXIDE, MAGNESIUM HYDROXIDE, AND SIMETHICONE 30 ML: 200; 200; 20 SUSPENSION ORAL at 08:20

## 2020-11-13 NOTE — BH NOTES
Pt received discharge instructions, prescriptions, and emergency numbers. Pt completed satisfaction survey. All personal belongings returned to pt. Pt encouraged to follow-up with outpatient resources and to call with any questions or concerns.

## 2020-11-13 NOTE — SUICIDE SAFETY PLAN
SAFETY PLAN    A suicide Safety Plan is a document that supports someone when they are having thoughts of suicide. Warning Signs that indicate a suicidal crisis may be developing: What (situations, thoughts, feelings, body sensations, behaviors, etc.) do you experience that lets you know you are beginning to think about suicide? 1. Stressed  2. Bordom  3. Physical Pain    Internal Coping Strategies:  What things can I do (relaxation techniques, hobbies, physical activities, etc.) to take my mind off my problems without contacting another person? 1. Meditate & [place]  2. Read Recovery AA Big Book  3. Video games or take a walk outside    People and social settings that provide distraction: Who can I call or where can I go to distract me? 1. Name: Jose Taylor    2. Place: AA Meetings            3. Place: Zoom Meetings for recovery  4. Place:  [place] [facility]    People whom I can ask for help: Who can I call when I need help - for example, friends, family, clergy, someone else? 1. Name: [place] [address] member)                 2. Name: Other  members        Professionals or [address] [place] [address] agencies I can contact during a crisis: Who can I call for help - for example, my doctor, my psychiatrist, my psychologist, a mental health provider, a suicide hotline? 1. Clinician Name: [place] [facility]     2. Clinician Name: Dr Chucky Beckham    3. Suicide Prevention Lifeline: 2-989-407-TALK (9544)  4. Local Behavioral Health Emergency Services: 9-1-1      Emergency Services Phone: [phone]    Making the environment safe: How can I make my environment (house/apartment/living space) safer? For example, can I remove guns, medications, and other items? 1. Remove alcohol from immediate environment   2. Talk to someone when I'm feeling triggered.

## 2020-11-13 NOTE — GROUP NOTE
[facility] GROUP DOCUMENTATION INDIVIDUAL Group Therapy Note Date: 11/12/2020 Group Start Time: 0900 Group End Time: 0930 Group Topic: Nursing [facility] 1 ADULT CHEM DEP Ryan Victor, RN 
 
[facility] GROUP DOCUMENTATION GROUP Group Therapy Note: Coping skills and importance of practicing skills to better use them during times of stress. Attendees: 7 Attendance: Attended Interventions/techniques: Informed and Supported Follows Directions: Followed directions Interactions: Interacted appropriately Mental Status: Anxious and Congruent Behavior/appearance: Attentive and Motivated Goals Achieved: Able to engage in interactions, Able to listen to others and Discussed coping Additional Notes:  Patient voiced anxiousness regarding discharge but was appropriate with questions and responses. Abena rTipp
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
## 2020-11-13 NOTE — BH NOTES
Pt c/o ankle pain 4/10 and indigestion. Pt received PRN Tylenol and Mylanta. Will continue to monitor.

## 2020-11-13 NOTE — GROUP NOTE
[facility] GROUP DOCUMENTATION INDIVIDUAL Group Therapy Note Date: 11/12/2020 Group Start Time: 2000 Group End Time: 2015 Group Topic: Nursing [facility] 1 ADULT CHEM DEP Rocio Franco RN 
 
[facility] GROUP DOCUMENTATION GROUP Group Therapy Note Attendees: 6 Attendance: Attended Interventions/techniques: Informed Follows Directions: Followed directions Interactions: Interacted appropriately Mental Status: Calm Behavior/appearance: Cooperative Goals Achieved: Able to listen to others Sandra Thompson RN

## 2020-11-13 NOTE — BH NOTES
Patient given Milk of Magnesium for constipation and Tylenol for pain per patient request will continue to follow current POC interventions per policies/protocols.

## 2020-11-13 NOTE — DISCHARGE INSTRUCTIONS
***IMPORTANT NUMBERS***        [address] [facility]        [phone] [address] [facility]       [phone]    Suicide Prevention     [phone]          Patient is alert x3 and ambulatory. Patient has copy of discharge papers with completed safety plan. Patient has prescriptions to be filled at pharmacy of choice. Patient has all personal belongings and has signed form. Patient denies thoughts of self harm or harm to others at this time. Patient armband taken and shredded. Patient discharged to [facility] and will be transitioning to Crisis Stabilization with [facility]. Pt received discharge instructions, prescriptions, and emergency numbers. Pt completed satisfaction survey. All personal belongings returned to pt. Pt encouraged to follow-up with outpatient resources and to call with any questions or concerns.

## 2020-11-13 NOTE — BH NOTES
Pt reports improvement of ankle pain and indigestion after receiving PRN medication. Will continue to monitor.

## 2020-11-13 NOTE — BSMART NOTE
Pt. is a 50-year-old homeless male with history of Bipolar Depression , PTSD, TBI and Alcohol Dependence. Pt was admitted to this facility for ideations to harm self and alcohol detox. 
  
 
Pt.'s case was discussed in treatment team this a.m  Pt. will be dc today to Pt. Will be discharged today to [facility]  [address] [facility] [place] [address].    [phone]. Pt. Also has a phone interview assessment with [address] [place] [address] on 12/1/20 @10:30   [phone]. Pt. Has a tele health Appointment with Dr. Rachel Valles on 12/9/20 @ 1:45 @   [address]  [phone]. SW discussed the above dc plan with pt. Pt. Denies ideations and hallucinations. SW and nurse talked to Crisis stab staff this am to address their concerns. The pt. Was accepted . Pt was dc and transported to the facility via cab.

## 2020-12-10 ENCOUNTER — OFFICE VISIT (OUTPATIENT)
Dept: INTERNAL MEDICINE CLINIC | Age: 55
End: 2020-12-10
Payer: MEDICAID

## 2020-12-10 VITALS
TEMPERATURE: 97.2 F | WEIGHT: 230.8 LBS | OXYGEN SATURATION: 96 % | HEIGHT: 74 IN | SYSTOLIC BLOOD PRESSURE: 129 MMHG | RESPIRATION RATE: 18 BRPM | DIASTOLIC BLOOD PRESSURE: 84 MMHG | HEART RATE: 82 BPM | BODY MASS INDEX: 29.62 KG/M2

## 2020-12-10 DIAGNOSIS — R39.14 BENIGN PROSTATIC HYPERPLASIA WITH INCOMPLETE BLADDER EMPTYING: ICD-10-CM

## 2020-12-10 DIAGNOSIS — F10.20 ALCOHOLISM (HCC): ICD-10-CM

## 2020-12-10 DIAGNOSIS — I25.118 CORONARY ARTERY DISEASE OF NATIVE HEART WITH STABLE ANGINA PECTORIS, UNSPECIFIED VESSEL OR LESION TYPE (HCC): ICD-10-CM

## 2020-12-10 DIAGNOSIS — J30.9 ALLERGIC RHINITIS, UNSPECIFIED SEASONALITY, UNSPECIFIED TRIGGER: ICD-10-CM

## 2020-12-10 DIAGNOSIS — R19.5 POSITIVE COLORECTAL CANCER SCREENING USING COLOGUARD TEST: ICD-10-CM

## 2020-12-10 DIAGNOSIS — N40.1 BENIGN PROSTATIC HYPERPLASIA WITH INCOMPLETE BLADDER EMPTYING: ICD-10-CM

## 2020-12-10 DIAGNOSIS — I10 ESSENTIAL HYPERTENSION: Primary | ICD-10-CM

## 2020-12-10 PROCEDURE — 99213 OFFICE O/P EST LOW 20 MIN: CPT | Performed by: INTERNAL MEDICINE

## 2020-12-10 RX ORDER — BISMUTH SUBSALICYLATE 262 MG
1 TABLET,CHEWABLE ORAL DAILY
COMMUNITY
End: 2021-03-28

## 2020-12-10 RX ORDER — LANOLIN ALCOHOL/MO/W.PET/CERES
CREAM (GRAM) TOPICAL DAILY
COMMUNITY
End: 2021-03-28

## 2020-12-10 RX ORDER — MULTIVIT-MIN/FA/LYCOPEN/LUTEIN .4-300-25
1 TABLET ORAL DAILY
Qty: 90 TAB | Refills: 5 | Status: SHIPPED | OUTPATIENT
Start: 2020-12-10

## 2020-12-10 RX ORDER — GUAIFENESIN 100 MG/5ML
81 LIQUID (ML) ORAL DAILY
Qty: 90 TAB | Refills: 5 | Status: SHIPPED | OUTPATIENT
Start: 2020-12-10 | End: 2021-03-28

## 2020-12-10 RX ORDER — FLUTICASONE PROPIONATE 50 MCG
SPRAY, SUSPENSION (ML) NASAL
Qty: 1 BOTTLE | Refills: 5 | Status: SHIPPED | OUTPATIENT
Start: 2020-12-10

## 2020-12-10 NOTE — PROGRESS NOTES
Lenard Leo is a 48 y.o. male (: 1965) presenting to address:  Progress Note    Patient: Lenard Leo               Sex: male                  YOB: 1965      Age:  48 y. o.                    HPI:     Lenard Leo is a 48 y.o. male who has been seen for  Hypertension and alcoholism . He has been sober x 40 days.  He has had his cast removed 6 weeks ago     Past Medical History:   Diagnosis Date    Abuse     Anemia NEC     Anxiety     Arthritis     Chronic pain     Colitis     Contact dermatitis and other eczema, due to unspecified cause     Depression     Depression     Headache(784.0)     Heart attack ([facility] [address]     Hypercholesterolemia     Hypertension     Liver disease     Low back pain     with left leg pain -- multilevel spondylosis and L4 radiculitis    Neck pain     mild spondylosis    Other ill-defined conditions(799.89)     MS symptoms    Pancreatitis     Psychotic disorder ([facility] [address]     Trauma        Past Surgical History:   Procedure Laterality Date    HX CYST REMOVAL         Family History   Problem Relation Age of Onset    Psychiatric Disorder Mother     Heart Disease Mother     Arthritis-osteo Father     Alcohol abuse Father     Cancer Father         lung    Elevated Lipids Father     Headache Father     Hypertension Father     Lung Disease Father     Diabetes Father        Social History     Socioeconomic History    Marital status:      Spouse name: Not on file    Number of children: Not on file    Years of education: Not on file    Highest education level: Not on file   Tobacco Use    Smoking status: Current Some Day Smoker     Packs/day: 0.50     Years: 20.00     Pack years: 10.00     Types: Cigarettes    Smokeless tobacco: Never Used    Tobacco comment: patient states he vapes daily   Substance and Sexual Activity    Alcohol use: Not Currently     Alcohol/week: 0.0 standard drinks     Comment:  ETOH abuse - quit 2018    Drug use: No    Sexual activity: Not Currently     Partners: Female     Birth control/protection: Condom   Social History Narrative    ** Merged History Encounter **              Current Outpatient Medications:     multivitamin (ONE A DAY) tablet, Take 1 Tab by mouth daily. , Disp: , Rfl:     thiamine HCL (Vitamin B-1) 100 mg tablet, Take  by mouth daily. , Disp: , Rfl:     clopidogreL (PLAVIX) 75 mg tab, Take 1 Tab by mouth daily. Indications: treatment to prevent a heart attack, Disp: 30 Tab, Rfl: 0    atorvastatin (LIPITOR) 40 mg tablet, Take 1 Tab by mouth nightly. Indications: high cholesterol and high triglycerides, Disp: 30 Tab, Rfl: 0    busPIRone (BUSPAR) 10 mg tablet, Take 1 Tab by mouth three (3) times daily. Indications: repeated episodes of anxiety, Disp: 90 Tab, Rfl: 0    carvediloL (COREG) 6.25 mg tablet, Take 1 Tab by mouth two (2) times daily (with meals). Indications: dysfunction of left ventricle of heart following heart attack (Patient taking differently: Take 12.5 mg by mouth two (2) times daily (with meals). Indications: dysfunction of left ventricle of heart following heart attack), Disp: 60 Tab, Rfl: 0    cloNIDine HCL (CATAPRES) 0.1 mg tablet, Take 1 Tab by mouth two (2) times a day. Indications: high blood pressure, Disp: 60 Tab, Rfl: 0    DULoxetine (CYMBALTA) 60 mg capsule, Take 1 Cap by mouth daily. Indications: diabetic complication causing injury to some body nerves, Disp: 30 Cap, Rfl: 0    fluticasone propionate (FLONASE) 50 mcg/actuation nasal spray, 2 sprays each nostril daily  Indications: inflammation of the nose due to an allergy, Disp: 1 Bottle, Rfl: 0    gabapentin (NEURONTIN) 400 mg capsule, Take 2 Caps by mouth three (3) times daily. Max Daily Amount: 2,400 mg. Indications: neuropathic pain (Patient taking differently: Take 800 mg by mouth three (3) times daily.  Patient stated he is taking 800 mg 3 times daily  Indications: neuropathic pain), Disp: 180 Cap, Rfl: 0   ketoconazole (NIZORAL) 2 % shampoo, Apply 5 mL to affected area daily. Indications: dandruff, Disp: 1 Bottle, Rfl: 0    levETIRAcetam (KEPPRA) 500 mg tablet, Take 1 Tab by mouth two (2) times a day. Indications: additional medication for myoclonic epilepsy, Disp: 60 Tab, Rfl: 0    QUEtiapine SR (SEROquel XR) 300 mg sr tablet, Take 1 Tab by mouth nightly. Indications: additional medications to treat depression, Disp: 30 Tab, Rfl: 0    tamsulosin (FLOMAX) 0.4 mg capsule, Take 1 Cap by mouth daily. Indications: enlarged prostate with urination problem, Disp: 30 Cap, Rfl: 0    pantoprazole (PROTONIX) 40 mg tablet, Take 1 Tab by mouth Before breakfast and dinner. Indications: gastroesophageal reflux disease, Disp: 60 Tab, Rfl: 0    aspirin 81 mg chewable tablet, Take 1 Tab by mouth daily. , Disp: 90 Tab, Rfl: 3    docusate sodium (COLACE) 100 mg capsule, Take 1 Cap by mouth two (2) times a day for 90 days. Indications: constipation, Disp: 60 Cap, Rfl: 0    penicillin v potassium (VEETID) 250 mg tablet, Take 1 Tab by mouth Before breakfast, lunch, dinner and at bedtime. Indications: inflammation of the gums and mouth, Disp: 4 Tab, Rfl: 0     Allergies   Allergen Reactions    Amlodipine Other (comments)    Carbamazepine Unknown (comments)     violent    Lisinopril Unknown (comments)    Prednisone Other (comments)     He stated it hypes him up       Review of Systems   Constitutional: Negative for chills, fever and weight loss. HENT: Negative for hearing loss. Eyes: Positive for blurred vision. Respiratory: Negative for cough and shortness of breath. Cardiovascular: Negative for chest pain. Gastrointestinal: Positive for heartburn. He has had some alcoholic gastritis   Genitourinary: Negative. Uses flomax   Neurological: Negative for dizziness and loss of consciousness. Psychiatric/Behavioral: Positive for depression. The patient is nervous/anxious.          Physical Exam:      Visit Vitals  /84 (BP 1 Location: Right arm, BP Patient Position: Sitting)   Pulse 82   Temp 97.2 °F (36.2 °C) (Oral)   Resp 18   Ht 6' 2\" (1.88 m)   Wt 230 lb 12.8 oz (104.7 kg)   SpO2 96%   BMI 29.63 kg/m²       Physical Exam  Constitutional:       Appearance: Normal appearance. Cardiovascular:      Rate and Rhythm: Normal rate and regular rhythm. Pulmonary:      Effort: Pulmonary effort is normal. No respiratory distress. Breath sounds: Normal breath sounds. No stridor. No wheezing, rhonchi or rales. Neurological:      General: No focal deficit present. Mental Status: He is alert and oriented to person, place, and time. Psychiatric:         Mood and Affect: Mood normal.         Behavior: Behavior normal.         Thought Content: Thought content normal.         Judgment: Judgment normal.              Assessment/Plan       ICD-10-CM ICD-9-CM    1. Essential hypertension  I10 401.9 aspirin 81 mg chewable tablet   2. Alcoholism ([address]  F10.20 303.90 multivit-min-FA-lycopen-lutein (Centrum Silver) 0.4-300-250 mg-mcg-mcg tab      vit B Cmplx 3-FA-Vit C-Biotin (NEPHRO CAYLA RX) 1- mg-mg-mcg tablet   3. Allergic rhinitis, unspecified seasonality, unspecified trigger  J30.9 477.9 fluticasone propionate (FLONASE) 50 mcg/actuation nasal spray   4.  Positive colorectal cancer screening using Cologuard test  R19.5 787.7 REFERRAL TO GASTROENTEROLOGY             Tee Warren MD      Chief Complaint   Patient presents with    Depression       Vitals:    12/10/20 1323   BP: 129/84   Pulse: 82   Resp: 18   Temp: 97.2 °F (36.2 °C)   TempSrc: Oral   SpO2: 96%   Weight: 230 lb 12.8 oz (104.7 kg)   Height: 6' 2\" (1.88 m)   PainSc:   7   PainLoc: Ankle       Hearing/Vision:   No exam data present    Learning Assessment:     Learning Assessment 1/28/2014   PRIMARY LEARNER Patient   HIGHEST LEVEL OF EDUCATION - PRIMARY LEARNER  -   BARRIERS PRIMARY LEARNER -   CO-LEARNER CAREGIVER -   PRIMARY LANGUAGE ENGLISH LEARNER PREFERENCE PRIMARY LISTENING   ANSWERED BY patient   RELATIONSHIP SELF     Depression Screening:     3 most recent PHQ Screens 8/15/2018   PHQ Not Done Active Diagnosis of Depression or Bipolar Disorder     Fall Risk Assessment:     Fall Risk Assessment, last 12 mths 3/30/2018   Able to walk? Yes   Fall in past 12 months? Yes   Fall with injury? No   Number of falls in past 12 months 3   Fall Risk Score 3     Abuse Screening:     Abuse Screening Questionnaire 3/30/2018   Do you ever feel afraid of your partner? N   Are you in a relationship with someone who physically or mentally threatens you? N   Is it safe for you to go home? Y     Coordination of Care Questionaire:   1. Have you been to the ER, urgent care clinic since your last visit? Hospitalized since your last visit? YES    2. Have you seen or consulted any other health care providers outside of the [address] [place] [address] since your last visit? Include any pap smears or colon screening. YES    Advanced Directive:   1. Do you have an Advanced Directive? NO    2. Would you like information on Advanced Directives?  NO

## 2020-12-17 ENCOUNTER — HOSPITAL ENCOUNTER (OUTPATIENT)
Dept: PHYSICAL THERAPY | Age: 55
Discharge: HOME | End: 2020-12-17
Payer: MEDICAID

## 2020-12-17 PROCEDURE — 97161 PT EVAL LOW COMPLEX 20 MIN: CPT

## 2020-12-17 NOTE — PROGRESS NOTES
PT DAILY TREATMENT NOTE     Patient Name: Katerine Harris  Date:2020  : 1965  [x]  Patient  Verified  Payor: [address] / Plan: [address] / Product Type: Managed Care Medicaid /    In time:3:30  Out time:4:13  Total Treatment Time (min): 43  Total Timed Codes (min): 0  1:1 Treatment Time (min): 33   Visit #: 1 of     Treatment Area: Acute right ankle pain [M25.571]    SUBJECTIVE  Pain Level (0-10 scale): 6  Any medication changes, allergies to medications, adverse drug reactions, diagnosis change, or new procedure performed?: [x] No    [] Yes (see summary sheet for update)  Subjective functional status/changes:   [] No changes reported  SEE IE for Subjective Information    OBJECTIVE  Modality rationale: decrease edema, decrease inflammation and decrease pain to improve the patients ability to walk, stand, ADLs,    Min Type Additional Details   10 [x]  Vasopneumatic Device - to R ankle with table in elevation  Pressure:       [] lo [x] med [] hi   Temperature: [x] lo [] med [] hi   [x] Skin assessment post-treatment:  [x]intact []redness- no adverse reaction       []redness - adverse reaction:           x min Patient Education: [x] Review HEP    [x] Progressed/Changed HEP based on:   [] positioning   [x] body mechanics   [] transfers   [x] heat/ice application        Other Objective/Functional Measures:     Physical Therapy Evaluation  - Foot and Ankle    Gait: see IE     ROM/Strength      AROM        PROM            Strength (1-5)   Left Right Left Right Left  Right   Dorsiflexion 5 2  3 5 <3   Plantarflexion 40 30  35 5 <3   Inversion 30 3  10 5 <3   Eversion 10 1  12 5 <3   Great Toe Ext   70 90 4+ 3+   Great Toe Flex NA NA         Flexibility:     Optional Tests:  SLS  (L): 18\"   (R): 3\"  Heel/toe walking: unable due to pain     Swelling:   Left (cm) Right (cm)   Figure 8: 58 60   Midfoot:  26.5 26.5   Malleoli Level: 30 31   MTH: 25 26   2\" proximal to Mall: 25 26   4\" Prox to deirdre       Other tests/ comments:      Pain Level (0-10 scale) post treatment: 4    ASSESSMENT/Changes in Function: see IE     Patient will continue to benefit from skilled PT services to modify and progress therapeutic interventions, address functional mobility deficits, address ROM deficits, address strength deficits, analyze and address soft tissue restrictions, analyze and cue movement patterns, analyze and modify body mechanics/ergonomics, assess and modify postural abnormalities, address imbalance/dizziness and instruct in home and community integration to attain remaining goals.      [x]  See Plan of Care  []  See progress note/recertification  []  See Discharge Summary         Progress towards goals / Updated goals:  SEE IE FOR GOALS     PLAN  []  Upgrade activities as tolerated     []  Continue plan of care  []  Update interventions per flow sheet       []  Discharge due to:_  [x]  Other:Initiate POC as stated in the IE      Justification for Eval Code Complexity:  Patient History : alcoholism  Examination see IE  Clinical Presentation: stable and predictable  Clinical Decision Making : FOTO 48/100     Kendall Nielsen DPT 12/17/2020  9:39 AM    Future Appointments   Date Time Provider Aristides Ovallesi   12/17/2020  3:30 PM Mayo Leo PT [facility]   3/11/2021  1:30 PM Elona Boeck, MD [facility]

## 2020-12-17 NOTE — PROGRESS NOTES
[address] [facility] [address] [place], [address]  Phone: [phone] Fax: [phone] / [address] [address] PHYSICAL THERAPY SERVICES  Patient Name: Bryn Bailey : 1965   Medical   Diagnosis: Acute right ankle pain [M25.571] Treatment Diagnosis: R ankle pain scondary to OA and R malleolus fx    Onset Date: approx 3.5 months ago      Referral Source: Tino Martinez MD [facility]): 2020   Prior Hospitalization: See medical history Provider #: [id number]   Prior Level of Function: Functional I; I with walking;  Chronic LBP - limited in heavy lifting, sitting in low sofas without support; can lay supine on firm PT plinths    Comorbidities: LBP, tobacco use, arthritis, depression, HTN, heart disease   Medications: Verified on Patient Summary List   The Plan of Care and following information is based on the information from the initial evaluation.   ========================================================================  Assessment / key information:  Pt is a 49 yo male who fell out of a chair due to excessive alcohol use and sustained a R ankle medial malleolus fx. Seen at [facility] and given a walking splint. Admited to inpatient the next day due to abnormal alignment of the foot and after much deliberation was decided to not have surgery. Pt was in cast for several weeks and then in a walking boot. Pt was in a w/c due to limited WB\"ing status. Pt has been out of the orthopaedic boot for a month. Pt has been using a walker in the home and a SPC in community; notes resulting neuropathy in the R hand. Pain ranges from 5-7/10 located anterior ankle and plantar surface of foot; notes swelling in the foot and ankle nightly. Pain is made worse with walking without shoes at night, walking > 10 min, better with elevation (sleeping with it elevated as much as possible).     Functional Limitations include walking >10 min, walking independently, descending stairs with reciprocal gait; slow ascent of stairs; riding bike; SLS activities on the R. Inspection reveals: dry skin throughout (B) feet. Increased edema lateral ankle. Minimal increased redness of R foot.    Palpation: anterior TCJ.     ROM/Strength      AROM          PROM            Strength (1-5)   Left Right Left Right Left  Right   Dorsiflexion 5 2  3 5 <3   Plantarflexion 40 30  35 5 <3   Inversion 30 3  10 5 <3   Eversion 10 1  12 5 <3   Great Toe Ext   70 90 4+ 3+   Great Toe Flex NA NA         Optional Tests:  SLS     (L): 18\"            (R): 3\"  Heel/toe walking: unable due to pain      Swelling:    Left (cm) Right (cm)   Figure 8: 58 60   Midfoot:  26.5 26.5   Malleoli Level: 30 31   MTH: 25 26   2\" proximal to Mall: 25 26   4\" Prox to malleoli              Pt will benefit from PT interventions to address the aforementioned deficits and allow pt to return to PLOF.  ====================================================  Eval Complexity: History: MEDIUM  Complexity : 1-2 comorbidities / personal factors will impact the outcome/ POC Exam:HIGH Complexity : 4+ Standardized tests and measures addressing body structure, function, activity limitation and / or participation in recreation  Presentation: LOW Complexity : Stable, uncomplicated  Clinical Decision Making:MEDIUM Complexity : FOTO score of 26-74Overall Complexity:LOW   Problem List: pain affecting function, decrease ROM, decrease strength, edema affecting function, impaired gait/ balance, decrease ADL/ functional abilitiies, decrease activity tolerance, decrease flexibility/ joint mobility and decrease transfer abilities   Treatment Plan may include any combination of the following: Therapeutic exercise, Therapeutic activities, Neuromuscular re-education, Physical agent/modality, Gait/balance training, Manual therapy, Aquatic therapy, Patient education, Self Care training, Functional mobility training, Home safety training and Stair training  Patient / Family readiness to learn indicated by: asking questions, trying to perform skills and interest  Persons(s) to be included in education: patient (P)  Barriers to Learning/Limitations: None  Measures taken:    Patient Goal (s): \"reduce pain; gain mobility; return to riding my bike about 30 minutes \"   Patient self reported health status: fair  Rehabilitation Potential: excellent   Short Term Goals: To be accomplished in  2  treatments:  1. Pt will be I and compliant with HEP to improve deficits and allow pt to return to PLOF    Long Term Goals: To be accomplished in  8-12  treatments:  1. Pt will increase score on the FOTO to > or = 63/100 to demo an increase in functional activity tolerance. 2. Pt will have an increase in R ankle AROM to 7 degrees of AROM DF to improve gait, stair negotiation. 3. Pt will be able to self-manage pain to < or = 2/10 ave to improve quality of life and functional activities. 4. Pt will demo normalized gait on level surfaces, independently for > or = 10 min to return to his PLOF. 5. Pt will have an ability to perform 10x heel and toe raises (B) to begin to restore normalized gait, stairs, ADLs   6. Pt will be able to descend a full flight of stairs with reciprocal pattern and no increase in pain to return to PLOF. Frequency / Duration:   Patient to be seen  2  times per week for 8-12  treatments:  Patient / Caregiver education and instruction: self care, activity modification, brace/ splint application and exercises  Therapist Signature: Josue Hudson DPT Date: 80/01/2827   Certification Period: na Time: 9:39 AM   ========================================================================  I certify that the above Physical Therapy Services are being furnished while the patient is under my care. I agree with the treatment plan and certify that this therapy is necessary.   Physician Signature:        Date: Time:   Please sign and return to [facility] or you may fax the signed copy to [phone]. Thank you.

## 2020-12-21 ENCOUNTER — HOSPITAL ENCOUNTER (OUTPATIENT)
Dept: PHYSICAL THERAPY | Age: 55
Discharge: HOME | End: 2020-12-21
Payer: MEDICAID

## 2020-12-21 PROCEDURE — 97110 THERAPEUTIC EXERCISES: CPT

## 2020-12-21 PROCEDURE — 97116 GAIT TRAINING THERAPY: CPT

## 2020-12-21 PROCEDURE — 97112 NEUROMUSCULAR REEDUCATION: CPT

## 2020-12-21 PROCEDURE — 97530 THERAPEUTIC ACTIVITIES: CPT

## 2020-12-21 PROCEDURE — 97016 VASOPNEUMATIC DEVICE THERAPY: CPT

## 2020-12-21 NOTE — PROGRESS NOTES
PT DAILY TREATMENT NOTE     Patient Name: Robin Valentin  Date:2020  : 1965  [x]  Patient  Verified  Payor: [address] / Plan: [address] [facility] / Product Type: Managed Care Medicaid /    In time:2:00 pm  Out time:2:59  Total Treatment Time (min): 59  Visit #: 2 of   Treatment Area: Acute right ankle pain [M25.571]    SUBJECTIVE  Pain Level (0-10 scale): 510  Any medication changes, allergies to medications, adverse drug reactions, diagnosis change, or new procedure performed?: [x] No    [] Yes (see summary sheet for update)  Subjective functional status/changes:   [] No changes reported  Pt reports his ankle is a bit stiff today, he has been thinking about getting his bicycle out to work on riding it.    OBJECTIVE    Modality rationale: decrease edema, decrease inflammation and decrease pain to improve the patients ability to walk, stand, and perform transfers   Min Type Additional Details    [] Estim:  []Unatt       []IFC  []Premod                        []Other:  []w/ice   []w/heat  Position:  Location:    [] Estim: []Att    []TENS instruct  []NMES                    []Other:  []w/US   []w/ice   []w/heat  Position:  Location:    []  Traction: [] Cervical       []Lumbar                       [] Prone          []Supine                       []Intermittent   []Continuous Lbs:  [] before manual  [] after manual    []  Ultrasound: []Continuous   [] Pulsed                           []1MHz   []3MHz W/cm2:  Location:    []  Iontophoresis with dexamethasone         Location: [] Take home patch   [] In clinic    []  Ice     []  heat  []  Ice massage  []  Laser   []  Anodyne Position:  Location:    []  Laser with stim  []  Other:  Position:  Location:   10 [x]  Vasopneumatic Device Pressure:       [x] lo [] med [] hi   Temperature: [x] lo [] med [] hi   [] Skin assessment post-treatment:  []intact []redness- no adverse reaction    []redness - adverse reaction:     21 min Therapeutic Exercise:  [] See flow sheet :   Rationale: increase ROM and increase strength to improve the patients ability to walk    10 min Therapeutic Activity:  []  See flow sheet :   Rationale: increase strength and improve coordination  to improve the patients ability to navigate stairs and transfer     10 min Neuromuscular Re-education:  []  See flow sheet :   Rationale: improve balance and increase proprioception  to improve the patients ability to balance and dynamically stabilize the ankle    8 min Gait Training: tandem walking, side step, pregait training. Rationale: to improve Pt gait pattern and WB through R LE            With   [] TE   [] TA   [] neuro   [] other: Patient Education: [x] Review HEP    [] Progressed/Changed HEP based on:   [] positioning   [] body mechanics   [] transfers   [] heat/ice application    [] other:      Other Objective/Functional Measures: program initiated today 1st follow up     Pain Level (0-10 scale) post treatment: 4    ASSESSMENT/Changes in Function: Pt required vc and demo 100% with all new exercises to perform correctly. Pt was challenged with seated heel and toe raises reporting mild pain with the activity that was tolerable to continue. Pt required vc throughout gait training to avoid R LE external rotation, and to improve heel strike and toe off. Pt responded well to ankle/foot PNF patterns with therapist light manual resistance to improve motor control and strength at the ankle. PT and pt discussed bicycle riding with PT instructing pt that he is able to ride bicycle provided he can get on and off the bicycle safely, and instructing pt to utilize ankle brace or tall hiking boots to provide stability/support for the ankle for safety. Pt verbalized understanding.      Patient will continue to benefit from skilled PT services to modify and progress therapeutic interventions, address functional mobility deficits, address ROM deficits, address strength deficits, analyze and address soft tissue restrictions, analyze and cue movement patterns, analyze and modify body mechanics/ergonomics, assess and modify postural abnormalities, address imbalance/dizziness and instruct in home and community integration to attain remaining goals. []  See Plan of Care  []  See progress note/recertification  []  See Discharge Summary         Progress towards goals / Updated goals: · Short Term Goals: To be accomplished in  2  treatments:  1. Pt will be I and compliant with HEP to improve deficits and allow pt to return to PLOF   · Long Term Goals: To be accomplished in  8-12  treatments:  1. Pt will increase score on the FOTO to > or = 63/100 to demo an increase in functional activity tolerance. 2. Pt will have an increase in R ankle AROM to 7 degrees of AROM DF to improve gait, stair negotiation. Addressing with incline stretch 12/21/20  3. Pt will be able to self-manage pain to < or = 2/10 ave to improve quality of life and functional activities. 4. Pt will demo normalized gait on level surfaces, independently for > or = 10 min to return to his PLOF. 5. Pt will have an ability to perform 10x heel and toe raises (B) to begin to restore normalized gait, stairs, ADLs   6. Pt will be able to descend a full flight of stairs with reciprocal pattern and no increase in pain to return to PLOF.       PLAN  []  Upgrade activities as tolerated     []  Continue plan of care  []  Update interventions per flow sheet       []  Discharge due to:_  []  Other:_      Bertha Walton 12/21/2020  10:53 AM    Future Appointments   Date Time Provider Aristides Ibanez   12/21/2020  2:00 PM [facility]   12/23/2020 11:30 AM Naina Hoskins, PT [facility]   12/29/2020  2:00 PM Ozzy Loya PTA [facility]   12/31/2020  2:00 PM Ozzy Loya PTA [facility]   3/11/2021  1:30 PM Dilma Melendez MD [facility]

## 2020-12-22 RX ORDER — PANTOPRAZOLE SODIUM 40 MG/1
40 TABLET, DELAYED RELEASE ORAL
Qty: 60 TAB | Refills: 0 | Status: SHIPPED | OUTPATIENT
Start: 2020-12-22 | End: 2021-03-28

## 2020-12-22 RX ORDER — CLONIDINE HYDROCHLORIDE 0.1 MG/1
0.1 TABLET ORAL 2 TIMES DAILY
Qty: 60 TAB | Refills: 0 | Status: SHIPPED | OUTPATIENT
Start: 2020-12-22 | End: 2021-01-26

## 2020-12-23 ENCOUNTER — HOSPITAL ENCOUNTER (OUTPATIENT)
Dept: PHYSICAL THERAPY | Age: 55
Discharge: HOME | End: 2020-12-23
Payer: MEDICAID

## 2020-12-23 PROCEDURE — 97110 THERAPEUTIC EXERCISES: CPT

## 2020-12-23 PROCEDURE — 97016 VASOPNEUMATIC DEVICE THERAPY: CPT

## 2020-12-23 PROCEDURE — 97112 NEUROMUSCULAR REEDUCATION: CPT

## 2020-12-23 NOTE — PROGRESS NOTES
PT DAILY TREATMENT NOTE     Patient Name: Lenard Leo  Date:2020  : 1965  [x]  Patient  Verified  Payor: [address] / Plan: [address] [facility] / Product Type: Managed Care Medicaid /    In time:1127  Out time:1210  Total Treatment Time (min): 43  Visit #: 3 of   Treatment Area: Acute right ankle pain [M25.571]    SUBJECTIVE  Pain Level (0-10 scale): 7/10  Any medication changes, allergies to medications, adverse drug reactions, diagnosis change, or new procedure performed?: [x] No    [] Yes (see summary sheet for update)  Subjective functional status/changes:   [] No changes reported  Pt reports soreness and pain from first FU treatment     OBJECTIVE    Modality rationale: decrease edema, decrease inflammation and decrease pain to improve the patients ability to walk, stand, and perform transfers   Min Type Additional Details    [] Estim:  []Unatt       []IFC  []Premod                        []Other:  []w/ice   []w/heat  Position:  Location:   10 [x]  Vasopneumatic Device Pressure:       [x] lo [] med [] hi   Temperature: [x] lo [] med [] hi   [x] Skin assessment post-treatment:  [x]intact []redness- no adverse reaction    []redness - adverse reaction:     18 min Therapeutic Exercise:  [x] See flow sheet : assess while on Bike    Rationale: increase ROM and increase strength to improve the patients ability to walk, negotiate stairs, and generally return to PLOF    10 min Therapeutic Activity:  [x]  See flow sheet :   Rationale: increase strength and improve coordination  to improve the patients ability to navigate stairs and transfer     5 min Neuromuscular Re-education:  [x]  See flow sheet :   Rationale: improve balance and increase proprioception  to improve the patients ability to balance and dynamically stabilize the ankle    NT  min Gait Training   Rationale: to improve Pt gait pattern and WB through R LE to progress with home and community mobility With   RX Patient Education: [x] Review HEP  : relative rest, HEP frequency  Icing       Other Objective/Functional Measures:    HEP: held yesterday sec to pain     Toe Yoga - GT extension : poor but with visual tendon movement     Lesser toe extension : fair - 50% AROM       Edema: + 2 cm compared to L LE    Pain Level (0-10 scale) post treatment: 5    ASSESSMENT/Changes in Function: Pt reports first FU treatment caused increased pain. Treatment was modified accordingly - held standing/ WBing therex and will resume NV as tolerated. Patient cont to amb with tall walking stick for balance and stability (PLOF no AD). VCing with sit to stand to avoid compensatory shift to Left- encouraged staggered stance to force R WBing. Patient encouraged to ice at home for post treatment soreness     Patient will continue to benefit from skilled PT services to modify and progress therapeutic interventions, address functional mobility deficits, address ROM deficits, address strength deficits, analyze and address soft tissue restrictions, analyze and cue movement patterns, analyze and modify body mechanics/ergonomics, assess and modify postural abnormalities, address imbalance/dizziness and instruct in home and community integration to attain remaining goals. [x]  See Plan of Care  []  See progress note/recertification  []  See Discharge Summary         Progress towards goals / Updated goals: · Short Term Goals: To be accomplished in  2  treatments:  1. Pt will be I and compliant with HEP to improve deficits and allow pt to return to PLOF  Goal progressing - HEP est with no questions. HEP will be modified and progressed as appropriate 12/23/2020  · Long Term Goals: To be accomplished in  8-12  treatments:  1. Pt will increase score on the FOTO to > or = 63/100 to demo an increase in functional activity tolerance. 2. Pt will have an increase in R ankle AROM to 7 degrees of AROM DF to improve gait, stair negotiation. Addressing with incline stretch 12/21/20  3. Pt will be able to self-manage pain to < or = 2/10 ave to improve quality of life and functional activities. 4. Pt will demo normalized gait on level surfaces, independently for > or = 10 min to return to his PLOF. 5. Pt will have an ability to perform 10x heel and toe raises (B) to begin to restore normalized gait, stairs, ADLs   6. Pt will be able to descend a full flight of stairs with reciprocal pattern and no increase in pain to return to PLOF.       PLAN  [x]  Upgrade activities as tolerated     []  Continue plan of care  []  Update interventions per flow sheet       []  Discharge due to:_  [x]  Other:_  Progress back to standing therex as tolerated     Kaushik Acevedo PT 12/23/2020      Future Appointments   Date Time Provider Aristides Ovallesi   12/23/2020 11:30 AM Karle Kussmaul., PT [facility]   12/31/2020  2:00 PM Arian Palacios PTA [facility]   1/4/2021  2:45 PM Opal Washington [facility]   1/7/2021  2:00 PM Arian Palacios PTA [facility]   1/11/2021  2:45 PM Opal Washington [facility]   1/14/2021  2:00 PM Arian Palacios PTA [facility]   3/11/2021  1:30 PM MD LADY Alcazar [facility]

## 2020-12-29 ENCOUNTER — APPOINTMENT (OUTPATIENT)
Dept: PHYSICAL THERAPY | Age: 55
End: 2020-12-29
Payer: MEDICAID

## 2020-12-30 RX ORDER — TAMSULOSIN HYDROCHLORIDE 0.4 MG/1
0.4 CAPSULE ORAL DAILY
Qty: 60 CAP | Refills: 3 | Status: SHIPPED | OUTPATIENT
Start: 2020-12-30

## 2020-12-31 ENCOUNTER — HOSPITAL ENCOUNTER (OUTPATIENT)
Dept: PHYSICAL THERAPY | Age: 55
Discharge: HOME | End: 2020-12-31
Payer: MEDICAID

## 2020-12-31 PROCEDURE — 97110 THERAPEUTIC EXERCISES: CPT

## 2020-12-31 PROCEDURE — 97116 GAIT TRAINING THERAPY: CPT

## 2020-12-31 PROCEDURE — 97530 THERAPEUTIC ACTIVITIES: CPT

## 2020-12-31 PROCEDURE — 97016 VASOPNEUMATIC DEVICE THERAPY: CPT

## 2020-12-31 NOTE — PROGRESS NOTES
PT DAILY TREATMENT NOTE     Patient Name: Viri Cope  Date:2020  : 1965  [x]  Patient  Verified  Payor: [address] / Plan: [address] [facility] / Product Type: Managed Care Medicaid /    In time: 2:02               Out time: 3:00  Total Treatment Time (min): 58  Visit #: 4 of     Treatment Area: Acute right ankle pain [M25.571]    SUBJECTIVE  Pain Level (0-10 scale): 4  Any medication changes, allergies to medications, adverse drug reactions, diagnosis change, or new procedure performed?: [x] No    [] Yes (see summary sheet for update)  Subjective functional status/changes:   [] No changes reported  Patient notes feeling better today. He states he has been more cognizant of pointing his feet forward when he walks, but it has been a habit to turn the foot out fully.     OBJECTIVE  Modality rationale: decrease edema, decrease inflammation and decrease pain to improve the patients ability to walk, stand, and perform transfers   Min Type Additional Details   10 [x]  Vasopneumatic Device with (B) LEs in elevation on mat, feet tied together to avoid excessive ER using black TB Pressure:       [x] lo [] med [] hi   Temperature: [x] lo [] med [] hi   [x] Skin assessment post-treatment:  [x]intact []redness- no adverse reaction    []redness - adverse reaction:     18 min Therapeutic Exercise:   [x]  See flow sheet:   Rationale: increase ROM and increase strength to improve the patients ability to walk, negotiate stairs, and generally return to PLOF    8 min Therapeutic Activity:  [x]  See flow sheet:   Rationale: increase strength and improve coordination  to improve the patients ability to navigate stairs and transfer     5 min Neuromuscular Re-education:  [x]  See flow sheet:   Rationale: improve balance and increase proprioception  to improve the patients ability to balance and dynamically stabilize the ankle    10  min Gait Training: pre-gait in clinic with emphasis on neutral foot positioning during stance phase of gait cycle   Rationale: to improve patient's gait pattern and WB through the right LE to progress with home and community mobility     7 min Manual Therapy:  (R) midfoot mobs f/b right ankle/foot PROM in all directions; (R) GT and all toe PROM   Rationale: decrease pain, increase ROM and increase tissue extensibility to improve the patients ability to perform ADLs. The manual therapy interventions were performed at a separate and distinct time from the therapeutic activities interventions. with  treatment Patient Education: [x] Review HEP     Other Objective/Functional Measures:       Pain Level (0-10 scale) post treatment: 2    ASSESSMENT/Changes in Function:   Patient with multiple cavitations in the right mid-foot with mobilizations resulting in an increase in foot EV (near normal) and IV (approx 20 deg) ROM. Patient able to amb (I) within clinic with improved symmetrical toe out positioning, although with slower hali and mild discomfort; discussed use of walking stick to offload the right foot, but need to continue to maintain symmetrical foot positioning to increase DF required for daily walking and stairs. Patient will continue to benefit from skilled PT services to modify and progress therapeutic interventions, address functional mobility deficits, address ROM deficits, address strength deficits, analyze and address soft tissue restrictions, analyze and cue movement patterns, analyze and modify body mechanics/ergonomics, assess and modify postural abnormalities, address imbalance/dizziness and instruct in home and community integration to attain remaining goals. [x]  See Plan of Care  []  See progress note/recertification  []  See Discharge Summary         Progress towards goals / Updated goals: · Short Term Goals: To be accomplished in  2  treatments:  1.  Pt will be I and compliant with HEP to improve deficits and allow pt to return to PLOF Goal progressing - HEP est with no questions. HEP will be modified and progressed as appropriate 12/23/2020  · Long Term Goals: To be accomplished in  8-12  treatments:  1. Pt will increase score on the FOTO to > or = 63/100 to demo an increase in functional activity tolerance. 2. Pt will have an increase in R ankle AROM to 7 degrees of AROM DF to improve gait, stair negotiation. Addressing with incline stretch 12/21/20  3. Pt will be able to self-manage pain to < or = 2/10 ave to improve quality of life and functional activities. 4. Pt will demo normalized gait on level surfaces, independently for > or = 10 min to return to his PLOF. 5. Pt will have an ability to perform 10x heel and toe raises (B) to begin to restore normalized gait, stairs, ADLs. -Goal met; pt able to perform HR and TR in sitting, but with fasciculations in end-range HR (12/31/20)  6. Pt will be able to descend a full flight of stairs with reciprocal pattern and no increase in pain to return to PLOF.       PLAN  [x]  Upgrade activities as tolerated     [x]  Continue plan of care  []  Update interventions per flow sheet       []  Discharge due to:_  []  Other:_    Chiara Gonzales PTA 12/31/2020      Future Appointments   Date Time Provider Aristides Ibanez   1/4/2021  2:45 PM Jayson Diza [facility]   1/7/2021  2:00 PM Hanna Levy PT [facility]   1/11/2021  2:45 PM Jayson Diaz [facility]   1/14/2021  2:00 PM Anaid Stacy PTA [facility]   3/11/2021  1:30 PM Chichi Schmitt MD [facility]

## 2021-01-04 ENCOUNTER — APPOINTMENT (OUTPATIENT)
Dept: PHYSICAL THERAPY | Age: 56
End: 2021-01-04
Payer: MEDICAID

## 2021-01-06 ENCOUNTER — APPOINTMENT (OUTPATIENT)
Dept: PHYSICAL THERAPY | Age: 56
End: 2021-01-06
Payer: MEDICAID

## 2021-01-07 ENCOUNTER — HOSPITAL ENCOUNTER (OUTPATIENT)
Dept: PHYSICAL THERAPY | Age: 56
Discharge: HOME | End: 2021-01-07
Payer: MEDICAID

## 2021-01-07 PROCEDURE — 97116 GAIT TRAINING THERAPY: CPT

## 2021-01-07 PROCEDURE — 97112 NEUROMUSCULAR REEDUCATION: CPT

## 2021-01-07 PROCEDURE — 97110 THERAPEUTIC EXERCISES: CPT

## 2021-01-07 PROCEDURE — 97530 THERAPEUTIC ACTIVITIES: CPT

## 2021-01-07 PROCEDURE — 97016 VASOPNEUMATIC DEVICE THERAPY: CPT

## 2021-01-07 NOTE — PROGRESS NOTES
PT DAILY TREATMENT NOTE     Patient Name: Sarah Elliott  Date:2021  : 1965  [x]  Patient  Verified  Payor: [address] / Plan: [address] [facility] / Product Type: Managed Care Medicaid /    In time:1:55  Out time:2:58  Total Treatment Time (min): 63  Total Timed Codes (min): 53  1:1 Treatment Time (min): 53   Visit #: 5 of     Treatment Area: Acute right ankle pain [M25.571]    SUBJECTIVE  Pain Level (0-10 scale): 4  Any medication changes, allergies to medications, adverse drug reactions, diagnosis change, or new procedure performed?: [x] No    [] Yes (see summary sheet for update)  Subjective functional status/changes:   [] No changes reported  Pain ranges from 3/10 to 6-7/10 lately. Limitations: walking >15 min; descending stairs with non-reciprocal pattern, instability more at night in the apartment; Improvements: ascending with reciprocal gait. Still swelling each night, but decreasing the amount overall  \"i'm slowly improving. I\"ve been on my bicycle a little bit and that's going ok when the weather is ok\"  Walking stick - using it daily. OBJECTIVE  Modality rationale: decrease edema, decrease inflammation and decrease pain to improve the patients ability to sstand, ambulation, stairs   Min Type Additional Details   10 [x]  Vasopneumatic Device - with (B) LEs in elevation on mat, feet tied together to avoid excessive ER Pressure:       [] lo [x] med [] hi   Temperature: [x] lo [] med [] hi   [x] Skin assessment post-treatment:  [x]intact []redness- no adverse reaction       []redness - adverse reaction:       14 min Therapeutic Exercise:  [x] See flow sheet :   Rationale: increase ROM and increase strength to improve the patients ability to walk, negotiate stairs, return to PLOF    7 min Manual Therapy:  (R) midfoot mobs f/b right ankle/foot PROM in all directions; (R) GT and all toe PROM, R ankle mobs, posterior lateral malleolus mobs to increase DF ROM. Rationale: decrease pain, increase ROM and increase tissue extensibility to improve gait, stairs, standing   The manual therapy interventions were performed at a separate and distinct time from the therapeutic activities interventions   (na  Add 59 Modifier in conjunction with TA treatment)    10 min Therapeutic Activity:  []  See flow sheet :   Rationale: To improve standing, ADLs, squatting      12 min Neuromuscular Re-education:  []  See flow sheet : MSR at arch with EC;    Rationale: improve coordination, improve balance and increase proprioception  to improve the patients ability to balance, increase safety with mobility     10 min Gait Training: resume hurdles, inline gait, lateral walking    Rationale: to improve gait patterning with WB thorough R LE for home and community mobility           x min Patient Education: [x] Review HEP    [x] Progressed/Changed HEP based on: 1/2 kneeling DF stretch with foot on stool/chair or in staggered stance. [] positioning   [] body mechanics   [] transfers   [] heat/ice application        Other Objective/Functional Measures:   R edema: figure 8:  59.5cm   R ankle AROM: DF 2, PF 40; standing loaded DF with knee flexion: 5 deg     Attempted standing HR/TR but with c/o pain     Pain Level (0-10 scale) post treatment: 4    ASSESSMENT/Changes in Function: Pt demo's stable stance in MSR at the arch and Romberg with EC on foam. Also demo's good balance reactions with  Long-stepping tandem / in line gait. Limited eversion (B), required assist from PT for eversion on rocker taps. Progressing well towards goals. Challenged by some increased MT toady addressed as mobilizations for the R ankle.      Patient will continue to benefit from skilled PT services to modify and progress therapeutic interventions, address functional mobility deficits, address ROM deficits, address strength deficits, analyze and address soft tissue restrictions, analyze and cue movement patterns, analyze and modify body mechanics/ergonomics, assess and modify postural abnormalities, address imbalance/dizziness and instruct in home and community integration to attain remaining goals. []  See Plan of Care  []  See progress note/recertification  []  See Discharge Summary         Progress towards goals / Updated goals: · Short Term Goals: To be accomplished in  2  treatments:  1. Pt will be I and compliant with HEP to improve deficits and allow pt to return to PLOF  Goal progressing - HEP est with no questions. HEP will be modified and progressed as appropriate 12/23/2020  · Long Term Goals: To be accomplished in  8-12  treatments:  1. Pt will increase score on the FOTO to > or = 63/100 to demo an increase in functional activity tolerance. 2. Pt will have an increase in R ankle AROM to 7 degrees of AROM DF to improve gait, stair negotiation. Addressing with incline stretch 12/21/20; loaded DF stretch up to 5 deg (1/7/21)  3. Pt will be able to self-manage pain to < or = 2/10 ave to improve quality of life and functional activities. Goal progressing with a decrease to 3-7/10 (1/7/21)  4. Pt will demo normalized gait on level surfaces, independently for > or = 10 min to return to his PLOF. 5. Pt will have an ability to perform 10x heel and toe raises (B) to begin to restore normalized gait, stairs, ADLs. -Goal progressing; pt able to perform HR and TR in sitting, but with fasciculations in end-range HR (12/31/20); unable to perform standing HR/TR due to pain (1/7/21)  6. Pt will be able to descend a full flight of stairs with reciprocal pattern and no increase in pain to return to PLOF.   Goal progressing with ascending reciprocal, descending with difficulty; initiated 2\" step downs to address (1/7/21)    PLAN  [x]  Upgrade activities as tolerated     [x]  Continue plan of care  []  Update interventions per flow sheet       []  Discharge due to:_  [x]  Other:_  Progress MSR to GT NV.  PN on 1/14. Anticipate pt will need to con't at 1-2x/week for 4-8 sessions (or until insurance auth expires)    Samina Mendez, PT 1/7/2021  9:35 AM    Future Appointments   Date Time Provider Aristides Ibanez   1/7/2021  2:00 PM Venu Mcgowan, CORI [facility]   1/8/2021  2:15 PM Den Martino PTA [facility]   1/11/2021  2:45 PM Stephani Luther [facility]   1/14/2021  2:00 PM Den Martino PTA [facility]   3/11/2021  1:30 PM Serjio Alexandra MD [facility]

## 2021-01-08 ENCOUNTER — HOSPITAL ENCOUNTER (OUTPATIENT)
Dept: PHYSICAL THERAPY | Age: 56
Discharge: HOME | End: 2021-01-08
Payer: MEDICAID

## 2021-01-08 PROCEDURE — 97110 THERAPEUTIC EXERCISES: CPT

## 2021-01-08 PROCEDURE — 97530 THERAPEUTIC ACTIVITIES: CPT

## 2021-01-08 PROCEDURE — 97112 NEUROMUSCULAR REEDUCATION: CPT

## 2021-01-08 PROCEDURE — 97116 GAIT TRAINING THERAPY: CPT

## 2021-01-08 NOTE — PROGRESS NOTES
PT DAILY TREATMENT NOTE     Patient Name: Miguel Day  Date:2021  : 1965  [x]  Patient  Verified  Payor: [address] / Plan: Garth Son / Product Type: Managed Care Medicaid /    In time: 2:26 pm          Out time: 3:25 pm  Total Treatment Time (min): 59  Visit #: 6 of     Treatment Area: Acute right ankle pain [M25.571]    SUBJECTIVE  Pain Level (0-10 scale): 4  Any medication changes, allergies to medications, adverse drug reactions, diagnosis change, or new procedure performed?: [x] No    [] Yes (see summary sheet for update)  Subjective functional status/changes:   [] No changes reported  Patient reports soreness in the top of his foot from yesterday's session. He states his balance is poor so he has been working on it at home. OBJECTIVE  Modality rationale: decrease edema, decrease inflammation and decrease pain to improve the patients ability to sstand, ambulation, stairs   Min Type Additional Details   PD due to [facility] [x]  Vasopneumatic Device - with (B) LEs in elevation on mat, feet tied together to avoid excessive ER Pressure:       [] lo [x] med [] hi   Temperature: [x] lo [] med [] hi   [x] Skin assessment post-treatment:  [x]intact []redness- no adverse reaction       []redness - adverse reaction:       13 min Therapeutic Exercise:  [x] See flow sheet:   Rationale: increase ROM and increase strength to improve the patients ability to walk, negotiate stairs, return to PLOF    7 min Manual Therapy:  (R) ankle distraction f/b passive stretching into DF and PF, stick-rolling STM to the (R) calf with the ankle relaxed and then DF'd; STM to right anterior tib   Rationale: decrease pain, increase ROM and increase tissue extensibility to improve gait, stairs, standing. The manual therapy interventions were performed at a separate and distinct time from the therapeutic activities interventions.     10 min Therapeutic Activity:  [x]  See flow sheet:   Rationale: to improve standing, ADLs, squatting      12 min Neuromuscular Re-education:  [x]  See flow sheet: progressed MSR to great toe   Rationale: improve coordination, improve balance and increase proprioception  to improve the patients ability to balance, increase safety with mobility     17 min Gait Training: pre-gait with emphasis on stance phase today; cont'd hurdles (forward and lateral), inline gait, lateral walking    Rationale: to improve gait patterning with weight-bearing thorough the right lower extremity for home and community mobility           X min Patient Education: [x] Review HEP     Other Objective/Functional Measures:   Passive DF with knee extended: 8 deg, post-MT  MSR GT: (B) 30 seconds, although with increased fatigue and sway with the (R) LE posterior  (+) TTP right anterior tibialis    Pain Level (0-10 scale) post treatment: 4    ASSESSMENT/Changes in Function:   Patient with increasing passive ankle DF from initial evaluation and demo's (I) gait with inc'd cognizance of avoiding excessive toe out during stance time on the right LE. Gross ankle/foot strength still limited for patient to ambulate with normalized gait mechanics. Patient will continue to benefit from skilled PT services to modify and progress therapeutic interventions, address functional mobility deficits, address ROM deficits, address strength deficits, analyze and address soft tissue restrictions, analyze and cue movement patterns, analyze and modify body mechanics/ergonomics, assess and modify postural abnormalities, address imbalance/dizziness and instruct in home and community integration to attain remaining goals. []  See Plan of Care  []  See progress note/recertification  []  See Discharge Summary         Progress towards goals / Updated goals:   Short Term Goals: To be accomplished in  2  treatments:  1.  Pt will be I and compliant with HEP to improve deficits and allow pt to return to PLOF  Goal progressing - HEP est with no questions. HEP will be modified and progressed as appropriate 12/23/2020   Long Term Goals: To be accomplished in  8-12  treatments:  1. Pt will increase score on the FOTO to > or = 63/100 to demo an increase in functional activity tolerance. 2. Pt will have an increase in R ankle AROM to 7 degrees of AROM DF to improve gait, stair negotiation. Addressing with incline stretch 12/21/20; loaded DF stretch up to 5 deg (1/7/21)  3. Pt will be able to self-manage pain to < or = 2/10 ave to improve quality of life and functional activities. Goal progressing with a decrease to 3-7/10 (1/7/21)  4. Pt will demo normalized gait on level surfaces, independently for > or = 10 min to return to his PLOF. 5. Pt will have an ability to perform 10x heel and toe raises (B) to begin to restore normalized gait, stairs, ADLs. -Goal progressing; pt able to perform HR and TR in sitting, but with fasciculations in end-range HR (12/31/20); unable to perform standing HR/TR due to pain (1/7/21)  6.  Pt will be able to descend a full flight of stairs with reciprocal pattern and no increase in pain to return to PLOF.   Goal progressing with ascending reciprocal, descending with difficulty; initiated 2\" step downs to address (1/7/21)    PLAN  [x]  Upgrade activities as tolerated     [x]  Continue plan of care  []  Update interventions per flow sheet       []  Discharge due to:_  [x]  Other: progressed ankle/foot strength as able    Feliberto Runner, PTA 1/8/2021    Future Appointments   Date Time Provider Aristides Ibanez   1/8/2021  2:15 PM Iza Silva PTA [facility]   1/11/2021  2:45 PM Sarah Peters [facility]   1/14/2021  2:00 PM ASHOK [facility]   1/18/2021  2:45 PM Iza Silva PTA [facility]   1/21/2021  2:45 PM [facility] PT [place] [facility]   1/25/2021  2:45 PM [facility] PT [place] [facility]   1/28/2021  2:45 PM [facility] PT [place] [facility]   3/11/2021  1:30 PM Osman Jacobo MD [facility]

## 2021-01-11 ENCOUNTER — HOSPITAL ENCOUNTER (OUTPATIENT)
Dept: PHYSICAL THERAPY | Age: 56
Discharge: HOME | End: 2021-01-11
Payer: MEDICAID

## 2021-01-11 PROCEDURE — 97530 THERAPEUTIC ACTIVITIES: CPT

## 2021-01-11 PROCEDURE — 97110 THERAPEUTIC EXERCISES: CPT

## 2021-01-11 PROCEDURE — 97112 NEUROMUSCULAR REEDUCATION: CPT

## 2021-01-11 PROCEDURE — 97116 GAIT TRAINING THERAPY: CPT

## 2021-01-11 PROCEDURE — 97016 VASOPNEUMATIC DEVICE THERAPY: CPT

## 2021-01-11 NOTE — PROGRESS NOTES
PT DAILY TREATMENT NOTE     Patient Name: Viri Cope  Date:2021  : 1965  [x]  Patient  Verified  Payor: Adriana Lucas / Plan: [address] [place] [address] / Product Type: Managed Care Medicaid /    In time: 2:45 pm          Out time: 341 pm  Total Treatment Time (min): 56  Visit #: 7 of     Treatment Area: Acute right ankle pain [M25.571]    SUBJECTIVE  Pain Level (0-10 scale): 4  Any medication changes, allergies to medications, adverse drug reactions, diagnosis change, or new procedure performed?: [x] No    [] Yes (see summary sheet for update)  Subjective functional status/changes:   [] No changes reported  Patient reports he has been getting some pinching at the front of his ankle when he first steps on it    OBJECTIVE  Modality rationale: decrease edema, decrease inflammation and decrease pain to improve the patients ability to sstand, ambulation, stairs   Min Type Additional Details   10 [x]  Vasopneumatic Device  Pressure:       [] lo [x] med [] hi   Temperature: [x] lo [] med [] hi   [x] Skin assessment post-treatment:  [x]intact []redness- no adverse reaction       []redness - adverse reaction:       8 min Therapeutic Exercise:  [x] See flow sheet:   Rationale: increase ROM and increase strength to improve the patients ability to walk, negotiate stairs, return to PLOF    7 min Manual Therapy:  (R) ankle distraction with strap, Gr III Tc posterior glides, manual stretching in all planes. Rationale: decrease pain, increase ROM and increase tissue extensibility to improve gait, stairs, standing. The manual therapy interventions were performed at a separate and distinct time from the therapeutic activities interventions.     10 min Therapeutic Activity:  [x]  See flow sheet:   Rationale:  to improve standing, ADLs, squatting      10 min Neuromuscular Re-education:  [x]  See flow sheet: progressed MSR to great toe   Rationale: improve coordination, improve balance and increase proprioception  to improve the patients ability to balance, increase safety with mobility     11 min Gait Training: pre-gait with emphasis on stance phase today; cont'd hurdles (forward and lateral), inline gait, lateral walking    Rationale: to improve gait patterning with weight-bearing thorough the right lower extremity for home and community mobility           X min Patient Education: [x] Review HEP     Other Objective/Functional Measures: Added seated inv/alia self stretching, performed lateral step ups at stairs, progressed EC ROM foam balance with head turns. Pain Level (0-10 scale) post treatment: 3    ASSESSMENT/Changes in Function:   Pt was able to progress balance training on foam with head turns. Pt was instructed to perform head turns slowly and within comfortable ROM. Pt demonstrated inc postural sway with the progressed activity, but no significant LOB. Pt was instructed in seated inv/alia self stretching to continue to improve ankle mobility. Patient will continue to benefit from skilled PT services to modify and progress therapeutic interventions, address functional mobility deficits, address ROM deficits, address strength deficits, analyze and address soft tissue restrictions, analyze and cue movement patterns, analyze and modify body mechanics/ergonomics, assess and modify postural abnormalities, address imbalance/dizziness and instruct in home and community integration to attain remaining goals. []  See Plan of Care  []  See progress note/recertification  []  See Discharge Summary         Progress towards goals / Updated goals:   Short Term Goals: To be accomplished in  2  treatments:  1. Pt will be I and compliant with HEP to improve deficits and allow pt to return to PLOF  Goal progressing - HEP est with no questions. HEP will be modified and progressed as appropriate 12/23/2020   Long Term Goals: To be accomplished in  8-12  treatments:  1.  Pt will increase score on the FOTO to > or = 63/100 to demo an increase in functional activity tolerance. 2. Pt will have an increase in R ankle AROM to 7 degrees of AROM DF to improve gait, stair negotiation. Addressing with incline stretch 12/21/20; loaded DF stretch up to 5 deg (1/7/21)  3. Pt will be able to self-manage pain to < or = 2/10 ave to improve quality of life and functional activities. Goal progressing with a decrease to 3-7/10 (1/7/21)  4. Pt will demo normalized gait on level surfaces, independently for > or = 10 min to return to his PLOF. Addressing with gait and balance training 1/11/20  5. Pt will have an ability to perform 10x heel and toe raises (B) to begin to restore normalized gait, stairs, ADLs. -Goal progressing; pt able to perform HR and TR in sitting, but with fasciculations in end-range HR (12/31/20); unable to perform standing HR/TR due to pain (1/7/21)  6.  Pt will be able to descend a full flight of stairs with reciprocal pattern and no increase in pain to return to PLOF.   Goal progressing with ascending reciprocal, descending with difficulty; initiated 2\" step downs to address (1/7/21)    PLAN  [x]  Upgrade activities as tolerated     [x]  Continue plan of care  []  Update interventions per flow sheet       []  Discharge due to:_  []  Other:     Francesco Odomite 1/11/2021    Future Appointments   Date Time Provider Aristides Ibanez   1/11/2021  2:45 PM Lora Osman [facility]   1/14/2021  2:00 PM Paula Allan, PT [facility]   1/18/2021  2:45 PM Catarina Cao, PTA [facility]   1/21/2021  2:45 PM [facility] PT [place] 2 [facility]   1/25/2021  2:45 PM [facility] PT [place] [facility]   1/28/2021  2:45 PM [facility] PT [place] 2 [facility]   3/11/2021  1:30 PM Shellie Chen MD [facility]

## 2021-01-13 ENCOUNTER — APPOINTMENT (OUTPATIENT)
Dept: PHYSICAL THERAPY | Age: 56
End: 2021-01-13
Payer: MEDICAID

## 2021-01-14 ENCOUNTER — HOSPITAL ENCOUNTER (OUTPATIENT)
Dept: PHYSICAL THERAPY | Age: 56
Discharge: HOME | End: 2021-01-14
Payer: MEDICAID

## 2021-01-14 PROCEDURE — 97016 VASOPNEUMATIC DEVICE THERAPY: CPT

## 2021-01-14 PROCEDURE — 97110 THERAPEUTIC EXERCISES: CPT

## 2021-01-14 PROCEDURE — 97530 THERAPEUTIC ACTIVITIES: CPT

## 2021-01-14 PROCEDURE — 97112 NEUROMUSCULAR REEDUCATION: CPT

## 2021-01-14 PROCEDURE — 97116 GAIT TRAINING THERAPY: CPT

## 2021-01-14 NOTE — PROGRESS NOTES
PT DAILY TREATMENT NOTE     Patient Name: Daren Zhao  Date:2021  : 1965  [x]  Patient  Verified  Payor: Gil Harrison / Plan: [facility] / Product Type: Managed Care Medicaid /    In time: 159        Out time:308  Total Treatment Time (min): 79  Visit #: 8 of     Treatment Area: Acute right ankle pain [M25.571]    SUBJECTIVE  Pain Level (0-10 scale): 310  Any medication changes, allergies to medications, adverse drug reactions, diagnosis change, or new procedure performed?: [x] No    [] Yes (see summary sheet for update)  Subjective functional status/changes:   [] No changes reported  Patient reports doing well and that he rode his bike here. OBJECTIVE  Modality rationale: decrease edema, decrease inflammation and decrease pain to improve the patients ability to sstand, ambulation, stairs   Min Type Additional Details   10 [x]  Vasopneumatic Device  Pressure:       [] lo [x] med [] hi   Temperature: [x] lo [] med [] hi   [x] Skin assessment post-treatment:  [x]intact []redness- no adverse reaction       []redness - adverse reaction:       20 min Therapeutic Exercise:  [x] See flow sheet: REASSESS FOR PN, assess while on TM     Rationale: increase ROM and increase strength to improve the patients ability to walk, negotiate stairs, return to PLOF    6 min Manual Therapy:  (R) ankle PROM all directions and grade 2-3 joint mobs all directions, Midfoot mobs    Rationale: decrease pain, increase ROM and increase tissue extensibility to improve gait, stairs, standing. The manual therapy interventions were performed at a separate and distinct time from the therapeutic activities interventions.     10 min Therapeutic Activity:  [x]  See flow sheet:   Rationale:  to improve standing, ADLs, squatting      10 min Neuromuscular Re-education:  [x]  See flow sheet: including PT assisted PNF CR   Rationale: improve coordination, improve balance and increase proprioception  to improve the patients ability to balance, increase safety with mobility     11 min Gait Training: see flow sheet    Rationale: to improve gait patterning with weight-bearing thorough the right lower extremity for home and community mobility           X min Patient Education: [x] Review HEP     Other Objective/Functional Measures:   Goals assessed for PN   Pain at best 2/10, at worst 5/10   Subjective % improvement 75%  Objective:   Gait : walking cane intermittently , decreased hali, no gross deviations noted  AROM DF 7, PF 49   NWBing strength DF/PF 5/5, IV/EV 4/5   Standing strength : 5 x HR 50% AROM / TR 50% AROM compared to L LE     SL unable   Balance: SLS L 15 sec R 11 sec , foam ROM EC - ankle instability , Bunion EC - ankle instability, EC unable   Dynamic balance : tandem 3 consecutive steps before LOB   Stairs - step down : 4 in tolerance   Edema midfoot and malleli level +.5   Improvements: walking tolerance: 1/4 mile with walking canes, functional standing tolerance for cooking and grooming, ascending stairs   Deficits descending stairs, swelling after inactivity, long walks: hiking trail/ few miles     Pain Level (0-10 scale) post treatment: 2    ASSESSMENT/Changes in Function:     Patient will continue to benefit from skilled PT services to modify and progress therapeutic interventions, address functional mobility deficits, address ROM deficits, address strength deficits, analyze and address soft tissue restrictions, analyze and cue movement patterns, analyze and modify body mechanics/ergonomics, assess and modify postural abnormalities, address imbalance/dizziness and instruct in home and community integration to attain remaining goals. []  See Plan of Care  [x]  See progress note/recertification [id number]/97/8975  []  See Discharge Summary         Progress towards goals / Updated goals:   Short Term Goals: To be accomplished in  2  treatments:  1.  Pt will be I and compliant with HEP to improve deficits and allow pt to return to PLOF  Goal progressing - intermittent compliance with HEP      Long Term Goals: To be accomplished in  8-12  treatments:  1. Pt will increase score on the FOTO to > or = 63/100 to demo an increase in functional activity tolerance. Goal progressing but not met at 52/100  2. Pt will have an increase in R ankle AROM to 7 degrees of AROM DF to improve gait, stair negotiation. goal met at 7 deg   3. Pt will be able to self-manage pain to < or = 2/10 ave to improve quality of life and functional activities. Goal progressing but not met at 5/10 at worst , 2/10 at best   4. Pt will demo normalized gait on level surfaces, independently for > or = 10 min to return to his PLOF. Goal progress able to walk 1/4 mile with use of walking cane without gross gait deviations   5. Pt will have an ability to perform 10x heel and toe raises (B) to begin to restore normalized gait, stairs, ADLs. -Goal not met 5x tolerance   6.  Pt will be able to descend a full flight of stairs with reciprocal pattern and no increase in pain to return to PLOF.   Goal not met - only able to tolerate 4 in descending , 6 inch caused pain     PLAN  [x]  Upgrade activities as tolerated     [x]  Continue plan of care  []  Update interventions per flow sheet       []  Discharge due to:_  [x]  Other: Cont per PN     Ilsa Lai PT 1/14/2021    Future Appointments   Date Time Provider Aristides Ibanez   1/14/2021  2:00 PM Elvia Mcdowell, PT [facility]   1/18/2021  2:45 PM Kia Nino PTA [facility]   1/21/2021  2:45 PM [facility] PT [place] [facility]   1/25/2021  2:45 PM [facility] PT [place] [facility]   1/28/2021  2:45 PM [facility] PT [place] [facility]   3/11/2021  1:30 PM Monica Daniels MD [facility]

## 2021-01-14 NOTE — PROGRESS NOTES
[address] [facility] [address] [facility] AT [address] Dianne Duarte  Phone: [phone] Fax: [phone]  PROGRESS NOTE  Patient Name: Bailey Neil : 1965   Treatment/Medical Diagnosis: Acute right ankle pain [M25.571]   Referral Source: Kiko Benavides MD     Date of Initial Visit: 2020 Attended Visits: 8 Missed Visits: 0     SUMMARY OF TREATMENT   Pt is a 49 yo male who fell out of a chair due to excessive alcohol use and sustained a R ankle medial malleolus fx. PT treatment has included TE, TA, GT, NM ed, HEP progression , manual as needed and modalities as needed   CURRENT STATUS  Patient is progressing well, slowly but steadily with his PT. Patient is intermittently compliant with HEP but shows improvements in ROM, strength, gait and balance. Patient cont to show deficits in functional strength and balance. Other assessment as follows:  Pain at best 2/10, at worst 5/10   Subjective % improvement 75%  Objective:   Gait : walking cane intermittently , decreased hali, no gross deviations noted  AROM DF 7, PF 49   NWBing strength DF/PF 5/5, IV/EV 4/5   Standing strength : 5 x HR 50% AROM / TR 50% AROM compared to L LE     SL unable   Balance: SLS L 15 sec R 11 sec , foam ROM EC - ankle instability , Bunion EC - ankle instability, EC unable   Dynamic balance : tandem 3 consecutive steps before LOB   Stairs - step down : 4 in tolerance   Edema midfoot and malleli level +.5   Improvements: walking tolerance: 1/4 mile with walking canes, functional standing tolerance for cooking and grooming, ascending stairs   Deficits descending stairs, swelling after inactivity, long walks: hiking trail/ few miles     Progress towards goals / Updated goals:   Short Term Goals: To be accomplished in  2  treatments:  1.  Pt will be I and compliant with HEP to improve deficits and allow pt to return to PLOF  Goal progressing - intermittent compliance with HEP Long Term Goals: To be accomplished in  8-12  treatments:  1. Pt will increase score on the FOTO to > or = 63/100 to demo an increase in functional activity tolerance. Goal progressing but not met at 52/100  2. Pt will have an increase in R ankle AROM to 7 degrees of AROM DF to improve gait, stair negotiation. goal met at 7 deg   3. Pt will be able to self-manage pain to < or = 2/10 ave to improve quality of life and functional activities. Goal progressing but not met at 5/10 at worst , 2/10 at best   4. Pt will demo normalized gait on level surfaces, independently for > or = 10 min to return to his PLOF. Goal progress able to walk 1/4 mile with use of walking cane without gross gait deviations   5. Pt will have an ability to perform 10x heel and toe raises (B) to begin to restore normalized gait, stairs, ADLs. -Goal not met 5x tolerance   6. Pt will be able to descend a full flight of stairs with reciprocal pattern and no increase in pain to return to PLOF.   Goal not met - only able to tolerate 4 in descending , 6 inch caused pain     New Goals to be achieved in __7__  treatments: auth by insurance   . Pt will increase score on the FOTO to > or = 63/100 to demo an increase in functional activity tolerance. 2. Pt will have an increase in R ankle AROM to 10 degrees of AROM DF to improve gait, stair negotiation.   3. Pt will be able to self-manage pain to < or = 2/10 ave to improve quality of life and functional activities. 4. Pt will demo normalized gait on level surfaces, independently for > or = 10 min to return to his PLOF. 5. Pt will have an ability to perform 10x heel and toe raises (B) to begin to restore normalized gait, stairs, ADLs. 6. Pt will be able to descend a full flight of stairs with reciprocal pattern and no increase in pain to return to PLOF.     RECOMMENDATIONS  Patietn would benefit from continuation of skilled PT to address above deficits and return to PLOF.   Cont 2-3x per week for 8-12 (7 auth by insurance) sessions as needed   If you have any questions/comments please contact us directly at [phone]   Thank you for allowing us to assist in the care of your patient. Therapist Signature: Nicole Hernandez PT Date: 1/14/2021   Reporting Period  NA Time: 303p   NOTE TO PHYSICIAN:  PLEASE COMPLETE THE ORDERS BELOW AND FAX TO   [facility]: [phone]. If you are unable to process this request in 24 hours please contact our office: [phone].  ___ I have read the above report and request that my patient continue as recommended.   ___ I have read the above report and request that my patient continue therapy with the following changes/special instructions:_________________________________________________________   ___ I have read the above report and request that my patient be discharged from therapy.      Physician Signature:        Date:       Time:

## 2021-01-18 ENCOUNTER — APPOINTMENT (OUTPATIENT)
Dept: PHYSICAL THERAPY | Age: 56
End: 2021-01-18
Payer: MEDICAID

## 2021-01-21 ENCOUNTER — HOSPITAL ENCOUNTER (OUTPATIENT)
Dept: PHYSICAL THERAPY | Age: 56
Discharge: HOME | End: 2021-01-21
Payer: MEDICAID

## 2021-01-21 PROCEDURE — 97530 THERAPEUTIC ACTIVITIES: CPT | Performed by: GENERAL ACUTE CARE HOSPITAL

## 2021-01-21 PROCEDURE — 97116 GAIT TRAINING THERAPY: CPT | Performed by: GENERAL ACUTE CARE HOSPITAL

## 2021-01-21 PROCEDURE — 97016 VASOPNEUMATIC DEVICE THERAPY: CPT | Performed by: GENERAL ACUTE CARE HOSPITAL

## 2021-01-21 PROCEDURE — 97112 NEUROMUSCULAR REEDUCATION: CPT | Performed by: GENERAL ACUTE CARE HOSPITAL

## 2021-01-21 NOTE — PROGRESS NOTES
PT DAILY TREATMENT NOTE 8-14    Patient Name: Viri Cope  Date:2021  : 1965  [x]  Patient  Verified  Payor: [address] / Plan: [facility] / Product Type: Managed Care Medicaid /    In time: 2:45    Out time: 3:45  Total Treatment Time (min): 60  Visit #: 1 of 7     Treatment Area: Acute right ankle pain [M25.571]    SUBJECTIVE  Pain Level (0-10 scale): 10  Any medication changes, allergies to medications, adverse drug reactions, diagnosis change, or new procedure performed?: [x] No    [] Yes (see summary sheet for update)  Subjective functional status/changes:   [] No changes reported  Patient reports doing well and that he rode his bike here. Having most difficulty with descending, pain when getting up at night to use bathroom. OBJECTIVE  Modality rationale: decrease edema, decrease inflammation and decrease pain to improve the patients ability to sstand, ambulation, stairs   Min Type Additional Details   10 [x]  Vasopneumatic Device  Pressure:       [] lo [x] med [] hi   Temperature: [x] lo [] med [] hi   [x] Skin assessment post-treatment:  [x]intact []redness- no adverse reaction       []redness - adverse reaction:       9 min Therapeutic Exercise:  [x] See flow sheet:     Rationale: increase ROM and increase strength to improve the patients ability to walk, negotiate stairs, return to PLOF    6 min Manual Therapy:  (R) ankle PROM all directions and grade 2-3 joint mobs all directions, Midfoot mobs    Rationale: decrease pain, increase ROM and increase tissue extensibility to improve gait, stairs, standing. The manual therapy interventions were performed at a separate and distinct time from the therapeutic activities interventions.     15 min Therapeutic Activity:  [x]  See flow sheet:   Rationale:  to improve standing, ADLs, squatting      10 min Neuromuscular Re-education:  [x]  See flow sheet:    Rationale: improve coordination, improve balance and increase proprioception  to improve the patients ability to balance, increase safety with mobility     10 min Gait Training: see flow sheet    Rationale: to improve gait patterning with weight-bearing thorough the right lower extremity for home and community mobility           X min Patient Education: [x] Review HEP     Other Objective/Functional Measures: Added YTB to lateral stepping   Toe yoga- poor to fair   Added star taps    Pain Level (0-10 scale) post treatment: 2    ASSESSMENT/Changes in Function: Pt continues to demonstrate limitations of R ankle mobility, especially in dorsiflexion. This continues to limit his ability to descend stairs without pain and compensation. Pt will continue to benefit from interventions to restore R ankle range of motion and gradual progression of R ankle strengthening. Noted poor foot intrinsic strength based on toe yoga ability, but this is showing slow improvement. Benefit from VASO post session to address exercise induced inflammation. Patient will continue to benefit from skilled PT services to modify and progress therapeutic interventions, address functional mobility deficits, address ROM deficits, address strength deficits, analyze and address soft tissue restrictions, analyze and cue movement patterns, analyze and modify body mechanics/ergonomics, assess and modify postural abnormalities, address imbalance/dizziness and instruct in home and community integration to attain remaining goals. []  See Plan of Care  [x]  See progress note/recertification 6/98/0297  []  See Discharge Summary         Progress towards goals / Updated goals:  FIRST FU AFTER PN (1/21/2021)   New Goals to be achieved in __7__  treatments: auth by insurance   . Pt will increase score on the FOTO to > or = 63/100 to demo an increase in functional activity tolerance.    2. Pt will have an increase in R ankle AROM to 10 degrees of AROM DF to improve gait, stair negotiation.   3. Pt will be able to self-manage pain to < or = 2/10 ave to improve quality of life and functional activities. 4. Pt will demo normalized gait on level surfaces, independently for > or = 10 min to return to his PLOF. 5. Pt will have an ability to perform 10x heel and toe raises (B) to begin to restore normalized gait, stairs, ADLs.    6. Pt will be able to descend a full flight of stairs with reciprocal pattern and no increase in pain to return to PLOF.       PLAN  [x]  Upgrade activities as tolerated     [x]  Continue plan of care  []  Update interventions per flow sheet       []  Discharge due to:_  [x]  Other:     Daron Jeffries, PT 1/21/2021    Future Appointments   Date Time Provider Aristides Ibanez   1/21/2021  2:45 PM [facility] [place] 2 [facility]   1/25/2021  2:45 PM [facility] [place] 2 [facility]   1/28/2021  2:45 PM [facility] [place] 2 [facility]   2/1/2021  2:45 PM Lambert Lindquist [facility]   2/4/2021  2:45 PM Danisha Morel, PT [facility]   2/8/2021  2:45 PM [facility]   3/11/2021  1:30 PM Rima Downs MD [facility]

## 2021-01-25 ENCOUNTER — HOSPITAL ENCOUNTER (OUTPATIENT)
Dept: PHYSICAL THERAPY | Age: 56
Discharge: HOME | End: 2021-01-25
Payer: MEDICAID

## 2021-01-25 PROCEDURE — 97016 VASOPNEUMATIC DEVICE THERAPY: CPT | Performed by: GENERAL ACUTE CARE HOSPITAL

## 2021-01-25 PROCEDURE — 97530 THERAPEUTIC ACTIVITIES: CPT | Performed by: GENERAL ACUTE CARE HOSPITAL

## 2021-01-25 PROCEDURE — 97112 NEUROMUSCULAR REEDUCATION: CPT | Performed by: GENERAL ACUTE CARE HOSPITAL

## 2021-01-25 PROCEDURE — 97110 THERAPEUTIC EXERCISES: CPT | Performed by: GENERAL ACUTE CARE HOSPITAL

## 2021-01-25 NOTE — PROGRESS NOTES
PT DAILY TREATMENT NOTE 8-14    Patient Name: Joi Weston  Date:2021  : 1965  [x]  Patient  Verified  Payor: [place]  [employer]ICA[employer] / Plan: Robert Reeves / Product Type: Managed Care Medicaid /    In time: 2:45    Out time: 3:45  Total Treatment Time (min): 60   Visit #: 2 of 7     Treatment Area: Acute right ankle pain [M25.571]    SUBJECTIVE  Pain Level (0-10 scale): 210   Any medication changes, allergies to medications, adverse drug reactions, diagnosis change, or new procedure performed?: [x] No    [] Yes (see summary sheet for update)  Subjective functional status/changes:   [] No changes reported  No new complaints. Walked here today instead of biking due to rainy weather. Pt requesting an updated HEP. OBJECTIVE  Modality rationale: decrease edema, decrease inflammation and decrease pain to improve the patients ability to sstand, ambulation, stairs   Min Type Additional Details   10 [x]  Vasopneumatic Device  Pressure:       [] lo [x] med [] hi   Temperature: [x] lo [] med [] hi   [x] Skin assessment post-treatment:  [x]intact []redness- no adverse reaction       []redness - adverse reaction:       9 min Therapeutic Exercise:  [x] See flow sheet:     Rationale: increase ROM and increase strength to improve the patients ability to walk, negotiate stairs, return to PLOF    6 min Manual Therapy:  (R) ankle PROM all directions and grade 2-3 joint mobs all directions, TCjJ distraction, Midfoot mobs    Rationale: decrease pain, increase ROM and increase tissue extensibility to improve gait, stairs, standing. The manual therapy interventions were performed at a separate and distinct time from the therapeutic activities interventions.     15 min Therapeutic Activity:  [x]  See flow sheet:   Rationale:  to improve standing, ADLs, squatting      10 min Neuromuscular Re-education:  [x]  See flow sheet:    Rationale: improve coordination, improve balance and increase proprioception to improve the patients ability to balance, increase safety with mobility     10 min Gait Training: see flow sheet    Rationale: to improve gait patterning with weight-bearing thorough the right lower extremity for home and community mobility           X min Patient Education: [x] Review HEP  Updated HEP - see hard chart for copy      Other Objective/Functional Measures: Added HR and TR in standing  Added DF mobilization on stairs  Added standing calf stretch      Pain Level (0-10 scale) post treatment: 2     ASSESSMENT/Changes in Function: Good response to self DF mobilization on stairs- has stairs in his apt building he can perform these on. Remains with significant DF ROM limitations. Attempted descending stairs in reciprocal pattern with patient able to complete but reports moderate pain in TCJ. Good tolerance to progression of HR/TR in standing, but lacking full range. Will continue to benefit from progression of ankle strengthening and mobilization to improve gait kinematics. Patient will continue to benefit from skilled PT services to modify and progress therapeutic interventions, address functional mobility deficits, address ROM deficits, address strength deficits, analyze and address soft tissue restrictions, analyze and cue movement patterns, analyze and modify body mechanics/ergonomics, assess and modify postural abnormalities, address imbalance/dizziness and instruct in home and community integration to attain remaining goals. []  See Plan of Care  [x]  See progress note/recertification 9/71/1485  []  See Discharge Summary         Progress towards goals / Updated goals:     New Goals to be achieved in __7__  treatments: auth by insurance   . Pt will increase score on the FOTO to > or = 63/100 to demo an increase in functional activity tolerance.    2. Pt will have an increase in R ankle AROM to 10 degrees of AROM DF to improve gait, stair negotiation.   3. Pt will be able to self-manage pain to < or = 2/10 ave to improve quality of life and functional activities. Progressing - avg pain 2/10 (1/25/21)  4. Pt will demo normalized gait on level surfaces, independently for > or = 10 min to return to his PLOF. 5. Pt will have an ability to perform 10x heel and toe raises (B) to begin to restore normalized gait, stairs, ADLs.  Progressing - initiated HR today in standing, partial range noted (1/25/21)  6.  Pt will be able to descend a full flight of stairs with reciprocal pattern and no increase in pain to return to PLOF.   Progressing - painful descending 4 stairs today with compensatory pattern (1/25/21)    PLAN  [x]  Upgrade activities as tolerated     [x]  Continue plan of care  []  Update interventions per flow sheet       []  Discharge due to:_  [x]  Other:     Rolan Maldonado PT 1/25/2021    Future Appointments   Date Time Provider Aristides Ibanez   1/25/2021  2:45 PM [facility] PT [place] [facility]   1/28/2021  2:45 PM [facility] PT [place] [facility]   2/1/2021  2:45 PM Mary Pantoja [facility]   2/4/2021  2:45 PM Cl Varner PT [facility]   2/8/2021  2:45 PM Natal[facility]   3/11/2021  1:30 PM Priyanka Cruz MD [facility]

## 2021-01-26 RX ORDER — CLONIDINE HYDROCHLORIDE 0.1 MG/1
0.1 TABLET ORAL 2 TIMES DAILY
Qty: 60 TAB | Refills: 0 | Status: SHIPPED | OUTPATIENT
Start: 2021-01-26 | End: 2021-04-01

## 2021-01-28 ENCOUNTER — APPOINTMENT (OUTPATIENT)
Dept: PHYSICAL THERAPY | Age: 56
End: 2021-01-28
Payer: MEDICAID

## 2021-03-25 NOTE — PROGRESS NOTES
[address] [facility] [address] [facility] AT [address]. Navid Yates, [place], [place] [address]  Phone: [phone] Fax [phone] SUMMARY  Patient Name: Deena Simon : 1965   Treatment/Medical Diagnosis: Acute right ankle pain [M25.571]   Referral Source: Linda Reaves MD     Date of Initial Visit: 2021 Attended Visits: 10 Missed Visits: 3     SUMMARY OF TREATMENT  Pt is X 48 DD male who fell out of a chair due to excessive alcohol use and sustained a R ankle medial malleolus fx. PT treatment has included TE, TA, GT, NM ed, HEP progression , manual as needed and modalities as needed     CURRENT STATUS  Pt completed 10 skilled PT sessions focusing on impairments related to R ankle. Pt making good progress towards all strength and mobility goals. Pt cancelled and/or No Showed for his last 2 appointments, then failed to schedule ongoing appt. Therefore, patient is being discharged from our services at this time. Please send a new referral if continuation of PT is needed.      Objective findings from LAST Progness Note on 2021:    Pain at best 2/10, at worst 5/10   Subjective % improvement 75%  Objective:   Gait : walking cane intermittently , decreased hali, no gross deviations noted  AROM DF 7, PF 49   NWBing strength DF/PF 5/5, IV/EV 4/5   Standing strength : 5 x HR 50% AROM / TR 50% AROM compared to L LE                                 SL unable   Balance: SLS L 15 sec R 11 sec , foam ROM EC - ankle instability , Bunion EC - ankle instability, EC unable   Dynamic balance : tandem 3 consecutive steps before LOB   Stairs - step down : 4 in tolerance   Edema midfoot and malleli level +.5   Improvements: walking tolerance: 1/4 mile with walking canes, functional standing tolerance for cooking and grooming, ascending stairs   Deficits descending stairs, swelling after inactivity, long walks: hiking trail/ few miles       Unable to re-assess goals secondary to lack of compliance. RECOMMENDATIONS  Discontinue therapy due to lack of attendance or compliance. If you have any questions/comments please contact us directly at [phone]. Thank you for allowing us to assist in the care of your patient.   Therapist Signature: Rolan Maldonado PT Date: 3/25/2021   Reporting Period: na Time: 1:48 PM

## 2021-03-27 ENCOUNTER — HOSPITAL ENCOUNTER (INPATIENT)
Age: 56
LOS: 4 days | Discharge: PSYCHIATRIC OR SUBSTANCE USE FACILITY | End: 2021-04-01
Attending: EMERGENCY MEDICINE | Admitting: INTERNAL MEDICINE
Payer: MEDICAID

## 2021-03-27 DIAGNOSIS — F10.939 ALCOHOL WITHDRAWAL SYNDROME WITH COMPLICATION (HCC): ICD-10-CM

## 2021-03-27 DIAGNOSIS — F10.10 ALCOHOL ABUSE: ICD-10-CM

## 2021-03-27 DIAGNOSIS — R45.851 SUICIDAL IDEATIONS: Primary | ICD-10-CM

## 2021-03-27 DIAGNOSIS — F10.920 ALCOHOLIC INTOXICATION WITHOUT COMPLICATION (HCC): ICD-10-CM

## 2021-03-27 LAB
ALBUMIN SERPL-MCNC: 3.6 G/DL (ref 3.4–5)
ALBUMIN/GLOB SERPL: 0.9 {RATIO} (ref 0.8–1.7)
ALP SERPL-CCNC: 124 U/L (ref 45–117)
ALT SERPL-CCNC: 117 U/L (ref 16–61)
AMPHET UR QL SCN: NEGATIVE
ANION GAP SERPL CALC-SCNC: 14 MMOL/L (ref 3–18)
AST SERPL-CCNC: 98 U/L (ref 10–38)
BARBITURATES UR QL SCN: NEGATIVE
BASOPHILS # BLD: 0.1 K/UL (ref 0–0.1)
BASOPHILS NFR BLD: 1 % (ref 0–2)
BENZODIAZ UR QL: POSITIVE
BILIRUB SERPL-MCNC: 0.5 MG/DL (ref 0.2–1)
BUN SERPL-MCNC: 6 MG/DL (ref 7–18)
BUN/CREAT SERPL: 9 (ref 12–20)
CALCIUM SERPL-MCNC: 8.3 MG/DL (ref 8.5–10.1)
CANNABINOIDS UR QL SCN: NEGATIVE
CHLORIDE SERPL-SCNC: 95 MMOL/L (ref 100–111)
CO2 SERPL-SCNC: 24 MMOL/L (ref 21–32)
COCAINE UR QL SCN: POSITIVE
COVID-19 RAPID TEST, COVR: NOT DETECTED
CREAT SERPL-MCNC: 0.65 MG/DL (ref 0.6–1.3)
DIFFERENTIAL METHOD BLD: ABNORMAL
EOSINOPHIL # BLD: 0 K/UL (ref 0–0.4)
EOSINOPHIL NFR BLD: 0 % (ref 0–5)
ERYTHROCYTE [DISTWIDTH] IN BLOOD BY AUTOMATED COUNT: 16.1 % (ref 11.6–14.5)
ETHANOL SERPL-MCNC: 280 MG/DL (ref 0–3)
GLOBULIN SER CALC-MCNC: 4 G/DL (ref 2–4)
GLUCOSE SERPL-MCNC: 135 MG/DL (ref 74–99)
HCT VFR BLD AUTO: 36.7 % (ref 36–48)
HDSCOM,HDSCOM: ABNORMAL
HGB BLD-MCNC: 12.6 G/DL (ref 13–16)
LYMPHOCYTES # BLD: 2.6 K/UL (ref 0.9–3.6)
LYMPHOCYTES NFR BLD: 21 % (ref 21–52)
MCH RBC QN AUTO: 29.9 PG (ref 24–34)
MCHC RBC AUTO-ENTMCNC: 34.3 G/DL (ref 31–37)
MCV RBC AUTO: 87.2 FL (ref 74–97)
METHADONE UR QL: NEGATIVE
MONOCYTES # BLD: 1.5 K/UL (ref 0.05–1.2)
MONOCYTES NFR BLD: 12 % (ref 3–10)
NEUTS SEG # BLD: 8 K/UL (ref 1.8–8)
NEUTS SEG NFR BLD: 66 % (ref 40–73)
OPIATES UR QL: NEGATIVE
PCP UR QL: NEGATIVE
PLATELET # BLD AUTO: 394 K/UL (ref 135–420)
PMV BLD AUTO: 9.6 FL (ref 9.2–11.8)
POTASSIUM SERPL-SCNC: 3 MMOL/L (ref 3.5–5.5)
PROT SERPL-MCNC: 7.6 G/DL (ref 6.4–8.2)
RBC # BLD AUTO: 4.21 M/UL (ref 4.7–5.5)
SODIUM SERPL-SCNC: 133 MMOL/L (ref 136–145)
SOURCE, COVRS: NORMAL
WBC # BLD AUTO: 12.2 K/UL (ref 4.6–13.2)

## 2021-03-27 PROCEDURE — 82077 ASSAY SPEC XCP UR&BREATH IA: CPT

## 2021-03-27 PROCEDURE — 80307 DRUG TEST PRSMV CHEM ANLYZR: CPT

## 2021-03-27 PROCEDURE — 87635 SARS-COV-2 COVID-19 AMP PRB: CPT

## 2021-03-27 PROCEDURE — 80053 COMPREHEN METABOLIC PANEL: CPT

## 2021-03-27 PROCEDURE — 85025 COMPLETE CBC W/AUTO DIFF WBC: CPT

## 2021-03-27 PROCEDURE — 99284 EMERGENCY DEPT VISIT MOD MDM: CPT

## 2021-03-27 RX ORDER — LORAZEPAM 1 MG/1
1 TABLET ORAL
Status: COMPLETED | OUTPATIENT
Start: 2021-03-27 | End: 2021-03-28

## 2021-03-27 RX ORDER — POTASSIUM CHLORIDE 20 MEQ/1
40 TABLET, EXTENDED RELEASE ORAL
Status: COMPLETED | OUTPATIENT
Start: 2021-03-27 | End: 2021-03-28

## 2021-03-27 NOTE — ED NOTES
Patient found on the floor with generalized body shaking. His airway was secured. Upon assessment of the patient, he was observed holding his head up off of the floor. When I asked him to sit up, he was able to sit up and then stand up to sit in the chair. One of the patients stated, \"I knew it was fake, because it took him too long to get on the floor. \"  Another person observed him \"easing himself on to the floor and then started to shake. \"

## 2021-03-27 NOTE — ED PROVIDER NOTES
EMERGENCY DEPARTMENT HISTORY AND PHYSICAL EXAM    Date: 3/27/2021  Patient Name: Reese López    History of Presenting Illness     Chief Complaint   Patient presents with    Delirium Tremens (DTS)         History Provided By: patient   Chief Complaint: alcohol withdrawal and SI  Duration: few days   Timing:acute on chronic   Location: n/a  Quality: n/a  Severity: severe  Modifying Factors: none   Associated Symptoms: tremors, anxiety, SI      Additional History (Context): Reese López is a 48 y.o. male with PMH anxiety, hypertension, heart attack, depression, alcohol abuse, and anemia who presents with c/o alcohol withdrawal symptoms, anxiety, tremors, and suicidal thoughts x a few days. Pt reports last drinking alcohol this afternoon, although he was found to be drinking vodka in the ED. patient denies any specific plans at present. He states he drinks approximately a gallon of vodka a day. No other complaints are reported at this time. PCP: Norberto Valente MD    Current Outpatient Medications   Medication Sig Dispense Refill    cloNIDine HCL (CATAPRES) 0.1 mg tablet TAKE 1 TAB BY MOUTH TWO (2) TIMES A DAY. INDICATIONS: HIGH BLOOD PRESSURE 60 Tab 0    tamsulosin (FLOMAX) 0.4 mg capsule Take 1 Cap by mouth daily. Indications: enlarged prostate with urination problem 60 Cap 3    pantoprazole (PROTONIX) 40 mg tablet Take 1 Tab by mouth Before breakfast and dinner. Indications: gastroesophageal reflux disease 60 Tab 0    multivitamin (ONE A DAY) tablet Take 1 Tab by mouth daily.  thiamine HCL (Vitamin B-1) 100 mg tablet Take  by mouth daily.  multivit-min-FA-lycopen-lutein (Centrum Silver) 0.4-300-250 mg-mcg-mcg tab Take 1 Tab by mouth daily. 90 Tab 5    aspirin 81 mg chewable tablet Take 1 Tab by mouth daily.  90 Tab 5    fluticasone propionate (FLONASE) 50 mcg/actuation nasal spray 2 sprays each nostril daily  Indications: inflammation of the nose due to an allergy 1 Bottle 5    clopidogreL (PLAVIX) 75 mg tab Take 1 Tab by mouth daily. Indications: treatment to prevent a heart attack 30 Tab 0    atorvastatin (LIPITOR) 40 mg tablet Take 1 Tab by mouth nightly. Indications: high cholesterol and high triglycerides 30 Tab 0    busPIRone (BUSPAR) 10 mg tablet Take 1 Tab by mouth three (3) times daily. Indications: repeated episodes of anxiety 90 Tab 0    carvediloL (COREG) 6.25 mg tablet Take 1 Tab by mouth two (2) times daily (with meals). Indications: dysfunction of left ventricle of heart following heart attack (Patient taking differently: Take 12.5 mg by mouth two (2) times daily (with meals). Indications: dysfunction of left ventricle of heart following heart attack) 60 Tab 0    DULoxetine (CYMBALTA) 60 mg capsule Take 1 Cap by mouth daily. Indications: diabetic complication causing injury to some body nerves 30 Cap 0    gabapentin (NEURONTIN) 400 mg capsule Take 2 Caps by mouth three (3) times daily. Max Daily Amount: 2,400 mg. Indications: neuropathic pain (Patient taking differently: Take 800 mg by mouth three (3) times daily. Patient stated he is taking 800 mg 3 times daily  Indications: neuropathic pain) 180 Cap 0    ketoconazole (NIZORAL) 2 % shampoo Apply 5 mL to affected area daily. Indications: dandruff 1 Bottle 0    levETIRAcetam (KEPPRA) 500 mg tablet Take 1 Tab by mouth two (2) times a day. Indications: additional medication for myoclonic epilepsy 60 Tab 0    QUEtiapine SR (SEROquel XR) 300 mg sr tablet Take 1 Tab by mouth nightly. Indications: additional medications to treat depression 30 Tab 0    penicillin v potassium (VEETID) 250 mg tablet Take 1 Tab by mouth Before breakfast, lunch, dinner and at bedtime. Indications: inflammation of the gums and mouth 4 Tab 0    aspirin 81 mg chewable tablet Take 1 Tab by mouth daily.  90 Tab 3       Past History     Past Medical History:  Past Medical History:   Diagnosis Date    Abuse     Anemia NEC     Anxiety     Arthritis     Chronic pain     Colitis     Contact dermatitis and other eczema, due to unspecified cause     Depression     Depression     Headache(784.0)     Heart attack ([address]     Hypercholesterolemia     Hypertension     Liver disease     Low back pain     with left leg pain -- multilevel spondylosis and L4 radiculitis    Neck pain     mild spondylosis    Other ill-defined conditions(799.89)     MS symptoms    Pancreatitis     Psychotic disorder ([address]     Trauma        Past Surgical History:  Past Surgical History:   Procedure Laterality Date    HX CYST REMOVAL         Family History:  Family History   Problem Relation Age of Onset    Psychiatric Disorder Mother     Heart Disease Mother     Arthritis-osteo Father     Alcohol abuse Father     Cancer Father         lung    Elevated Lipids Father     Headache Father     Hypertension Father     Lung Disease Father     Diabetes Father        Social History:  Social History     Tobacco Use    Smoking status: Current Some Day Smoker     Packs/day: 0.50     Years: 20.00     Pack years: 10.00     Types: Cigarettes    Smokeless tobacco: Never Used    Tobacco comment: patient states he vapes daily   Substance Use Topics    Alcohol use: Not Currently     Alcohol/week: 0.0 standard drinks     Comment:  ETOH abuse - quit NOV 2018    Drug use: No       Allergies: Allergies   Allergen Reactions    Amlodipine Other (comments)    Carbamazepine Unknown (comments)     violent    Lisinopril Unknown (comments)    Prednisone Other (comments)     He stated it hypes him up         Review of Systems   Review of Systems   Constitutional: Negative. Negative for chills and fever. HENT: Negative. Negative for congestion, ear pain and rhinorrhea. Eyes: Negative. Negative for pain and redness. Respiratory: Negative. Negative for cough, shortness of breath, wheezing and stridor. Cardiovascular: Negative. Negative for chest pain and leg swelling. Gastrointestinal: Negative. Negative for abdominal pain, constipation, diarrhea, nausea and vomiting. Genitourinary: Negative. Negative for dysuria and frequency. Musculoskeletal: Negative. Negative for back pain and neck pain. Skin: Negative. Negative for rash and wound. Neurological: Positive for tremors. Negative for dizziness, seizures, syncope and headaches. Psychiatric/Behavioral: Positive for suicidal ideas. The patient is nervous/anxious. All other systems reviewed and are negative. All Other Systems Negative  Physical Exam     Vitals:    03/27/21 1847   BP: 132/85   Pulse: (!) 105   Resp: 20   Temp: 99 °F (37.2 °C)   SpO2: 96%     Physical Exam  Vitals signs and nursing note reviewed. Constitutional:       General: He is in acute distress. Appearance: He is well-developed. He is not diaphoretic. Comments: Moderately distressed and anxious appearing    HENT:      Head: Normocephalic and atraumatic. Eyes:      General: No scleral icterus. Right eye: No discharge. Left eye: No discharge. Conjunctiva/sclera: Conjunctivae normal.   Neck:      Musculoskeletal: Normal range of motion and neck supple. Cardiovascular:      Rate and Rhythm: Regular rhythm. Tachycardia present. Heart sounds: Normal heart sounds. No murmur. No friction rub. No gallop. Comments: Slightly tachycardiac   Pulmonary:      Effort: Pulmonary effort is normal. No respiratory distress. Breath sounds: Normal breath sounds. No stridor. No wheezing or rales. Musculoskeletal: Normal range of motion. Skin:     General: Skin is warm and dry. Findings: No erythema or rash. Neurological:      Mental Status: He is alert and oriented to person, place, and time. Coordination: Coordination normal.      Comments: Gait is steady and patient exhibits no evidence of ataxia. Patient is able to ambulate without difficulty.  Resting tremors noted on exam. No focal neurological deficit noted. No facial droop. Psychiatric:      Comments: EtOH intoxication, admits to SI without plans. Denies HI. Anxious appearing. Diagnostic Study Results     Labs -     Recent Results (from the past 12 hour(s))   ETHYL ALCOHOL    Collection Time: 03/27/21  5:05 PM   Result Value Ref Range    ALCOHOL(ETHYL),SERUM 280 (H) 0 - 3 MG/DL   METABOLIC PANEL, COMPREHENSIVE    Collection Time: 03/27/21  5:05 PM   Result Value Ref Range    Sodium 133 (L) 136 - 145 mmol/L    Potassium 3.0 (L) 3.5 - 5.5 mmol/L    Chloride 95 (L) 100 - 111 mmol/L    CO2 24 21 - 32 mmol/L    Anion gap 14 3.0 - 18 mmol/L    Glucose 135 (H) 74 - 99 mg/dL    BUN 6 (L) 7.0 - 18 MG/DL    Creatinine 0.65 0.6 - 1.3 MG/DL    BUN/Creatinine ratio 9 (L) 12 - 20      GFR est AA >60 >60 ml/min/1.73m2    GFR est non-AA >60 >60 ml/min/1.73m2    Calcium 8.3 (L) 8.5 - 10.1 MG/DL    Bilirubin, total 0.5 0.2 - 1.0 MG/DL    ALT (SGPT) 117 (H) 16 - 61 U/L    AST (SGOT) 98 (H) 10 - 38 U/L    Alk. phosphatase 124 (H) 45 - 117 U/L    Protein, total 7.6 6.4 - 8.2 g/dL    Albumin 3.6 3.4 - 5.0 g/dL    Globulin 4.0 2.0 - 4.0 g/dL    A-G Ratio 0.9 0.8 - 1.7     CBC WITH AUTOMATED DIFF    Collection Time: 03/27/21  5:05 PM   Result Value Ref Range    WBC 12.2 4.6 - 13.2 K/uL    RBC 4.21 (L) 4.70 - 5.50 M/uL    HGB 12.6 (L) 13.0 - 16.0 g/dL    HCT 36.7 36.0 - 48.0 %    MCV 87.2 74.0 - 97.0 FL    MCH 29.9 24.0 - 34.0 PG    MCHC 34.3 31.0 - 37.0 g/dL    RDW 16.1 (H) 11.6 - 14.5 %    PLATELET 592 445 - 816 K/uL    MPV 9.6 9.2 - 11.8 FL    NEUTROPHILS 66 40 - 73 %    LYMPHOCYTES 21 21 - 52 %    MONOCYTES 12 (H) 3 - 10 %    EOSINOPHILS 0 0 - 5 %    BASOPHILS 1 0 - 2 %    ABS. NEUTROPHILS 8.0 1.8 - 8.0 K/UL    ABS. LYMPHOCYTES 2.6 0.9 - 3.6 K/UL    ABS. MONOCYTES 1.5 (H) 0.05 - 1.2 K/UL    ABS. EOSINOPHILS 0.0 0.0 - 0.4 K/UL    ABS.  BASOPHILS 0.1 0.0 - 0.1 K/UL    DF AUTOMATED     DRUG SCREEN, URINE    Collection Time: 03/27/21  5:07 PM Result Value Ref Range    BENZODIAZEPINES Positive (A) NEG      BARBITURATES Negative NEG      THC (TH-CANNABINOL) Negative NEG      OPIATES Negative NEG      PCP(PHENCYCLIDINE) Negative NEG      COCAINE Positive (A) NEG      AMPHETAMINES Negative NEG      METHADONE Negative NEG      HDSCOM (NOTE)    COVID-19 RAPID TEST    Collection Time: 03/27/21  5:09 PM   Result Value Ref Range    Specimen source Nasopharyngeal      COVID-19 rapid test Not detected NOTD         Radiologic Studies -   No orders to display     CT Results  (Last 48 hours)    None        CXR Results  (Last 48 hours)    None            Medical Decision Making   I am the first provider for this patient. I reviewed the vital signs, available nursing notes, past medical history, past surgical history, family history and social history. Vital Signs-Reviewed the patient's vital signs. Records Reviewed: Megan Stoll PA-C     Procedures:  Procedures    Provider Notes (Medical Decision Making): Impression:  Alcohol intoxication, alcohol abuse, SI    IV inserted  Banana bag ordered, oral ativan ordered    Labs: hgb 12.6, Na 133, Ca 8.3, K 3.0, , AST 98, alk phosphatase 124, alcohol 280, UDS + benzos and cocaine  Rapid COVID ordered     Spoke with crisis worker Blanca Renteria who will pass the pt on to the night crisis worker for further evaluation. Megan Stoll PA-C     2:00 AM Pt is turned over to Dr. Esmer Palencia night ED attending who agrees to assume care of this pt at this time. Discussed this case with the doctor who agrees with the plan to dispo the pt pending crisis evaluation and recommendation. Megan Stoll PA-C     MED RECONCILIATION:  No current facility-administered medications for this encounter. Current Outpatient Medications   Medication Sig    cloNIDine HCL (CATAPRES) 0.1 mg tablet TAKE 1 TAB BY MOUTH TWO (2) TIMES A DAY. INDICATIONS: HIGH BLOOD PRESSURE    tamsulosin (FLOMAX) 0.4 mg capsule Take 1 Cap by mouth daily. Indications: enlarged prostate with urination problem    pantoprazole (PROTONIX) 40 mg tablet Take 1 Tab by mouth Before breakfast and dinner. Indications: gastroesophageal reflux disease    multivitamin (ONE A DAY) tablet Take 1 Tab by mouth daily.  thiamine HCL (Vitamin B-1) 100 mg tablet Take  by mouth daily.  multivit-min-FA-lycopen-lutein (Centrum Silver) 0.4-300-250 mg-mcg-mcg tab Take 1 Tab by mouth daily.  aspirin 81 mg chewable tablet Take 1 Tab by mouth daily.  fluticasone propionate (FLONASE) 50 mcg/actuation nasal spray 2 sprays each nostril daily  Indications: inflammation of the nose due to an allergy    clopidogreL (PLAVIX) 75 mg tab Take 1 Tab by mouth daily. Indications: treatment to prevent a heart attack    atorvastatin (LIPITOR) 40 mg tablet Take 1 Tab by mouth nightly. Indications: high cholesterol and high triglycerides    busPIRone (BUSPAR) 10 mg tablet Take 1 Tab by mouth three (3) times daily. Indications: repeated episodes of anxiety    carvediloL (COREG) 6.25 mg tablet Take 1 Tab by mouth two (2) times daily (with meals). Indications: dysfunction of left ventricle of heart following heart attack (Patient taking differently: Take 12.5 mg by mouth two (2) times daily (with meals). Indications: dysfunction of left ventricle of heart following heart attack)    DULoxetine (CYMBALTA) 60 mg capsule Take 1 Cap by mouth daily. Indications: diabetic complication causing injury to some body nerves    gabapentin (NEURONTIN) 400 mg capsule Take 2 Caps by mouth three (3) times daily. Max Daily Amount: 2,400 mg. Indications: neuropathic pain (Patient taking differently: Take 800 mg by mouth three (3) times daily. Patient stated he is taking 800 mg 3 times daily  Indications: neuropathic pain)    ketoconazole (NIZORAL) 2 % shampoo Apply 5 mL to affected area daily. Indications: dandruff    levETIRAcetam (KEPPRA) 500 mg tablet Take 1 Tab by mouth two (2) times a day.  Indications: additional medication for myoclonic epilepsy    QUEtiapine SR (SEROquel XR) 300 mg sr tablet Take 1 Tab by mouth nightly. Indications: additional medications to treat depression    penicillin v potassium (VEETID) 250 mg tablet Take 1 Tab by mouth Before breakfast, lunch, dinner and at bedtime. Indications: inflammation of the gums and mouth    aspirin 81 mg chewable tablet Take 1 Tab by mouth daily. Disposition:  TBD    DISCHARGE NOTE:       Follow-up Information    None         Current Discharge Medication List                Diagnosis     Clinical Impression:   1. Suicidal ideations    2. Alcohol abuse    3.  Alcoholic intoxication without complication ([facility] [address]

## 2021-03-27 NOTE — ED TRIAGE NOTES
\"I drink half a gallon of vodka just about every day. I need detox. \"  Last drink was approximately two hours prior to arrival to the ED.

## 2021-03-27 NOTE — ED NOTES
Another patient observed him getting a large bottle of vodka out of his bag and pouring it into his cup. Hospital security confiscated the bottle of alcohol and his cup. Patient instructed that he may not drink on hospital premises.

## 2021-03-27 NOTE — ED NOTES
Unable to provide a urine sample at this time. Encouraged patient to drink fluids. Will try again later.

## 2021-03-28 PROBLEM — R45.851 SUICIDAL IDEATION: Status: ACTIVE | Noted: 2021-03-28

## 2021-03-28 PROBLEM — F10.939 ALCOHOL WITHDRAWAL (HCC): Status: ACTIVE | Noted: 2021-03-28

## 2021-03-28 PROBLEM — F14.10 COCAINE ABUSE (HCC): Status: ACTIVE | Noted: 2021-03-28

## 2021-03-28 LAB
ANION GAP SERPL CALC-SCNC: 5 MMOL/L (ref 3–18)
BUN SERPL-MCNC: 6 MG/DL (ref 7–18)
BUN/CREAT SERPL: 9 (ref 12–20)
CALCIUM SERPL-MCNC: 8.6 MG/DL (ref 8.5–10.1)
CHLORIDE SERPL-SCNC: 104 MMOL/L (ref 100–111)
CO2 SERPL-SCNC: 28 MMOL/L (ref 21–32)
CREAT SERPL-MCNC: 0.65 MG/DL (ref 0.6–1.3)
ERYTHROCYTE [DISTWIDTH] IN BLOOD BY AUTOMATED COUNT: 16.2 % (ref 11.6–14.5)
ETHANOL SERPL-MCNC: 9 MG/DL (ref 0–3)
GLUCOSE SERPL-MCNC: 125 MG/DL (ref 74–99)
HCT VFR BLD AUTO: 36.9 % (ref 36–48)
HGB BLD-MCNC: 12.3 G/DL (ref 13–16)
LIPASE SERPL-CCNC: 40 U/L (ref 73–393)
MAGNESIUM SERPL-MCNC: 2.1 MG/DL (ref 1.6–2.6)
MCH RBC QN AUTO: 29.6 PG (ref 24–34)
MCHC RBC AUTO-ENTMCNC: 33.3 G/DL (ref 31–37)
MCV RBC AUTO: 88.7 FL (ref 74–97)
PHOSPHATE SERPL-MCNC: 4 MG/DL (ref 2.5–4.9)
PLATELET # BLD AUTO: 347 K/UL (ref 135–420)
PMV BLD AUTO: 9.9 FL (ref 9.2–11.8)
POTASSIUM SERPL-SCNC: 4 MMOL/L (ref 3.5–5.5)
RBC # BLD AUTO: 4.16 M/UL (ref 4.7–5.5)
SODIUM SERPL-SCNC: 137 MMOL/L (ref 136–145)
WBC # BLD AUTO: 6.1 K/UL (ref 4.6–13.2)

## 2021-03-28 PROCEDURE — 65270000029 HC RM PRIVATE

## 2021-03-28 PROCEDURE — 80048 BASIC METABOLIC PNL TOTAL CA: CPT

## 2021-03-28 PROCEDURE — 82077 ASSAY SPEC XCP UR&BREATH IA: CPT

## 2021-03-28 PROCEDURE — 74011250637 HC RX REV CODE- 250/637: Performed by: EMERGENCY MEDICINE

## 2021-03-28 PROCEDURE — 36415 COLL VENOUS BLD VENIPUNCTURE: CPT

## 2021-03-28 PROCEDURE — 2709999900 HC NON-CHARGEABLE SUPPLY

## 2021-03-28 PROCEDURE — 74011000250 HC RX REV CODE- 250: Performed by: PHYSICIAN ASSISTANT

## 2021-03-28 PROCEDURE — 74011250637 HC RX REV CODE- 250/637: Performed by: INTERNAL MEDICINE

## 2021-03-28 PROCEDURE — 85027 COMPLETE CBC AUTOMATED: CPT

## 2021-03-28 PROCEDURE — 74011250637 HC RX REV CODE- 250/637: Performed by: PHYSICIAN ASSISTANT

## 2021-03-28 PROCEDURE — 99222 1ST HOSP IP/OBS MODERATE 55: CPT | Performed by: INTERNAL MEDICINE

## 2021-03-28 PROCEDURE — 96365 THER/PROPH/DIAG IV INF INIT: CPT

## 2021-03-28 PROCEDURE — 83735 ASSAY OF MAGNESIUM: CPT

## 2021-03-28 PROCEDURE — 96366 THER/PROPH/DIAG IV INF ADDON: CPT

## 2021-03-28 PROCEDURE — 74011250636 HC RX REV CODE- 250/636: Performed by: INTERNAL MEDICINE

## 2021-03-28 PROCEDURE — 74011250636 HC RX REV CODE- 250/636: Performed by: PHYSICIAN ASSISTANT

## 2021-03-28 PROCEDURE — 74011000258 HC RX REV CODE- 258: Performed by: INTERNAL MEDICINE

## 2021-03-28 PROCEDURE — 74011000250 HC RX REV CODE- 250: Performed by: INTERNAL MEDICINE

## 2021-03-28 PROCEDURE — 84100 ASSAY OF PHOSPHORUS: CPT

## 2021-03-28 PROCEDURE — 83690 ASSAY OF LIPASE: CPT

## 2021-03-28 RX ORDER — LORAZEPAM 1 MG/1
2 TABLET ORAL EVERY 6 HOURS
Status: COMPLETED | OUTPATIENT
Start: 2021-03-28 | End: 2021-03-30

## 2021-03-28 RX ORDER — THERA TABS 400 MCG
1 TAB ORAL DAILY
Status: DISCONTINUED | OUTPATIENT
Start: 2021-03-28 | End: 2021-04-01 | Stop reason: SDUPTHER

## 2021-03-28 RX ORDER — IBUPROFEN 200 MG
TABLET ORAL
COMMUNITY
End: 2021-04-01

## 2021-03-28 RX ORDER — SODIUM CHLORIDE 0.9 % (FLUSH) 0.9 %
5-40 SYRINGE (ML) INJECTION EVERY 8 HOURS
Status: DISCONTINUED | OUTPATIENT
Start: 2021-03-28 | End: 2021-04-01 | Stop reason: HOSPADM

## 2021-03-28 RX ORDER — LEVETIRACETAM 500 MG/1
1000 TABLET ORAL 2 TIMES DAILY
Status: DISCONTINUED | OUTPATIENT
Start: 2021-03-28 | End: 2021-03-29

## 2021-03-28 RX ORDER — LORAZEPAM 2 MG/ML
2 INJECTION INTRAMUSCULAR
Status: DISCONTINUED | OUTPATIENT
Start: 2021-03-28 | End: 2021-04-01 | Stop reason: HOSPADM

## 2021-03-28 RX ORDER — LORAZEPAM 2 MG/ML
3 INJECTION INTRAMUSCULAR
Status: DISCONTINUED | OUTPATIENT
Start: 2021-03-28 | End: 2021-04-01 | Stop reason: HOSPADM

## 2021-03-28 RX ORDER — GABAPENTIN 800 MG/1
TABLET ORAL
Status: ON HOLD | COMMUNITY
Start: 2021-03-01 | End: 2021-04-01 | Stop reason: SDUPTHER

## 2021-03-28 RX ORDER — FOLIC ACID 1 MG/1
1 TABLET ORAL DAILY
Status: DISCONTINUED | OUTPATIENT
Start: 2021-03-28 | End: 2021-04-01 | Stop reason: HOSPADM

## 2021-03-28 RX ORDER — LORAZEPAM 1 MG/1
2 TABLET ORAL
Status: COMPLETED | OUTPATIENT
Start: 2021-03-28 | End: 2021-03-28

## 2021-03-28 RX ORDER — PANCRELIPASE 24000; 76000; 120000 [USP'U]/1; [USP'U]/1; [USP'U]/1
CAPSULE, DELAYED RELEASE PELLETS ORAL
COMMUNITY

## 2021-03-28 RX ORDER — LANOLIN ALCOHOL/MO/W.PET/CERES
100 CREAM (GRAM) TOPICAL DAILY
Status: DISCONTINUED | OUTPATIENT
Start: 2021-03-28 | End: 2021-04-01 | Stop reason: HOSPADM

## 2021-03-28 RX ORDER — LORAZEPAM 1 MG/1
2 TABLET ORAL
Status: DISCONTINUED | OUTPATIENT
Start: 2021-03-28 | End: 2021-04-01 | Stop reason: HOSPADM

## 2021-03-28 RX ORDER — PANTOPRAZOLE SODIUM 40 MG/1
40 TABLET, DELAYED RELEASE ORAL
Status: DISCONTINUED | OUTPATIENT
Start: 2021-03-28 | End: 2021-04-01

## 2021-03-28 RX ORDER — CARVEDILOL 12.5 MG/1
TABLET ORAL
COMMUNITY

## 2021-03-28 RX ORDER — OMEPRAZOLE 20 MG/1
CAPSULE, DELAYED RELEASE ORAL
COMMUNITY
End: 2021-04-01

## 2021-03-28 RX ORDER — SODIUM CHLORIDE 0.9 % (FLUSH) 0.9 %
5-40 SYRINGE (ML) INJECTION AS NEEDED
Status: DISCONTINUED | OUTPATIENT
Start: 2021-03-28 | End: 2021-04-01 | Stop reason: HOSPADM

## 2021-03-28 RX ORDER — CARVEDILOL 12.5 MG/1
12.5 TABLET ORAL 2 TIMES DAILY WITH MEALS
Status: DISCONTINUED | OUTPATIENT
Start: 2021-03-28 | End: 2021-03-31

## 2021-03-28 RX ORDER — ASPIRIN 81 MG/1
81 TABLET ORAL DAILY
Status: DISCONTINUED | OUTPATIENT
Start: 2021-03-28 | End: 2021-04-01 | Stop reason: HOSPADM

## 2021-03-28 RX ORDER — DEXTROSE MONOHYDRATE AND SODIUM CHLORIDE 5; .9 G/100ML; G/100ML
100 INJECTION, SOLUTION INTRAVENOUS CONTINUOUS
Status: DISPENSED | OUTPATIENT
Start: 2021-03-28 | End: 2021-03-29

## 2021-03-28 RX ORDER — DULOXETIN HYDROCHLORIDE 60 MG/1
60 CAPSULE, DELAYED RELEASE ORAL DAILY
Status: DISCONTINUED | OUTPATIENT
Start: 2021-03-28 | End: 2021-04-01 | Stop reason: HOSPADM

## 2021-03-28 RX ORDER — ASPIRIN 81 MG/1
TABLET ORAL
COMMUNITY

## 2021-03-28 RX ORDER — TAMSULOSIN HYDROCHLORIDE 0.4 MG/1
0.4 CAPSULE ORAL DAILY
Status: DISCONTINUED | OUTPATIENT
Start: 2021-03-28 | End: 2021-04-01 | Stop reason: HOSPADM

## 2021-03-28 RX ORDER — LORAZEPAM 1 MG/1
1 TABLET ORAL
Status: DISCONTINUED | OUTPATIENT
Start: 2021-03-28 | End: 2021-04-01 | Stop reason: HOSPADM

## 2021-03-28 RX ORDER — LORAZEPAM 2 MG/ML
1 INJECTION INTRAMUSCULAR
Status: DISCONTINUED | OUTPATIENT
Start: 2021-03-28 | End: 2021-04-01 | Stop reason: HOSPADM

## 2021-03-28 RX ORDER — ATORVASTATIN CALCIUM 40 MG/1
40 TABLET, FILM COATED ORAL
Status: DISCONTINUED | OUTPATIENT
Start: 2021-03-28 | End: 2021-04-01 | Stop reason: HOSPADM

## 2021-03-28 RX ORDER — QUETIAPINE 300 MG/1
300 TABLET, FILM COATED, EXTENDED RELEASE ORAL
Status: DISCONTINUED | OUTPATIENT
Start: 2021-03-28 | End: 2021-04-01 | Stop reason: HOSPADM

## 2021-03-28 RX ADMIN — LORAZEPAM 2 MG: 1 TABLET ORAL at 11:04

## 2021-03-28 RX ADMIN — LORAZEPAM 2 MG: 1 TABLET ORAL at 17:26

## 2021-03-28 RX ADMIN — PANTOPRAZOLE SODIUM 40 MG: 40 TABLET, DELAYED RELEASE ORAL at 11:06

## 2021-03-28 RX ADMIN — LEVETIRACETAM 1000 MG: 500 TABLET ORAL at 11:02

## 2021-03-28 RX ADMIN — Medication 100 MG: at 11:04

## 2021-03-28 RX ADMIN — ATORVASTATIN CALCIUM 40 MG: 40 TABLET, FILM COATED ORAL at 21:24

## 2021-03-28 RX ADMIN — LORAZEPAM 2 MG: 1 TABLET ORAL at 06:49

## 2021-03-28 RX ADMIN — Medication 10 ML: at 21:25

## 2021-03-28 RX ADMIN — POTASSIUM CHLORIDE 40 MEQ: 1500 TABLET, EXTENDED RELEASE ORAL at 00:02

## 2021-03-28 RX ADMIN — DEXTROSE MONOHYDRATE AND SODIUM CHLORIDE 100 ML/HR: 5; .9 INJECTION, SOLUTION INTRAVENOUS at 17:27

## 2021-03-28 RX ADMIN — CARVEDILOL 12.5 MG: 12.5 TABLET, FILM COATED ORAL at 17:26

## 2021-03-28 RX ADMIN — CARVEDILOL 12.5 MG: 12.5 TABLET, FILM COATED ORAL at 11:02

## 2021-03-28 RX ADMIN — LORAZEPAM 1 MG: 1 TABLET ORAL at 00:03

## 2021-03-28 RX ADMIN — PANCRELIPASE 2 CAPSULE: 60000; 12000; 38000 CAPSULE, DELAYED RELEASE PELLETS ORAL at 11:54

## 2021-03-28 RX ADMIN — THERA TABS 1 TABLET: TAB at 11:05

## 2021-03-28 RX ADMIN — Medication 10 ML: at 14:00

## 2021-03-28 RX ADMIN — LORAZEPAM 2 MG: 2 INJECTION INTRAMUSCULAR; INTRAVENOUS at 14:33

## 2021-03-28 RX ADMIN — FOLIC ACID 1 MG: 1 TABLET ORAL at 11:05

## 2021-03-28 RX ADMIN — LORAZEPAM 2 MG: 1 TABLET ORAL at 21:24

## 2021-03-28 RX ADMIN — DULOXETINE HYDROCHLORIDE 60 MG: 60 CAPSULE, DELAYED RELEASE ORAL at 11:54

## 2021-03-28 RX ADMIN — Medication 81 MG: at 11:03

## 2021-03-28 RX ADMIN — LORAZEPAM 2 MG: 1 TABLET ORAL at 13:21

## 2021-03-28 RX ADMIN — QUETIAPINE FUMARATE 300 MG: 300 TABLET, EXTENDED RELEASE ORAL at 21:24

## 2021-03-28 RX ADMIN — THIAMINE HYDROCHLORIDE: 100 INJECTION, SOLUTION INTRAMUSCULAR; INTRAVENOUS at 00:02

## 2021-03-28 RX ADMIN — TAMSULOSIN HYDROCHLORIDE 0.4 MG: 0.4 CAPSULE ORAL at 11:05

## 2021-03-28 RX ADMIN — PANCRELIPASE 2 CAPSULE: 60000; 12000; 38000 CAPSULE, DELAYED RELEASE PELLETS ORAL at 17:25

## 2021-03-28 RX ADMIN — LEVETIRACETAM 1000 MG: 500 TABLET ORAL at 17:25

## 2021-03-28 RX ADMIN — THIAMINE HYDROCHLORIDE: 100 INJECTION, SOLUTION INTRAMUSCULAR; INTRAVENOUS at 11:55

## 2021-03-28 NOTE — ED NOTES
Patient given morning meal tray. States he doesn't feel like eating. Will keep at bedside and if he wants to eat I will warm it up. Urinal was empty.

## 2021-03-28 NOTE — H&P
History & Physical    Patient: Giancarlo Saavedra MRN: [id number]  CSN: [id number]    YOB: 1965  Age: 48 y.o. Sex: male      DOA: 3/27/2021  CC: suicidal and alcohol intoxication    PCP: Vipul Prabhakar MD       HPI:     Giancarlo Saavedra is a 48 y.o. male with medical co-morbidities including HTN, hyperlipidemia, CAD, alcoholism, alcohol abuse, depression, seizure disorder presented to ER with anxiety and tremor and suicidal thought over the last few days. Apparently, he has been drinking volka everyday and want to detox. He stated that he has taking some of his medications not regularly. No chest pain or headache. In the ER, he was found intoxicated with elevated EtOH level. He was treated conservatively and was planned for psychiatric admission. Apparently, he has been more anxiety with worsening of his tremor, hence there was concern for DT as his current alcohol level is normalizing. Review of Systems  GENERAL: No fever, No chill,+ malaise   HEENT: No change in vision, no sore throat or sinus congestion. NECK: No pain or stiffness. PULMONARY: + shortness of breath, no cough or wheeze. Cardiovascular: no pnd / orthopnea, no Chest Pain  GASTROINTESTINAL: No abd pain, No nausea/vomiting, No diarrhea, No bright red blood per rectum. GENITOURINARY: No urinary frequency, No urgency or pain with urination. MUSCULOSKELETAL: No joint or muscle pain, no back pain, no recent trauma. DERMATOLOGIC: No rash, no itching, no lesions. ENDOCRINE: No polyuria, polydipsia, No recent change in weight. HEMATOLOGICAL: No easy bruising or bleeding.    NEUROLOGIC: No headache, No seizures, No generalized weakness         Past Medical History:   Diagnosis Date    Abuse     Anemia NEC     Anxiety     Arthritis     Chronic pain     Colitis     Contact dermatitis and other eczema, due to unspecified cause     Depression     Depression     Headache(784.0)     Heart attack ([address]     Hypercholesterolemia     Hypertension     Liver disease     Low back pain     with left leg pain -- multilevel spondylosis and L4 radiculitis    Neck pain     mild spondylosis    Other ill-defined conditions(799.89)     MS symptoms    Pancreatitis     Psychotic disorder ([facility]     Trauma        Past Surgical History:   Procedure Laterality Date    HX CYST REMOVAL         Family History   Problem Relation Age of Onset    Psychiatric Disorder Mother     Heart Disease Mother     Arthritis-osteo Father     Alcohol abuse Father     Cancer Father         lung    Elevated Lipids Father     Headache Father     Hypertension Father     Lung Disease Father     Diabetes Father        Social History     Socioeconomic History    Marital status:      Spouse name: Not on file    Number of children: Not on file    Years of education: Not on file    Highest education level: Not on file   Tobacco Use    Smoking status: Current Some Day Smoker     Packs/day: 0.50     Years: 20.00     Pack years: 10.00     Types: Cigarettes    Smokeless tobacco: Never Used    Tobacco comment: patient states he vapes daily   Substance and Sexual Activity    Alcohol use: Not Currently     Alcohol/week: 0.0 standard drinks     Comment:  ETOH abuse - quit NOV 2018    Drug use: No    Sexual activity: Not Currently     Partners: Female     Birth control/protection: Condom   Social History Narrative    ** Merged History Encounter **            Prior to Admission medications    Medication Sig Start Date End Date Taking?  Authorizing Provider   lipase-protease-amylase (Creon) 24,000-76,000 -120,000 unit capsule Creon 24,000-76,000-120,000 unit capsule,delayed release   take 1 capsule by mouth three times a day with meals   Yes Provider, Historical   nicotine (NICODERM CQ) 21 mg/24 hr nicotine 21 mg/24 hr daily transdermal patch   Yes Provider, Historical   omeprazole (PRILOSEC) 20 mg capsule omeprazole 20 mg capsule,delayed release   take 1 capsule by mouth every morning before breakfast   Yes Provider, Historical   carvediloL (COREG) 12.5 mg tablet carvedilol 12.5 mg tablet   Yes Provider, Historical   gabapentin (NEURONTIN) 800 mg tablet  3/1/21  Yes Provider, Historical   aspirin delayed-release 81 mg tablet aspirin 81 mg tablet,delayed release   Yes Provider, Historical   cloNIDine HCL (CATAPRES) 0.1 mg tablet TAKE 1 TAB BY MOUTH TWO (2) TIMES A DAY. INDICATIONS: HIGH BLOOD PRESSURE 1/26/21  Yes Shelia Choi MD   tamsulosin [facility]) 0.4 mg capsule Take 1 Cap by mouth daily. Indications: enlarged prostate with urination problem 12/30/20  Yes Shelia Choi MD   multivit-min-FA[facility]lycopen[facility]lutein [facility]) 9.8-917-319 mg-mcg-mcg tab Take 1 Tab by mouth daily. 12/10/20  Yes Shelia Choi MD   fluticasone propionate (FLONASE) 50 mcg/actuation nasal spray 2 sprays each nostril daily  Indications: inflammation of the nose due to an allergy 12/10/20  Yes Shelia Choi MD   clopidogreL (PLAVIX) 75 mg tab Take 1 Tab by mouth daily. Indications: treatment to prevent a heart attack 11/12/20  Yes Darío Mejia MD   atorvastatin (LIPITOR) 40 mg tablet Take 1 Tab by mouth nightly. Indications: high cholesterol and high triglycerides 11/12/20  Yes Darío Mejia MD   busPIRone (BUSPAR) 10 mg tablet Take 1 Tab by mouth three (3) times daily. Indications: repeated episodes of anxiety 11/12/20  Yes Darío Mejia MD   DULoxetine (CYMBALTA) 60 mg capsule Take 1 Cap by mouth daily. Indications: diabetic complication causing injury to some body nerves 11/13/20  Yes Darío Mejia MD   ketoconazole (NIZORAL) 2 % shampoo Apply 5 mL to affected area daily. Indications: dandruff 11/13/20  Yes Darío Mejia MD   levETIRAcetam (KEPPRA) 500 mg tablet Take 1 Tab by mouth two (2) times a day.  Indications: additional medication for myoclonic epilepsy 11/12/20  Yes Darío Mejia MD   QUEtiapine SR (SEROquel XR) 300 mg sr tablet Take 1 Tab by mouth nightly. Indications: additional medications to treat depression 11/12/20  Yes Wilner Gomez MD       Allergies   Allergen Reactions    Amlodipine Other (comments)    Carbamazepine Unknown (comments)     violent    Lisinopril Unknown (comments)    Prednisone Other (comments)     He stated it hypes him up              Physical Exam:      Visit Vitals  BP (!) 133/100 (BP 1 Location: Left upper arm)   Pulse 72   Temp 98.9 °F (37.2 °C)   Resp 20   SpO2 100%       Physical Exam:  Tele:   General:  Cooperative, Not in acute distress, speaks in short sentence while in bed  HEENT: PERRL, EOMI, supple neck, no JVD, dry oral mucosa  Cardiovascular: S1S2 regular, no rub/gallop   Pulmonary: air entry bilaterally, no wheezing, no crackle  GI:  Soft, non tender, non distended, +bs, no guarding   Extremities:  No pedal edema, +distal pulses appreciated   Neuro: AOx3, moving all extremities    Lab/Data Review:  Labs: Results:       Chemistry Recent Labs     03/27/21  1705   *   *   K 3.0*   CL 95*   CO2 24   BUN 6*   CREA 0.65   CA 8.3*   AGAP 14   BUCR 9*   *   TP 7.6   ALB 3.6   GLOB 4.0   AGRAT 0.9      CBC w/Diff Recent Labs     03/27/21  1705   WBC 12.2   RBC 4.21*   HGB 12.6*   HCT 36.7      GRANS 66   LYMPH 21   EOS 0      Coagulation No results for input(s): PTP, INR, APTT, INREXT in the last 72 hours. Iron/Ferritin No results for input(s): IRON in the last 72 hours. No lab exists for component: TIBCCALC   BNP No results for input(s): BNPP in the last 72 hours. Cardiac Enzymes No results for input(s): CPK, CKND1, ROHIT in the last 72 hours.     No lab exists for component: CKRMB, TROIP   Liver Enzymes Recent Labs     03/27/21  1705   TP 7.6   ALB 3.6   *      Thyroid Studies Lab Results   Component Value Date/Time    TSH 1.310 01/16/2019 11:55 AM          All Micro Results     Procedure Component Value Units Date/Time    COVID-19 RAPID TEST [[id number]] Collected: 03/27/21 1709    Order Status: Completed Specimen: Nasopharyngeal Updated: 03/27/21 1803     Specimen source Nasopharyngeal        COVID-19 rapid test Not detected        Comment: Rapid Abbott ID Now       Rapid NAAT:  The specimen is NEGATIVE for SARS-CoV-2, the novel coronavirus associated with COVID-19. Negative results should be treated as presumptive and, if inconsistent with clinical signs and symptoms or necessary for patient management, should be tested with an alternative molecular assay. Negative results do not preclude SARS-CoV-2 infection and should not be used as the sole basis for patient management decisions. This test has been authorized by the FDA under an Emergency Use Authorization (EUA) for use by authorized laboratories. Fact sheet for Healthcare Providers: ConventionXillianTVdate.co.nz  Fact sheet for Patients: [employer].nz       Methodology: Isothermal Nucleic Acid Amplification               Imaging Reviewed      Assessment:   Active Problems:  1. Alcohol withdrawal with at risk for DT  2. Hypovolemic hyponatremia  3. Hypokalemia   4. H/o seizure disorder on keppra   5. CAD   6. Major depression disorder   7. Suicidal ideation (3/28/2021)  8. Cocaine abuse ([facility] (3/28/2021)  9. H/o chronic alcoholic pancreatitis   10. GERD   11.  hyperlipidemia    Plan:     Admit to medical floor for alcohol withdraw treatment. He remains on IV hydration, start CIWA protocol   Resume him back on his oral medication.  Increase his Keppra for today and resume at home doses tomorrow   Suicidal precaution   Sitter needed   Will need psychiatric admission when he finished his withdrawal treatment   PPI   Electrolyte replacement     Risk of deterioration:  []Low    [x]Moderate  []High     Prophylaxis:  [x]Lovenox  []Coumadin  []Hep SQ  []SCDs  [x]H2B/PPI     Disposition:  []Home w/ Family   [x]HH PT,OT,RN   []SNF/LTC   []SAH/Rehab     Discussed Code Status:         [x]Full Code      []DNR         ___________________________________________________     Care Plan discussed with:    [x]Patient   []Family    []ED Care Manager  [x]ED Doc   []Specialist :  Total Time Coordinating Admission:  60    minutes    []Total Critical Care Time:       Coby Weiner MD  3/28/2021, 10:14 AM
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
## 2021-03-28 NOTE — PROGRESS NOTES
Received pt from ED. Pt Aox4, Tele box applied, vitals taken, pt resting in bed/bed in low position, wheels locked. 1900-Bedside and Verbal shift change report given to [address] [place] [address] Street (oncoming nurse) by Saint Martin RN (offgoing nurse).  Report included the following information SBAR, Kardex, [facility] and Cardiac Rhythm SR.

## 2021-03-28 NOTE — ED NOTES
I assumed care of patient from [place], [place]    Patient sleeping, no apparent distress. Here for alcohol intoxication and suicidal ideation. 2:48 AM  Bina Youssef from crisis called and requests repeat alcohol and then will evaluate patient       5:48 AM  Bina Youssef states Pt being admitted to behavioral at [facility]. Bina Dillon called back and states that the psychiatrist now wants patient admitted medically as he has history of withdrawal and DTs, and has been intubated for it in the past.  Psychiatry can be consulted. Reassessed patient he is resting but is starting to have mild tremor. Is alert and oriented x3. No nausea or vomiting. No abdominal pain. We will give him 2 mg of Ativan p.o. Patient's vital signs are stable, heart rate in the 70s, respirations 20. Consult:  Discussed care with Dr. Georgi Pham, Specialty: Hospitalist, standard discussion; including history of patients chief complaint, available diagnostic results, and treatment course. He does not accept admission as patient is not currently in DTs and believes that the patient can be admitted to psychiatry  7:39 AM, 3/27/2021     I called crisis back and spoke with Saintclair Lynch. Bina Youssef also there. They state that the psychiatrist would not admit the patient. Patient with significant history of withdrawal seizures and getting intubated. I explained that the hospitalist will not accept patient who is not currently in DTs and recommends admission to behavioral.  They state that psychiatrist will not accept patient and that they do not currently have the capacity to even give him fluids. 7:53 AM  I spoke back with Dr. Georgi Pham.  He states that Dr. Nidhi Hu will come down to the ED to evaluate the patient and determine if any medical necessity. 8:15 AM  Dr. Nidhi Hu called and states that he will accept patient to the medical floor for alcohol withdrawal.  States he is putting in orders.

## 2021-03-28 NOTE — ED NOTES
TRANSFER - OUT REPORT:    Verbal report given to [place] (name) on Jessi Joiner  being transferred to [facility] (unit) for routine progression of care       Report consisted of patients Situation, Background, Assessment and   Recommendations(SBAR). Information from the following report(s) ED Summary was reviewed with the receiving nurse. Lines:   Peripheral IV 03/27/21 Left Forearm (Active)   Site Assessment Clean, dry, & intact 03/27/21 1708   Phlebitis Assessment 0 03/27/21 1708   Infiltration Assessment 0 03/27/21 1708   Dressing Status Clean, dry, & intact 03/27/21 1708   Dressing Type Transparent 03/27/21 1708   Hub Color/Line Status Pink;Flushed;Patent 03/27/21 1708   Action Taken Blood drawn 03/27/21 1708        Opportunity for questions and clarification was provided.       Patient transported with:   [employer] transportation

## 2021-03-29 LAB
ANION GAP SERPL CALC-SCNC: 4 MMOL/L (ref 3–18)
BUN SERPL-MCNC: 5 MG/DL (ref 7–18)
BUN/CREAT SERPL: 8 (ref 12–20)
CALCIUM SERPL-MCNC: 8.2 MG/DL (ref 8.5–10.1)
CHLORIDE SERPL-SCNC: 109 MMOL/L (ref 100–111)
CO2 SERPL-SCNC: 28 MMOL/L (ref 21–32)
CREAT SERPL-MCNC: 0.6 MG/DL (ref 0.6–1.3)
ERYTHROCYTE [DISTWIDTH] IN BLOOD BY AUTOMATED COUNT: 16.2 % (ref 11.6–14.5)
GLUCOSE SERPL-MCNC: 112 MG/DL (ref 74–99)
HCT VFR BLD AUTO: 35.6 % (ref 36–48)
HGB BLD-MCNC: 11.7 G/DL (ref 13–16)
MAGNESIUM SERPL-MCNC: 2.1 MG/DL (ref 1.6–2.6)
MCH RBC QN AUTO: 29.5 PG (ref 24–34)
MCHC RBC AUTO-ENTMCNC: 32.9 G/DL (ref 31–37)
MCV RBC AUTO: 89.9 FL (ref 74–97)
PLATELET # BLD AUTO: 340 K/UL (ref 135–420)
PMV BLD AUTO: 10.5 FL (ref 9.2–11.8)
POTASSIUM SERPL-SCNC: 3.3 MMOL/L (ref 3.5–5.5)
RBC # BLD AUTO: 3.96 M/UL (ref 4.7–5.5)
SODIUM SERPL-SCNC: 141 MMOL/L (ref 136–145)
WBC # BLD AUTO: 4.8 K/UL (ref 4.6–13.2)

## 2021-03-29 PROCEDURE — 83735 ASSAY OF MAGNESIUM: CPT

## 2021-03-29 PROCEDURE — 74011000258 HC RX REV CODE- 258: Performed by: INTERNAL MEDICINE

## 2021-03-29 PROCEDURE — 74011250637 HC RX REV CODE- 250/637: Performed by: INTERNAL MEDICINE

## 2021-03-29 PROCEDURE — 36415 COLL VENOUS BLD VENIPUNCTURE: CPT

## 2021-03-29 PROCEDURE — 80048 BASIC METABOLIC PNL TOTAL CA: CPT

## 2021-03-29 PROCEDURE — 65270000029 HC RM PRIVATE

## 2021-03-29 PROCEDURE — 85027 COMPLETE CBC AUTOMATED: CPT

## 2021-03-29 PROCEDURE — 99232 SBSQ HOSP IP/OBS MODERATE 35: CPT | Performed by: INTERNAL MEDICINE

## 2021-03-29 RX ORDER — POTASSIUM CHLORIDE 750 MG/1
40 TABLET, FILM COATED, EXTENDED RELEASE ORAL
Status: COMPLETED | OUTPATIENT
Start: 2021-03-29 | End: 2021-03-29

## 2021-03-29 RX ORDER — LEVETIRACETAM 500 MG/1
500 TABLET ORAL 2 TIMES DAILY
Status: DISCONTINUED | OUTPATIENT
Start: 2021-03-29 | End: 2021-04-01 | Stop reason: HOSPADM

## 2021-03-29 RX ORDER — ONDANSETRON 2 MG/ML
4 INJECTION INTRAMUSCULAR; INTRAVENOUS
Status: DISCONTINUED | OUTPATIENT
Start: 2021-03-29 | End: 2021-04-01 | Stop reason: HOSPADM

## 2021-03-29 RX ADMIN — PANCRELIPASE 2 CAPSULE: 60000; 12000; 38000 CAPSULE, DELAYED RELEASE PELLETS ORAL at 09:06

## 2021-03-29 RX ADMIN — Medication 10 ML: at 21:05

## 2021-03-29 RX ADMIN — QUETIAPINE FUMARATE 300 MG: 300 TABLET, EXTENDED RELEASE ORAL at 21:05

## 2021-03-29 RX ADMIN — PANTOPRAZOLE SODIUM 40 MG: 40 TABLET, DELAYED RELEASE ORAL at 09:05

## 2021-03-29 RX ADMIN — LEVETIRACETAM 1000 MG: 500 TABLET ORAL at 09:06

## 2021-03-29 RX ADMIN — LORAZEPAM 2 MG: 1 TABLET ORAL at 13:03

## 2021-03-29 RX ADMIN — PANCRELIPASE 2 CAPSULE: 60000; 12000; 38000 CAPSULE, DELAYED RELEASE PELLETS ORAL at 13:03

## 2021-03-29 RX ADMIN — LORAZEPAM 2 MG: 1 TABLET ORAL at 06:12

## 2021-03-29 RX ADMIN — LEVETIRACETAM 500 MG: 500 TABLET ORAL at 17:45

## 2021-03-29 RX ADMIN — LORAZEPAM 2 MG: 1 TABLET ORAL at 09:31

## 2021-03-29 RX ADMIN — POTASSIUM CHLORIDE 40 MEQ: 750 TABLET, FILM COATED, EXTENDED RELEASE ORAL at 09:31

## 2021-03-29 RX ADMIN — Medication 81 MG: at 09:06

## 2021-03-29 RX ADMIN — ATORVASTATIN CALCIUM 40 MG: 40 TABLET, FILM COATED ORAL at 21:05

## 2021-03-29 RX ADMIN — CARVEDILOL 12.5 MG: 12.5 TABLET, FILM COATED ORAL at 16:22

## 2021-03-29 RX ADMIN — THERA TABS 1 TABLET: TAB at 09:05

## 2021-03-29 RX ADMIN — CARVEDILOL 12.5 MG: 12.5 TABLET, FILM COATED ORAL at 09:05

## 2021-03-29 RX ADMIN — LORAZEPAM 2 MG: 1 TABLET ORAL at 16:22

## 2021-03-29 RX ADMIN — Medication 100 MG: at 09:05

## 2021-03-29 RX ADMIN — PANCRELIPASE 2 CAPSULE: 60000; 12000; 38000 CAPSULE, DELAYED RELEASE PELLETS ORAL at 16:22

## 2021-03-29 RX ADMIN — LORAZEPAM 2 MG: 1 TABLET ORAL at 21:05

## 2021-03-29 RX ADMIN — TAMSULOSIN HYDROCHLORIDE 0.4 MG: 0.4 CAPSULE ORAL at 09:12

## 2021-03-29 RX ADMIN — DEXTROSE MONOHYDRATE AND SODIUM CHLORIDE 100 ML/HR: 5; .9 INJECTION, SOLUTION INTRAVENOUS at 09:31

## 2021-03-29 RX ADMIN — Medication 10 ML: at 13:19

## 2021-03-29 RX ADMIN — LORAZEPAM 2 MG: 1 TABLET ORAL at 00:21

## 2021-03-29 RX ADMIN — FOLIC ACID 1 MG: 1 TABLET ORAL at 09:05

## 2021-03-29 RX ADMIN — LORAZEPAM 2 MG: 1 TABLET ORAL at 17:44

## 2021-03-29 RX ADMIN — Medication 10 ML: at 06:14

## 2021-03-29 RX ADMIN — DULOXETINE HYDROCHLORIDE 60 MG: 60 CAPSULE, DELAYED RELEASE ORAL at 09:05

## 2021-03-29 NOTE — PROGRESS NOTES
Hospitalist Progress Note    Patient: Alfredo Cason Age: 48 y.o. : 1965 MR#: [id number] SSN: [id number]  Date/Time: 3/29/2021 9:00 AM    DOA: 3/27/2021  PCP: Yahaira Patterson MD    Subjective:     Still feels suicidal.   Tremors in hand improved. Has not want to eat or get out of bed today. Still scoring on CIWA protocol today   Tolerates the rest of his home medications      Interval Hospital Course:        ROS: No current fever/chills, no headache, no dizziness, no facial pain, no sinus congestion,   No swallowing pain, No chest pain, no palpitation, no shortness of breath, no abd pain,  No diarrhea, no urinary complaint, no leg pain or swelling, +suicidal       Assessment/Plan:     1. Alcohol withdrawal with at risk for DT  2. Hypovolemic hyponatremia  3. Hypokalemia   4. H/o seizure disorder on keppra   5. CAD   6. Major depression disorder   7. Suicidal ideation   8. Cocaine abuse   9. H/o chronic alcoholic pancreatitis   10. GERD   11.  hyperlipidemia    Cont CIWA  Needs sitter for continue suicidal thoughts  Can wean down his keppra   Continue his cardiac medications.    Will need to transfer to psychiatric unit when withdrawal symptom is stable     Full code       Additional Notes:    Time spent >35 minute    Case discussed with:  [x]Patient  []Family  [x]Nursing  [x]Case Management  DVT Prophylaxis:  []Lovenox  []Hep SQ  [x]SCDs  []Coumadin   []On Heparin gtt    Signed By: Adair Duarte MD     2021 9:00 AM              Objective:   VS:   Visit Vitals  /70 (BP 1 Location: Left upper arm, BP Patient Position: At rest)   Pulse 74   Temp 97.6 °F (36.4 °C)   Resp 18   SpO2 94%      Tmax/24hrs: Temp (24hrs), Av °F (36.7 °C), Min:97.6 °F (36.4 °C), Max:98.2 °F (36.8 °C)      Intake/Output Summary (Last 24 hours) at 3/29/2021 0900  Last data filed at 3/29/2021 1817  Gross per 24 hour   Intake --   Output 1050 ml   Net -1050 ml       Tele:   General:  Cooperative, Not in acute distress, speaks in full sentence while in bed  HEENT: PERRL, EOMI, supple neck, no JVD, dry oral mucosa  Cardiovascular: S1S2 regular, no rub/gallop   Pulmonary: Clear air entry bilaterally, no wheezing, no crackle  GI:  Soft, non tender, non distended, +bs, no guarding   Extremities:  No pedal edema, +distal pulses appreciated   Neuro: AOx3, moving all extremities, no gross deficit.      Additional:       Current Facility-Administered Medications   Medication Dose Route Frequency    potassium chloride SR (KLOR-CON 10) tablet 40 mEq  40 mEq Oral NOW    ondansetron (ZOFRAN) injection 4 mg  4 mg IntraVENous Q6H PRN    sodium chloride (NS) flush 5-40 mL  5-40 mL IntraVENous Q8H    sodium chloride (NS) flush 5-40 mL  5-40 mL IntraVENous PRN    LORazepam (ATIVAN) tablet 2 mg  2 mg Oral Q1H PRN    Or    LORazepam (ATIVAN) injection 2 mg  2 mg IntraVENous Q1H PRN    LORazepam (ATIVAN) injection 3 mg  3 mg IntraVENous Q15MIN PRN    dextrose 5% and 0.9% NaCl infusion  100 mL/hr IntraVENous CONTINUOUS    folic acid (FOLVITE) tablet 1 mg  1 mg Oral DAILY    thiamine HCL (B-1) tablet 100 mg  100 mg Oral DAILY    therapeutic multivitamin (THERAGRAN) tablet 1 Tab  1 Tab Oral DAILY    LORazepam (ATIVAN) tablet 2 mg  2 mg Oral Q6H    LORazepam (ATIVAN) tablet 1 mg  1 mg Oral Q1H PRN    Or    LORazepam (ATIVAN) injection 1 mg  1 mg IntraVENous Q1H PRN    aspirin delayed-release tablet 81 mg  81 mg Oral DAILY    atorvastatin (LIPITOR) tablet 40 mg  40 mg Oral QHS    carvediloL (COREG) tablet 12.5 mg  12.5 mg Oral BID WITH MEALS    DULoxetine (CYMBALTA) capsule 60 mg  60 mg Oral DAILY    lipase-protease-amylase (CREON 12,000) capsule 2 Cap  2 Cap Oral TID WITH MEALS    pantoprazole (PROTONIX) tablet 40 mg  40 mg Oral ACB    QUEtiapine SR (SEROquel XR) tablet 300 mg  300 mg Oral QHS    tamsulosin (FLOMAX) capsule 0.4 mg  0.4 mg Oral DAILY    levETIRAcetam (KEPPRA) tablet 1,000 mg  1,000 mg Oral BID Lab/Data Review:  Labs: Results:       Chemistry Recent Labs     03/29/21 0221 03/28/21  1452 03/27/21  1705   * 125* 135*    137 133*   K 3.3* 4.0 3.0*    104 95*   CO2 28 28 24   BUN 5* 6* 6*   CREA 0.60 0.65 0.65   BUCR 8* 9* 9*   AGAP 4 5 14   CA 8.2* 8.6 8.3*   PHOS  --  4.0  --      Recent Labs     03/27/21  1705   *   TP 7.6   ALB 3.6   GLOB 4.0   AGRAT 0.9      CBC w/Diff Recent Labs     03/29/21 0221 03/28/21 1452 03/27/21  1705   WBC 4.8 6.1 12.2   RBC 3.96* 4.16* 4.21*   HGB 11.7* 12.3* 12.6*   HCT 35.6* 36.9 36.7   MCV 89.9 88.7 87.2   MCH 29.5 29.6 29.9   MCHC 32.9 33.3 34.3   RDW 16.2* 16.2* 16.1*    347 394   GRANS  --   --  66   LYMPH  --   --  21   EOS  --   --  0      Coagulation No results for input(s): PTP, INR, APTT, INREXT in the last 72 hours. Iron/Ferritin No results found for: IRON, FE, TIBC, IBCT, PSAT, FERR    BNP    Cardiac Enzymes Lab Results   Component Value Date/Time     (H) 06/03/2020 03:30 AM    CK - MB 6.3 (H) 06/03/2020 03:30 AM    CK-MB Index 0.7 06/03/2020 03:30 AM    Troponin-I <0.015 04/22/2014 05:10 AM    Troponin-I, QT 0.02 06/04/2020 04:20 PM    BNP 2.5 04/22/2014 05:10 AM        Lactic Acid    Thyroid Studies          All Micro Results     Procedure Component Value Units Date/Time    COVID-19 RAPID TEST [[id number]] Collected: 03/27/21 1709    Order Status: Completed Specimen: Nasopharyngeal Updated: 03/27/21 1803     Specimen source Nasopharyngeal        COVID-19 rapid test Not detected        Comment: Rapid Abbott ID Now       Rapid NAAT:  The specimen is NEGATIVE for SARS-CoV-2, the novel coronavirus associated with COVID-19. Negative results should be treated as presumptive and, if inconsistent with clinical signs and symptoms or necessary for patient management, should be tested with an alternative molecular assay.   Negative results do not preclude SARS-CoV-2 infection and should not be used as the sole basis for patient management decisions. This test has been authorized by the FDA under an Emergency Use Authorization (EUA) for use by authorized laboratories. Fact sheet for Healthcare Providers: ConventionUpdate.co.nz  Fact sheet for Patients: [employer]Update.co.nz       Methodology: Isothermal Nucleic Acid Amplification                 Images:    CT (Most Recent). XRAY (Most Recent) XR Results (most recent):  Results from Abstract encounter on 10/14/20   XR ANKLE RT 1 V          EKG No results found for this or any previous visit.      2D ECHO

## 2021-03-29 NOTE — PROGRESS NOTES
Problem: Falls - Risk of  Goal: *Absence of Falls  Description: Document Martín Hodgson Fall Risk and appropriate interventions in the flowsheet.   Outcome: Progressing Towards Goal  Note: Fall Risk Interventions:            Medication Interventions: Bed/chair exit alarm         History of Falls Interventions: Bed/chair exit alarm

## 2021-03-29 NOTE — PROGRESS NOTES
conducted an initial consultation and Spiritual Assessment for Suzan Stratton, who is a 48 y.o.,male. Patients Primary Language is: [place]. According to the patients EMR Oriental orthodox Affiliation is: Alonso Gottron. The reason the Patient came to the hospital is:   Patient Active Problem List    Diagnosis Date Noted    Bipolar 1 disorder, depressed, severe ([address] 02/08/2014     Priority: 1 - One    Alcohol withdrawal ([address] 03/28/2021    Suicidal ideation 03/28/2021    Cocaine abuse ([address] 03/28/2021    Ankle fracture, bimalleolar, closed, right, sequela 11/03/2020    Delirium tremens ([address] 06/03/2020    Alcohol withdrawal seizure ([address] 03/12/2019    SBO (small bowel obstruction) ([address] 03/12/2019    Adenomyomatosis of gallbladder 08/15/2018    Hepatic steatosis 08/15/2018    Marijuana use 06/04/2018    Recurrent pulmonary emboli ([address] 06/04/2018    Eczema 01/17/2018    History of non-ST elevation myocardial infarction (NSTEMI) 09/22/2017    Hx pulmonary embolism 09/22/2017    History of CVA (cerebrovascular accident) 09/22/2017    Coronary artery disease involving native coronary artery of native heart without angina pectoris 08/31/2017    Chronic left-sided low back pain with left-sided sciatica 07/31/2017    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 Patient was cooperative and willing to talk about why he wanted to hurt himself and how he planned to do it.  Patient expressed, \" I run out of money from doing drugs so I wanted to end my life through drug overdose. \"  Explored issues of sara, belief, spirituality and Yazdanism/ritual needs while hospitalized. Listened empathically. Provided chaplaincy education. Provided information about Spiritual Care Services. Offered prayer and assurance of continued prayers on patient's behalf. Chart reviewed. The following outcomes where achieved:  Patient shared limited information about both his medical narrative and spiritual journey/beliefs. Patient shared that he has no one in his life, no family, no friends he could trust. He lives alone and has no emergency contacts. A friend from [place] [phone] called [phone] for him to get to the hospital.   not able to confirm Patient's Druze Affiliation. Patient was still foggy about current hospitalization. A sitter was in the room by his bedside at close watch. Patient expressed gratitude for 's visit. Assessment:  Patient has emotional pains identified through his narrative. Patient does not have any Yazdanism/cultural needs that will affect patients preferences in health care. There are no spiritual or Yazdanism issues which require intervention at this time. Plan:    Chaplains will continue to follow and will provide pastoral care on an as needed/requested basis.  recommends bedside caregivers page  on duty if patient shows signs of acute spiritual or emotional distress.  upon discharge is suggested.     [address] [facility]   [phone]

## 2021-03-29 NOTE — PROGRESS NOTES
Reason for Admission:   Alcohol withdrawal ([facility] [F10.239]  Suicidal ideation [R45.851]  Cocaine abuse ([facility] [F14.10]               RUR Score:     28%             Resources/supports as identified by patient/family:       Top Challenges facing patient (as identified by patient/family and CM): Patient would like Detox assistance. Finances/Medication cost?     Patient has Medicaid. Transportation      Patient uses a Taxi. Support system or lack thereof? Patient has a friend as support. Living arrangements? Patient lives alone in an apartment. Self-care/ADLs/Cognition? Patient is self-care. Current Advanced Directive/Advance Care Plan:   no.    Healthcare Decision Maker:     Click here to complete [address] including selection of the Healthcare Decision Maker Relationship (ie \"Primary\")                          Plan for utilizing home health:    no, not at this time. Likelihood of readmission:   HIGH    Transition of Care Plan:                    Initial assessment completed with patient at the bedside. Cognitive status of patient: oriented to time, place, person and situation. Face sheet information confirmed:  yes. The patient designates his friend Emily Machuca [phone] to participate in his discharge plan and to receive any needed information. This patient lives alone in a apartment, with no steps to enter. Patient is able to navigate steps as needed. Prior to hospitalization, patient was considered to be independent with ADLs/IADLS : yes . Patient has a current ACP document on file: no. The patient said he will call a Taxi to be available to transport patient home upon discharge. The patient already has none reported,  medical equipment available in the home. Patient is not currently active with home health. Patient has stayed in a skilled nursing facility or rehab: Yes.     Was  stay within last 60 days : no. This patient is on dialysis :no    Currently, the discharge plan is Home. The patient states that he can obtain his medications from the pharmacy, and take his medications as directed. Patient's current insurance is [employer]. Care Management Interventions  PCP Verified by CM: Yes  Mode of Transport at Discharge: Self(Patiient said he will call a taxi for transport at time of discharge. )  Transition of Care Consult (CM Consult): Other(Transfer [facility] Behavioral Health unit at [facility]  Physical Therapy Consult: No  Occupational Therapy Consult: No  Speech Therapy Consult: No  Current Support Network: Lives Alone  Confirm Follow Up Transport: Other (see comment)(Patient uses a Taxi for transport.)  Discharge Location  Discharge Placement: Rehab Unit Subacute        José Miguel Nicole RN  Case Management [phone]      Readmission Assessment  Number of days since last admission?: 8-30 days  Previous disposition: Home Alone  Who is being interviewed?: Patient  What was the patient's/caregiver's perception as to why they think they needed to return back to the hospital?: Other (Comment)(Patient came to ED asking for help with Detox.)  Did you visit your Primary Care Physician after you left the hospital, before you returned this time?: Yes  Did you see a specialist, such as Cardiac, Pulmonary, Orthopedic Physician, etc. after you left the hospital?: Yes  Who advised the patient to return to the hospital?: Self-referral  Does the patient report anything that got in the way of taking their medications?: No  In our efforts to provide the best possible care to you and others like you, can you think of anything that we could have done to help you after you left the hospital the first time, so that you might not have needed to return so soon?: Other (Comment)(Nothing.  Patient came to the ED, asking for help with Detox.)

## 2021-03-30 ENCOUNTER — APPOINTMENT (OUTPATIENT)
Dept: GENERAL RADIOLOGY | Age: 56
End: 2021-03-30
Attending: INTERNAL MEDICINE
Payer: MEDICAID

## 2021-03-30 LAB
ANION GAP SERPL CALC-SCNC: 6 MMOL/L (ref 3–18)
BUN SERPL-MCNC: 5 MG/DL (ref 7–18)
BUN/CREAT SERPL: 7 (ref 12–20)
CALCIUM SERPL-MCNC: 8.3 MG/DL (ref 8.5–10.1)
CHLORIDE SERPL-SCNC: 109 MMOL/L (ref 100–111)
CO2 SERPL-SCNC: 26 MMOL/L (ref 21–32)
CREAT SERPL-MCNC: 0.76 MG/DL (ref 0.6–1.3)
ERYTHROCYTE [DISTWIDTH] IN BLOOD BY AUTOMATED COUNT: 16.4 % (ref 11.6–14.5)
GLUCOSE SERPL-MCNC: 107 MG/DL (ref 74–99)
HCT VFR BLD AUTO: 35.1 % (ref 36–48)
HGB BLD-MCNC: 11.5 G/DL (ref 13–16)
MCH RBC QN AUTO: 29.8 PG (ref 24–34)
MCHC RBC AUTO-ENTMCNC: 32.8 G/DL (ref 31–37)
MCV RBC AUTO: 90.9 FL (ref 74–97)
PLATELET # BLD AUTO: 309 K/UL (ref 135–420)
PMV BLD AUTO: 10.7 FL (ref 9.2–11.8)
POTASSIUM SERPL-SCNC: 3.4 MMOL/L (ref 3.5–5.5)
PROCALCITONIN SERPL-MCNC: <0.05 NG/ML
RBC # BLD AUTO: 3.86 M/UL (ref 4.7–5.5)
SODIUM SERPL-SCNC: 141 MMOL/L (ref 136–145)
WBC # BLD AUTO: 7.2 K/UL (ref 4.6–13.2)

## 2021-03-30 PROCEDURE — 74011000258 HC RX REV CODE- 258: Performed by: INTERNAL MEDICINE

## 2021-03-30 PROCEDURE — 99232 SBSQ HOSP IP/OBS MODERATE 35: CPT | Performed by: INTERNAL MEDICINE

## 2021-03-30 PROCEDURE — 65270000029 HC RM PRIVATE

## 2021-03-30 PROCEDURE — 36415 COLL VENOUS BLD VENIPUNCTURE: CPT

## 2021-03-30 PROCEDURE — 74011250637 HC RX REV CODE- 250/637: Performed by: INTERNAL MEDICINE

## 2021-03-30 PROCEDURE — 85027 COMPLETE CBC AUTOMATED: CPT

## 2021-03-30 PROCEDURE — 71046 X-RAY EXAM CHEST 2 VIEWS: CPT

## 2021-03-30 PROCEDURE — 80048 BASIC METABOLIC PNL TOTAL CA: CPT

## 2021-03-30 PROCEDURE — 74011250636 HC RX REV CODE- 250/636: Performed by: INTERNAL MEDICINE

## 2021-03-30 PROCEDURE — 84145 PROCALCITONIN (PCT): CPT

## 2021-03-30 RX ORDER — POTASSIUM CHLORIDE 20 MEQ/1
40 TABLET, EXTENDED RELEASE ORAL 2 TIMES DAILY
Status: COMPLETED | OUTPATIENT
Start: 2021-03-30 | End: 2021-03-31

## 2021-03-30 RX ORDER — DEXTROSE MONOHYDRATE AND SODIUM CHLORIDE 5; .9 G/100ML; G/100ML
100 INJECTION, SOLUTION INTRAVENOUS CONTINUOUS
Status: DISPENSED | OUTPATIENT
Start: 2021-03-30 | End: 2021-03-31

## 2021-03-30 RX ORDER — CHOLECALCIFEROL (VITAMIN D3) 125 MCG
5000 CAPSULE ORAL DAILY
Status: DISCONTINUED | OUTPATIENT
Start: 2021-03-30 | End: 2021-04-01 | Stop reason: HOSPADM

## 2021-03-30 RX ADMIN — LORAZEPAM 1 MG: 1 TABLET ORAL at 18:14

## 2021-03-30 RX ADMIN — LORAZEPAM 1 MG: 1 TABLET ORAL at 21:11

## 2021-03-30 RX ADMIN — LEVETIRACETAM 500 MG: 500 TABLET ORAL at 09:03

## 2021-03-30 RX ADMIN — Medication 5000 UNITS: at 11:53

## 2021-03-30 RX ADMIN — Medication 81 MG: at 09:02

## 2021-03-30 RX ADMIN — FOLIC ACID 1 MG: 1 TABLET ORAL at 09:02

## 2021-03-30 RX ADMIN — THERA TABS 1 TABLET: TAB at 09:02

## 2021-03-30 RX ADMIN — Medication 10 ML: at 21:11

## 2021-03-30 RX ADMIN — PANCRELIPASE 2 CAPSULE: 60000; 12000; 38000 CAPSULE, DELAYED RELEASE PELLETS ORAL at 18:14

## 2021-03-30 RX ADMIN — DEXTROSE MONOHYDRATE AND SODIUM CHLORIDE 100 ML/HR: 5; .9 INJECTION, SOLUTION INTRAVENOUS at 09:06

## 2021-03-30 RX ADMIN — CARVEDILOL 12.5 MG: 12.5 TABLET, FILM COATED ORAL at 18:14

## 2021-03-30 RX ADMIN — LORAZEPAM 2 MG: 1 TABLET ORAL at 04:46

## 2021-03-30 RX ADMIN — POTASSIUM CHLORIDE 40 MEQ: 1500 TABLET, EXTENDED RELEASE ORAL at 18:14

## 2021-03-30 RX ADMIN — ATORVASTATIN CALCIUM 40 MG: 40 TABLET, FILM COATED ORAL at 21:11

## 2021-03-30 RX ADMIN — POTASSIUM CHLORIDE 40 MEQ: 1500 TABLET, EXTENDED RELEASE ORAL at 11:53

## 2021-03-30 RX ADMIN — LORAZEPAM 2 MG: 1 TABLET ORAL at 06:30

## 2021-03-30 RX ADMIN — LORAZEPAM 2 MG: 2 INJECTION INTRAMUSCULAR; INTRAVENOUS at 02:31

## 2021-03-30 RX ADMIN — TAMSULOSIN HYDROCHLORIDE 0.4 MG: 0.4 CAPSULE ORAL at 09:03

## 2021-03-30 RX ADMIN — QUETIAPINE FUMARATE 300 MG: 300 TABLET, EXTENDED RELEASE ORAL at 21:11

## 2021-03-30 RX ADMIN — LORAZEPAM 2 MG: 1 TABLET ORAL at 11:53

## 2021-03-30 RX ADMIN — PANCRELIPASE 2 CAPSULE: 60000; 12000; 38000 CAPSULE, DELAYED RELEASE PELLETS ORAL at 09:02

## 2021-03-30 RX ADMIN — PANTOPRAZOLE SODIUM 40 MG: 40 TABLET, DELAYED RELEASE ORAL at 09:03

## 2021-03-30 RX ADMIN — Medication 100 MG: at 09:02

## 2021-03-30 RX ADMIN — Medication 10 ML: at 06:31

## 2021-03-30 RX ADMIN — LEVETIRACETAM 500 MG: 500 TABLET ORAL at 18:14

## 2021-03-30 RX ADMIN — PANCRELIPASE 2 CAPSULE: 60000; 12000; 38000 CAPSULE, DELAYED RELEASE PELLETS ORAL at 11:53

## 2021-03-30 RX ADMIN — DULOXETINE HYDROCHLORIDE 60 MG: 60 CAPSULE, DELAYED RELEASE ORAL at 09:02

## 2021-03-30 RX ADMIN — Medication 10 ML: at 13:58

## 2021-03-30 RX ADMIN — CARVEDILOL 12.5 MG: 12.5 TABLET, FILM COATED ORAL at 09:03

## 2021-03-30 RX ADMIN — LORAZEPAM 2 MG: 1 TABLET ORAL at 00:01

## 2021-03-30 NOTE — PROGRESS NOTES
Hospitalist Progress Note    Patient: Woo Bermudez Age: 48 y.o. : 1965 MR#: [id number] SSN: [id number]  Date/Time: 3/30/2021 10:54 AM    DOA: 3/27/2021  PCP: Pepe Dalal MD    Subjective:       Still feels suicidal.   Tremors in hand resolved. Still scoring on CIWA protocol last night, last dose IV ativan (3/30/21 at 230AM)  Tolerates the rest of his home medications    Feels hard in his breathing, stated that \"this is due to the crack I smoked. \"      Interval Hospital Course:        ROS: No current fever/chills, no headache, no dizziness, no facial pain, no sinus congestion,   No swallowing pain, No chest pain, no palpitation, no shortness of breath, no abd pain,  No diarrhea, no urinary complaint, no leg pain or swelling, +suicidal       Assessment/Plan:     1. Alcohol withdrawal with at risk for DT  2. Hypovolemic hyponatremia  3. Hypokalemia   4. H/o seizure disorder on keppra   5. CAD   6. Major depression disorder   7. Suicidal ideation   8. Cocaine abuse, smoked crack cocaine   9. H/o chronic alcoholic pancreatitis   10. GERD   11.  hyperlipidemia      Chest Xray today. Can give prn bronchodilator. Doubt this is cardiac pathology  Cont CIWA for today, if not scoring by this evening then, can stop protocol   Needs sitter for continue suicidal thoughts  Continue his cardiac medications.    Will need to transfer to psychiatric unit when withdrawal symptom is stable     Full code       Additional Notes:    Time spent >35 minute    Case discussed with:  [x]Patient  []Family  [x]Nursing  [x]Case Management  DVT Prophylaxis:  []Lovenox  []Hep SQ  [x]SCDs  []Coumadin   []On Heparin gtt    Signed By: Makenna Canchola MD     2021 10:54 AM              Objective:   VS:   Visit Vitals  /70   Pulse 71   Temp 97.6 °F (36.4 °C)   Resp 20   Ht 6' 2.02\" (1.88 m) Comment: 3/21/21 admission   Wt 106.1 kg (233 lb 14.5 oz) Comment: 3/21/21 admission   SpO2 95%   BMI 30.02 kg/m² Tmax/24hrs: Temp (24hrs), Av.9 °F (36.6 °C), Min:97.6 °F (36.4 °C), Max:98 °F (36.7 °C)    No intake or output data in the 24 hours ending 21 1054    Tele:   General:  Cooperative, Not in acute distress, speaks in full sentence while in bed  HEENT: PERRL, EOMI, supple neck, no JVD, dry oral mucosa  Cardiovascular: S1S2 regular, no rub/gallop   Pulmonary: Clear air entry bilaterally, no wheezing, no crackle  GI:  Soft, non tender, non distended, +bs, no guarding   Extremities:  No pedal edema, +distal pulses appreciated   Neuro: AOx3, moving all extremities, no gross deficit.      Additional:       Current Facility-Administered Medications   Medication Dose Route Frequency    dextrose 5% and 0.9% NaCl infusion  100 mL/hr IntraVENous CONTINUOUS    potassium chloride (K-DUR, KLOR-CON) SR tablet 40 mEq  40 mEq Oral BID    cholecalciferol (VITAMIN D3) capsule 5,000 Units  5,000 Units Oral DAILY    ondansetron (ZOFRAN) injection 4 mg  4 mg IntraVENous Q6H PRN    levETIRAcetam (KEPPRA) tablet 500 mg  500 mg Oral BID    sodium chloride (NS) flush 5-40 mL  5-40 mL IntraVENous Q8H    sodium chloride (NS) flush 5-40 mL  5-40 mL IntraVENous PRN    LORazepam (ATIVAN) tablet 2 mg  2 mg Oral Q1H PRN    Or    LORazepam (ATIVAN) injection 2 mg  2 mg IntraVENous Q1H PRN    LORazepam (ATIVAN) injection 3 mg  3 mg IntraVENous P68INE PRN    folic acid (FOLVITE) tablet 1 mg  1 mg Oral DAILY    thiamine HCL (B-1) tablet 100 mg  100 mg Oral DAILY    therapeutic multivitamin (THERAGRAN) tablet 1 Tab  1 Tab Oral DAILY    LORazepam (ATIVAN) tablet 1 mg  1 mg Oral Q1H PRN    Or    LORazepam (ATIVAN) injection 1 mg  1 mg IntraVENous Q1H PRN    aspirin delayed-release tablet 81 mg  81 mg Oral DAILY    atorvastatin (LIPITOR) tablet 40 mg  40 mg Oral QHS    carvediloL (COREG) tablet 12.5 mg  12.5 mg Oral BID WITH MEALS    DULoxetine (CYMBALTA) capsule 60 mg  60 mg Oral DAILY    lipase-protease-amylase (CREON 12,000) capsule 2 Cap  2 Cap Oral TID WITH MEALS    pantoprazole (PROTONIX) tablet 40 mg  40 mg Oral ACB    QUEtiapine SR (SEROquel XR) tablet 300 mg  300 mg Oral QHS    tamsulosin (FLOMAX) capsule 0.4 mg  0.4 mg Oral DAILY            Lab/Data Review:  Labs: Results:       Chemistry Recent Labs     03/30/21 0222 03/29/21 0221 03/28/21  1452   * 112* 125*    141 137   K 3.4* 3.3* 4.0    109 104   CO2 26 28 28   BUN 5* 5* 6*   CREA 0.76 0.60 0.65   BUCR 7* 8* 9*   AGAP 6 4 5   CA 8.3* 8.2* 8.6   PHOS  --   --  4.0     Recent Labs     03/27/21  1705   *   TP 7.6   ALB 3.6   GLOB 4.0   AGRAT 0.9      CBC w/Diff Recent Labs     03/30/21 0222 03/29/21 0221 03/28/21  1452 03/27/21  1705   WBC 7.2 4.8 6.1 12.2   RBC 3.86* 3.96* 4.16* 4.21*   HGB 11.5* 11.7* 12.3* 12.6*   HCT 35.1* 35.6* 36.9 36.7   MCV 90.9 89.9 88.7 87.2   MCH 29.8 29.5 29.6 29.9   MCHC 32.8 32.9 33.3 34.3   RDW 16.4* 16.2* 16.2* 16.1*    340 347 394   GRANS  --   --   --  66   LYMPH  --   --   --  21   EOS  --   --   --  0      Coagulation No results for input(s): PTP, INR, APTT, INREXT, INREXT in the last 72 hours.     Iron/Ferritin No results found for: IRON, FE, TIBC, IBCT, PSAT, FERR    BNP    Cardiac Enzymes Lab Results   Component Value Date/Time     (H) 06/03/2020 03:30 AM    CK - MB 6.3 (H) 06/03/2020 03:30 AM    CK-MB Index 0.7 06/03/2020 03:30 AM    Troponin-I <0.015 04/22/2014 05:10 AM    Troponin-I, QT 0.02 06/04/2020 04:20 PM    BNP 2.5 04/22/2014 05:10 AM        Lactic Acid    Thyroid Studies          All Micro Results     Procedure Component Value Units Date/Time    COVID-19 RAPID TEST [[id number]] Collected: 03/27/21 170    Order Status: Completed Specimen: Nasopharyngeal Updated: 03/27/21 1803     Specimen source Nasopharyngeal        COVID-19 rapid test Not detected        Comment: Rapid Abbott ID Now       Rapid NAAT:  The specimen is NEGATIVE for SARS-CoV-2, the novel coronavirus associated with COVID-19. Negative results should be treated as presumptive and, if inconsistent with clinical signs and symptoms or necessary for patient management, should be tested with an alternative molecular assay. Negative results do not preclude SARS-CoV-2 infection and should not be used as the sole basis for patient management decisions. This test has been authorized by the FDA under an Emergency Use Authorization (EUA) for use by authorized laboratories. Fact sheet for Healthcare Providers: [employer]co.nz  Fact sheet for Patients: [employer]       Methodology: Isothermal Nucleic Acid Amplification                 Images:    CT (Most Recent). XRAY (Most Recent) XR Results (most recent):  Results from Abstract encounter on 10/14/20   XR ANKLE RT 1 V          EKG No results found for this or any previous visit.      2D ECHO

## 2021-03-30 NOTE — PROGRESS NOTES
Paged attending provider as per pt he was ready to go. Sitter that was in the room allowed pt to go thru his items, in the patients personal belongings noted the patient to have a basin full of medication, loose coins, and cigarettes. Attending provider notified of patient increasing agitation and requesting to leave AMA. When inquired about suicidal ideations pt declined but then stated tht if he was to go home now he would overdose on medication. Provider made aware of patient statements.   Medication will be given according to [facility]

## 2021-03-30 NOTE — PROGRESS NOTES
Problem: Falls - Risk of  Goal: *Absence of Falls  Description: Document Tatum Mendez Fall Risk and appropriate interventions in the flowsheet.   Outcome: Progressing Towards Goal  Note: Fall Risk Interventions:            Medication Interventions: Bed/chair exit alarm         History of Falls Interventions: Bed/chair exit alarm         Problem: Patient Education: Go to Patient Education Activity  Goal: Patient/Family Education  Outcome: Progressing Towards Goal

## 2021-03-31 LAB
ANION GAP SERPL CALC-SCNC: 4 MMOL/L (ref 3–18)
BUN SERPL-MCNC: 3 MG/DL (ref 7–18)
BUN/CREAT SERPL: 5 (ref 12–20)
CALCIUM SERPL-MCNC: 8.9 MG/DL (ref 8.5–10.1)
CHLORIDE SERPL-SCNC: 108 MMOL/L (ref 100–111)
CO2 SERPL-SCNC: 28 MMOL/L (ref 21–32)
CREAT SERPL-MCNC: 0.64 MG/DL (ref 0.6–1.3)
ERYTHROCYTE [DISTWIDTH] IN BLOOD BY AUTOMATED COUNT: 16.2 % (ref 11.6–14.5)
GLUCOSE SERPL-MCNC: 100 MG/DL (ref 74–99)
HCT VFR BLD AUTO: 35.4 % (ref 36–48)
HGB BLD-MCNC: 11.6 G/DL (ref 13–16)
MAGNESIUM SERPL-MCNC: 1.9 MG/DL (ref 1.6–2.6)
MCH RBC QN AUTO: 29.6 PG (ref 24–34)
MCHC RBC AUTO-ENTMCNC: 32.8 G/DL (ref 31–37)
MCV RBC AUTO: 90.3 FL (ref 74–97)
PLATELET # BLD AUTO: 319 K/UL (ref 135–420)
PMV BLD AUTO: 11.1 FL (ref 9.2–11.8)
POTASSIUM SERPL-SCNC: 3.8 MMOL/L (ref 3.5–5.5)
RBC # BLD AUTO: 3.92 M/UL (ref 4.7–5.5)
SODIUM SERPL-SCNC: 140 MMOL/L (ref 136–145)
WBC # BLD AUTO: 7.7 K/UL (ref 4.6–13.2)

## 2021-03-31 PROCEDURE — 83735 ASSAY OF MAGNESIUM: CPT

## 2021-03-31 PROCEDURE — 80048 BASIC METABOLIC PNL TOTAL CA: CPT

## 2021-03-31 PROCEDURE — 65270000029 HC RM PRIVATE

## 2021-03-31 PROCEDURE — 74011250636 HC RX REV CODE- 250/636: Performed by: INTERNAL MEDICINE

## 2021-03-31 PROCEDURE — 99232 SBSQ HOSP IP/OBS MODERATE 35: CPT | Performed by: INTERNAL MEDICINE

## 2021-03-31 PROCEDURE — 74011250637 HC RX REV CODE- 250/637: Performed by: INTERNAL MEDICINE

## 2021-03-31 PROCEDURE — 36415 COLL VENOUS BLD VENIPUNCTURE: CPT

## 2021-03-31 PROCEDURE — 85027 COMPLETE CBC AUTOMATED: CPT

## 2021-03-31 RX ORDER — CARVEDILOL 6.25 MG/1
6.25 TABLET ORAL 2 TIMES DAILY WITH MEALS
Status: DISCONTINUED | OUTPATIENT
Start: 2021-03-31 | End: 2021-04-01 | Stop reason: HOSPADM

## 2021-03-31 RX ORDER — KETOCONAZOLE 20 MG/ML
5 SHAMPOO TOPICAL DAILY
Status: DISCONTINUED | OUTPATIENT
Start: 2021-03-31 | End: 2021-04-01 | Stop reason: HOSPADM

## 2021-03-31 RX ADMIN — LORAZEPAM 2 MG: 2 INJECTION INTRAMUSCULAR; INTRAVENOUS at 23:08

## 2021-03-31 RX ADMIN — PANCRELIPASE 2 CAPSULE: 60000; 12000; 38000 CAPSULE, DELAYED RELEASE PELLETS ORAL at 10:07

## 2021-03-31 RX ADMIN — POTASSIUM CHLORIDE 40 MEQ: 1500 TABLET, EXTENDED RELEASE ORAL at 10:08

## 2021-03-31 RX ADMIN — ATORVASTATIN CALCIUM 40 MG: 40 TABLET, FILM COATED ORAL at 21:41

## 2021-03-31 RX ADMIN — QUETIAPINE FUMARATE 300 MG: 300 TABLET, EXTENDED RELEASE ORAL at 21:41

## 2021-03-31 RX ADMIN — LEVETIRACETAM 500 MG: 500 TABLET ORAL at 10:07

## 2021-03-31 RX ADMIN — DULOXETINE HYDROCHLORIDE 60 MG: 60 CAPSULE, DELAYED RELEASE ORAL at 10:08

## 2021-03-31 RX ADMIN — LEVETIRACETAM 500 MG: 500 TABLET ORAL at 18:13

## 2021-03-31 RX ADMIN — Medication 5000 UNITS: at 10:07

## 2021-03-31 RX ADMIN — CARVEDILOL 12.5 MG: 12.5 TABLET, FILM COATED ORAL at 10:08

## 2021-03-31 RX ADMIN — LORAZEPAM 2 MG: 2 INJECTION INTRAMUSCULAR; INTRAVENOUS at 21:42

## 2021-03-31 RX ADMIN — Medication 10 ML: at 21:44

## 2021-03-31 RX ADMIN — Medication 10 ML: at 18:12

## 2021-03-31 RX ADMIN — TAMSULOSIN HYDROCHLORIDE 0.4 MG: 0.4 CAPSULE ORAL at 10:08

## 2021-03-31 RX ADMIN — PANTOPRAZOLE SODIUM 40 MG: 40 TABLET, DELAYED RELEASE ORAL at 10:08

## 2021-03-31 RX ADMIN — Medication 81 MG: at 10:08

## 2021-03-31 RX ADMIN — FOLIC ACID 1 MG: 1 TABLET ORAL at 10:08

## 2021-03-31 RX ADMIN — LORAZEPAM 2 MG: 1 TABLET ORAL at 23:10

## 2021-03-31 RX ADMIN — Medication 100 MG: at 10:08

## 2021-03-31 RX ADMIN — LORAZEPAM 2 MG: 2 INJECTION INTRAMUSCULAR; INTRAVENOUS at 18:15

## 2021-03-31 RX ADMIN — Medication 10 ML: at 05:16

## 2021-03-31 RX ADMIN — LORAZEPAM 2 MG: 1 TABLET ORAL at 01:52

## 2021-03-31 RX ADMIN — PANCRELIPASE 2 CAPSULE: 60000; 12000; 38000 CAPSULE, DELAYED RELEASE PELLETS ORAL at 12:00

## 2021-03-31 RX ADMIN — THERA TABS 1 TABLET: TAB at 10:08

## 2021-03-31 RX ADMIN — ONDANSETRON 4 MG: 2 INJECTION INTRAMUSCULAR; INTRAVENOUS at 18:21

## 2021-03-31 RX ADMIN — PANCRELIPASE 2 CAPSULE: 60000; 12000; 38000 CAPSULE, DELAYED RELEASE PELLETS ORAL at 18:13

## 2021-03-31 NOTE — PROGRESS NOTES
1900-Bedside and Verbal shift change report given to   Shivani RN (oncoming nurse) by [place] RN(offgoing nurse).  Report included the following information SBAR, Kardex, [facility] and Cardiac Rhythm SR.

## 2021-03-31 NOTE — PROGRESS NOTES
Hospitalist Progress Note    Patient: Barry Briones Age: 48 y.o. : 1965 MR#: [id number] SSN: [id number]  Date/Time: 3/31/2021     DOA: 3/27/2021  PCP: Salvatore Pascual MD    Subjective:     Patient was seen in presence of sitter. Denies any chest pain abdominal pain. No nausea or vomiting. No headache or dizziness. Denies for shortness of breath or cough. Feeling depressed. He says he wants to go to inpatient psych unit for further care if possible. Patient also wants to be on Nizoral cream for his dandruff. Objective:     BP 99/60   Pulse 85   Temp 97.8 °F (36.6 °C)   Resp 20   Ht 6' 2.02\" (1.88 m) Comment: 3/21/21 admission  Wt 106.1 kg (233 lb 14.5 oz) Comment: 3/21/21 admission  SpO2 94%   BMI 30.02 kg/m²       Intake/Output Summary (Last 24 hours) at 3/31/2021 1151  Last data filed at 3/31/2021 0954  Gross per 24 hour   Intake 240 ml   Output 400 ml   Net -160 ml     General appearance - alert, disheveled, and in no distress  Chest -clear air entry noted in bases, no wheezes  Heart - S1 and S2 normal  Abdomen - soft, nontender, nondistended, Bowel sounds present  Neurological -awake, follows commands appropriately, minimal tremors, normal speech. Musculoskeletal - no joint tenderness or erythema of knees bilaterally  Extremities - no pedal edema noted    Assessment/Plan:     1. Alcohol withdrawal symptoms without DTs, better  2. Depression with suicidal ideations  3. Hypokalemia, resolved  4. H/o seizure disorder, stable  5. CAD without any acute issues  6. Major depression disorder   7. GERD  8. Cocaine abuse, smoked crack cocaine   9. H/o chronic alcoholic pancreatitis   10. Dyslipidemia    PLAN:    Chest x-ray report reviewed  Continue Keppra for seizure disorder  Reduce the dose of Coreg and monitor blood pressure.   Continue vitamin supplements and as needed Ativan  Patient is medically stable to be transferred to inpatient psych unit  Patient has not been seen by psychiatrist officially other than crisis nurse in the emergency room. I spoke to Dr. Janis Moore about it and he will evaluate this patient.       Case discussed with:  [x]Patient  []Family  [x]Nursing  [x]Case Management  DVT Prophylaxis:  []Lovenox  []Hep SQ  [x]SCDs  []Coumadin   []On Heparin gtt    Signed By: Eliza Sparrow MD     March 31, 2021         Current Facility-Administered Medications   Medication Dose Route Frequency    ketoconazole (NIZORAL) 2 % shampoo 5 mL  5 mL Topical DAILY    carvediloL (COREG) tablet 6.25 mg  6.25 mg Oral BID WITH MEALS    cholecalciferol (VITAMIN D3) capsule 5,000 Units  5,000 Units Oral DAILY    ondansetron (ZOFRAN) injection 4 mg  4 mg IntraVENous Q6H PRN    levETIRAcetam (KEPPRA) tablet 500 mg  500 mg Oral BID    sodium chloride (NS) flush 5-40 mL  5-40 mL IntraVENous Q8H    sodium chloride (NS) flush 5-40 mL  5-40 mL IntraVENous PRN    LORazepam (ATIVAN) tablet 2 mg  2 mg Oral Q1H PRN    Or    LORazepam (ATIVAN) injection 2 mg  2 mg IntraVENous Q1H PRN    LORazepam (ATIVAN) injection 3 mg  3 mg IntraVENous [id number] PRN    folic acid (FOLVITE) tablet 1 mg  1 mg Oral DAILY    thiamine HCL (B-1) tablet 100 mg  100 mg Oral DAILY    therapeutic multivitamin (THERAGRAN) tablet 1 Tab  1 Tab Oral DAILY    LORazepam (ATIVAN) tablet 1 mg  1 mg Oral Q1H PRN    Or    LORazepam (ATIVAN) injection 1 mg  1 mg IntraVENous Q1H PRN    aspirin delayed-release tablet 81 mg  81 mg Oral DAILY    atorvastatin (LIPITOR) tablet 40 mg  40 mg Oral QHS    DULoxetine (CYMBALTA) capsule 60 mg  60 mg Oral DAILY    lipase-protease-amylase (CREON 12,000) capsule 2 Cap  2 Cap Oral TID WITH MEALS    pantoprazole (PROTONIX) tablet 40 mg  40 mg Oral ACB    QUEtiapine SR (SEROquel XR) tablet 300 mg  300 mg Oral QHS    tamsulosin (FLOMAX) capsule 0.4 mg  0.4 mg Oral DAILY

## 2021-04-01 ENCOUNTER — HOSPITAL ENCOUNTER (INPATIENT)
Age: 56
LOS: 6 days | Discharge: HOME | End: 2021-04-07
Attending: PSYCHIATRY & NEUROLOGY | Admitting: PSYCHIATRY & NEUROLOGY
Payer: MEDICAID

## 2021-04-01 VITALS
WEIGHT: 233.91 LBS | SYSTOLIC BLOOD PRESSURE: 150 MMHG | BODY MASS INDEX: 30.02 KG/M2 | RESPIRATION RATE: 18 BRPM | HEART RATE: 71 BPM | OXYGEN SATURATION: 91 % | DIASTOLIC BLOOD PRESSURE: 90 MMHG | HEIGHT: 74 IN | TEMPERATURE: 97.6 F

## 2021-04-01 DIAGNOSIS — G40.909 SEIZURE DISORDER (HCC): Primary | ICD-10-CM

## 2021-04-01 PROBLEM — F32.9 MAJOR DEPRESSIVE DISORDER: Status: ACTIVE | Noted: 2021-04-01

## 2021-04-01 LAB
ANION GAP SERPL CALC-SCNC: 4 MMOL/L (ref 3–18)
BUN SERPL-MCNC: 5 MG/DL (ref 7–18)
BUN/CREAT SERPL: 7 (ref 12–20)
CALCIUM SERPL-MCNC: 8.3 MG/DL (ref 8.5–10.1)
CHLORIDE SERPL-SCNC: 108 MMOL/L (ref 100–111)
CO2 SERPL-SCNC: 30 MMOL/L (ref 21–32)
CREAT SERPL-MCNC: 0.69 MG/DL (ref 0.6–1.3)
ERYTHROCYTE [DISTWIDTH] IN BLOOD BY AUTOMATED COUNT: 16.2 % (ref 11.6–14.5)
GLUCOSE SERPL-MCNC: 97 MG/DL (ref 74–99)
HCT VFR BLD AUTO: 36.5 % (ref 36–48)
HGB BLD-MCNC: 12 G/DL (ref 13–16)
MCH RBC QN AUTO: 29.9 PG (ref 24–34)
MCHC RBC AUTO-ENTMCNC: 32.9 G/DL (ref 31–37)
MCV RBC AUTO: 91 FL (ref 74–97)
PLATELET # BLD AUTO: 286 K/UL (ref 135–420)
PMV BLD AUTO: 11 FL (ref 9.2–11.8)
POTASSIUM SERPL-SCNC: 3.5 MMOL/L (ref 3.5–5.5)
RBC # BLD AUTO: 4.01 M/UL (ref 4.7–5.5)
SODIUM SERPL-SCNC: 142 MMOL/L (ref 136–145)
WBC # BLD AUTO: 6.7 K/UL (ref 4.6–13.2)

## 2021-04-01 PROCEDURE — 85027 COMPLETE CBC AUTOMATED: CPT

## 2021-04-01 PROCEDURE — 74011250637 HC RX REV CODE- 250/637: Performed by: INTERNAL MEDICINE

## 2021-04-01 PROCEDURE — 74011250636 HC RX REV CODE- 250/636: Performed by: INTERNAL MEDICINE

## 2021-04-01 PROCEDURE — 99231 SBSQ HOSP IP/OBS SF/LOW 25: CPT | Performed by: PSYCHIATRY & NEUROLOGY

## 2021-04-01 PROCEDURE — 65220000003 HC RM SEMIPRIVATE PSYCH

## 2021-04-01 PROCEDURE — 99239 HOSP IP/OBS DSCHRG MGMT >30: CPT | Performed by: INTERNAL MEDICINE

## 2021-04-01 PROCEDURE — 74011250637 HC RX REV CODE- 250/637: Performed by: PSYCHIATRY & NEUROLOGY

## 2021-04-01 PROCEDURE — 36415 COLL VENOUS BLD VENIPUNCTURE: CPT

## 2021-04-01 PROCEDURE — 80048 BASIC METABOLIC PNL TOTAL CA: CPT

## 2021-04-01 RX ORDER — CARVEDILOL 6.25 MG/1
6.25 TABLET ORAL 2 TIMES DAILY WITH MEALS
Status: DISCONTINUED | OUTPATIENT
Start: 2021-04-02 | End: 2021-04-03 | Stop reason: HOSPADM

## 2021-04-01 RX ORDER — DULOXETIN HYDROCHLORIDE 30 MG/1
60 CAPSULE, DELAYED RELEASE ORAL DAILY
Status: DISCONTINUED | OUTPATIENT
Start: 2021-04-02 | End: 2021-04-03 | Stop reason: HOSPADM

## 2021-04-01 RX ORDER — LORAZEPAM 1 MG/1
2 TABLET ORAL
Status: DISCONTINUED | OUTPATIENT
Start: 2021-04-01 | End: 2021-04-03 | Stop reason: HOSPADM

## 2021-04-01 RX ORDER — LORAZEPAM 1 MG/1
1 TABLET ORAL
Status: DISCONTINUED | OUTPATIENT
Start: 2021-04-01 | End: 2021-04-03 | Stop reason: HOSPADM

## 2021-04-01 RX ORDER — ASPIRIN 81 MG/1
81 TABLET ORAL DAILY
Status: DISCONTINUED | OUTPATIENT
Start: 2021-04-02 | End: 2021-04-03 | Stop reason: HOSPADM

## 2021-04-01 RX ORDER — ATORVASTATIN CALCIUM 20 MG/1
40 TABLET, FILM COATED ORAL
Status: DISCONTINUED | OUTPATIENT
Start: 2021-04-01 | End: 2021-04-03 | Stop reason: HOSPADM

## 2021-04-01 RX ORDER — ONDANSETRON 4 MG/1
4 TABLET, FILM COATED ORAL
Status: DISCONTINUED | OUTPATIENT
Start: 2021-04-01 | End: 2021-04-03 | Stop reason: HOSPADM

## 2021-04-01 RX ORDER — FOLIC ACID 1 MG/1
1 TABLET ORAL DAILY
Status: DISCONTINUED | OUTPATIENT
Start: 2021-04-02 | End: 2021-04-03 | Stop reason: HOSPADM

## 2021-04-01 RX ORDER — LANOLIN ALCOHOL/MO/W.PET/CERES
100 CREAM (GRAM) TOPICAL DAILY
Qty: 30 TAB | Refills: 0 | Status: SHIPPED
Start: 2021-04-02 | End: 2021-04-07

## 2021-04-01 RX ORDER — LORAZEPAM 1 MG/1
1 TABLET ORAL
Qty: 10 TAB | Refills: 0 | Status: SHIPPED
Start: 2021-04-01 | End: 2021-04-07

## 2021-04-01 RX ORDER — PANTOPRAZOLE SODIUM 40 MG/1
40 TABLET, DELAYED RELEASE ORAL
Status: DISCONTINUED | OUTPATIENT
Start: 2021-04-02 | End: 2021-04-03 | Stop reason: HOSPADM

## 2021-04-01 RX ORDER — GABAPENTIN 600 MG/1
300 TABLET ORAL 2 TIMES DAILY
Qty: 20 TAB | Refills: 0 | Status: SHIPPED
Start: 2021-04-01 | End: 2021-04-07

## 2021-04-01 RX ORDER — PANTOPRAZOLE SODIUM 40 MG/1
40 TABLET, DELAYED RELEASE ORAL
Qty: 30 TAB | Refills: 0 | Status: SHIPPED
Start: 2021-04-01

## 2021-04-01 RX ORDER — CHOLECALCIFEROL (VITAMIN D3) 125 MCG
5000 CAPSULE ORAL DAILY
Status: DISCONTINUED | OUTPATIENT
Start: 2021-04-02 | End: 2021-04-03 | Stop reason: HOSPADM

## 2021-04-01 RX ORDER — QUETIAPINE 300 MG/1
300 TABLET, FILM COATED, EXTENDED RELEASE ORAL
Status: DISCONTINUED | OUTPATIENT
Start: 2021-04-01 | End: 2021-04-03 | Stop reason: HOSPADM

## 2021-04-01 RX ORDER — THERA TABS 400 MCG
1 TAB ORAL DAILY
Status: DISCONTINUED | OUTPATIENT
Start: 2021-04-02 | End: 2021-04-03 | Stop reason: HOSPADM

## 2021-04-01 RX ORDER — LANOLIN ALCOHOL/MO/W.PET/CERES
100 CREAM (GRAM) TOPICAL DAILY
Status: DISCONTINUED | OUTPATIENT
Start: 2021-04-02 | End: 2021-04-03 | Stop reason: HOSPADM

## 2021-04-01 RX ORDER — HYDROXYZINE PAMOATE 50 MG/1
50 CAPSULE ORAL
Status: DISCONTINUED | OUTPATIENT
Start: 2021-04-01 | End: 2021-04-03 | Stop reason: HOSPADM

## 2021-04-01 RX ORDER — TRAZODONE HYDROCHLORIDE 50 MG/1
50 TABLET ORAL
Status: DISCONTINUED | OUTPATIENT
Start: 2021-04-01 | End: 2021-04-03 | Stop reason: HOSPADM

## 2021-04-01 RX ORDER — TAMSULOSIN HYDROCHLORIDE 0.4 MG/1
0.4 CAPSULE ORAL DAILY
Status: DISCONTINUED | OUTPATIENT
Start: 2021-04-02 | End: 2021-04-03 | Stop reason: HOSPADM

## 2021-04-01 RX ORDER — PANTOPRAZOLE SODIUM 40 MG/1
40 TABLET, DELAYED RELEASE ORAL
Status: DISCONTINUED | OUTPATIENT
Start: 2021-04-01 | End: 2021-04-01 | Stop reason: HOSPADM

## 2021-04-01 RX ORDER — LEVETIRACETAM 500 MG/1
500 TABLET ORAL 2 TIMES DAILY
Status: DISCONTINUED | OUTPATIENT
Start: 2021-04-01 | End: 2021-04-03 | Stop reason: HOSPADM

## 2021-04-01 RX ADMIN — PANCRELIPASE 2 CAPSULE: 60000; 12000; 38000 CAPSULE, DELAYED RELEASE PELLETS ORAL at 09:46

## 2021-04-01 RX ADMIN — QUETIAPINE FUMARATE 300 MG: 300 TABLET, EXTENDED RELEASE ORAL at 21:00

## 2021-04-01 RX ADMIN — LORAZEPAM 1 MG: 1 TABLET ORAL at 15:28

## 2021-04-01 RX ADMIN — Medication 5000 UNITS: at 09:46

## 2021-04-01 RX ADMIN — ONDANSETRON 4 MG: 2 INJECTION INTRAMUSCULAR; INTRAVENOUS at 16:47

## 2021-04-01 RX ADMIN — PANCRELIPASE 2 CAPSULE: 60000; 12000; 38000 CAPSULE, DELAYED RELEASE PELLETS ORAL at 16:46

## 2021-04-01 RX ADMIN — LORAZEPAM 1 MG: 1 TABLET ORAL at 10:38

## 2021-04-01 RX ADMIN — ONDANSETRON 4 MG: 2 INJECTION INTRAMUSCULAR; INTRAVENOUS at 09:56

## 2021-04-01 RX ADMIN — POTASSIUM BICARBONATE 20 MEQ: 782 TABLET, EFFERVESCENT ORAL at 09:46

## 2021-04-01 RX ADMIN — FOLIC ACID 1 MG: 1 TABLET ORAL at 09:46

## 2021-04-01 RX ADMIN — LEVETIRACETAM 500 MG: 500 TABLET ORAL at 18:06

## 2021-04-01 RX ADMIN — Medication 5 ML: at 15:28

## 2021-04-01 RX ADMIN — TRAZODONE HYDROCHLORIDE 50 MG: 50 TABLET ORAL at 20:08

## 2021-04-01 RX ADMIN — LEVETIRACETAM 500 MG: 500 TABLET, FILM COATED ORAL at 21:00

## 2021-04-01 RX ADMIN — Medication 10 ML: at 07:09

## 2021-04-01 RX ADMIN — TAMSULOSIN HYDROCHLORIDE 0.4 MG: 0.4 CAPSULE ORAL at 09:46

## 2021-04-01 RX ADMIN — LORAZEPAM 2 MG: 1 TABLET ORAL at 20:06

## 2021-04-01 RX ADMIN — CARVEDILOL 6.25 MG: 6.25 TABLET, FILM COATED ORAL at 16:46

## 2021-04-01 RX ADMIN — Medication 100 MG: at 09:46

## 2021-04-01 RX ADMIN — LEVETIRACETAM 500 MG: 500 TABLET ORAL at 09:46

## 2021-04-01 RX ADMIN — PANTOPRAZOLE SODIUM 40 MG: 40 TABLET, DELAYED RELEASE ORAL at 09:46

## 2021-04-01 RX ADMIN — Medication 81 MG: at 09:46

## 2021-04-01 RX ADMIN — PANCRELIPASE 2 CAPSULE: 60000; 12000; 38000 CAPSULE, DELAYED RELEASE PELLETS ORAL at 12:15

## 2021-04-01 RX ADMIN — MULTIPLE VITAMINS W/ MINERALS TAB 1 TABLET: TAB at 09:46

## 2021-04-01 RX ADMIN — DULOXETINE HYDROCHLORIDE 60 MG: 60 CAPSULE, DELAYED RELEASE ORAL at 09:46

## 2021-04-01 RX ADMIN — PANTOPRAZOLE SODIUM 40 MG: 40 TABLET, DELAYED RELEASE ORAL at 16:46

## 2021-04-01 RX ADMIN — CARVEDILOL 6.25 MG: 6.25 TABLET, FILM COATED ORAL at 09:46

## 2021-04-01 RX ADMIN — ATORVASTATIN CALCIUM 40 MG: 20 TABLET, FILM COATED ORAL at 20:05

## 2021-04-01 RX ADMIN — LORAZEPAM 2 MG: 2 INJECTION INTRAMUSCULAR; INTRAVENOUS at 00:27

## 2021-04-01 RX ADMIN — LORAZEPAM 1 MG: 1 TABLET ORAL at 18:06

## 2021-04-01 NOTE — PROGRESS NOTES
Problem: Falls - Risk of  Goal: *Absence of Falls  Description: Document Harmony Gum Fall Risk and appropriate interventions in the flowsheet.   Outcome: Progressing Towards Goal  Note: Fall Risk Interventions:            Medication Interventions: Evaluate medications/consider consulting pharmacy, Teach patient to arise slowly         History of Falls Interventions: Door open when patient unattended, Room close to nurse's station         Problem: Patient Education: Go to Patient Education Activity  Goal: Patient/Family Education  Outcome: Progressing Towards Goal     Problem: Alcohol Withdrawal  Goal: *STG: Remains safe in hospital  Outcome: Progressing Towards Goal  Goal: *STG: Seeks staff when symptoms of withdrawal increase  Outcome: Progressing Towards Goal  Goal: *STG: Complies with medication therapy  Outcome: Progressing Towards Goal  Goal: *STG: Maintains appropriate nutrition and hydration  Outcome: Progressing Towards Goal  Goal: *STG: Vital signs within defined limits  Outcome: Progressing Towards Goal  Goal: Interventions  Outcome: Progressing Towards Goal     Problem: Patient Education: Go to Patient Education Activity  Goal: Patient/Family Education  Outcome: Progressing Towards Goal     Problem: Pain  Goal: *Control of Pain  Outcome: Progressing Towards Goal     Problem: Patient Education: Go to Patient Education Activity  Goal: Patient/Family Education  Outcome: Progressing Towards Goal     Problem: Patient Education: Go to Patient Education Activity  Goal: Patient/Family Education  Outcome: Progressing Towards Goal

## 2021-04-01 NOTE — PROGRESS NOTES
Nurse called and spoke with Zay Garland on [facility] pertaining to the patient having belonging with security. Per Zay Garland \"bring slip with patient and it will be placed in patient chart. \"

## 2021-04-01 NOTE — CONSULTS
Psychiatry consult. The patient is a 67-year-old white male who is known to me from previous inpatient evaluation. Attention is invited to that admission of 112/20. This describes his history of chronic alcoholism with medical complications including past delirium tremens, seizures, alcoholic hepatitis and alcoholic pancreatitis. He had been consuming about a gallon of vodka a day and was in severe withdrawal with continuous shaking of the head trunk and arms, seeing bugs crawling on the walls and on his skin and hearing the sound of drums beating. He has had multiple detoxifications before as well as 7 prior inpatient residential treatment attempts including to the [address] [place] [address] in [address] [place] [address] of [facility]. He could not recall the names of the other facilities. He does have history of described depression and anxiety being treated by Nico Reyez MD, psychiatrist in [facility]. He was receiving Cymbalta 60 mg daily, buspirone 10 mg 3 times daily, Seroquel 300 mg at bedtime and gabapentin 800 mg 3 times daily for chronic pain as well as for seizure prophylaxis. He had just recently broken his ankle while intoxicated. He had required detoxification again and was discharged with diagnoses major depression recurrent severe without psychosis, resolved delirium tremens, alcohol use disorder severe, cannabis use disorder mild. He was discharged to follow-up with the [facility] regional crisis stabilization unit. We had attempted to get him into a residential treatment program and could not get him accepted anywhere. He was to follow-up with their day treatment program at [facility] which he apparently was to start. He stayed sober for a period of time going to [facility] but then he said his sponsor became too Alevism for him and he did not want to continue to deal with him.   He terminated with with a sponsor about 1 month ago and almost immediately relapsed to drinking a half a gallon of vodka a day. He is supposed to be on the buspirone, duloxetine, gabapentin, quetiapine  mg bedtime trazodone 50 mg at night for sleep as well as his other physical health medications. He discontinued these at just about the same time he started drinking again. He described worsening of depression and has had return of suicidal thoughts. He has thoughts and desire to drink alcohol and consume pills since he is still depressed. He described sleep appetite and energy problems. He described recurrent abdominal cramping and diarrhea related to his history of alcoholic gastritis and pancreatitis. He acknowledged the use of cocaine which did show up on his drug screen. His alcohol level on presentation to emergency room was 280. He was smoking 1/2 pack of cigarettes a day and did want a nicotine patch. He says he generally was not using illegal drugs but did smoke the crack cocaine as noted above. He does say that he was fired by his  with a group which she was referred. Mental status examination revealed him to be an alert oriented white-haired male who was constantly shaking in his head trunk and hands. Speech was fluent. Mood was extremely anxious with a congruent affect. Thought processing was slightly slowed but otherwise clear. He immediately recognized me including the fact that I had treated him 5 months ago which he could describe. He endorsed recent visual and auditory hallucinations as recent as yesterday. He described paranoid and Gladystine Cb ideas which she says he is still having. He denied homicidal ideas but endorsed suicidal ideas with a plan saying he would not be safe to go back out to the street. He endorsed the fact that he needed to be resumed back on antidepressant medications. He was willing to engage in treatment including inpatient hospitalization to get back on medications.   Insight was affected by his chronic substance dependence. Assessment alcohol use disorder, severe. Alcohol withdrawal syndrome. Cocaine use disorder moderate. Nicotine use disorder, severe. Major depression recurrent severe without psychosis. Recommendations the patient is willing for admission to inpatient transfer to the psychiatry adult program.  We will accept him for admission there and I have discussed this with the crisis worker. We will continue with detoxification and that he is still requiring Ativan he is still having tremors and a degree of physical symptomatology including paranoia. We will resume him back on he is psychiatric medications including the Seroquel XR and the antidepressant. Estimated length of stay in the psychiatric hospital 5 to 7 days. We will attempt to get him longer term inpatient residential care again though I am not very hopeful since he was turned down by every facility that we contacted because he has been to most of them and those  that he has  not been to do not accept people with medical problems.

## 2021-04-01 NOTE — PROGRESS NOTES
Hospitalist Progress Note    Patient: Theresa Holland Age: 48 y.o. : 1965 MR#: [id number] SSN: [id number]  Date/Time: 2021     DOA: 3/27/2021  PCP: Emerson Holloway MD    Subjective:     Patient was seen in presence of sitter. Off-and-on abdominal pain. No nausea vomiting since this morning. Denies any headaches or dizziness currently other than had some headaches earlier today. Continues to have suicidal ideations. Objective:     /85 (BP 1 Location: Left upper arm, BP Patient Position: At rest)   Pulse 81   Temp 98 °F (36.7 °C)   Resp 20   Ht 6' 2.02\" (1.88 m) Comment: 3/21/21 admission  Wt 106.1 kg (233 lb 14.5 oz) Comment: 3/21/21 admission  SpO2 92%   BMI 30.02 kg/m²       Intake/Output Summary (Last 24 hours) at 2021 1448  Last data filed at 2021 1043  Gross per 24 hour   Intake 360 ml   Output --   Net 360 ml     General appearance - alert, disheveled, and in no distress  Chest -clear air entry noted in bases, no wheezes  Heart - S1 and S2 normal  Abdomen - soft, nontender, nondistended, Bowel sounds present  Neurological -awake, follows commands appropriately, + tremors, normal speech. Extremities - no pedal edema noted    Assessment/Plan:     1. Alcohol withdrawal symptoms without DTs, stable  2. Depression with suicidal ideations  3. Hypokalemia, resolved  4. H/o seizure disorder, stable  5. CAD without any acute issues  6. Major depression disorder   7. Abdominal pain due to GERD  8. Cocaine abuse, smoked crack cocaine   9. H/o chronic alcoholic pancreatitis   10. Dyslipidemia    PLAN:    We will increase the dose of PPI  Continue Keppra for seizure disorder  Continue the current dose of Coreg and monitor blood pressure. Continue vitamin supplements and as needed Ativan  Patient is medically stable to be transferred to inpatient psych unit  Discussed with Dr. Ubaldo Weston and he is planning to see this patient soon.       Case discussed with:  [x]Patient []Family  [x]Nursing  [x]Case Management  DVT Prophylaxis:  []Lovenox  []Hep SQ  [x]SCDs  []Coumadin   []On Heparin gtt    Signed By: Lennox Sy MD     April 1, 2021         Current Facility-Administered Medications   Medication Dose Route Frequency    multivitamin, tx-iron-ca-min (THERA-M w/ IRON) tablet 1 Tab  1 Tab Oral DAILY    pantoprazole (PROTONIX) tablet 40 mg  40 mg Oral ACB&D    ketoconazole (NIZORAL) 2 % shampoo 5 mL  5 mL Topical DAILY    carvediloL (COREG) tablet 6.25 mg  6.25 mg Oral BID WITH MEALS    cholecalciferol (VITAMIN D3) capsule 5,000 Units  5,000 Units Oral DAILY    ondansetron (ZOFRAN) injection 4 mg  4 mg IntraVENous Q6H PRN    levETIRAcetam (KEPPRA) tablet 500 mg  500 mg Oral BID    sodium chloride (NS) flush 5-40 mL  5-40 mL IntraVENous Q8H    sodium chloride (NS) flush 5-40 mL  5-40 mL IntraVENous PRN    LORazepam (ATIVAN) tablet 2 mg  2 mg Oral Q1H PRN    Or    LORazepam (ATIVAN) injection 2 mg  2 mg IntraVENous Q1H PRN    LORazepam (ATIVAN) injection 3 mg  3 mg IntraVENous K19AES PRN    folic acid (FOLVITE) tablet 1 mg  1 mg Oral DAILY    thiamine HCL (B-1) tablet 100 mg  100 mg Oral DAILY    LORazepam (ATIVAN) tablet 1 mg  1 mg Oral Q1H PRN    Or    LORazepam (ATIVAN) injection 1 mg  1 mg IntraVENous Q1H PRN    aspirin delayed-release tablet 81 mg  81 mg Oral DAILY    atorvastatin (LIPITOR) tablet 40 mg  40 mg Oral QHS    DULoxetine (CYMBALTA) capsule 60 mg  60 mg Oral DAILY    lipase-protease-amylase (CREON 12,000) capsule 2 Cap  2 Cap Oral TID WITH MEALS    QUEtiapine SR (SEROquel XR) tablet 300 mg  300 mg Oral QHS    tamsulosin (FLOMAX) capsule 0.4 mg  0.4 mg Oral DAILY

## 2021-04-01 NOTE — PROGRESS NOTES
Had given pt 2 mg ativan IV at 2142 for CIWA score of 20, however, was not satisfied that this did not infiltrate because at  2310 his CIWA is 24. Placed new PIV and gave ativan 2mg IV. Pt then said that Dr. Gavin (Psych) had to detox him with phenobarb because ativan did not work for him. Rather than give additional IV ativan, gave him PO ativan also. Will check in an hour.

## 2021-04-01 NOTE — ROUTINE PROCESS
TRANSFER - OUT REPORT:    Verbal report given to German(name) on Lori Chavez being transferred to Behavioral Health(unit) for routine progression of care       Report consisted of patient's Situation, Background, Assessment and   Recommendations(SBAR). Information from the following report(s) SBAR was reviewed with the receiving nurse. Opportunity for questions and clarification was provided.       Patient transported with:   Sarita Tapia

## 2021-04-01 NOTE — DISCHARGE INSTRUCTIONS
Patient armband removed and shredded        DISCHARGE SUMMARY from Nurse    PATIENT INSTRUCTIONS:    After general anesthesia or intravenous sedation, for 24 hours or while taking prescription Narcotics:  · Limit your activities  · Do not drive and operate hazardous machinery  · Do not make important personal or business decisions  · Do  not drink alcoholic beverages  · If you have not urinated within 8 hours after discharge, please contact your surgeon on call. Report the following to your surgeon:  · Excessive pain, swelling, redness or odor of or around the surgical area  · Temperature over 100.5  · Nausea and vomiting lasting longer than 4 hours or if unable to take medications  · Any signs of decreased circulation or nerve impairment to extremity: change in color, persistent  numbness, tingling, coldness or increase pain  · Any questions    What to do at Home:  Recommended activity: Activity as tolerated    If you experience any of the following symptoms nausea, vomiting, diarrhea, fever greater than 100.5 please follow up with Dr. Nestor Coy. *  Please give a list of your current medications to your Primary Care Provider. *  Please update this list whenever your medications are discontinued, doses are      changed, or new medications (including over-the-counter products) are added. *  Please carry medication information at all times in case of emergency situations. These are general instructions for a healthy lifestyle:    No smoking/ No tobacco products/ Avoid exposure to second hand smoke  Surgeon General's Warning:  Quitting smoking now greatly reduces serious risk to your health.     Obesity, smoking, and sedentary lifestyle greatly increases your risk for illness    A healthy diet, regular physical exercise & weight monitoring are important for maintaining a healthy lifestyle    You may be retaining fluid if you have a history of heart failure or if you experience any of the following symptoms: Weight gain of 3 pounds or more overnight or 5 pounds in a week, increased swelling in our hands or feet or shortness of breath while lying flat in bed. Please call your doctor as soon as you notice any of these symptoms; do not wait until your next office visit. The discharge information has been reviewed with the patient. The patient verbalized understanding. Discharge medications reviewed with the patient and appropriate educational materials and side effects teaching were provided. Patient Education        9 Ways to Cut Back on Drinking  Maybe you've found yourself drinking more alcohol than you'd prefer. If you want to cut back, here are some ideas to try. Think before you drink. Do you really want a drink, or is it just a habit? If you're used to having a drink at a certain time, try doing something else then. Look for substitutes. Find some no-alcohol drinks that you enjoy, like flavored seltzer water, tea with honey, or tonic with a slice of lime. Or try alcohol-free beer or \"virgin\" cocktails (without the alcohol). Drink more water. Use water to quench your thirst. Drink a glass of water before you have any alcohol. Have another glass along with every drink or between drinks. Shrink your drink. For example, have a bottle of beer instead of a pint. Use a smaller glass for wine. Choose drinks with lower alcohol content (ABV%). Or use less liquor and more mixer in cocktails. Slow down. It's easy to drink quickly and without thinking about it. Pay attention, and make each drink last longer. Do the math. Total up how much you spend on alcohol each month. How much is that a year? If you cut back, what could you do with the money you save? Take a break. Choose a day or two each week when you won't drink at all. Notice how you feel on those days, physically and emotionally. How did you sleep? Do you feel better? Over time, add more break days. Count calories. Would you like to lose some weight? That can be a good motivator for cutting back. Figure out how many calories are in each drink. How many does that add up to in a day? In a week? In a month? Practice saying no. Be ready when someone offers you a drink. Try: Stephanie Steph, I've had enough. \" Or \"Thanks, but I'm cutting back. \" Or \"No, thanks. I feel better when I drink less. \"   Current as of: December 1, 2020               Content Version: 12.8  © 2006-2021 [employer]. Care instructions adapted under license by [employer] (which disclaims liability or warranty for this information). If you have questions about a medical condition or this instruction, always ask your healthcare professional. [place] [address] any warranty or liability for your use of this information. Patient Education        Learning About Alcohol Use Disorder  What is alcohol use disorder? Alcohol use disorder means that a person drinks alcohol even though it causes harm to themselves or others. It can range from mild to severe. The more signs of this disorder you have, the more severe it may be. Moderate to severe alcohol use disorder is sometimes called addiction. People who have it may find it hard to control their use of alcohol. People who have this disorder may argue with others about how much they're drinking. Their job may be affected because of drinking. They may drink when it's dangerous or illegal, such as when they drive. They also may have a strong need, or craving, to drink. They may feel like they must drink just to get by. Their drinking may increase their risk of getting hurt or being in a car crash. Over time, drinking too much alcohol may cause health problems. These may include high blood pressure, liver problems, or problems with digestion. What are the signs? Maybe you've wondered about your alcohol habits, or how to tell if your drinking is becoming a problem.   Here are some of the signs of alcohol use disorder. You may have it if you have two or more of the following signs:  · You drink larger amounts of alcohol than you ever meant to. Or you've been drinking for a longer time than you ever meant to. · You can't cut down or control your use. Or you constantly wish you could cut down. · You spend a lot of time getting or drinking alcohol or recovering from its effects. · You have strong cravings for alcohol. · You can no longer do your main jobs at work, at school, or at home. · You keep drinking alcohol, even though your use hurts your relationships. · You have stopped doing important activities because of your alcohol use. · You drink alcohol in situations where doing so is dangerous. · You keep drinking alcohol even though you know it's causing health problems. · You need more and more alcohol to get the same effect, or you get less effect from the same amount over time. This is called tolerance. · You have uncomfortable symptoms when you stop drinking alcohol or use less. This is called withdrawal.  Alcohol use disorder can range from mild to severe. The more signs you have, the more severe the disorder may be. Moderate to severe alcohol use disorder is sometimes called addiction. You might not realize that your drinking is a problem. You might not drink large amounts when you drink. Or you might go for days or weeks between drinking episodes. But even if you don't drink very often, your drinking could still be harmful and put you at risk. How is alcohol use disorder treated? Getting help is up to you. But you don't have to do it alone. There are many people and kinds of treatments that can help. Treatment for alcohol use disorder can include:  · Group therapy, one or more types of counseling, and alcohol education. · Medicines that help to:  ? Reduce withdrawal symptoms and help you safely stop drinking. ? Reduce cravings for alcohol. · Support groups.  These groups include Alcoholics Anonymous and [facility] (Self-Management and Recovery Training). Some people are able to stop or cut back on drinking with help from a counselor. People who have moderate to severe alcohol use disorder may need medical treatment. They may need to stay in a hospital or treatment center. You may have a treatment team to help you. This team may include a psychologist or psychiatrist, counselors, doctors, social workers, nurses, and a . A  helps plan and manage your treatment. Follow-up care is a key part of your treatment and safety. Be sure to make and go to all appointments, and call your doctor if you are having problems. It's also a good idea to know your test results and keep a list of the medicines you take. Where can you learn more? Go to http://www.gray.com/  Enter H758 in the search box to learn more about \"Learning About Alcohol Use Disorder. \"  Current as of: June 29, 2020               Content Version: 12.8  © 2006-2021 [employer]. Care instructions adapted under license by [employer] (which disclaims liability or warranty for this information). If you have questions about a medical condition or this instruction, always ask your healthcare professional. [place] [address] any warranty or liability for your use of this information. Patient Education        Alcohol Detoxification and Withdrawal: Care Instructions  Your Care Instructions     If you drink alcohol regularly and then suddenly stop, you may go through some physical and emotional problems while the alcohol clears out of your system. Clearing the alcohol from your body is called detoxification, or detox. Physical and emotional problems that may happen during detox are called withdrawal.  Symptoms of withdrawal can be scary and dangerous.  Mild symptoms include nausea and vomiting, sweating, shakiness, and intense worry. Severe symptoms include being confused and irritable, feeling things on your body that are not there, seeing or hearing things that are not there, and trembling. You may even have seizures. If your symptoms become severe you must see a doctor. People who drink large amounts of alcohol should not try to detox at home. A person can die of severe alcohol withdrawal.  Symptoms of alcohol withdrawal may begin from 4 to 12 hours after you stop drinking. But they may not start for several days after the last drink. They can last a few days. It is hard to stop drinking. But when you have cleared the alcohol from your system, you will be able to start the next part of your life, free from the burden of being dependent. Follow-up care is a key part of your treatment and safety. Be sure to make and go to all appointments, and call your doctor if you are having problems. It's also a good idea to know your test results and keep a list of the medicines you take. How can you care for yourself at home? · Before you stop drinking, talk to your doctor about how you plan to stop. Be sure to be completely honest with him or her about how much you have been drinking. Your doctor will figure out whether you need to detox in a supervised medical center. · Take your medicines exactly as prescribed. Call your doctor if you think you are having a problem with your medicine. · Make sure someone you trust is with you the whole time. Have friends and family members take turns staying with you until you are done with detox. · Put a list of emergency numbers near the phone. This should include your doctor, the police, the nearest hospital and emergency room, and neighbors who can help if needed. · Make sure all alcohol is removed from the house before you start. This includes beverages as well as medicines, rubbing alcohol, and certain flavorings like vanilla extract.   · Keep \"drinking buddies\" away during the time you are going through detox. · Make your surroundings calm. Soft lights, soft music, and a comfortable place to sit or lie down can help make the process easier. · Drink lots of fluids and eat snacks such as fruit, cheese and crackers, and pretzels. Foods high in carbohydrate may help reduce the craving for alcohol. · Understand that detox is going to be hard. · Keep in mind that the people watching over you during detox are there to help. Explain to them before you start that you may not act like yourself until detox is finished. · Consider joining a support group such as Alcoholics Anonymous. Sharing your experiences with other people who face similar challenges may help you feel less overwhelmed. · Keep the numbers for these national suicide hotlines: 1-133-964-TALK (1-945.642.7237) and 7-617-KFGBUFH (6-484.658.6239). If you or someone you know talks about suicide or feeling hopeless, get help right away. When should you call for help? Call 911 anytime you think you may need emergency care. For example, call if:    · You feel you cannot stop from hurting yourself or someone else.     · You vomit many times and cannot stop.     · You vomit blood or what looks like coffee grounds.     · You have trouble breathing or are breathing very fast.     · Your heart beats more than 120 times a minute and will not slow down.     · You have chest pain.     · You have a seizure.     · You see or feel things that are not there (hallucinate). If you are caring for someone who is going through detox, call if:    · The person passes out (loses consciousness).     · The person sees or feels things that are not there and sees or hears the same things many times.     · The person is very agitated and will not calm down.     · The person becomes violent or threatens to be violent.     · The person has a seizure.    Call your doctor now or seek immediate medical care if:    · You have a high fever.     · You have severe belly pain.     · You are very shaky. Watch closely for changes in your health, and be sure to contact your doctor if:    · You do not get better as expected. Where can you learn more? Go to http://www.AppointmentCity.com/  Enter D051 in the search box to learn more about \"Alcohol Detoxification and Withdrawal: Care Instructions. \"  Current as of: June 29, 2020               Content Version: 12.8  © 2006-2021 [employer]. Care instructions adapted under license by [employer] (which disclaims liability or warranty for this information). If you have questions about a medical condition or this instruction, always ask your healthcare professional. [place] [address] any warranty or liability for your use of this information. Patient Education        Learning About Substance Use Disorder  What is substance use disorder? Substance use disorder means that a person uses substances even though it causes harm to themselves or others. It can range from mild to severe. The more signs of this disorder you have, the more severe it may be. Moderate to severe substance use disorder is sometimes called addiction. People who have it find it hard to control their use. This disorder can develop from the use of almost any type of substance. This includes alcohol, illegal drugs, prescription medicines, and over-the-counter medicines. Could you have substance use disorder? If there's a chance you may have substance use disorder, it's important to find out. Ask yourself the following questions. You may have substance use disorder if your answer is \"yes\" to two or more of them. · Do you use larger amounts of the substance than you ever meant to? Or have you been using it for a longer time than you ever meant to? · Are you not able to cut down or control your use? Or do you constantly wish you could cut down?   · Do you spend a lot of time getting or using the substance or recovering from the effects? · Do you have strong cravings for the substance? · Do you find that you can no longer do your main jobs at work, at school, or at home? · Do you keep using, even though your substance use hurts your relationships? · Have you stopped doing important activities because of your substance use? · Do you use substances in situations where doing so is dangerous? · Do you keep using the substance even though you know it's causing health problems? · Do you need more and more of the substance to get the same effect, or do you get less effect from the same amount over time? This is called tolerance. · Do you have uncomfortable symptoms (withdrawal) when you stop using the substance or use less? Substance use disorder can range from mild to severe. The more signs of this disorder you have, the more severe it may be. Do you think you might have substance use disorder? If you do, then you've just taken an important first step. Many people have overcome this problem. And most of them started by reaching out to others, like caring friends or family, their doctor, or a support group. How is substance use disorder treated? If you think you may have substance use disorder, talk to your doctor. You and your doctor can decide whether you have this disorder and what type of treatment might help you. If you are physically dependent on the substance, you may need to stay in a hospital at first. There you can be treated for withdrawal symptoms. One of the goals of treatment for substance use disorder is to help you get used to life without the substance. Counseling can help you prepare for people or situations that might tempt you to start using again. You can practice these skills through one-on-one counseling, family therapy, or group therapy. Therapy may be part of inpatient treatment, where you stay in a treatment center.  Or it may be part of outpatient treatment, where you can fit your therapy around your job or other responsibilities. Another goal of treatment is to help you find ongoing support for your sober life. Many people find support by going to meetings like Alcoholics Anonymous, Narcotics Anonymous, or SMART Recovery. This type of support can help you feel less alone and more motivated to stay sober. You might talk to your doctor or do an online search for local treatment programs. Or you might tell a friend or loved one that you need help. Follow-up care is a key part of your treatment and safety. Be sure to make and go to all appointments, and call your doctor if you are having problems. It's also a good idea to know your test results and keep a list of the medicines you take. Where can you learn more? Go to http://www.gray.com/  Enter M849 in the search box to learn more about \"Learning About Substance Use Disorder. \"  Current as of: June 29, 2020               Content Version: 12.8  © 2006-2021 [employer]. Care instructions adapted under license by [employer] (which disclaims liability or warranty for this information). If you have questions about a medical condition or this instruction, always ask your healthcare professional. [place] [address] any warranty or liability for your use of this information. Patient Education   Patient Education      Pantoprazole (Protonix) - (By mouth)   Why this medicine is used:   Treats gastroesophageal reflux disease (GERD), a damaged esophagus, and high levels of stomach acid.   Contact a nurse or doctor right away if you have:  · Blistering, peeling, red skin rash  · Swelling, muscle pain, stiffness, cramps, or twitching  · Joint pain, rash on your cheeks or arms that gets worse in the sun  · Dark-colored urine, change in how much or how often you urinate  · Seizures, dizziness, uneven heartbeat  · Severe diarrhea, stomach cramps, fever, weight gain     Common side effects:  · Mild diarrhea, stomach pain  · Headache, tiredness  © 2017 [facility] Information is for End User's use only and may not be sold, redistributed or otherwise used for commercial purposes. Lorazepam (Ativan, LORazepam Intensol) - (By mouth)   Why this medicine is used:   Treats anxiety. Contact a nurse or doctor right away if you have:  · Slow heartbeat, trouble breathing or speaking  · Extreme tiredness or weakness  · Depression, confusion, thoughts of hurting yourself     Common side effects:  · Drowsiness, tiredness  · Dizziness, clumsiness  © 2017 [facility] Information is for End User's use only and may not be sold, redistributed or otherwise used for commercial purposes.        ___________________________________________________________________________________________________________________________________

## 2021-04-01 NOTE — ROUTINE PROCESS
Bedside shift change report given to [facility] and Raquel Sapp RNs (oncoming nurse) by Claribel Valle (offgoing nurse). Report included the following information SBAR, Kardex, Intake/Output, MAR and Cardiac Rhythm SR. Also had 14 beat NSVT last noc.

## 2021-04-01 NOTE — BH NOTES
Pt arrived from [facility], escorted by security and nurse, with his belongings which were searched for contraband and inventoried. Pt endorses si/ and avh and presents tremulous. Denies hi and does cfs. Endorses alcohol and drug use prior to admission. Reports drinking 0.5 gallon of vodka daily; equivalent to 43 servings daily. Smokes 1.5 packs of cigarettes daily. Endorses alcohol withdrawal, current ciwa 9 and was medicated per protocol. Pt was cooperative with admission.       RNS WILL INITIATE, DEVELOP, IMPLEMENT, REVIEW OR REVISE TREATMENT PLAN

## 2021-04-02 PROBLEM — F32.9 MAJOR DEPRESSIVE DISORDER: Status: RESOLVED | Noted: 2021-04-01 | Resolved: 2021-04-02

## 2021-04-02 LAB — RPR SER QL: NONREACTIVE

## 2021-04-02 PROCEDURE — 74011250637 HC RX REV CODE- 250/637: Performed by: PSYCHIATRY & NEUROLOGY

## 2021-04-02 PROCEDURE — 86592 SYPHILIS TEST NON-TREP QUAL: CPT

## 2021-04-02 PROCEDURE — 86702 HIV-2 ANTIBODY: CPT

## 2021-04-02 PROCEDURE — 99222 1ST HOSP IP/OBS MODERATE 55: CPT | Performed by: PSYCHIATRY & NEUROLOGY

## 2021-04-02 PROCEDURE — 36415 COLL VENOUS BLD VENIPUNCTURE: CPT

## 2021-04-02 PROCEDURE — 65220000003 HC RM SEMIPRIVATE PSYCH

## 2021-04-02 RX ORDER — KETOCONAZOLE 20 MG/ML
SHAMPOO TOPICAL AS NEEDED
Status: DISCONTINUED | OUTPATIENT
Start: 2021-04-02 | End: 2021-04-03 | Stop reason: HOSPADM

## 2021-04-02 RX ADMIN — QUETIAPINE FUMARATE 300 MG: 300 TABLET, EXTENDED RELEASE ORAL at 20:08

## 2021-04-02 RX ADMIN — LEVETIRACETAM 500 MG: 500 TABLET, FILM COATED ORAL at 08:11

## 2021-04-02 RX ADMIN — LORAZEPAM 1 MG: 1 TABLET ORAL at 09:47

## 2021-04-02 RX ADMIN — PANTOPRAZOLE 40 MG: 40 TABLET, DELAYED RELEASE ORAL at 08:11

## 2021-04-02 RX ADMIN — FOLIC ACID 1 MG: 1 TABLET ORAL at 08:13

## 2021-04-02 RX ADMIN — CARVEDILOL 6.25 MG: 6.25 TABLET, FILM COATED ORAL at 17:00

## 2021-04-02 RX ADMIN — Medication 81 MG: at 08:13

## 2021-04-02 RX ADMIN — CARVEDILOL 6.25 MG: 6.25 TABLET, FILM COATED ORAL at 08:10

## 2021-04-02 RX ADMIN — THERA TABS 1 TABLET: TAB at 08:13

## 2021-04-02 RX ADMIN — Medication 5000 UNITS: at 09:00

## 2021-04-02 RX ADMIN — ATORVASTATIN CALCIUM 40 MG: 20 TABLET, FILM COATED ORAL at 20:08

## 2021-04-02 RX ADMIN — TAMSULOSIN HYDROCHLORIDE 0.4 MG: 0.4 CAPSULE ORAL at 08:13

## 2021-04-02 RX ADMIN — LEVETIRACETAM 500 MG: 500 TABLET, FILM COATED ORAL at 20:08

## 2021-04-02 RX ADMIN — PANCRELIPASE 2 CAPSULE: 60000; 12000; 38000 CAPSULE, DELAYED RELEASE PELLETS ORAL at 08:10

## 2021-04-02 RX ADMIN — ONDANSETRON HYDROCHLORIDE 4 MG: 4 TABLET, FILM COATED ORAL at 16:10

## 2021-04-02 RX ADMIN — PANCRELIPASE 2 CAPSULE: 60000; 12000; 38000 CAPSULE, DELAYED RELEASE PELLETS ORAL at 17:00

## 2021-04-02 RX ADMIN — LORAZEPAM 1 MG: 1 TABLET ORAL at 22:13

## 2021-04-02 RX ADMIN — LORAZEPAM 1 MG: 1 TABLET ORAL at 16:11

## 2021-04-02 RX ADMIN — Medication 100 MG: at 08:13

## 2021-04-02 RX ADMIN — PANTOPRAZOLE 40 MG: 40 TABLET, DELAYED RELEASE ORAL at 16:30

## 2021-04-02 RX ADMIN — PANCRELIPASE 2 CAPSULE: 60000; 12000; 38000 CAPSULE, DELAYED RELEASE PELLETS ORAL at 11:12

## 2021-04-02 RX ADMIN — TRAZODONE HYDROCHLORIDE 50 MG: 50 TABLET ORAL at 20:08

## 2021-04-02 RX ADMIN — DULOXETINE HYDROCHLORIDE 60 MG: 30 CAPSULE, DELAYED RELEASE ORAL at 08:13

## 2021-04-02 NOTE — GROUP NOTE
SUSAN  GROUP DOCUMENTATION INDIVIDUAL                                                                          Group Therapy Note    Date: 4/2/2021    Group Start Time: 0845  Group End Time: 0900  Group Topic: Nursing    [facility] SPECIAL TRTMT Reymundo Peterson, RN    IP [address] [facility] GROUP DOCUMENTATION GROUP    Group Therapy Note    Attendees: 2         Attendance: Did not attend          Mick Carreno RN

## 2021-04-02 NOTE — PROGRESS NOTES
Behavioral Services  Medicare Certification Upon Admission    I certify that this patient's inpatient psychiatric hospital admission is medically necessary for:      [x] Treatment which could reasonably be expected to improve this patient's condition,       [] For diagnostic study;     AND     [x] The inpatient psychiatric services are provided while the individual is under the care of a physician and are included in the individualized plan of care.     Estimated length of stay/service 7 days  Plan for post-hospital care Waqar Mcmanus MD, [facility]    Electronically signed by [unfilled] on 4/2/2021 at 12:21 PM

## 2021-04-02 NOTE — BSMART NOTE
ART THERAPY GROUP PROGRESS NOTE    Group time: 6023    The patient declined group despite encouragement. Art Therapy Intern administered group art therapy.

## 2021-04-02 NOTE — PROGRESS NOTES
Patient resting in his room. Patient is having slight tremors. Ativan 1 mg given po for w/d symptoms.

## 2021-04-02 NOTE — H&P
[address] [facility] HISTORY AND PHYSICAL    Name:  Itz Murphy  MR#:   [id number]  :  1965  ACCOUNT #:  [de-identified]  ADMIT DATE:  2021    IDENTIFYING DATA:  The patient is a 60-year-old single white male, resident of [place], [place], who is unemployed and covered by [place]. BASIS FOR ADMISSION:  The patient is admitted in transfer from [facility] inpatient medical floor following consultation to Psychiatry for suicidal ideas as he is being detoxified from alcohol. He had been admitted 2021 and attention is invited to the inpatient note for detoxification. He had presented in an intoxicated condition and was at risk for both seizures and delirium tremens. He was in significant withdrawal with delirium tremens with auditory and visual hallucinations and confusion. He had a history of seizures and was kept on his Keppra for seizure disorder. He was treated with lorazepam protocol and was no longer confused but was voicing suicidal ideas and return of depression as he had been off of his psychiatric medications. Attention is invited to my consultation of 2021. This describes him to have been consuming about a gallon of vodka a day and was continuing in withdrawal with continued shaking of the head, trunk, and arms, seeing bugs crawling on the walls and feeling them on his skin. He had a history of multiple detoxifications in the past as well as seven prior inpatient residential treatment programs including [address] [place] [address] in [place], [place], and the [address] [place] [address]. He could not recall the names of other facilities. He had history of described depression and anxiety treated by Rabia Sosa MD, psychiatrist in [facility]. He was receiving Cymbalta 60 mg daily, buspirone 10 mg t.i.d., Seroquel 300 mg at bedtime, gabapentin 800 mg t.i.d. for chronic pain and seizure prophylaxis.   He had just recently broken his ankle when I detoxified him on phenobarbital..  At that time, he also had delirium tremens and we could not get him into any long-term treatment facility. He had been accepted at the [facility] [address] from which he was discharged back to his long-term motel room. He was supposed to follow up with the [employer] [facility], apparently did not. He also was to follow up with Dr. Mimi Zhao. He had relapsed about 4 weeks ago and stopped taking all of his psychiatric medications at that time. He described inability to maintain sobriety. He described hopeless/helpless thoughts, sleep, appetite, and energy problems as well as abdominal cramping and diarrhea related to his history of alcoholic gastritis and alcoholic pancreatitis. He had also started snorting cocaine again. Alcohol level on time of presentation to emergency room was 280. At the current time, he describes continued auditory hallucinations yesterday as well as visual hallucinations of seeing rats. He described continued paranoia with suicidal ideas and hopeless/helpless feelings. LABORATORY DATA:  Laboratory testing at the time of admission included a normal CBC, abnormal comprehensive metabolic panel with a sodium of 133, potassium 3.0, chloride 95, glucose 135, and calcium level 8.3. Elevated liver function tests with , AST 98, alkaline phosphatase 124. Lipase level had been low at 40. His drug screen had been positive for benzodiazepines and cocaine. His alcohol level had been 280. MEDICAL HISTORY:  Quite significant with a history of the alcoholic pancreatitis, alcoholic gastritis, alcohol liver disease, benign prostatic hypertrophy, history of myocardial infarction with a stent placement, history of dental disease, past history of seizures. He had a history of ileus on one occasion.   He was supposed to be on clonidine in the past, carvedilol, and Flonase for allergic rhinitis. He was on aspirin 81 mg daily, tamsulosin 0.4 mg daily, Keppra 500 mg twice a day, gabapentin for history of seizures and the chronic pain. SUBSTANCE USE HISTORY:  He would occasionally smoke marijuana and episodically relapse to powdered cocaine which he had done recently. He had the history of at least seven residential substance abuse treatment programs and we were unable to actually get him into one last time he was here because he had been to each of the ones consulted and they were not willing to take him back again. He smokes one and a half to two packs of cigarettes a day and did not want a nicotine patch this time. SOCIAL AND FAMILY HISTORY:  Family history is significant for father with alcoholism, mother with schizophrenia. He is . He does not have children and does not have a significant other. He does not participate in much and said he would spend much of his time during the day, drinking. MENTAL STATUS EXAMINATION:  Revealed him to be an alert, oriented white male lying in bed with shaking of the head, neck, and trunk. Eye contact was poor. Speech was minimal, soft, and nonspontaneous. Insight and judgment were influenced by his substance use. IQ was estimated in the low normal range. ASSESSMENT:  AXIS I:  Major depression, recurrent, severe, without psychosis. Delirium tremens. Alcohol use disorder, severe. Cannabis use disorder, mild. Nicotine use disorder, severe. AXIS II:  None. AXIS III:  Alcoholic gastritis. Alcoholic pancreatitis. Alcohol liver disease. Hypertension. Benign prostatic hypertrophy. History of myocardial infarction with stent placement. Dental disease. TREATMENT PLAN:  This patient is admitted in referral from the [facility] where they said he no longer required IV medications for detoxification and they felt him to be stable for evaluation and transfer to Psychiatry.   He does continue to voice suicidal ideas. We will resume him back on his various medications including the lorazepam 1 mg for withdrawal since he is still having withdrawal symptoms, gabapentin, Creon, BuSpar for anxiety, Cymbalta, Keppra for his seizure disorder, and Seroquel XR. We will continue with individual, group and milieu therapies, art and recreation therapy, case management services, social work services, physical examination, and laboratory testing. ESTIMATED LENGTH OF STAY:  7 days. ANTICIPATED DISPOSITION:  Follow up [place] [id number].       Lilliana Werner MD      [id number]  D:  04/02/2021 11:27  T:  04/02/2021 14:44  JOB #:  [id number]

## 2021-04-02 NOTE — BSMART NOTE
OCCUPATIONAL THERAPY PROGRESS NOTE  Group Time:  1400  Attendance: The patient attended full group. Participation:  The patient participated with moderate elaboration in the activity. Attention:  The patient was able to focus on the activity. Interaction:  The patient acknowledges others or responds to questions,  with no spontaneous interaction. Responses on subject and appropriate. Discussed plans to stop drinking, limited insight.

## 2021-04-02 NOTE — H&P
Psychiatry History and Physical    Subjective:     Date of Evaluation:  4/2/2021    Reason for Referral:  Yovany Ray was referred to the examiners from [facility] for Severe Withdrawal/DT's. History of Presenting Problem: 55y/o C male in NAD well developed and nourished. Some fine tremors and nausea form DT's. Numerous detox admits. Denies SI/HI/AH/VH. Medical problems: Bipolar, Alcoholism, Roscea, Depression, history of cirrhosis.      Patient Active Problem List    Diagnosis Date Noted    Bipolar 1 disorder, depressed, severe ([address] 02/08/2014     Priority: 1 - One    Alcohol withdrawal ([address] 03/28/2021    Suicidal ideation 03/28/2021    Cocaine abuse ([address] 03/28/2021    Ankle fracture, bimalleolar, closed, right, sequela 11/03/2020    Delirium tremens ([address] 06/03/2020    Alcohol withdrawal seizure ([address] 03/12/2019    SBO (small bowel obstruction) ([address] 03/12/2019    Adenomyomatosis of gallbladder 08/15/2018    Hepatic steatosis 08/15/2018    Marijuana use 06/04/2018    Recurrent pulmonary emboli ([address] 06/04/2018    Eczema 01/17/2018    History of non-ST elevation myocardial infarction (NSTEMI) 09/22/2017    Hx pulmonary embolism 09/22/2017    History of CVA (cerebrovascular accident) 09/22/2017    Coronary artery disease involving native coronary artery of native heart without angina pectoris 08/31/2017    Chronic left-sided low back pain with left-sided sciatica 07/31/2017    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 ([address] 08/06/2016    History of suicide attempt 11/11/2014    Alcohol use disorder, severe, dependence ([address] 04/22/2014    Tobacco dependence 08/20/2013    HTN (hypertension) 08/20/2013    HLD (hyperlipidemia) 08/20/2013    Severe episode of recurrent major depressive disorder ([address] 11/14/2011     Past Medical History:   Diagnosis Date    Abuse     Anemia NEC     Anxiety     Arthritis     Chronic pain     Colitis     Contact dermatitis and other eczema, due to unspecified cause     Depression     Depression     Headache(784.0)     Heart attack ([address]     Hypercholesterolemia     Hypertension     Liver disease     Low back pain     with left leg pain -- multilevel spondylosis and L4 radiculitis    Neck pain     mild spondylosis    Other ill-defined conditions(799.89)     MS symptoms    Pancreatitis     Psychotic disorder ([address]     Trauma      Past Surgical History:   Procedure Laterality Date    HX CYST REMOVAL         Family History   Problem Relation Age of Onset    Psychiatric Disorder Mother     Heart Disease Mother     Arthritis-osteo Father     Alcohol abuse Father     Cancer Father         lung    Elevated Lipids Father     Headache Father     Hypertension Father     Lung Disease Father     Diabetes Father       Social History     Tobacco Use    Smoking status: Current Some Day Smoker     Packs/day: 0.50     Years: 20.00     Pack years: 10.00     Types: Cigarettes    Smokeless tobacco: Never Used    Tobacco comment: patient states he vapes daily   Substance Use Topics    Alcohol use: Not Currently     Alcohol/week: 0.0 standard drinks     Comment:  ETOH abuse - quit NOV 2018     Prior to Admission medications    Medication Sig Start Date End Date Taking? Authorizing Provider   thiamine HCL (B-1) 100 mg tablet Take 1 Tab by mouth daily. 4/2/21   Bala Domínguez MD   LORazepam (ATIVAN) 1 mg tablet Take 1 Tab by mouth every three (3) hours as needed for Anxiety (As well as for tremors). Max Daily Amount: 8 mg. 4/1/21   Bala Domínguez MD   pantoprazole (PROTONIX) 40 mg tablet Take 1 Tab by mouth Before breakfast and dinner. 4/1/21   Bala Domínguez MD   gabapentin (NEURONTIN) 600 mg tablet Take 0.5 Tabs by mouth two (2) times a day.  Max Daily Amount: 600 mg. 4/1/21   Bala Domínguez MD   lipase-protease-amylase (Creon) 24,000-76,000 -120,000 unit capsule Creon 24,000-76,000-120,000 unit capsule,delayed release   take 1 capsule by mouth three times a day with meals    Provider, Historical   carvediloL (COREG) 12.5 mg tablet carvedilol 12.5 mg tablet    Provider, Historical   aspirin delayed-release 81 mg tablet aspirin 81 mg tablet,delayed release    Provider, Historical   tamsulosin (FLOMAX) 0.4 mg capsule Take 1 Cap by mouth daily. Indications: enlarged prostate with urination problem 12/30/20   Ginny Willingham MD   multivit-min-FA-lycopen-lutein [facility] Silver) 8.9-718-300 mg-mcg-mcg tab Take 1 Tab by mouth daily. 12/10/20   Ginny Willingham MD   fluticasone propionate (FLONASE) 50 mcg/actuation nasal spray 2 sprays each nostril daily  Indications: inflammation of the nose due to an allergy 12/10/20   Ginny Willingham MD   atorvastatin (LIPITOR) 40 mg tablet Take 1 Tab by mouth nightly. Indications: high cholesterol and high triglycerides 11/12/20   Maksim Szymanski MD   busPIRone (BUSPAR) 10 mg tablet Take 1 Tab by mouth three (3) times daily. Indications: repeated episodes of anxiety 11/12/20   Maksim Szymanski MD   DULoxetine (CYMBALTA) 60 mg capsule Take 1 Cap by mouth daily. Indications: diabetic complication causing injury to some body nerves 11/13/20   Maksim Szymanski MD   ketoconazole (NIZORAL) 2 % shampoo Apply 5 mL to affected area daily. Indications: dandruff 11/13/20   Maksim Szymanski MD   levETIRAcetam (KEPPRA) 500 mg tablet Take 1 Tab by mouth two (2) times a day. Indications: additional medication for myoclonic epilepsy 11/12/20   Maksim Szymanski MD   QUEtiapine SR (SEROquel XR) 300 mg sr tablet Take 1 Tab by mouth nightly.  Indications: additional medications to treat depression 11/12/20   Maksim Szymanski MD     Allergies   Allergen Reactions    Amlodipine Other (comments)    Carbamazepine Unknown (comments)     violent    Lisinopril Unknown (comments)    Prednisone Other (comments)     He stated it hypes him up        Review of Systems - History obtained from chart review and the patient  General ROS: negative  Psychological ROS: positive for - depression  Ophthalmic ROS: negative  ENT ROS: negative  Respiratory ROS: no cough, shortness of breath, or wheezing  Cardiovascular ROS: no chest pain or dyspnea on exertion  Gastrointestinal ROS: positive for - nausea/vomiting  Genito-Urinary ROS: no dysuria, trouble voiding, or hematuria  Musculoskeletal ROS: positive for - denies  Neurological ROS: positive for - tremors  Dermatological ROS: positive for - eczema and rash      Objective:     Patient Vitals for the past 8 hrs:   BP Temp Pulse Resp   04/02/21 1601 139/86 97.1 °F (36.2 °C) 65 18   04/02/21 1319 112/76 97.5 °F (36.4 °C) 71 16       Mental Status exam: WNL except for    Sensorium  oriented to time, place and person   Orientation person, place, time/date and situation   Relations cooperative   Eye Contact appropriate   Appearance:  age appropriate, bearded and overweight   Motor Behavior:  gait unsteady and within normal limits   Speech:  normal volume   Vocabulary average   Thought Process: logical   Thought Content free of delusions and free of hallucinations   Suicidal ideations no plan  and no intention   Homicidal ideations no plan  and no intention   Mood:  depressed   Affect:  depressed   Memory recent  adequate   Memory remote:  adequate   Concentration:  adequate   Abstraction:  concrete   Insight:  fair   Reliability fair   Judgment:  fair         Physical Exam:   Visit Vitals  /86 (BP 1 Location: Right upper arm, BP Patient Position: Sitting)   Pulse 65   Temp 97.1 °F (36.2 °C)   Resp 18   SpO2 93%     General appearance: alert, cooperative, mild distress, appears older than stated age  Head: Normocephalic, without obvious abnormality, atraumatic  Eyes: negative  Ears: normal TM's and external ear canals AU  Nose: Nares normal. Septum midline. Mucosa normal. No drainage or sinus tenderness.   Throat: Lips, mucosa, and tongue normal. Teeth and gums normal and abnormal findings: dentition: poor  Neck: supple, symmetrical, trachea midline, no adenopathy, thyroid: not enlarged, symmetric, no tenderness/mass/nodules, no carotid bruit and no JVD  Back: symmetric, no curvature. ROM normal. No CVA tenderness. Lungs: clear to auscultation bilaterally  Chest wall: no tenderness  Heart: regular rate and rhythm, S1, S2 normal, no murmur, click, rub or gallop  Abdomen: soft, non-tender. Bowel sounds normal. No masses,  no organomegaly  Extremities: extremities normal, atraumatic, no cyanosis or edema  Pulses: 2+ and symmetric  Skin: scaly erythema rash to face and scalp  Lymph nodes: Cervical, supraclavicular, and axillary nodes normal.  Neurologic: Grossly normal except some tremors        Impression:      Principal Problem:    Severe episode of recurrent major depressive disorder ([facility] (11/14/2011)      Overview: Overview:       Most probable SIMD    Active Problems:    Tobacco dependence (8/20/2013)      HTN (hypertension) (8/20/2013)      HLD (hyperlipidemia) (8/20/2013)      Alcohol use disorder, severe, dependence ([facility] (4/22/2014)      Overview: Numerous hospitalization of intoxication/suicidal ideation      Delirium tremens ([facility] (6/3/2020)          Plan:     Recommendations for Treatment/Conditions:  Psychiatric treatment recommended while in hospital  Admit to Psych services for ETOH Withdrawal/Depression    Referral To:    Inpatient psychiatric care      Jackie Shook NP   4/2/2021 4:49 PM

## 2021-04-02 NOTE — BH NOTES
Pt c/o nausea and given prn medication zofran 4 mg po for nausea. Also CIWA of 5 and given 1 mg Ativan po per protocol.   Will continue to monior

## 2021-04-02 NOTE — PROGRESS NOTES
Problem: Suicide  Goal: *STG: Remains safe in hospital  Description: AEB pt not harming self or others and sheron for safety daily while in the hospital  Outcome: Progressing Towards Goal  Goal: *STG: Attends activities and groups  Description: AEB pt attending 3 groups daily while in the hospital  Outcome: Progressing Towards Goal  Goal: *STG/LTG: No longer expresses self destructive or suicidal thoughts  Description: AEB pt no longer expressing self destructive or suicidal thoughts daily while in the hospital  Outcome: Progressing Towards Goal       Pt cooperative, denies si/hi and avh and ciwa scores are improving. Has been selectively tremulous, mainly at the nurses station, otherwise his motoractivity is unremarkable. Has been medicated per ciwa protocl.   Will continue to support

## 2021-04-02 NOTE — BH NOTES
The patient was monitored for safety. Affect was anxious but able to verbalize all issues. Pt did eat at all meals. Pt was able to have his vitals checked every four hours during the shift. Pt was able to talk with his DR and SW. Pt did complete his hygiene during the shift. Staff will continue to monitor for safety and locations.

## 2021-04-02 NOTE — GROUP NOTE
SUSAN  GROUP DOCUMENTATION INDIVIDUAL                                                                          Group Therapy Note    Date: 4/1/2021    Group Start Time: 2000  Group End Time: 2030  Group Topic: Medication    [facility] SPECIAL TRT 1    Hugoanika Mattie    IP [address] GROUP DOCUMENTATION GROUP    Group Therapy Note    Attendees:4         Attendance: Did not attend        Additional Notes:  New admission; not required to attend    Deonna Handler

## 2021-04-02 NOTE — BSMART NOTE
BH Biopsychosocial Assessment    Current Level of Psychosocial Functioning     [x]Independent  []Dependent  []Minimal Assist      Comments:      Psychosocial High Risk Factors (check all that apply)      []Unable to obtain meds                                                               [x]Chronic illness/pain    [x]Substance abuse   [x]Lack of Family Support   []Financial stress   [x]Isolation   [x]Inadequate Community Resources  [x]Suicide attempt(s)  [x]Not taking medications   []Victim of crime   []Developmental Delay  []Unable to manage personal needs    []Age 72 or older   []  Homeless  []Ai transportation   []Readmission within 30 days  []Unemployment  []Traumatic Event      Psychiatric Advanced Directive: None     Family to involve in treatment: None     Sexual Orientation: NA     Patient Strengths: Pt. is willing to seek treatment      Patient Barriers: Pt has history of non-compliant with medications and self-medicates with alcohol     Substance Abuse: Yes. Pt. abuses alcohol, marijuana ans cocaine. ANA provided pt. with substance abuse education      Safety plan: ANA discussed safety plan. Pt contracts for safety     CMHC/ history: Please refer to the psychiatrist and NP or PA note       AXIS I:  Major depression, recurrent, severe, without psychosis. Delirium tremens. Alcohol use disorder, severe. Cannabis use disorder, mild. Nicotine use disorder, severe. AXIS II:  None. AXIS III:  Alcoholic gastritis. Alcoholic pancreatitis. Alcohol liver disease. Hypertension. Benign prostatic hypertrophy. History of myocardial infarction with stent placement. Dental disease.      Plan of Care: ANA discussed and encouraged pt. to participate in the following activities: medication management, group/individual therapies, family meetings, psycho education, treatment team meetings to assist with stabilization     Initial Discharge Plan: 3-5 days      Clinical Summary:   Pt. is a 60-year-old male with history of Major depression, recurrent, severe, without psychosis. Delirium tremens. Alcohol use disorder, severe. Cannabis use disorder, mild. Nicotine use disorder, severe. Pt.multiple medical issues due to alcohol dependence. Pt was admitted to this facility for with anxiety and tremor and suicidal.  Patient was drinking in the emergency room upon admission Pt. s case was discussed in staffing this a.m. Pt. has past admission at this facility. SW met with pt. to discuss this admission. Pt states he has been sober for 3 months. Pt states he did not go to [facility] treatment center > pt. states he return to his home and talk to sponsor and had supportive services in the community. Pt reports he has been feeling depressed and relapsed. Pt reports to drinking a half of gallon of Vodka daily. SW discussed rehab options. Pt has agreed to go to [facility] residential. Walter Simpson will refer pt. to [place] [address] facility @ [id number], [facility] in [address] [phone]. SW discussed safety plan. Pt contracts for safety. SW will assist pt. with exploring his options. Pt. appears depressed, fair insight and poor judgement. SW will continue to assist the pt. with dc planning.      referrals were fax to the above treatment facilities  Ubaldo Parisi MA, LMHP-R

## 2021-04-03 ENCOUNTER — HOSPITAL ENCOUNTER (EMERGENCY)
Age: 56
Discharge: HOME | End: 2021-04-04
Attending: EMERGENCY MEDICINE
Payer: MEDICAID

## 2021-04-03 DIAGNOSIS — F10.930 ALCOHOL WITHDRAWAL SYNDROME WITHOUT COMPLICATION (HCC): ICD-10-CM

## 2021-04-03 DIAGNOSIS — G40.919 BREAKTHROUGH SEIZURE (HCC): Primary | ICD-10-CM

## 2021-04-03 PROCEDURE — 99231 SBSQ HOSP IP/OBS SF/LOW 25: CPT | Performed by: PSYCHIATRY & NEUROLOGY

## 2021-04-03 PROCEDURE — 74011250637 HC RX REV CODE- 250/637: Performed by: PSYCHIATRY & NEUROLOGY

## 2021-04-03 PROCEDURE — 65220000003 HC RM SEMIPRIVATE PSYCH

## 2021-04-03 RX ORDER — LOPERAMIDE HYDROCHLORIDE 2 MG/1
2 CAPSULE ORAL
Status: DISCONTINUED | OUTPATIENT
Start: 2021-04-03 | End: 2021-04-03 | Stop reason: HOSPADM

## 2021-04-03 RX ADMIN — TAMSULOSIN HYDROCHLORIDE 0.4 MG: 0.4 CAPSULE ORAL at 08:12

## 2021-04-03 RX ADMIN — PANCRELIPASE 2 CAPSULE: 60000; 12000; 38000 CAPSULE, DELAYED RELEASE PELLETS ORAL at 11:16

## 2021-04-03 RX ADMIN — ONDANSETRON HYDROCHLORIDE 4 MG: 4 TABLET, FILM COATED ORAL at 20:00

## 2021-04-03 RX ADMIN — PANTOPRAZOLE 40 MG: 40 TABLET, DELAYED RELEASE ORAL at 16:21

## 2021-04-03 RX ADMIN — Medication 81 MG: at 08:12

## 2021-04-03 RX ADMIN — PANCRELIPASE 2 CAPSULE: 60000; 12000; 38000 CAPSULE, DELAYED RELEASE PELLETS ORAL at 16:21

## 2021-04-03 RX ADMIN — LORAZEPAM 2 MG: 1 TABLET ORAL at 21:11

## 2021-04-03 RX ADMIN — TRAZODONE HYDROCHLORIDE 50 MG: 50 TABLET ORAL at 20:00

## 2021-04-03 RX ADMIN — LEVETIRACETAM 500 MG: 500 TABLET, FILM COATED ORAL at 21:11

## 2021-04-03 RX ADMIN — ONDANSETRON HYDROCHLORIDE 4 MG: 4 TABLET, FILM COATED ORAL at 07:41

## 2021-04-03 RX ADMIN — DULOXETINE HYDROCHLORIDE 60 MG: 30 CAPSULE, DELAYED RELEASE ORAL at 08:11

## 2021-04-03 RX ADMIN — CARVEDILOL 6.25 MG: 6.25 TABLET, FILM COATED ORAL at 16:21

## 2021-04-03 RX ADMIN — LEVETIRACETAM 500 MG: 500 TABLET, FILM COATED ORAL at 08:11

## 2021-04-03 RX ADMIN — CARVEDILOL 6.25 MG: 6.25 TABLET, FILM COATED ORAL at 08:12

## 2021-04-03 RX ADMIN — PANTOPRAZOLE 40 MG: 40 TABLET, DELAYED RELEASE ORAL at 07:20

## 2021-04-03 RX ADMIN — THERA TABS 1 TABLET: TAB at 08:11

## 2021-04-03 RX ADMIN — FOLIC ACID 1 MG: 1 TABLET ORAL at 08:11

## 2021-04-03 RX ADMIN — LORAZEPAM 1 MG: 1 TABLET ORAL at 16:58

## 2021-04-03 RX ADMIN — PANCRELIPASE 2 CAPSULE: 60000; 12000; 38000 CAPSULE, DELAYED RELEASE PELLETS ORAL at 08:12

## 2021-04-03 RX ADMIN — Medication 100 MG: at 08:11

## 2021-04-03 RX ADMIN — KETOCONAZOLE: 20 SHAMPOO, SUSPENSION TOPICAL at 13:33

## 2021-04-03 RX ADMIN — ATORVASTATIN CALCIUM 40 MG: 20 TABLET, FILM COATED ORAL at 21:11

## 2021-04-03 RX ADMIN — LORAZEPAM 1 MG: 1 TABLET ORAL at 08:46

## 2021-04-03 RX ADMIN — QUETIAPINE FUMARATE 300 MG: 300 TABLET, EXTENDED RELEASE ORAL at 21:11

## 2021-04-03 RX ADMIN — Medication 5000 UNITS: at 08:11

## 2021-04-03 NOTE — PROGRESS NOTES
Behavioral Health Progress Note    Admit Date: 4/1/2021  Hospital day 1    Vitals :   Patient Vitals for the past 8 hrs:   BP Temp Pulse Resp   04/03/21 0822 107/80 97.2 °F (36.2 °C) 74 17     Labs:  No results found for this or any previous visit (from the past 24 hour(s)).   Meds:   Current Facility-Administered Medications   Medication Dose Route Frequency    ketoconazole (NIZORAL) 2 % shampoo   Topical PRN    carvediloL (COREG) tablet 6.25 mg  6.25 mg Oral BID WITH MEALS    DULoxetine (CYMBALTA) capsule 60 mg  60 mg Oral DAILY    cholecalciferol (VITAMIN D3) capsule 5,000 Units  5,000 Units Oral DAILY    folic acid (FOLVITE) tablet 1 mg  1 mg Oral DAILY    LORazepam (ATIVAN) tablet 2 mg  2 mg Oral Q4H PRN    LORazepam (ATIVAN) tablet 1 mg  1 mg Oral Q4H PRN    therapeutic multivitamin (THERAGRAN) tablet 1 Tab  1 Tab Oral DAILY    thiamine HCL (B-1) tablet 100 mg  100 mg Oral DAILY    traZODone (DESYREL) tablet 50 mg  50 mg Oral QHS PRN    hydrOXYzine pamoate (VISTARIL) capsule 50 mg  50 mg Oral Q4H PRN    pantoprazole (PROTONIX) tablet 40 mg  40 mg Oral ACB&D    ondansetron hcl (ZOFRAN) tablet 4 mg  4 mg Oral Q6H PRN    tamsulosin (FLOMAX) capsule 0.4 mg  0.4 mg Oral DAILY    aspirin delayed-release tablet 81 mg  81 mg Oral DAILY    atorvastatin (LIPITOR) tablet 40 mg  40 mg Oral QHS    levETIRAcetam (KEPPRA) tablet 500 mg  500 mg Oral BID    lipase-protease-amylase (CREON 12,000) capsule 2 Cap  2 Cap Oral TID WITH MEALS    QUEtiapine SR (SEROquel XR) tablet 300 mg  300 mg Oral QHS      Hospital Problems: Principal Problem:    Severe episode of recurrent major depressive disorder ([facility] [address] (11/14/2011)      Overview: Overview:       Most probable SIMD    Active Problems:    Tobacco dependence (8/20/2013)      HTN (hypertension) (8/20/2013)      HLD (hyperlipidemia) (8/20/2013)      Alcohol use disorder, severe, dependence ([facility] [address] (4/22/2014)      Overview: Numerous hospitalization of intoxication/suicidal ideation      Delirium tremens ([facility] [address] (6/3/2020)        Subjective:   Medication side effects: fatigue/weakness, diarrhea  tremors    Mental Status Exam  Sensorium: alert  Orientation: only aware of  place, person and situation  Relations: cooperative  Eye Contact: poor  Appearance: shows poor hygiene  Thought Process: normal rate of thoughts and fair abstract reasoning/computation   Thought Content: no evidence of impairment   Suicidal: denies   Homicidal: none   Mood: is anxious and unhappy   Affect: stable  Memory: is impaired and is remote     Concentration: distractable  Abstraction: concrete  Insight: The patient shows little insight    OR Fair  Judgement: is psychologically impaired OR  Fair    Assessment/Plan:   improved    Continue close observation    Nurses report that he stays in his room. He lays down most of the time. He is still complaining of tremors that come and go mainly in the hands now. As I came in he had minor truncal tremor for a brief amount of time and then minor hand tremors. He had required lorazepam 1 mg again today for his withdrawal but it appears that the withdrawal is abating. He was complaining of nausea and required Zofran. He also now is complaining of diarrhea. I have written for Imodium as as needed in case he needs it. His CIWA score was 5. He says mood is improving as he is back on his antidepressant medication. He denies suicidal ideas. He is sheron for safety. His withdrawal symptoms should be done by Monday. I have spoken to the  about possibly going to another residential program but I am uncertain if 1 will be available considering that he was unable to get into one last time he was here.   It may be again necessary for him to go back to his long-term motel and be referred to the outpatient substance treatment program over at [facility] Detail Level: Zone Note Text (......Xxx Chief Complaint.): This diagnosis correlates with the Other (Free Text): Discussed removal by shave biopsy with patient and mom. He is bothered by this lesion and picks at it. However we discussed risk of scarring and that scar would likely be more visible than the lesion itself. Patient opted to no biopsy at this time.

## 2021-04-03 NOTE — BH NOTES
Group Therapy Note    Patient: Jacek Raines    Patient # in Group: 7    Group Type: Reality orientation    Group Time: 30 minutes    Method used: Free discussion    Attendance: Jacek Raines was      compliant with group and participated when directly confronted for 100% of the duration. Interaction Narrative: Jacek Raines had a Stable mood, was Cooperative during group and Allen Carcamo's thought content was Goal Directed. Writer Reinforced patient's understanding of how to check with her thinking process against reality by speaking with staff to corroborate her ideas. Patient was Able to give feedback to another, Able to reflect/comment on own behavior and Able to experience relief/decrease in symptoms. Will continue to monitor and provide redirection, education, and support as needed.

## 2021-04-03 NOTE — GROUP NOTE
[facility] GROUP DOCUMENTATION INDIVIDUAL                                                                          Group Therapy Note    Date: 4/2/2021    Group Start Time: 2000  Group End Time: 2030  Group Topic: Medication    [facility] [address] [facility] GROUP DOCUMENTATION GROUP    Group Therapy Note    Attendees: 6         Attendance: Attended      Interventions/techniques: Informed    Follows Directions:  Followed directions    Interactions: Interacted appropriately    Mental Status: Calm    Behavior/appearance: Attentive    Goals Achieved: Able to listen to others          Sherlyn Alonso

## 2021-04-04 ENCOUNTER — APPOINTMENT (OUTPATIENT)
Dept: CT IMAGING | Age: 56
End: 2021-04-04
Attending: EMERGENCY MEDICINE
Payer: MEDICAID

## 2021-04-04 ENCOUNTER — APPOINTMENT (OUTPATIENT)
Dept: GENERAL RADIOLOGY | Age: 56
End: 2021-04-04
Attending: EMERGENCY MEDICINE
Payer: MEDICAID

## 2021-04-04 VITALS
DIASTOLIC BLOOD PRESSURE: 100 MMHG | OXYGEN SATURATION: 91 % | TEMPERATURE: 98.9 F | SYSTOLIC BLOOD PRESSURE: 154 MMHG | RESPIRATION RATE: 22 BRPM | HEART RATE: 86 BPM

## 2021-04-04 LAB
AMPHET UR QL SCN: NEGATIVE
ANION GAP SERPL CALC-SCNC: 4 MMOL/L (ref 3–18)
APPEARANCE UR: CLEAR
BARBITURATES UR QL SCN: NEGATIVE
BASOPHILS # BLD: 0 K/UL (ref 0–0.1)
BASOPHILS NFR BLD: 1 % (ref 0–2)
BENZODIAZ UR QL: POSITIVE
BILIRUB UR QL: NEGATIVE
BUN SERPL-MCNC: 7 MG/DL (ref 7–18)
BUN/CREAT SERPL: 10 (ref 12–20)
CALCIUM SERPL-MCNC: 8.1 MG/DL (ref 8.5–10.1)
CANNABINOIDS UR QL SCN: NEGATIVE
CHLORIDE SERPL-SCNC: 109 MMOL/L (ref 100–111)
CO2 SERPL-SCNC: 28 MMOL/L (ref 21–32)
COCAINE UR QL SCN: NEGATIVE
COLOR UR: YELLOW
CREAT SERPL-MCNC: 0.69 MG/DL (ref 0.6–1.3)
DIFFERENTIAL METHOD BLD: ABNORMAL
EOSINOPHIL # BLD: 0.3 K/UL (ref 0–0.4)
EOSINOPHIL NFR BLD: 5 % (ref 0–5)
ERYTHROCYTE [DISTWIDTH] IN BLOOD BY AUTOMATED COUNT: 15.5 % (ref 11.6–14.5)
ETHANOL SERPL-MCNC: <3 MG/DL (ref 0–3)
GLUCOSE SERPL-MCNC: 114 MG/DL (ref 74–99)
GLUCOSE UR STRIP.AUTO-MCNC: NEGATIVE MG/DL
HCT VFR BLD AUTO: 36.3 % (ref 36–48)
HDSCOM,HDSCOM: ABNORMAL
HGB BLD-MCNC: 12.1 G/DL (ref 13–16)
HGB UR QL STRIP: NEGATIVE
KETONES UR QL STRIP.AUTO: NEGATIVE MG/DL
LEUKOCYTE ESTERASE UR QL STRIP.AUTO: NEGATIVE
LYMPHOCYTES # BLD: 2 K/UL (ref 0.9–3.6)
LYMPHOCYTES NFR BLD: 36 % (ref 21–52)
MAGNESIUM SERPL-MCNC: 1.9 MG/DL (ref 1.6–2.6)
MCH RBC QN AUTO: 30.1 PG (ref 24–34)
MCHC RBC AUTO-ENTMCNC: 33.3 G/DL (ref 31–37)
MCV RBC AUTO: 90.3 FL (ref 74–97)
METHADONE UR QL: NEGATIVE
MONOCYTES # BLD: 0.9 K/UL (ref 0.05–1.2)
MONOCYTES NFR BLD: 16 % (ref 3–10)
NEUTS SEG # BLD: 2.4 K/UL (ref 1.8–8)
NEUTS SEG NFR BLD: 42 % (ref 40–73)
NITRITE UR QL STRIP.AUTO: NEGATIVE
OPIATES UR QL: NEGATIVE
PCP UR QL: NEGATIVE
PH UR STRIP: 8 [PH] (ref 5–8)
PLATELET # BLD AUTO: 218 K/UL (ref 135–420)
PMV BLD AUTO: 10.9 FL (ref 9.2–11.8)
POTASSIUM SERPL-SCNC: 3.7 MMOL/L (ref 3.5–5.5)
PROT UR STRIP-MCNC: NEGATIVE MG/DL
RBC # BLD AUTO: 4.02 M/UL (ref 4.7–5.5)
SODIUM SERPL-SCNC: 141 MMOL/L (ref 136–145)
SP GR UR REFRACTOMETRY: 1.01 (ref 1–1.03)
TSH SERPL DL<=0.05 MIU/L-ACNC: 1.56 UIU/ML (ref 0.36–3.74)
UROBILINOGEN UR QL STRIP.AUTO: 0.2 EU/DL (ref 0.2–1)
WBC # BLD AUTO: 5.7 K/UL (ref 4.6–13.2)

## 2021-04-04 PROCEDURE — 70450 CT HEAD/BRAIN W/O DYE: CPT

## 2021-04-04 PROCEDURE — 74011250637 HC RX REV CODE- 250/637: Performed by: EMERGENCY MEDICINE

## 2021-04-04 PROCEDURE — 82077 ASSAY SPEC XCP UR&BREATH IA: CPT

## 2021-04-04 PROCEDURE — 80307 DRUG TEST PRSMV CHEM ANLYZR: CPT

## 2021-04-04 PROCEDURE — 85025 COMPLETE CBC W/AUTO DIFF WBC: CPT

## 2021-04-04 PROCEDURE — 81003 URINALYSIS AUTO W/O SCOPE: CPT

## 2021-04-04 PROCEDURE — 96376 TX/PRO/DX INJ SAME DRUG ADON: CPT

## 2021-04-04 PROCEDURE — 74011250636 HC RX REV CODE- 250/636

## 2021-04-04 PROCEDURE — 96361 HYDRATE IV INFUSION ADD-ON: CPT

## 2021-04-04 PROCEDURE — 84443 ASSAY THYROID STIM HORMONE: CPT

## 2021-04-04 PROCEDURE — 80048 BASIC METABOLIC PNL TOTAL CA: CPT

## 2021-04-04 PROCEDURE — 74011250636 HC RX REV CODE- 250/636: Performed by: EMERGENCY MEDICINE

## 2021-04-04 PROCEDURE — 74011250637 HC RX REV CODE- 250/637: Performed by: PSYCHIATRY & NEUROLOGY

## 2021-04-04 PROCEDURE — 74011250636 HC RX REV CODE- 250/636: Performed by: STUDENT IN AN ORGANIZED HEALTH CARE EDUCATION/TRAINING PROGRAM

## 2021-04-04 PROCEDURE — 83735 ASSAY OF MAGNESIUM: CPT

## 2021-04-04 PROCEDURE — 99284 EMERGENCY DEPT VISIT MOD MDM: CPT

## 2021-04-04 PROCEDURE — 65220000003 HC RM SEMIPRIVATE PSYCH

## 2021-04-04 PROCEDURE — 73610 X-RAY EXAM OF ANKLE: CPT

## 2021-04-04 PROCEDURE — 96374 THER/PROPH/DIAG INJ IV PUSH: CPT

## 2021-04-04 RX ORDER — TAMSULOSIN HYDROCHLORIDE 0.4 MG/1
0.4 CAPSULE ORAL DAILY
Status: DISCONTINUED | OUTPATIENT
Start: 2021-04-05 | End: 2021-04-07 | Stop reason: HOSPADM

## 2021-04-04 RX ORDER — GUAIFENESIN 100 MG/5ML
81 LIQUID (ML) ORAL DAILY
Status: DISCONTINUED | OUTPATIENT
Start: 2021-04-05 | End: 2021-04-07 | Stop reason: HOSPADM

## 2021-04-04 RX ORDER — QUETIAPINE 300 MG/1
300 TABLET, FILM COATED, EXTENDED RELEASE ORAL
Status: DISCONTINUED | OUTPATIENT
Start: 2021-04-04 | End: 2021-04-07 | Stop reason: HOSPADM

## 2021-04-04 RX ORDER — LORAZEPAM 2 MG/ML
2 INJECTION INTRAMUSCULAR
Status: COMPLETED | OUTPATIENT
Start: 2021-04-04 | End: 2021-04-04

## 2021-04-04 RX ORDER — ATORVASTATIN CALCIUM 20 MG/1
40 TABLET, FILM COATED ORAL
Status: DISCONTINUED | OUTPATIENT
Start: 2021-04-04 | End: 2021-04-07 | Stop reason: HOSPADM

## 2021-04-04 RX ORDER — FOLIC ACID 1 MG/1
1 TABLET ORAL DAILY
Status: DISCONTINUED | OUTPATIENT
Start: 2021-04-05 | End: 2021-04-07 | Stop reason: HOSPADM

## 2021-04-04 RX ORDER — LOPERAMIDE HYDROCHLORIDE 2 MG/1
2-4 CAPSULE ORAL AS NEEDED
Status: DISCONTINUED | OUTPATIENT
Start: 2021-04-04 | End: 2021-04-07 | Stop reason: HOSPADM

## 2021-04-04 RX ORDER — ONDANSETRON 4 MG/1
4 TABLET, FILM COATED ORAL
Status: DISCONTINUED | OUTPATIENT
Start: 2021-04-04 | End: 2021-04-07 | Stop reason: HOSPADM

## 2021-04-04 RX ORDER — TRAZODONE HYDROCHLORIDE 50 MG/1
50 TABLET ORAL
Status: DISCONTINUED | OUTPATIENT
Start: 2021-04-04 | End: 2021-04-07 | Stop reason: HOSPADM

## 2021-04-04 RX ORDER — CHOLECALCIFEROL (VITAMIN D3) 125 MCG
5000 CAPSULE ORAL DAILY
Status: DISCONTINUED | OUTPATIENT
Start: 2021-04-05 | End: 2021-04-07 | Stop reason: HOSPADM

## 2021-04-04 RX ORDER — LORAZEPAM 2 MG/ML
2 INJECTION INTRAMUSCULAR
Status: DISPENSED | OUTPATIENT
Start: 2021-04-04 | End: 2021-04-04

## 2021-04-04 RX ORDER — DULOXETIN HYDROCHLORIDE 30 MG/1
60 CAPSULE, DELAYED RELEASE ORAL DAILY
Status: DISCONTINUED | OUTPATIENT
Start: 2021-04-05 | End: 2021-04-07 | Stop reason: HOSPADM

## 2021-04-04 RX ORDER — LORAZEPAM 1 MG/1
1 TABLET ORAL
Status: DISCONTINUED | OUTPATIENT
Start: 2021-04-04 | End: 2021-04-07 | Stop reason: HOSPADM

## 2021-04-04 RX ORDER — LANOLIN ALCOHOL/MO/W.PET/CERES
100 CREAM (GRAM) TOPICAL DAILY
Status: DISCONTINUED | OUTPATIENT
Start: 2021-04-05 | End: 2021-04-07 | Stop reason: HOSPADM

## 2021-04-04 RX ORDER — THERA TABS 400 MCG
1 TAB ORAL DAILY
Status: DISCONTINUED | OUTPATIENT
Start: 2021-04-05 | End: 2021-04-07 | Stop reason: HOSPADM

## 2021-04-04 RX ORDER — LORAZEPAM 1 MG/1
2 TABLET ORAL
Status: DISCONTINUED | OUTPATIENT
Start: 2021-04-04 | End: 2021-04-07 | Stop reason: HOSPADM

## 2021-04-04 RX ORDER — LORAZEPAM 2 MG/ML
INJECTION INTRAMUSCULAR
Status: COMPLETED
Start: 2021-04-04 | End: 2021-04-04

## 2021-04-04 RX ORDER — KETOCONAZOLE 20 MG/ML
SHAMPOO TOPICAL AS NEEDED
Status: DISCONTINUED | OUTPATIENT
Start: 2021-04-04 | End: 2021-04-07 | Stop reason: HOSPADM

## 2021-04-04 RX ORDER — LORAZEPAM 2 MG/ML
2 INJECTION INTRAMUSCULAR ONCE
Status: COMPLETED | OUTPATIENT
Start: 2021-04-04 | End: 2021-04-04

## 2021-04-04 RX ORDER — PANTOPRAZOLE SODIUM 40 MG/1
40 TABLET, DELAYED RELEASE ORAL
Status: DISCONTINUED | OUTPATIENT
Start: 2021-04-04 | End: 2021-04-07 | Stop reason: HOSPADM

## 2021-04-04 RX ORDER — HYDROXYZINE PAMOATE 50 MG/1
50 CAPSULE ORAL
Status: DISCONTINUED | OUTPATIENT
Start: 2021-04-04 | End: 2021-04-07 | Stop reason: HOSPADM

## 2021-04-04 RX ORDER — LEVETIRACETAM 500 MG/1
500 TABLET ORAL 2 TIMES DAILY
Status: DISCONTINUED | OUTPATIENT
Start: 2021-04-04 | End: 2021-04-07 | Stop reason: HOSPADM

## 2021-04-04 RX ORDER — CARVEDILOL 6.25 MG/1
6.25 TABLET ORAL 2 TIMES DAILY WITH MEALS
Status: DISCONTINUED | OUTPATIENT
Start: 2021-04-04 | End: 2021-04-07 | Stop reason: HOSPADM

## 2021-04-04 RX ORDER — ACETAMINOPHEN 325 MG/1
650 TABLET ORAL
Status: COMPLETED | OUTPATIENT
Start: 2021-04-04 | End: 2021-04-04

## 2021-04-04 RX ADMIN — TRAZODONE HYDROCHLORIDE 50 MG: 50 TABLET ORAL at 20:38

## 2021-04-04 RX ADMIN — LORAZEPAM 2 MG: 2 INJECTION INTRAMUSCULAR; INTRAVENOUS at 15:33

## 2021-04-04 RX ADMIN — ACETAMINOPHEN 650 MG: 325 TABLET ORAL at 02:33

## 2021-04-04 RX ADMIN — LORAZEPAM 2 MG: 1 TABLET ORAL at 19:50

## 2021-04-04 RX ADMIN — ATORVASTATIN CALCIUM 40 MG: 20 TABLET, FILM COATED ORAL at 20:36

## 2021-04-04 RX ADMIN — CARVEDILOL 6.25 MG: 6.25 TABLET, FILM COATED ORAL at 17:13

## 2021-04-04 RX ADMIN — LORAZEPAM 2 MG: 2 INJECTION INTRAMUSCULAR; INTRAVENOUS at 01:35

## 2021-04-04 RX ADMIN — LORAZEPAM 2 MG: 2 INJECTION INTRAMUSCULAR; INTRAVENOUS at 11:44

## 2021-04-04 RX ADMIN — PANTOPRAZOLE SODIUM 40 MG: 40 TABLET, DELAYED RELEASE ORAL at 17:13

## 2021-04-04 RX ADMIN — LORAZEPAM 2 MG: 2 INJECTION INTRAMUSCULAR at 00:10

## 2021-04-04 RX ADMIN — LORAZEPAM 2 MG: 2 INJECTION, SOLUTION INTRAMUSCULAR; INTRAVENOUS at 00:10

## 2021-04-04 RX ADMIN — SODIUM CHLORIDE 1000 ML: 900 INJECTION, SOLUTION INTRAVENOUS at 01:17

## 2021-04-04 RX ADMIN — LEVETIRACETAM 500 MG: 500 TABLET ORAL at 20:36

## 2021-04-04 RX ADMIN — QUETIAPINE FUMARATE 300 MG: 300 TABLET, EXTENDED RELEASE ORAL at 20:36

## 2021-04-04 RX ADMIN — PANCRELIPASE 2 CAPSULE: 60000; 12000; 38000 CAPSULE, DELAYED RELEASE PELLETS ORAL at 17:13

## 2021-04-04 NOTE — ED NOTES
Pt brought to ed from behavioral via stretcher after rapid response was called as pt was in DT's. Pt is awake, alert and tremulous. Vitals stable  No iv access on arrival to ed. Pt belongings remain in behavioral unit with staff.

## 2021-04-04 NOTE — BH NOTES
Patient was found on the floor in his room the patient was shaking, and trembling laying on the floor. The patient was unresponsive to verbal and physical commands, the patient had his eyes closed, the patient continued to shake and tremble the patient appeared to have seizure like activity. Rapid response called, Rapid response team came, patient vital signs taken. Patient began responding to Rapid response team verbal commands. On call physician  notified of patient status. Patient was transferred off the unit via stretcher to the ER will continue to follow current POC and interventions per policies/protocols.

## 2021-04-04 NOTE — BH NOTES
Progress Note    Patient returns via ambulatory to unit from his stay in the ED, r/t his having a tonic/clonic seizure episode on the unit last night. Per information gleaned from notes, patient's symptoms have been resolved, patient has no complaints of pain at this time, is walking with a normal gait, and no visible injuries at this time. Patient was reoriented to the unit/room, vital signs were taken and they were WNL, and patient has no complaints from withdrawals at this time. Will continue to monitor and provide support as needed.

## 2021-04-04 NOTE — GROUP NOTE
[facility] GROUP DOCUMENTATION INDIVIDUAL                                                                          Group Therapy Note    Date: 4/3/2021    Group Start Time: 2000  Group End Time: 2105  Group Topic: Hygiene    [address] [place] [address] SPECIAL TRTMT 1    Tulio Johnson RN    [facility] GROUP DOCUMENTATION GROUP    Group Therapy Note    Attendees:7         Attendance: Attended    Patient's Goal:  Importance of good hygiene    Interventions/techniques: Informed and Validated    Follows Directions: Followed directions    Interactions: Interacted appropriately    Mental Status: Calm    Behavior/appearance: Cooperative    Goals Achieved:  Identified feelings        Kyra Rachel RN

## 2021-04-04 NOTE — PROGRESS NOTES
Problem: Suicide  Goal: *STG: Remains safe in hospital  Description: AEB pt not harming self or others and sheron for safety daily while in the hospital  Outcome: Progressing Towards Goal  Goal: *STG: Attends activities and groups  Description: AEB pt attending 3 groups daily while in the hospital  Outcome: Progressing Towards Goal  Goal: *STG/LTG: No longer expresses self destructive or suicidal thoughts  Description: AEB pt no longer expressing self destructive or suicidal thoughts daily while in the hospital  Outcome: Progressing Towards Goal     Problem: Alcohol Withdrawal  Goal: *STG: Seeks staff when symptoms of withdrawal increase  Description: AEB pt seeking staff as needed when symptoms of alcohol withdrawal increase daily while in the hospital  Outcome: Progressing Towards Goal  Goal: *STG: Complies with medication therapy  Description: AEB pt taking all scheduled medications daily while in the hospital  Outcome: Progressing Towards Goal  Goal: *STG: Will identify negative impact of chemical dependency including the use of tobacco, alcohol, and other substances  Description: AEB pt identifying 3 negative impacts of substance abuse prior to discharge from the hospital  Outcome: Progressing Towards Goal  Goal: *STG: Verbalizes abstinence as an achievable goal  Description: AEB pt verbalizing abstinence as an achievable goal prior to discharge from the hospital  Outcome: Progressing Towards Goal     Romina Moore is a 48 y.o. Male that presented today as anxiety, depression, but cooperative with transfer back to unit from ED. Romina Moore has been compliant with all medications given in the ED, ate his dinner meal given upon return, and has been very cooperative with his transfer back to the unit. RN's will initiate, develop, implement, review, and revise treatment plans as necessary. Will continue to provide education, redirection, and support as needed or verbalized by patient.    Signed By: Radha Givens RN     April 4, 2021

## 2021-04-04 NOTE — PROGRESS NOTES
Pt.is  visible in the milieu most of the evening. He adheres to the unit guidelines. He is compliant with his medications and attends group. PRN ativan given for high CIWA score. He denies SI,HI or AVH. Will continue to monitor for safety.

## 2021-04-04 NOTE — PROGRESS NOTES
Rapid Response Note  [facility]    Patient: Lori Chavez 48 y.o. male  [id number]  1965    Admit Date: 4/3/2021   Admission Diagnosis: No admission diagnoses are documented for this encounter. RAPID RESPONSE     Rapid response called for siezure-like activity and unresponsiveness.     Medications Reviewed - given 1mg x2 ativan, then 2mg ativan at 21:00 (for CIWA of 12)  -not on anticoagulation    Pt with known seizure disorder on Keppra 500mg BID. Also has recent admission at [facility] for acute alcohol withdrawal.      Nursing tech reports found patient with full body convulsions. She stated she saw him fall off the bed onto the floor. Pt unresponsive during this event. Nurse reports this stat lasted for 5+ minutes. Fall assessment found no bruising or concern of acute fracture. Only endorsed mild right ankle pain after hitting wooden bed frame.      When PFM arrived pt was on the floor actively seizing with full body spasms. Patient responsiveness slowly returned and was able to converse despite having bilateral UE tremulousness. Patient was alert and ox2 prior to slowly moving patient to hospital bed.      Psych nursing reports he can only receive PO ativan. If IV needed he needs to go to ED. Discussed with Dr. Tara Rasmussen, Psychiatry on call. He approves plan for transfer to ED for further evaluation.       ROS   \"unwell\"  nausea   ankle pain     Medications Reviewed: Keppra,     OBJECTIVE     Patient Vitals for the past 12 hrs:   Pulse Resp BP SpO2   04/04/21 0015 84 15 134/79 93 %   04/03/21 2357 -- -- (!) 132/97 94 %     PHYSICAL:  General:  Alert, slow to respond. Visibly shaking. Tremulousness worse in hands. CV:  RRR, no murmurs, rubs, or gallops. PMI not displaced. No visible pulsations or thrills. No carotid bruits. RESP:  Unlabored breathing. CTAB. ABD:  Soft, nontender, nondistended. Neuro:  5/5 strength bilateral upper extremities and lower extremities.  Pupils and RTL. CN II-XII intact. Sensation grossly intact. A+Ox2 initially, Ox3 after moved to hospital bed. ASSESSMENT, PLAN & DISPOSITION   Theresa Holland is a 48y.o. year old male admitted for No admission diagnoses are documented for this encounter. . Rapid response called for seizure like activity in unresponsive patient. Patient condition currently: Stable. Medications Administered: patient received 2mg PO ativan prior to MRT @ 2100, upon transfer to ED given additional 2mg IV. Labs ordered/Pending: none    Disposition: stable     Attending Maddie Colon notified of rapid response. In agreement with plan. Patient discussed with Dr. Lyndsey Mcdonald (Emergency) who will resume care.      Senior resident Arielle Martines present during RRT and evaluation    Elonda Angelucci, MD   [address] Medicine PGY-1  1:01 AM 04/04/21

## 2021-04-04 NOTE — ED NOTES
Patient endorsed to me as a signout from Dr. Mandy Arnett at 0700 pending transfer back to the behavioral health unit. He was brought to the ED as a rapid response for possible seizure-like activity related to alcohol withdrawal. He is medically cleared and will remain boarding in the ED until he can return to the behavioral health unit.     3:03 PM  Per the behavioral health unit, patient needs a discharge order from the ED in order to return upstairs. Patient discharged. He required 2 doses of ativan while boarding in the ED for tremors related to his alcohol withdrawal. He has remained stable during the entire duration of his stay here.     Sarah Madrigal,

## 2021-04-04 NOTE — ED PROVIDER NOTES
Theresa Holland is a 48 y.o. male coming in as a rapid response from the behavioral health unit. Patient is a heavy alcohol user. He was admitted for alcohol withdrawal and detox. Patient states her last drink was about a week ago. He was on behavioral health floor when he rolled off the bed and had what was described as a 5-minute generalized tonic-clonic seizure. Patient does have a history of seizure disorder and is on Keppra. He does continue to endorse tremulousness and shakiness. He was given multiple doses of IV Ativan prior to arrival here in the emergency department. Not on any anticoagulation. Patient denies any headache, vomiting, fevers, or other new illness. Does report some discomfort of his right lateral ankle where he had previous surgery. He thinks he may have hit it in the fall. Denies neck pain. Denies back pain. Patient has no other complaints at this time.            Past Medical History:   Diagnosis Date    Abuse     Anemia NEC     Anxiety     Arthritis     Chronic pain     Colitis     Contact dermatitis and other eczema, due to unspecified cause     Depression     Depression     Headache(784.0)     Heart attack ([address]     Hypercholesterolemia     Hypertension     Liver disease     Low back pain     with left leg pain -- multilevel spondylosis and L4 radiculitis    Neck pain     mild spondylosis    Other ill-defined conditions(799.89)     MS symptoms    Pancreatitis     Psychotic disorder ([address]     Trauma        Past Surgical History:   Procedure Laterality Date    HX CYST REMOVAL           Family History:   Problem Relation Age of Onset    Psychiatric Disorder Mother     Heart Disease Mother     Arthritis-osteo Father     Alcohol abuse Father     Cancer Father         lung    Elevated Lipids Father     Headache Father     Hypertension Father     Lung Disease Father     Diabetes Father        Social History     Socioeconomic History    Marital status:      Spouse name: Not on file    Number of children: Not on file    Years of education: Not on file    Highest education level: Not on file   Occupational History    Not on file   Social Needs    Financial resource strain: Not on file    Food insecurity     Worry: Not on file     Inability: Not on file    Transportation needs     Medical: Not on file     Non-medical: Not on file   Tobacco Use    Smoking status: Current Some Day Smoker     Packs/day: 0.50     Years: 20.00     Pack years: 10.00     Types: Cigarettes    Smokeless tobacco: Never Used    Tobacco comment: patient states he vapes daily   Substance and Sexual Activity    Alcohol use: Not Currently     Alcohol/week: 0.0 standard drinks     Comment:  ETOH abuse - quit NOV 2018    Drug use: No    Sexual activity: Not Currently     Partners: Female     Birth control/protection: Condom   Lifestyle    Physical activity     Days per week: Not on file     Minutes per session: Not on file    Stress: Not on file   Relationships    Social connections     Talks on phone: Not on file     Gets together: Not on file     Attends Caodaism service: Not on file     Active member of club or organization: Not on file     Attends meetings of clubs or organizations: Not on file     Relationship status: Not on file    Intimate partner violence     Fear of current or ex partner: Not on file     Emotionally abused: Not on file     Physically abused: Not on file     Forced sexual activity: Not on file   Other Topics Concern    Not on file   Social History Narrative    ** Merged History Encounter **              ALLERGIES: Amlodipine, Carbamazepine, Lisinopril, and Prednisone    Review of Systems   Constitutional: Negative. Negative for chills and fever. Eyes: Negative. Negative for visual disturbance. Respiratory: Negative. Negative for shortness of breath. Cardiovascular: Negative. Negative for chest pain. Gastrointestinal: Negative.   Negative for abdominal pain, nausea and vomiting. Musculoskeletal: Positive for arthralgias. Negative for back pain and neck pain. Skin: Negative. Negative for rash. Neurological: Positive for tremors and seizures. Negative for headaches. All other systems reviewed and are negative. Vitals:    04/04/21 0215 04/04/21 0230 04/04/21 0245 04/04/21 0300   BP: (!) 142/89 135/84 137/81 132/77   Pulse: 72 76 73 74   Resp: 18 18 17 18   Temp:    98.9 °F (37.2 °C)   SpO2: 94% 92% 92% 92%            Physical Exam  Vitals signs reviewed. Constitutional:       General: He is not in acute distress. Appearance: Normal appearance. He is well-developed. He is obese. Comments: Patient is awake, alert, oriented, and cooperative. Nontoxic. He is tremulous, but not tachycardic. Very lucid, no hallucinations or alteration in mental status. HENT:      Head: Normocephalic and atraumatic. Mouth/Throat:      Mouth: Mucous membranes are moist.   Eyes:      Extraocular Movements: Extraocular movements intact. Conjunctiva/sclera: Conjunctivae normal.      Pupils: Pupils are equal, round, and reactive to light. Neck:      Musculoskeletal: Normal range of motion and neck supple. Cardiovascular:      Rate and Rhythm: Normal rate and regular rhythm. Heart sounds: S1 normal and S2 normal. No murmur. No friction rub. No gallop. Pulmonary:      Effort: Pulmonary effort is normal. No accessory muscle usage or respiratory distress. Breath sounds: Normal breath sounds. Abdominal:      General: There is no distension. Palpations: Abdomen is not rigid. Tenderness: There is no abdominal tenderness. Musculoskeletal: Normal range of motion. Comments: Some reproducible pain with range of motion of the right ankle. No limitation range of motion. No effusion. Neurovascularly intact. Compartment soft. Skin:     General: Skin is warm. Findings: No rash.    Neurological:      General: No focal deficit present. Mental Status: He is alert and oriented to person, place, and time. Mental status is at baseline. Cranial Nerves: No cranial nerve deficit. Sensory: No sensory deficit. Motor: No weakness. Comments: Completely awake, alert, oriented, and cooperative. No focal neurologic deficits. He is tremulous. Strength equal bilaterally. No sensation deficits. Cranial nerves intact. Psychiatric:         Speech: Speech normal.          MDM  Number of Diagnoses or Management Options  Alcohol withdrawal syndrome without complication ([facility]  Breakthrough seizure ([facility]  Diagnosis management comments: Jessi Joiner is a 48 y.o. male coming in after a seizure from behavioral health. Patient does have a history of seizure disorder and is on Keppra. He is also detoxing from alcohol. Last drink was about a week ago. He does have some tremulousness and shakiness, however he is not tachycardic, he has no alteration in his mental status, and I do not think that this is from delirium tremens at this point. I feel this is less like an alcohol withdrawal seizure and more likely breakthrough seizure from his underlying seizure disorder. He reportedly did fall off the bed and hit his head on the ground. Will get CT imaging of the brain and basic labs. Will treat with benzos here for symptomatic relief and will plan for transfer back to inpatient behavioral health if work-up unremarkable here. Patient's work-up is been unremarkable. Has been resting comfortably in bed and sleeping with a heart rate in the 60s to 70s. He is remained at his baseline mental status without any further seizure activity. No evidence of DTs or altered mental status. Discussed with Marysol Gonzales from the crisis team and she stated that patient is cleared to return to behavioral health.          Procedures    Vitals:  Patient Vitals for the past 12 hrs:   Temp Pulse Resp BP SpO2   04/04/21 0300 98.9 °F (37.2 °C) 74 18 132/77 92 %   04/04/21 0245 -- 73 17 137/81 92 %   04/04/21 0230 -- 76 18 135/84 92 %   04/04/21 0215 -- 72 18 (!) 142/89 94 %   04/04/21 0145 -- 69 16 (!) 144/95 96 %   04/04/21 0130 -- 67 18 129/85 93 %   04/04/21 0115 -- 73 16 129/79 94 %   04/04/21 0100 -- 73 13 130/81 93 %   04/04/21 0045 -- 75 17 (!) 126/56 92 %   04/04/21 0015 -- 84 15 134/79 93 %   04/03/21 2357 -- -- -- (!) 132/97 94 %       Medications ordered:   Medications   LORazepam (ATIVAN) injection 2 mg ( IntraVENous Canceled Entry 4/4/21 0018)   sodium chloride 0.9 % bolus infusion 1,000 mL (0 mL IntraVENous IV Completed 4/4/21 0300)   LORazepam (ATIVAN) injection 2 mg (2 mg IntraVENous Given 4/4/21 0010)   LORazepam (ATIVAN) injection 2 mg (2 mg IntraVENous Given 4/4/21 0135)   acetaminophen (TYLENOL) tablet 650 mg (650 mg Oral Given 4/4/21 0233)         Lab findings:  Recent Results (from the past 12 hour(s))   DRUG SCREEN, URINE    Collection Time: 04/04/21  1:36 AM   Result Value Ref Range    BENZODIAZEPINES Positive (A) NEG      BARBITURATES Negative NEG      THC (TH-CANNABINOL) Negative NEG      OPIATES Negative NEG      PCP(PHENCYCLIDINE) Negative NEG      COCAINE Negative NEG      AMPHETAMINES Negative NEG      METHADONE Negative NEG      HDSCOM (NOTE)    URINALYSIS W/ RFLX MICROSCOPIC    Collection Time: 04/04/21  1:36 AM   Result Value Ref Range    Color YELLOW      Appearance CLEAR      Specific gravity 1.007 1.005 - 1.030      pH (UA) 8.0 5.0 - 8.0      Protein Negative NEG mg/dL    Glucose Negative NEG mg/dL    Ketone Negative NEG mg/dL    Bilirubin Negative NEG      Blood Negative NEG      Urobilinogen 0.2 0.2 - 1.0 EU/dL    Nitrites Negative NEG      Leukocyte Esterase Negative NEG     CBC WITH AUTOMATED DIFF    Collection Time: 04/04/21  3:52 AM   Result Value Ref Range    WBC 5.7 4.6 - 13.2 K/uL    RBC 4.02 (L) 4.70 - 5.50 M/uL    HGB 12.1 (L) 13.0 - 16.0 g/dL    HCT 36.3 36.0 - 48.0 %    MCV 90.3 74.0 - 97.0 FL    MCH 30.1 24.0 - 34.0 PG    MCHC 33.3 31.0 - 37.0 g/dL    RDW 15.5 (H) 11.6 - 14.5 %    PLATELET 835 678 - 614 K/uL    MPV 10.9 9.2 - 11.8 FL    NEUTROPHILS 42 40 - 73 %    LYMPHOCYTES 36 21 - 52 %    MONOCYTES 16 (H) 3 - 10 %    EOSINOPHILS 5 0 - 5 %    BASOPHILS 1 0 - 2 %    ABS. NEUTROPHILS 2.4 1.8 - 8.0 K/UL    ABS. LYMPHOCYTES 2.0 0.9 - 3.6 K/UL    ABS. MONOCYTES 0.9 0.05 - 1.2 K/UL    ABS. EOSINOPHILS 0.3 0.0 - 0.4 K/UL    ABS. BASOPHILS 0.0 0.0 - 0.1 K/UL    DF AUTOMATED     METABOLIC PANEL, BASIC    Collection Time: 04/04/21  3:52 AM   Result Value Ref Range    Sodium 141 136 - 145 mmol/L    Potassium 3.7 3.5 - 5.5 mmol/L    Chloride 109 100 - 111 mmol/L    CO2 28 21 - 32 mmol/L    Anion gap 4 3.0 - 18 mmol/L    Glucose 114 (H) 74 - 99 mg/dL    BUN 7 7.0 - 18 MG/DL    Creatinine 0.69 0.6 - 1.3 MG/DL    BUN/Creatinine ratio 10 (L) 12 - 20      GFR est AA >60 >60 ml/min/1.73m2    GFR est non-AA >60 >60 ml/min/1.73m2    Calcium 8.1 (L) 8.5 - 10.1 MG/DL   MAGNESIUM    Collection Time: 04/04/21  3:52 AM   Result Value Ref Range    Magnesium 1.9 1.6 - 2.6 mg/dL   ETHYL ALCOHOL    Collection Time: 04/04/21  3:52 AM   Result Value Ref Range    ALCOHOL(ETHYL),SERUM <3 0 - 3 MG/DL   TSH 3RD GENERATION    Collection Time: 04/04/21  3:52 AM   Result Value Ref Range    TSH 1.56 0.36 - 3.74 uIU/mL       X-Ray, CT or other radiology findings or impressions:  CT HEAD WO CONT   Final Result   1. No acute intracranial process identified. XR ANKLE RT MIN 3 V    (Results Pending)       Disposition:  Diagnosis:   1. Breakthrough seizure ([address]    2. Alcohol withdrawal syndrome without complication (HCC)        Disposition: Readmit to behavioral health    Follow-up Information    None          Patient's Medications   Start Taking    No medications on file   Continue Taking    ASPIRIN DELAYED-RELEASE 81 MG TABLET    aspirin 81 mg tablet,delayed release    ATORVASTATIN (LIPITOR) 40 MG TABLET    Take 1 Tab by mouth nightly. Indications: high cholesterol and high triglycerides    BUSPIRONE (BUSPAR) 10 MG TABLET    Take 1 Tab by mouth three (3) times daily. Indications: repeated episodes of anxiety    CARVEDILOL (COREG) 12.5 MG TABLET    carvedilol 12.5 mg tablet    DULOXETINE (CYMBALTA) 60 MG CAPSULE    Take 1 Cap by mouth daily. Indications: diabetic complication causing injury to some body nerves    FLUTICASONE PROPIONATE (FLONASE) 50 MCG/ACTUATION NASAL SPRAY    2 sprays each nostril daily  Indications: inflammation of the nose due to an allergy    GABAPENTIN (NEURONTIN) 600 MG TABLET    Take 0.5 Tabs by mouth two (2) times a day. Max Daily Amount: 600 mg. KETOCONAZOLE (NIZORAL) 2 % SHAMPOO    Apply 5 mL to affected area daily. Indications: dandruff    LEVETIRACETAM (KEPPRA) 500 MG TABLET    Take 1 Tab by mouth two (2) times a day. Indications: additional medication for myoclonic epilepsy    LIPASE-PROTEASE-AMYLASE (CREON) 24,000-76,000 -120,000 UNIT CAPSULE    Creon 24,000-76,000-120,000 unit capsule,delayed release   take 1 capsule by mouth three times a day with meals    LORAZEPAM (ATIVAN) 1 MG TABLET    Take 1 Tab by mouth every three (3) hours as needed for Anxiety (As well as for tremors). Max Daily Amount: 8 mg. MULTIVIT-MIN-FA-LYCOPEN-LUTEIN (CENTRUM SILVER) 0.4-300-250 MG-MCG-MCG TAB    Take 1 Tab by mouth daily. PANTOPRAZOLE (PROTONIX) 40 MG TABLET    Take 1 Tab by mouth Before breakfast and dinner. QUETIAPINE SR (SEROQUEL XR) 300 MG SR TABLET    Take 1 Tab by mouth nightly. Indications: additional medications to treat depression    TAMSULOSIN (FLOMAX) 0.4 MG CAPSULE    Take 1 Cap by mouth daily. Indications: enlarged prostate with urination problem    THIAMINE HCL (B-1) 100 MG TABLET    Take 1 Tab by mouth daily.    These Medications have changed    No medications on file   Stop Taking    No medications on file

## 2021-04-05 LAB — HIV 2 ANTIBODY,HIV2: NEGATIVE

## 2021-04-05 PROCEDURE — 74011250637 HC RX REV CODE- 250/637: Performed by: PSYCHIATRY & NEUROLOGY

## 2021-04-05 PROCEDURE — 65220000003 HC RM SEMIPRIVATE PSYCH

## 2021-04-05 PROCEDURE — 99232 SBSQ HOSP IP/OBS MODERATE 35: CPT | Performed by: PSYCHIATRY & NEUROLOGY

## 2021-04-05 RX ORDER — GABAPENTIN 300 MG/1
300 CAPSULE ORAL 3 TIMES DAILY
Status: DISCONTINUED | OUTPATIENT
Start: 2021-04-05 | End: 2021-04-06

## 2021-04-05 RX ADMIN — FOLIC ACID 1 MG: 1 TABLET ORAL at 08:12

## 2021-04-05 RX ADMIN — TRAZODONE HYDROCHLORIDE 50 MG: 50 TABLET ORAL at 20:35

## 2021-04-05 RX ADMIN — PANCRELIPASE 2 CAPSULE: 60000; 12000; 38000 CAPSULE, DELAYED RELEASE PELLETS ORAL at 16:07

## 2021-04-05 RX ADMIN — CARVEDILOL 6.25 MG: 6.25 TABLET, FILM COATED ORAL at 16:07

## 2021-04-05 RX ADMIN — PANTOPRAZOLE SODIUM 40 MG: 40 TABLET, DELAYED RELEASE ORAL at 07:41

## 2021-04-05 RX ADMIN — LORAZEPAM 1 MG: 1 TABLET ORAL at 07:41

## 2021-04-05 RX ADMIN — PANCRELIPASE 2 CAPSULE: 60000; 12000; 38000 CAPSULE, DELAYED RELEASE PELLETS ORAL at 11:18

## 2021-04-05 RX ADMIN — THERA TABS 1 TABLET: TAB at 08:11

## 2021-04-05 RX ADMIN — LEVETIRACETAM 500 MG: 500 TABLET ORAL at 20:34

## 2021-04-05 RX ADMIN — ASPIRIN 81 MG: 81 TABLET, CHEWABLE ORAL at 08:11

## 2021-04-05 RX ADMIN — ONDANSETRON HYDROCHLORIDE 4 MG: 4 TABLET, FILM COATED ORAL at 17:07

## 2021-04-05 RX ADMIN — TAMSULOSIN HYDROCHLORIDE 0.4 MG: 0.4 CAPSULE ORAL at 08:11

## 2021-04-05 RX ADMIN — GABAPENTIN 300 MG: 300 CAPSULE ORAL at 14:20

## 2021-04-05 RX ADMIN — Medication 100 MG: at 08:12

## 2021-04-05 RX ADMIN — LEVETIRACETAM 500 MG: 500 TABLET ORAL at 08:09

## 2021-04-05 RX ADMIN — LORAZEPAM 1 MG: 1 TABLET ORAL at 12:42

## 2021-04-05 RX ADMIN — ONDANSETRON HYDROCHLORIDE 4 MG: 4 TABLET, FILM COATED ORAL at 11:20

## 2021-04-05 RX ADMIN — LORAZEPAM 2 MG: 1 TABLET ORAL at 17:07

## 2021-04-05 RX ADMIN — PANTOPRAZOLE SODIUM 40 MG: 40 TABLET, DELAYED RELEASE ORAL at 16:07

## 2021-04-05 RX ADMIN — Medication 5000 UNITS: at 08:12

## 2021-04-05 RX ADMIN — CARVEDILOL 6.25 MG: 6.25 TABLET, FILM COATED ORAL at 08:09

## 2021-04-05 RX ADMIN — ATORVASTATIN CALCIUM 40 MG: 20 TABLET, FILM COATED ORAL at 20:34

## 2021-04-05 RX ADMIN — PANCRELIPASE 2 CAPSULE: 60000; 12000; 38000 CAPSULE, DELAYED RELEASE PELLETS ORAL at 08:09

## 2021-04-05 RX ADMIN — QUETIAPINE FUMARATE 300 MG: 300 TABLET, EXTENDED RELEASE ORAL at 20:34

## 2021-04-05 RX ADMIN — DULOXETINE HYDROCHLORIDE 60 MG: 30 CAPSULE, DELAYED RELEASE ORAL at 08:11

## 2021-04-05 RX ADMIN — GABAPENTIN 300 MG: 300 CAPSULE ORAL at 20:34

## 2021-04-05 NOTE — PROGRESS NOTES
Patient has been out in day area. He has been attending groups. Stated \" I need something for my w/d. Ativan 1 mg given this morning for CIWA of 5 and again at this time for complaints. Social with peers and select staff.

## 2021-04-05 NOTE — BSMART NOTE
OCCUPATIONAL THERAPY PROGRESS NOTE  Group Time:  1400  Attendance: The patient attended full group. Participation:  The patient participated with moderate elaboration in the activity. Attention:  The patient was able to focus on the activity. Interaction:  The patient occasionally  interacts with others. Appropriate and focused.

## 2021-04-05 NOTE — PROGRESS NOTES
Behavioral Health Progress Note    Admit Date: 4/1/2021  Hospital day 4    Vitals :   Patient Vitals for the past 8 hrs:   BP Temp Pulse Resp   04/05/21 1244 130/88 99.5 °F (37.5 °C) 94 18   04/05/21 0744 109/73 97.5 °F (36.4 °C) 84 18     Labs:  No results found for this or any previous visit (from the past 24 hour(s)).   Meds:   Current Facility-Administered Medications   Medication Dose Route Frequency    gabapentin (NEURONTIN) capsule 300 mg  300 mg Oral TID    aspirin chewable tablet 81 mg  81 mg Oral DAILY    atorvastatin (LIPITOR) tablet 40 mg  40 mg Oral QHS    carvediloL (COREG) tablet 6.25 mg  6.25 mg Oral BID WITH MEALS    cholecalciferol (VITAMIN D3) capsule 5,000 Units  5,000 Units Oral DAILY    DULoxetine (CYMBALTA) capsule 60 mg  60 mg Oral DAILY    folic acid (FOLVITE) tablet 1 mg  1 mg Oral DAILY    therapeutic multivitamin (THERAGRAN) tablet 1 Tab  1 Tab Oral DAILY    thiamine HCL (B-1) tablet 100 mg  100 mg Oral DAILY    hydrOXYzine pamoate (VISTARIL) capsule 50 mg  50 mg Oral Q4H PRN    loperamide (IMODIUM) capsule 2-4 mg  2-4 mg Oral PRN    levETIRAcetam (KEPPRA) tablet 500 mg  500 mg Oral BID    LORazepam (ATIVAN) tablet 1 mg  1 mg Oral Q4H PRN    LORazepam (ATIVAN) tablet 2 mg  2 mg Oral Q4H PRN    ondansetron hcl (ZOFRAN) tablet 4 mg  4 mg Oral Q6H PRN    pantoprazole (PROTONIX) tablet 40 mg  40 mg Oral ACB&D    tamsulosin (FLOMAX) capsule 0.4 mg  0.4 mg Oral DAILY    QUEtiapine SR (SEROquel XR) tablet 300 mg  300 mg Oral QHS    traZODone (DESYREL) tablet 50 mg  50 mg Oral QHS PRN    lipase-protease-amylase (CREON 12,000) capsule 2 Cap  2 Cap Oral TID WITH MEALS    ketoconazole (NIZORAL) 2 % shampoo   Topical PRN      Hospital Problems: Principal Problem:    Major depressive disorder, recurrent severe without psychotic features ([facility] (11/14/2011)      Overview: Overview:       Most probable SIMD    Active Problems:    Tobacco dependence (8/20/2013)      HTN (hypertension) (8/20/2013)      HLD (hyperlipidemia) (8/20/2013)      Alcohol use disorder, severe, dependence ([facility] (4/22/2014)      Overview: Numerous hospitalization of intoxication/suicidal ideation      Delirium tremens ([facility] (6/3/2020)        Subjective:   Medication side effects: fatigue/weakness  tremor    Mental Status Exam  Sensorium: alert  Orientation: only aware of  place and person  Relations: cooperative  Eye Contact: appropriate  Appearance: is unkempt  Thought Process: slow rate of thoughts and fair abstract reasoning/computation   Thought Content: no evidence of impairment   Suicidal: denies   Homicidal: none   Mood: is anxious   Affect: stable  Memory: is impaired and is remote     Concentration: distractable  Abstraction: concrete  Insight: The patient shows little insight    OR Fair  Judgement: is psychologically impaired OR  Fair    Assessment/Plan:   Not improved      Continue close observation    Nurses report that the patient had spent about 24 hours in the emergency room. He had a rapid response called when he had been found laying in the floor on Saturday morning. He is also had a witnessed seizure. He was already about 6 days out from his last drink at that point. He was taken to the emergency room and hydrated stabilized with his Keppra. He is continued to complain of withdrawal symptoms with some shaking and has continued to receive lorazepam.  His CIWA score today was 5. This is a prolonged amount of time for him to still be having withdrawal but we will continue to use the lorazepam for today. He previously had been on gabapentin which he said was being given for mood stabilization. This is not a particularly good mood stabilizer and it does though help with anxiety. It is possible that this actually was being used for his seizure disorder.   He had been taken off of this on several different occasions recently when he was at [facility].  He had times in which he would be on none at all, other times on low-dose of 100 mg 3 times a day and he has had times on as much as 800 mg 3 times a day. The medical department. Placed him on 300 mg twice a day. For now we will keep him on the 300 mg 3 times a day. This also will help since it is an antiseizure medication. He does say that he continues to feel depressed. Unfortunately his medical problems have been so severe is difficult to deal with the mental health side of things until he finishes this withdrawal with the delirium tremens and the seizures.

## 2021-04-05 NOTE — BH NOTES
Patient given Trazodone for insomnia per patient request will continue to follow current POC and interventions per protocols/policies.

## 2021-04-05 NOTE — BSMART NOTE
ART THERAPY GROUP PROGRESS NOTE    PATIENT SCHEDULED FOR GROUP AT: [id number]    ATTENDANCE: Full    PARTICIPATION LEVEL: Participates fully in the art process    ATTENTION LEVEL : Able to focus on task    FOCUS: [address]: He was calm and compliant. He was engaged in the task at hand. His approach was planned out. He participated in discussion and shared memories of drinking coffee and watching [place] in the backyard, cacti in the kitchen, and wind chimes. Art Therapy Intern administered group art therapy.

## 2021-04-05 NOTE — GROUP NOTE
SUSAN  GROUP DOCUMENTATION INDIVIDUAL                                                                          Group Therapy Note    Date: 4/4/2021    Group Start Time: 2015  Group End Time: 2030  Group Topic: Medication    [facility] 1 SPECIAL TRTMT 1    Jqauelin Elias RN    IP [address] GROUP DOCUMENTATION GROUP    Group Therapy Note    Attendees: 8         Attendance: Attended    Interventions/techniques: Informed    Follows Directions:  Followed directions    Interactions: Interacted appropriately    Mental Status: Calm    Behavior/appearance: Cooperative    Goals Achieved: Able to listen to others    Janak Sequeira RN

## 2021-04-05 NOTE — GROUP NOTE
SUSAN  GROUP DOCUMENTATION INDIVIDUAL                                                                          Group Therapy Note    Date: 4/5/2021    Group Start Time: 0830  Group End Time: 3852  Group Topic: Nursing    [facility] SPECIAL TRTMT Reymundo Jose 124, RN    IP [address] [facility] GROUP DOCUMENTATION GROUP    Group Therapy Note    Attendees: 5         Attendance: Attended    Patient's Goal:  \" Do Something Positive:. Interventions/techniques: Informed    Follows Directions: Followed directions    Interactions: Interacted appropriately    Mental Status: Calm    Behavior/appearance: Cooperative    Goals Achieved: Able to engage in interactions and Able to listen to others      Additional Notes:  Patient stated one of his greatest achievements was when he worked on an [employer].       Rudi Fabry, RN

## 2021-04-05 NOTE — BH NOTES
Pt c/o of tremors and nausea and was medicated per ciwa protocol. Scored ciwa 10 and was given prn medication 2 mg Ativan po and Zofran 4 mg for nausea.  Will continue to support

## 2021-04-05 NOTE — BSMART NOTE
History: The patient is admitted in transfer from [facility] inpatient medical floor following consultation to Psychiatry for suicidal ideas as he is being detoxified from alcohol. He had been admitted 03/27/2021 and attention is invited to the inpatient note for detoxification. SW made contact with patient as he remained isolated in the milieu. Patient expressed no major issues or complaints Patient denies SI/Hi. Patient denies AVH. SW will continue to monitor patient and will assist as needed.     Jennifer Newsome LCSW-E

## 2021-04-05 NOTE — BSMART NOTE
SOCIAL WORK GROUP THERAPY PROGRESS NOTE    Group Time:  10am    Group Topic:  Coping Skills    Group Participation:     Pt was unavailable for group due to being in bed & chose not to attend.

## 2021-04-05 NOTE — BH NOTES
Patient given Ativan for CIWA of 12 will continue to follow current POC and interventions per policies/protocols. Juju Aguilar

## 2021-04-05 NOTE — BH NOTES
Patient is cooperative during the shift, the patient takes medications as prescribed, patient gets along with other peers. Patient goes to group therapy will continue to follow current POC and interventions per policies/protocols.

## 2021-04-06 PROCEDURE — 65220000003 HC RM SEMIPRIVATE PSYCH

## 2021-04-06 PROCEDURE — 99232 SBSQ HOSP IP/OBS MODERATE 35: CPT | Performed by: PSYCHIATRY & NEUROLOGY

## 2021-04-06 PROCEDURE — 74011250637 HC RX REV CODE- 250/637: Performed by: PSYCHIATRY & NEUROLOGY

## 2021-04-06 RX ORDER — GABAPENTIN 400 MG/1
800 CAPSULE ORAL 3 TIMES DAILY
Status: DISCONTINUED | OUTPATIENT
Start: 2021-04-06 | End: 2021-04-07 | Stop reason: HOSPADM

## 2021-04-06 RX ADMIN — Medication 100 MG: at 08:08

## 2021-04-06 RX ADMIN — ONDANSETRON HYDROCHLORIDE 4 MG: 4 TABLET, FILM COATED ORAL at 11:27

## 2021-04-06 RX ADMIN — LEVETIRACETAM 500 MG: 500 TABLET ORAL at 08:06

## 2021-04-06 RX ADMIN — TRAZODONE HYDROCHLORIDE 50 MG: 50 TABLET ORAL at 20:25

## 2021-04-06 RX ADMIN — ONDANSETRON HYDROCHLORIDE 4 MG: 4 TABLET, FILM COATED ORAL at 21:26

## 2021-04-06 RX ADMIN — GABAPENTIN 300 MG: 300 CAPSULE ORAL at 14:18

## 2021-04-06 RX ADMIN — ATORVASTATIN CALCIUM 40 MG: 20 TABLET, FILM COATED ORAL at 20:25

## 2021-04-06 RX ADMIN — HYDROXYZINE PAMOATE 50 MG: 50 CAPSULE ORAL at 23:43

## 2021-04-06 RX ADMIN — FOLIC ACID 1 MG: 1 TABLET ORAL at 08:08

## 2021-04-06 RX ADMIN — CARVEDILOL 6.25 MG: 6.25 TABLET, FILM COATED ORAL at 16:18

## 2021-04-06 RX ADMIN — QUETIAPINE FUMARATE 300 MG: 300 TABLET, EXTENDED RELEASE ORAL at 20:25

## 2021-04-06 RX ADMIN — TAMSULOSIN HYDROCHLORIDE 0.4 MG: 0.4 CAPSULE ORAL at 08:08

## 2021-04-06 RX ADMIN — HYDROXYZINE PAMOATE 50 MG: 50 CAPSULE ORAL at 16:18

## 2021-04-06 RX ADMIN — PANCRELIPASE 2 CAPSULE: 60000; 12000; 38000 CAPSULE, DELAYED RELEASE PELLETS ORAL at 08:06

## 2021-04-06 RX ADMIN — THERA TABS 1 TABLET: TAB at 08:08

## 2021-04-06 RX ADMIN — DULOXETINE HYDROCHLORIDE 60 MG: 30 CAPSULE, DELAYED RELEASE ORAL at 08:08

## 2021-04-06 RX ADMIN — PANTOPRAZOLE SODIUM 40 MG: 40 TABLET, DELAYED RELEASE ORAL at 08:06

## 2021-04-06 RX ADMIN — LEVETIRACETAM 500 MG: 500 TABLET ORAL at 20:25

## 2021-04-06 RX ADMIN — PANCRELIPASE 2 CAPSULE: 60000; 12000; 38000 CAPSULE, DELAYED RELEASE PELLETS ORAL at 16:18

## 2021-04-06 RX ADMIN — PANCRELIPASE 2 CAPSULE: 60000; 12000; 38000 CAPSULE, DELAYED RELEASE PELLETS ORAL at 11:27

## 2021-04-06 RX ADMIN — Medication 5000 UNITS: at 08:08

## 2021-04-06 RX ADMIN — CARVEDILOL 6.25 MG: 6.25 TABLET, FILM COATED ORAL at 08:06

## 2021-04-06 RX ADMIN — GABAPENTIN 800 MG: 400 CAPSULE ORAL at 20:24

## 2021-04-06 RX ADMIN — PANTOPRAZOLE SODIUM 40 MG: 40 TABLET, DELAYED RELEASE ORAL at 15:53

## 2021-04-06 RX ADMIN — GABAPENTIN 300 MG: 300 CAPSULE ORAL at 08:06

## 2021-04-06 RX ADMIN — ASPIRIN 81 MG: 81 TABLET, CHEWABLE ORAL at 08:08

## 2021-04-06 NOTE — GROUP NOTE
SUSAN  GROUP DOCUMENTATION INDIVIDUAL                                                                          Group Therapy Note    Date: 4/6/2021    Group Start Time: 9849  Group End Time: 1300  Group Topic: Nursing    [facility] SPECIAL TRTMT Reymundo Jose [address], RN    IP [address] [facility] GROUP DOCUMENTATION GROUP    Group Therapy Note    Attendees: 5         Attendance: Attended    Patient's Goal:  Safety Plan    Interventions/techniques: Informed    Follows Directions:  Followed directions    Interactions: Interacted appropriately    Mental Status: Calm    Behavior/appearance: Cooperative    Goals Achieved: Able to listen to others          Kathya Schmitt RN

## 2021-04-06 NOTE — PROGRESS NOTES
Problem: Suicide  Goal: *STG: Remains safe in hospital  Description: AEB pt not harming self or others and sheron for safety daily while in the hospital  Outcome: Progressing Towards Goal  Goal: *STG: Attends activities and groups  Description: AEB pt attending 3 groups daily while in the hospital  Outcome: Progressing Towards Goal  Goal: *STG/LTG: No longer expresses self destructive or suicidal thoughts  Description: AEB pt no longer expressing self destructive or suicidal thoughts daily while in the hospital  Outcome: Progressing Towards Goal       Pt is cooperative and pleasant but is also somatic. Ativan 2 mg po was given per ciwa protocol (ciwa score 10). Pt is requesting to be started on buspar 15 mg tid, as he reported previously taking the medication in this manner; he was encouraged to discuss this with his psychiatrist. Pt continues to be selectively tremulous. Denies si/hi and avh.   Will continue to support

## 2021-04-06 NOTE — BSMART NOTE
Pt. is a 52-year-old male with history of Major depression, recurrent, severe, without psychosis.  Delirium tremens.  Alcohol use disorder, severe.  Cannabis use disorder, mild.  Nicotine use disorder, severe. Pt.multiple medical issues due to alcohol dependence. Pt was admitted to this facility for with anxiety and tremor and suicidal.       Pt.'s case was discussed in treatment team this a.m   SW has referred pt to 3  residential facilities: [facility], [facility], [facility] . SW will follow-up on the  residential referrals. The team met with pt to discuss [place] planning. Pt expressed he is starting to feel better. \" I do not shake as much\". Pt.'s withdrawal symptoms are minimal. Pt. Is almost finished with detox. Pt is requesting to leave to [place]. Pt. Expressed he plans to follow up with the  residential programs  In the future. Pt informed Sw he has been in contact with  program. Pt also requested mental health skill building. SW will discuss options and refer pt to a mental health skill building company. Pt. At NV plans to return to his current residence and follow up with Dawson Daniels. ANA Collateral:  SW followed up with Dougie : The facility informed SW pt needs to complete a [facility]  Due to benzo taper  Before they will accept pt. Pt. Has a phone assessment on 4/7/21 @ 3:00 pm with [facility]. The facility has available. Beds. SW informed pt. Pt informed SW he plans to go to hi home address at [place]. \" I have to clean up my apartment for inspection\". \" Will complete the phon assessment and go to the facility  on 4/9/21.        Liberty Morin MA, CRISTIANO-R

## 2021-04-06 NOTE — PROGRESS NOTES
Behavioral Health Progress Note    Admit Date: 4/1/2021  Hospital day 5    Vitals :   Patient Vitals for the past 8 hrs:   BP Temp Pulse Resp   04/06/21 0738 110/77 97.2 °F (36.2 °C) 81 18     Labs:  No results found for this or any previous visit (from the past 24 hour(s)).   Meds:   Current Facility-Administered Medications   Medication Dose Route Frequency    gabapentin (NEURONTIN) capsule 800 mg  800 mg Oral TID    aspirin chewable tablet 81 mg  81 mg Oral DAILY    atorvastatin (LIPITOR) tablet 40 mg  40 mg Oral QHS    carvediloL (COREG) tablet 6.25 mg  6.25 mg Oral BID WITH MEALS    cholecalciferol (VITAMIN D3) capsule 5,000 Units  5,000 Units Oral DAILY    DULoxetine (CYMBALTA) capsule 60 mg  60 mg Oral DAILY    folic acid (FOLVITE) tablet 1 mg  1 mg Oral DAILY    therapeutic multivitamin (THERAGRAN) tablet 1 Tab  1 Tab Oral DAILY    thiamine HCL (B-1) tablet 100 mg  100 mg Oral DAILY    hydrOXYzine pamoate (VISTARIL) capsule 50 mg  50 mg Oral Q4H PRN    loperamide (IMODIUM) capsule 2-4 mg  2-4 mg Oral PRN    levETIRAcetam (KEPPRA) tablet 500 mg  500 mg Oral BID    LORazepam (ATIVAN) tablet 1 mg  1 mg Oral Q4H PRN    LORazepam (ATIVAN) tablet 2 mg  2 mg Oral Q4H PRN    ondansetron hcl (ZOFRAN) tablet 4 mg  4 mg Oral Q6H PRN    pantoprazole (PROTONIX) tablet 40 mg  40 mg Oral ACB&D    tamsulosin (FLOMAX) capsule 0.4 mg  0.4 mg Oral DAILY    QUEtiapine SR (SEROquel XR) tablet 300 mg  300 mg Oral QHS    traZODone (DESYREL) tablet 50 mg  50 mg Oral QHS PRN    lipase-protease-amylase (CREON 12,000) capsule 2 Cap  2 Cap Oral TID WITH MEALS    ketoconazole (NIZORAL) 2 % shampoo   Topical PRN      Hospital Problems: Principal Problem:    Major depressive disorder, recurrent severe without psychotic features ([facility] (11/14/2011)      Overview: Overview:       Most probable SIMD    Active Problems:    Tobacco dependence (8/20/2013)      HTN (hypertension) (8/20/2013)      HLD (hyperlipidemia) (8/20/2013)      Alcohol use disorder, severe, dependence ([facility] (4/22/2014)      Overview: Numerous hospitalization of intoxication/suicidal ideation      Delirium tremens ([facility] (6/3/2020)        Subjective:   Medication side effects: none  none    Mental Status Exam  Sensorium: alert  Orientation: only aware of  place and person  Relations: guarded  Eye Contact: appropriate  Appearance: shows no evidence of impairment  Thought Process: normal rate of thoughts and fair abstract reasoning/computation   Thought Content: no evidence of impairment   Suicidal: none   Homicidal: none   Mood: is anxious   Affect: stable  Memory: is impaired and is remote     Concentration: distractable  Abstraction: concrete  Insight: The patient shows little insight    OR Fair  Judgement: is psychologically impaired OR  Fair    Assessment/Plan:   improved    Continue close observation    Patient had treatment team attended by physician nurse  social work nurse practitioner student nursing administration and patient. The patient has been referred to 3 different residential facilities we have not heard anything back. Social work will be checking on this. He did have some tremors yesterday and again required more lorazepam.  In the previous time he had been dealing with [employer] as his outpatient mental health skills builder but they apparently dropped him because he started drinking again. He will need a new referral for a mental health skills builder group. He does say that he has a scheduled appointment to see Dr. Zonia Michelle when he gets out of hospital.  He only has mild tremors today. Hopefully, he will be doing well enough to be ready for discharge by tomorrow.

## 2021-04-06 NOTE — GROUP NOTE
SUSAN  GROUP DOCUMENTATION INDIVIDUAL                                                                          Group Therapy Note    Date: 4/5/2021    Group Start Time: 2000  Group End Time: 2030  Group Topic: Medication    [facility] 1 ADULT CHEM AMARA Sahu    IP [facility] GROUP DOCUMENTATION GROUP    Group Therapy Note    Attendees: 6         Attendance: Attended        Interventions/techniques: Informed    Follows Directions:  Followed directions    Interactions: Interacted appropriately    Mental Status: Calm    Behavior/appearance: Attentive    Goals Achieved: Able to listen to others        Lis Ellison

## 2021-04-06 NOTE — BH NOTES
MHT Note   Patient was calm and compliant throughout the shift. Patient was receptive to all of staffs instructions. Patient participated in all group activates during the shift. Patient interacted in a appropriate manner during the shift. Patient was able to contract for safety during the shift. No falls or major issues to report at this time.

## 2021-04-06 NOTE — GROUP NOTE
SUSAN  GROUP DOCUMENTATION INDIVIDUAL                                                                          Group Therapy Note    Date: 4/6/2021    Group Start Time: 8531  Group End Time: 1600  Group Topic: Social Work Group    [facility] 1 ADULT CHEM DEP    [place], [address] GROUP DOCUMENTATION GROUP    Group Therapy Note    Attendees: 5         Attendance: Attended    Interventions/techniques: Informed    Follows Directions:  Followed directions    Interactions: Interacted appropriately    Mental Status: Calm    Behavior/appearance: Attentive    Goals Achieved: Able to listen to others    [employer]

## 2021-04-06 NOTE — PROGRESS NOTES
Problem: Suicide  Goal: *STG: Remains safe in hospital  Description: AEB pt not harming self or others and sheron for safety daily while in the hospital  Outcome: Progressing Towards Goal  Goal: *STG: Attends activities and groups  Description: AEB pt attending 3 groups daily while in the hospital  Outcome: Progressing Towards Goal  Goal: *STG/LTG: No longer expresses self destructive or suicidal thoughts  Description: AEB pt no longer expressing self destructive or suicidal thoughts daily while in the hospital  Outcome: Progressing Towards Goal       Pt has been participating in his tx plan. Denies si/hi and avh. Looks forward to upcoming discharge. Has made an effort to contact Ms. Sethi, his , from [employer].   Will continue to support

## 2021-04-06 NOTE — BH NOTES
Treatment team met -     Medical Director: _____present   Psychiatrist: ___x__present   Charge nurse: _x____present   MSW: _x____present   : _____present   Nurse Manager: _____present   Student RNs: _____present   Medical Students: _____present   Art Therapist: _____present   Clinical Coordinator: _x____present    Occupational Therapist: _____present   : _______ present    _______ present  Crisis Supervisor_______present      Plan of care discussed and updated as appropriate. Patient was present for his treatment team meeting. Patient was pleasant and cooperative. Patient denied having hallucinations or ideations. Medications discussed. Patient interested in [facility] rehab program and mental health skill building program, The SW discussed both programs and options. The patient been med compliant and going to groups.

## 2021-04-06 NOTE — BSMART NOTE
OCCUPATIONAL THERAPY PROGRESS NOTE  Group Time:  1400  Attendance: The patient attended full group. Participation:  The patient participated fully in the activity. Attention:  The patient was able to focus on the activity. Interaction:  The patient occasionally  interacts with others. Mood improved, talkative in appropriate manner, looking at discharge and recovery plans.

## 2021-04-06 NOTE — BSMART NOTE
ART THERAPY GROUP PROGRESS NOTE    PATIENT SCHEDULED FOR GROUP AT: 10:00    ATTENDANCE: Full    PARTICIPATION LEVEL: Participates fully in the art process    ATTENTION LEVEL : Able to focus on task    FOCUS: [address] Ab[address] Thao: He was calm and presented with a bright affect. He was invested in the task at hand and interacted with this writer. His associations were relevant and thought process linear.

## 2021-04-07 VITALS
OXYGEN SATURATION: 92 % | TEMPERATURE: 97.3 F | RESPIRATION RATE: 16 BRPM | SYSTOLIC BLOOD PRESSURE: 136 MMHG | HEART RATE: 76 BPM | DIASTOLIC BLOOD PRESSURE: 88 MMHG

## 2021-04-07 PROCEDURE — 74011250637 HC RX REV CODE- 250/637: Performed by: PSYCHIATRY & NEUROLOGY

## 2021-04-07 PROCEDURE — 99238 HOSP IP/OBS DSCHRG MGMT 30/<: CPT | Performed by: PSYCHIATRY & NEUROLOGY

## 2021-04-07 RX ORDER — QUETIAPINE 300 MG/1
300 TABLET, FILM COATED, EXTENDED RELEASE ORAL
Qty: 30 TAB | Refills: 0 | Status: SHIPPED | OUTPATIENT
Start: 2021-04-07

## 2021-04-07 RX ORDER — DULOXETIN HYDROCHLORIDE 60 MG/1
60 CAPSULE, DELAYED RELEASE ORAL DAILY
Qty: 30 CAP | Refills: 0 | Status: SHIPPED | OUTPATIENT
Start: 2021-04-08

## 2021-04-07 RX ORDER — GABAPENTIN 400 MG/1
800 CAPSULE ORAL 3 TIMES DAILY
Qty: 90 CAP | Refills: 0 | Status: SHIPPED | OUTPATIENT
Start: 2021-04-07

## 2021-04-07 RX ORDER — BUSPIRONE HYDROCHLORIDE 10 MG/1
15 TABLET ORAL 3 TIMES DAILY
Qty: 90 TAB | Refills: 0 | Status: SHIPPED | OUTPATIENT
Start: 2021-04-07

## 2021-04-07 RX ORDER — LEVETIRACETAM 500 MG/1
500 TABLET ORAL 2 TIMES DAILY
Qty: 60 TAB | Refills: 0 | Status: SHIPPED | OUTPATIENT
Start: 2021-04-07

## 2021-04-07 RX ORDER — CHOLECALCIFEROL (VITAMIN D3) 125 MCG
5000 CAPSULE ORAL DAILY
Qty: 1 CAP | Refills: 0 | Status: SHIPPED
Start: 2021-04-08

## 2021-04-07 RX ADMIN — THERA TABS 1 TABLET: TAB at 08:02

## 2021-04-07 RX ADMIN — Medication 5000 UNITS: at 08:02

## 2021-04-07 RX ADMIN — CARVEDILOL 6.25 MG: 6.25 TABLET, FILM COATED ORAL at 08:02

## 2021-04-07 RX ADMIN — FOLIC ACID 1 MG: 1 TABLET ORAL at 08:02

## 2021-04-07 RX ADMIN — TAMSULOSIN HYDROCHLORIDE 0.4 MG: 0.4 CAPSULE ORAL at 08:02

## 2021-04-07 RX ADMIN — PANTOPRAZOLE SODIUM 40 MG: 40 TABLET, DELAYED RELEASE ORAL at 08:02

## 2021-04-07 RX ADMIN — PANCRELIPASE 2 CAPSULE: 60000; 12000; 38000 CAPSULE, DELAYED RELEASE PELLETS ORAL at 12:01

## 2021-04-07 RX ADMIN — LEVETIRACETAM 500 MG: 500 TABLET ORAL at 08:02

## 2021-04-07 RX ADMIN — GABAPENTIN 800 MG: 400 CAPSULE ORAL at 08:02

## 2021-04-07 RX ADMIN — ASPIRIN 81 MG: 81 TABLET, CHEWABLE ORAL at 08:02

## 2021-04-07 RX ADMIN — DULOXETINE HYDROCHLORIDE 60 MG: 30 CAPSULE, DELAYED RELEASE ORAL at 08:02

## 2021-04-07 RX ADMIN — PANCRELIPASE 2 CAPSULE: 60000; 12000; 38000 CAPSULE, DELAYED RELEASE PELLETS ORAL at 08:02

## 2021-04-07 RX ADMIN — Medication 100 MG: at 08:02

## 2021-04-07 NOTE — SUICIDE SAFETY PLAN
SAFETY PLAN    A suicide Safety Plan is a document that supports someone when they are having thoughts of suicide. Warning Signs that indicate a suicidal crisis may be developing: What (situations, thoughts, feelings, body sensations, behaviors, etc.) do you experience that lets you know you are beginning to think about suicide? 1. Drinking  2. Stop taking medications      Internal Coping Strategies:  What things can I do (relaxation techniques, hobbies, physical activities, etc.) to take my mind off my problems without contacting another person? 1. AA meetings  2. Ride bike  3. [employer] stores    People and social settings that provide distraction: Who can I call or where can I go to distract me? 1. Name: Puneet Turner ([place] [address])    2. Name: [address]        People whom I can ask for help: Who can I call when I need help - for example, friends, family, clergy, someone else? 1. Name: [address]                   2. Name: Stefani Little or [address] [place] [address] I can contact during a crisis: Who can I call for help - for example, my doctor, my psychiatrist, my psychologist, a mental health provider, a suicide hotline? 1. Clinician Name: Dr. Roseanne Simmons        2. Suicide Prevention Lifeline: 3-751-320-TALK (1911)    4. [address] [place] [address] Emergency Services -  for example, [address] [facility] suicide hotline, [facility] Hotline:  Dr. Roseanne Simmons      Making the environment safe: How can I make my environment (house/apartment/living space) safer? For example, can I remove guns, medications, and other items? 1.  No immediate safety concerns noted

## 2021-04-07 NOTE — DISCHARGE INSTRUCTIONS
BEHAVIORAL HEALTH NURSING DISCHARGE NOTE      The following personal items collected during your admission are returned to you:   Dental Appliance: Dental Appliances: None  Vision:    Hearing Aid:    Jewelry: Jewelry: None  Clothing: Clothing: Undergarments(Socks(2 Pairs),Sneakers, Pants(2),Shirts(3), Jacket,Shorts)  Other Valuables: Other Valuables: (Eyeglasses)  Valuables sent to safe: Personal Items Sent to Safe: wallet, cash, cards, keys      PATIENT INSTRUCTIONS:    What to do at Home    You {Actions; may/not:35072} operate a vehicle for 24-hours. You {Actions; may/not:61569} engage in an occupation involving machinery or appliances. You {Actions; may/not:45251} drink any alcoholic beverages during the next 48-hours. You {Actions; may/not:72764} resume normal activities until ***. Avoid making any critical decisions for at least 24-hours. Recommended diet: {diet:00888}, ***    Recommended activity: {discharge activity:54776}, ***    You are referred to {substance abuse reference:29423}. Follow-up with *** in {0-10:83569} {units:11}. If you have problems relating to your recovery, call your physician. The discharge information has been reviewed with the {PATIENT PARENT GUARDIAN:85739}. The {PATIENT PARENT GUARDIAN:62503} verbalized understanding. BEHAVIORAL HEALTH NURSING DISCHARGE NOTE      The following personal items collected during your admission are returned to you:   Dental Appliance: Dental Appliances: None  Vision:    Hearing Aid:    Jewelry: Jewelry: None  Clothing: Clothing: Undergarments(Socks(2 Pairs),Sneakers, Pants(2),Shirts(3), Jacket,Shorts)  Other Valuables: Other Valuables: (Eyeglasses)  Valuables sent to safe: Personal Items Sent to Safe: wallet, cash, cards, keys      PATIENT INSTRUCTIONS:             The discharge information has been reviewed with the patient. The patient verbalized understanding.         Patient armband removed and shredded

## 2021-04-07 NOTE — PROGRESS NOTES
Patient received discharge instructions, verbalized understanding of follow up appointments. All personal medications returned to patient and other items. Medicaid cab here for pt. Escorted out of building by staff.

## 2021-04-07 NOTE — GROUP NOTE
SUSAN  GROUP DOCUMENTATION INDIVIDUAL                                                                          Group Therapy Note    Date: 4/6/2021    Group Start Time: 2000  Group End Time: 2030  Group Topic: Medication    [facility] 1 SPECIAL TRTMT 1    Marlena Been    IP [facility] GROUP DOCUMENTATION GROUP    Group Therapy Note    Attendees: 6         Attendance: Attended        Interventions/techniques: Informed    Follows Directions:  Followed directions    Interactions: Interacted appropriately    Mental Status: Calm    Behavior/appearance: Attentive    Goals Achieved: Able to listen to others            Saloni Ledbetter

## 2021-04-07 NOTE — BSMART NOTE
Pt. is a 81-year-old male with history of Major depression, recurrent, severe, without psychosis.  Delirium tremens.  Alcohol use disorder, severe.  Cannabis use disorder, mild.  Nicotine use disorder, severe. Pt.multiple medical issues due to alcohol dependence. Pt was admitted to this facility for with anxiety and tremor and suicidal.      Pt.'s case was discussed this a.m  Pt. Will be dc today. Pt. Has a phone assessment on 4/7/21 @ 3:00 pm with [facility]. SW met with pt to discuss dc plan. Pt. Was reminded about the above phone assessment. Pt. informed SW he plans to contact the provider @ 3:00 pm to complete his phone assessment. Pt reports he has to clean up his apartment prior to transition to the [address] [place] [address]. Pt. Also stated he reached out to [employer] mental health skill building company to resume mental health skill building services. SWdiscussed safety plan, relapse prevention plana d aftercare. Pt denies both ideations and hallucinations. Pt, was encouraged to keep the following appointments: [facility]  4/15/21 @ 4:00 virtual tele-Psych appointment with Dr Waaqr Mcmanus for medication  management [address] [place] [address], [address] [place] [address]     [phone] and   [address]  [phone]. Unit secretary coordinated Medicaid Cab to pt 's home address.       Bhargavi Bowles MA, LMHP-R

## 2021-04-08 ENCOUNTER — TELEPHONE (OUTPATIENT)
Dept: ADDICTION MEDICINE | Age: 56
End: 2021-04-08

## 2021-04-08 NOTE — TELEPHONE ENCOUNTER
This writer completed follow-up call at [phone] to phone number [phone]. Patient states that he is currently \"shaky\", but that he had a call into his PCP office and was advised to call back after 1300 today. Patient did confirm plans to follow-up with SURY Rivera on 4/15. Patient did state that he was supposed to have a prescription for gabapentin called into [facility] in [place], but that it was not called in. I advised the patient that I would follow-up and return his call. I spoke with the pharmacist at [facility] at phone number [phone] at [phone] who confirmed that the patient had an existing gabapentin prescription on file (same dosage and frequency) with 2 refills. Returned call to patient at [phone] to phone number [phone] and advised that we have consulted with his pharmacy and informed him of the already existing script on file. Patient thanked me for the call.

## 2021-04-08 NOTE — DISCHARGE SUMMARY
[facility] [facility]    Name:  Lacho Foreman  MR#:   [id number]  :  1965  ACCOUNT #:  [de-identified]  ADMIT DATE:  2021  DISCHARGE DATE:  2021      IDENTIFYING DATA:  The patient is a 70-year-old single white male, resident of [place], [place], who was admitted in transfer from [facility] where he had been admitted for detoxification from alcohol on 2021. He was at risk for both seizures and delirium tremens and was in significant withdrawal with full-blown delirium tremens with auditory and visual hallucinations and confusion. They thought he was medically stabilized and consulted Psychiatry because of his continued suicidal ideas. He was continuing to have continued shaking of the head, trunk and arms; was seeing bugs crawling on the walls and feeling them on his skin. He had history of prior inpatient substance abuse treatments as well as repeated detoxifications and outpatient treatments. He has a history of depression, being on Cymbalta 60 mg daily, buspirone 15 mg t.i.d. for anxiety, Seroquel 300 mg at bedtime, gabapentin 800 mg t.i.d. for chronic pain, and seizure prophylaxis. When he was at this facility on last occasion, he had been sent to [address] [place] [address]. We had attempted to get him into other residential program, but could not do so as he was turned down by all of them. Laboratory testing done at the time of admission had included a normal CBC, abnormal comprehensive metabolic panel with a sodium 133, potassium 3.0, chloride 95, glucose 135, calcium 8.3. He had elevated liver function tests with , AST 98, alkaline phosphatase 124. Lipase level was normal and urine drug screen was positive for both benzodiazepines and cocaine. Alcohol level in the emergency room had been 280.     HOSPITAL COURSE:  The patient was admitted to the [facility] special treatment unit where he was afforded observation, evaluation and treatment. Physical examination was done by [facility], nurse practitioner, which described his history of tremors with delirium tremens, rosacea, history of cirrhosis, cannabis use, past history of non-ST elevated myocardial infarction, history of cerebrovascular accident, history of chronic left-sided low back pain with left-sided sciatica, history of hypertension, history of hyperlipidemia. No additional findings were found on physical exam.  While in the hospital, vital signs slowly stabilized where the last blood pressure was 110/77. He did have withdrawal over the first 4 days of being in the hospital and then had suffered a seizure, having been sent to the emergency room to spend about 1 day in the emergency room to be re-stabilized for the seizure. He returned back to the unit and was no longer having withdrawal by the time of discharge. He was awake, alert and oriented. He continued to want to go to a substance abuse treatment program and was given referral to several.  He had a scheduled interview with [address] [place] [address] in [place], [place], for later on the day of discharge. He did wish to be discharged to home with going there to clean up his apartment and then set up his followup from home. He was denying hallucinations or delusions, homicidal or suicidal ideas. While in the hospital he received aspirin 81 mg, atorvastatin 40 mg, carvedilol 6.25 mg b.i.d., cholecalciferol vitamin D3 5000 units daily, duloxetine 60 mg daily, folic acid 1 mg daily, gabapentin 800 mg t.i.d., hydroxyzine 50 mg p.r.n. anxiety for two doses, levetiracetam 500 mg b.i.d. for his seizures, Creon two capsules t.i.d., pantoprazole 40 mg daily, Zofran 4 mg for nausea and vomiting, tamsulosin 0.4 mg daily, quetiapine  mg at bedtime, trazodone 50 mg at night for sleep.   Mood slowly improved in the structure of the hospital and by the time of discharge, he was denying homicidal or suicidal ideas, hallucinations or delusions and felt ready for discharged to home. CONDITION ON DISCHARGE:  Fair. PROGNOSIS:  Fair. ASSESSMENT:  AXIS I:  Major depression, recurrent, severe, without psychosis. Alcohol use disorder, severe. Delirium tremens. Cannabis use disorder, mild. Nicotine use disorder, severe. AXIS II:  None. AXIS III:  Alcoholic gastritis. Alcoholic pancreatitis. Alcoholic liver disease. Hypertension. Benign prostatic hypertrophy. History of myocardial infarction with stent placement. Dental disease. Seizure disorder. DISPOSITION:  Discharged to self. Interview with [address] today. Follow up [facility] when he returns to this area. Follow up with own primary care provider when he returns to this area. MEDICATIONS:  Flomax 0.4 mg daily; quetiapine  mg at bedtime, #30; pantoprazole 40 mg daily; levetiracetam 500 mg tablet one b.i.d., #60; gabapentin 800 mg capsules one t.i.d., #90; duloxetine 60 mg capsules one daily, #30; cholecalciferol vitamin D3 5000 units daily; aspirin 81 mg daily; atorvastatin 40 mg daily; carvedilol 12.5 mg b.i.d.         Mis Roy MD      [id number]  D:  04/07/2021 11:25  T:  04/08/2021 8:55  JOB #:  [id number]

## 2021-04-27 NOTE — TELEPHONE ENCOUNTER
[facility] called requesting a refill in regards to the pt. -Now calm and agreeable for rehab, calm since 4/23  -she was agitated on 4/22, not taking meds, pulling out IVs and yelling.  - psych eval as likely needs to be medicated to allow care given severity of agitation, collateral from family suggested prior episodes of delirium  - no s/s of infection that would precipitate likely 2/2 age and hospital environment  - advised son likely has underlying dementia and should f/u with OP neuro  - family does not wish for rehab at this time and would prefer d/c home with aides

## 2021-07-23 NOTE — BH NOTES
Pt now rates ankle pain 0/10 after receiving PRN pain medication. Pt also reports improvement of indigestion. Will continue to monitor. Double O-Z Flap Text: The defect edges were debeveled with a #15 scalpel blade.  Given the location of the defect, shape of the defect and the proximity to free margins a Double O-Z flap was deemed most appropriate.  Using a sterile surgical marker, an appropriate transposition flap was drawn incorporating the defect and placing the expected incisions within the relaxed skin tension lines where possible. The area thus outlined was incised deep to adipose tissue with a #15 scalpel blade.  The skin margins were undermined to an appropriate distance in all directions utilizing iris scissors.

## 2021-07-28 NOTE — ED NOTES
\"Administration Instructions\"    I mg Ativan wasted by Sharonda Zhang RN and [employer] order given by Dr Zoie Santiago for 1 mg Ativan as the patient was seizing, medication pulled from the pyxis as an over ride as the patient was seizing. Statement Selected

## 2021-11-23 ENCOUNTER — HOSPITAL ENCOUNTER (OUTPATIENT)
Dept: PHYSICAL THERAPY | Age: 56
Discharge: HOME | End: 2021-11-23
Payer: MEDICAID

## 2021-11-23 PROCEDURE — 97162 PT EVAL MOD COMPLEX 30 MIN: CPT

## 2021-11-23 NOTE — PROGRESS NOTES
[facility] AT [address]. [address]ian[address] Du[address]  Phone: [phone] Fax: [phone] / [address] [address] PHYSICAL THERAPY SERVICES  Patient Name: Daren Zhao : 1965   Medical   Diagnosis: Neck pain [M54.2]  Impingement syndrome of left shoulder [M75.42] Treatment Diagnosis: Neck pain, left shoulder pain   Onset Date: 2021     Referral Source: DEANN Ojeda [facility]): 2021   Prior Hospitalization: See medical history Provider #: [id number]   Prior Level of Function: functionally independent, lives alone, ambulates with walking stick, right handed   Comorbidities: Hx of seizures, hx of TBI chronic LBP, alcohol abuse, tobacco use, arthritis, depression, HTN, heart disease, hx of previous B rotator cuff injuries   Medications: Verified on Patient Summary List   The Plan of Care and following information is based on the information from the initial evaluation.     ========================================================================    Assessment / key information:  Pt is a pleasant 55 y.o. male who presents with c/o left shoulder pain and neck pain. The patient reports an onset of sharp left shoulder pain and neck pain following a series of seizures following alcohol withdrawals. He describes shoulder pain along the lateral aspect of the shoulder that is exacerbated by lifting overhead and putting on his shirt and midline, caudal neck pain exacerbated by cervical rotation. Signs/symptoms at eval consistent with mechanical neck pain (with possible radicular symptoms) and likely rotator cuff irritation. Functional deficits include: impaired B shoulder flexion AROM, impaired cervical AROM, decreased ability to perform functional lifting/visual scanning. Rehab potential is fair due to multiple comorbidities that restrict quality of healing.  Pt would benefit from skilled PT to address above deficits to improve Pt's function and ability to return to PLOF with decreased pain.      ========================================================================    Eval Complexity: History: HIGH Complexity :3+ comorbidities / personal factors will impact the outcome/ POC Exam:MEDIUM Complexity : 3 Standardized tests and measures addressing body structure, function, activity limitation and / or participation in recreation  Presentation: MEDIUM Complexity : Evolving with changing characteristics  Clinical Decision Making:MEDIUM Complexity : FOTO score of 26-74Overall Complexity:MEDIUM  Problem List: pain affecting function, decrease ROM, decrease strength, impaired gait/ balance, decrease ADL/ functional abilitiies, decrease activity tolerance, decrease flexibility/ joint mobility and decrease transfer abilities   Treatment Plan may include any combination of the following: Therapeutic exercise, Therapeutic activities, Neuromuscular re-education, Physical agent/modality, Gait/balance training, Manual therapy, Patient education, Self Care training, Functional mobility training, Home safety training and Stair training  Patient / Family readiness to learn indicated by: asking questions    Persons(s) to be included in education: patient (P)  Barriers to Learning/Limitations: None  Measures taken:    Patient Goal (s): \"regain motion\"   Patient self reported health status: fair  Rehabilitation Potential: fair    GOALS-  Short Term Goals: To be accomplished in 1 week  - Goal: Pt to be compliant with initial HEP to improve shoulder/cervical range of motion and pain. Status at last note/certification: Established and reviewed with Pt  Long Term Goals:  To be accomplished in 10 treatments  - Goal: Pt to demonstrate 5/5 shoulder flexion MMT of left shoulder to improve overhead lifting ability   Status at last note/certification: 4-/5 shoulder flexion MMT  - Goal: Pt to demonstrate at least 150 deg of B shoulder flexion AROM in seated position to facilitate ease of donning/doffing clothes, overhead lifting, and grooming tasks. Status at last note/certification: seated shoulder flexion right 134 deg, left 103 deg  - Goal: Pt to demonstrate at least 50 deg of B cervicatl rotation AROM to facilitate ease of visual scanning. Status at last note/certification: cervical rotation right 35 deg, left 37 deg  - Goal: Pt to report < 3/10 pain at worst to return to PLOF activities. Status at last note/certification: 8/50 pain at worst  - Goal: Pt to report FOTO score of at least 41 pts to show improved function and quality of life. Status at last note/certification: FOTO 33 pts       Frequency / Duration:   -Patient to be seen  2  times per week for 10  treatments:     Patient / Caregiver education and instruction: self care, activity modification and exercises    Therapist Signature: Alexander Braun Date: 30/19/0055   Certification Period:  Time: 3:23 PM   ========================================================================  I certify that the above Physical Therapy Services are being furnished while the patient is under my care. I agree with the treatment plan and certify that this therapy is necessary. Physician Signature:        Date:       Time: ___                                            DEANN Mesa. Please sign and return to [facility] [place] or you may fax the signed copy to [phone]. Thank you.

## 2021-11-23 NOTE — PROGRESS NOTES
PT DAILY TREATMENT NOTE     Patient Name: Miguel Day  Date:2021  : 1965  [x]  Patient  Verified  Payor: [facility] MEDICAID / Plan: [facility] [employer] PLUS / Product Type: Managed Care Medicaid /    In time:1:25  Out time:2:07  Total Treatment Time (min): 42  Visit #: 1 of 10    Medicare/BCBS Only   Total Timed Codes (min):   1:1 Treatment Time:         Treatment Area: Left shoulder pain [M25.512]    SUBJECTIVE  Pain Level (0-10 scale): 6  Any medication changes, allergies to medications, adverse drug reactions, diagnosis change, or new procedure performed?: [x] No    [] Yes (see summary sheet for update)  Subjective functional status/changes:   [] No changes reported    CC: left shoulder pain, neck pain, left hip pain  History/Mechanism of Injury: seizures approximately one month ago following alcohol withdrawal/removal of anti-seizure medication (last seizure approximately 3-4 weeks ago); woke up from seizures with current symptoms  Current Symptoms/Complaints: left shoulder pain- along lateral shoulder blade- difficulty lifting left shoulder, difficulty putting on shirt, sharp localized pain  neck pain- more centralized along caudal C5-C7 segments, difficulty with visual scanning, pain with turning head  Left hip pain, tail bone - following removal of suture- would prefer to address neck/shoulder symptoms at this time  Pain-  Current: 5/10     Worst: 6/10   Best: 3/10  Aggravated By: limited by lifting neck  Alleviated By: suboxone, gabapentin, Tylenol  Previous Treatment/Compliance: poor  PMHx/Surgical Hx: chronic LBP, alcohol abuse, tobacco use, arthritis, depression, HTN, heart disease, hx of previous B rotator cuff injuries  Work Hx: not currently working  Living Situation: lives alone, drives moped/bicycle   Hobbies:   PLOF: functionally independent, lives alone, ambulates with walking stick, right handed  Limitations to PLOF: impaired range of motion, difficulty reaching overhead  Pt Goals: regain motion      OBJECTIVE    30 min [x]Eval                  []Re-Eval     12 min Therapeutic Activity:  []  See flow sheet : Patient education on therapy assessment, prognosis, expectations for therapy sessions, patient goals, expected recovery timleing, and HEP. Rationale: to improve the patients ability to adhere to HEP and therapy sessions for increased compliance when working toward therapy goals.              With   [] TE   [x] TA   [] neuro   [] other: Patient Education: [x] Review HEP    [] Progressed/Changed HEP based on:   [] positioning   [x] body mechanics   [] transfers   [] heat/ice application    [] other:      Other Objective/Functional Measures:     Observation: rounded shoulders, decreased cervical lordosis, right elbow effusion consistent with olecranon bursitis  Palpation: TTP at left infraspinatus, C5-T1 midline spine and adjacent paraspinals    Shoulder AROM/PROM:                                           AROM (deg)               Right Left   Flexion  105   Extension     Scaption     ER at 45 Deg ABD  45   IR at 39 Deg ABD     Seated Flexion  134 103     Functional ER: right C4, left to external ear  Functional IR: right lumbar spine, left iliac crest    Cervical AROM:                                           AROM (deg)                 Right Left   Flexion 39   Extension 12   Side Bend 15 21   Rotation 35 37        Strength:   Right (/5) Left (/5)   GHJ   Flexion 5 4-             Abduction               Extension 5 4-             ER 5 4             IR 5 4+             Elbow Flexion 5 4-   Elbow Extension 5 4     Joint Feel: muscle guarding that limits range of motion    Reflexes/Sensation: endorses left 1-4 digit numbness/tingling and dorsum of left hand    Special Tests :      Right Left   Botello-Jignesh +   +   Cross Arm  +   Speed's  +   Painful Arc  +        Spurlings   + (without overpressure)       Pain Level (0-10 scale) post treatment: 6    ASSESSMENT/Changes in Function: See POC    Patient will continue to benefit from skilled PT services to modify and progress therapeutic interventions, address functional mobility deficits, address ROM deficits, address strength deficits, analyze and address soft tissue restrictions, analyze and cue movement patterns, analyze and modify body mechanics/ergonomics, assess and modify postural abnormalities, address imbalance/dizziness and instruct in home and community integration to attain remaining goals. [x]  See Plan of Care  []  See progress note/recertification  []  See Discharge Summary         Progress towards goals / Updated goals:  See POC    PLAN  [x]  Upgrade activities as tolerated     []  Continue plan of care  [x]  Update interventions per flow sheet       []  Discharge due to:_  []  Other:_      Rolalaine Pack 11/23/2021  1:23 PM    No future appointments.

## 2021-11-29 ENCOUNTER — HOSPITAL ENCOUNTER (OUTPATIENT)
Dept: PHYSICAL THERAPY | Age: 56
Discharge: HOME | End: 2021-11-29
Payer: MEDICAID

## 2021-11-29 PROCEDURE — 97110 THERAPEUTIC EXERCISES: CPT

## 2021-11-29 PROCEDURE — 97112 NEUROMUSCULAR REEDUCATION: CPT

## 2021-11-29 PROCEDURE — 97530 THERAPEUTIC ACTIVITIES: CPT

## 2021-11-29 NOTE — PROGRESS NOTES
PT DAILY TREATMENT NOTE     Patient Name: Shana Jonas  Date:2021  : 1965  [x]  Patient  Verified  Payor: [facility] MEDICAID / Plan: [facility] [employer] PLUS / Product Type: Managed Care Medicaid /    In time:3:36  Out time:4:28  Total Treatment Time (min): 52  Visit #: 2 of 10    Medicare/BCBS Only   Total Timed Codes (min):   1:1 Treatment Time:         Treatment Area: Neck pain [M54.2]  Impingement syndrome of left shoulder [M75.42]    SUBJECTIVE  Pain Level (0-10 scale): 6  Any medication changes, allergies to medications, adverse drug reactions, diagnosis change, or new procedure performed?: [x] No    [] Yes (see summary sheet for update)  Subjective functional status/changes:   [] No changes reported  \"The exercises were helping my left shoulder but my neck is still pretty aggravated. \"    OBJECTIVE    Modality rationale: decrease pain to improve the patients ability to relax and sleep following therapy session to improve restorative sleep patterns.     Min Type Additional Details    [x] Estim:  []Unatt       []IFC  []Premod                        []Other:  []w/ice   []w/heat  Position:   Location:     [] Estim: []Att    []TENS instruct  []NMES                    []Other:  []w/US   []w/ice   []w/heat  Position:  Location:    []  Traction: [] Cervical       []Lumbar                       [] Prone          []Supine                       []Intermittent   []Continuous Lbs:  [] before manual  [] after manual    []  Ultrasound: []Continuous   [] Pulsed                           []1MHz   []3MHz W/cm2:  Location:    []  Iontophoresis with dexamethasone         Location: [] Take home patch   [] In clinic   10 []  Ice     [x]  heat  []  Ice massage  []  Laser   []  Anodyne Position: supine  Location: left shoulder/neck    []  Laser with stim  []  Other:  Position:  Location:    []  Vasopneumatic Device    []  Right     []  Left  Pre-treatment girth:  Post-treatment girth:  Measured at (location):  Pressure:       [] lo [] med [] hi   Temperature: [] lo [] med [] hi   [x] Skin assessment post-treatment:  [x]intact []redness- no adverse reaction    []redness - adverse reaction:     14 min Therapeutic Exercise:  [x] See flow sheet :   Rationale: increase ROM and increase strength to improve the patients ability to perform ADLs with improved shoulder/elbow strength and mobility. 8 min Therapeutic Activity:  [x]  See flow sheet :   Rationale: increase ROM, increase strength, improve coordination, improve balance, and increase proprioception  to improve the patients ability to perform functional overhead/forward lifts. Patient education: use of massage for pain relief, relaxing muscles     15 min Neuromuscular Re-education:  [x]  See flow sheet :   Rationale: increase ROM, increase strength, improve coordination, improve balance and increase proprioception  to improve the patients ability to perform overhead functional lifts with improved scapular stabilization and neutral cervical posture. 5 min Manual Therapy:  STM to left UT/levator scap/cervical paraspinals   The manual therapy interventions were performed at a separate and distinct time from the therapeutic activities interventions. Rationale: decrease pain, increase ROM, increase tissue extensibility, decrease trigger points and increase postural awareness to improve ease of ADLs in the home environment.           With   [x] TE   [] TA   [] neuro   [] other: Patient Education: [x] Review HEP    [] Progressed/Changed HEP based on:   [] positioning   [] body mechanics   [] transfers   [] heat/ice application    [] other:      Other Objective/Functional Measures: -Initiated treatment per flowsheet     Pain Level (0-10 scale) post treatment: 4-5    ASSESSMENT/Changes in Function: Patient reports onset of left sided neck/shoulder pain following release of serratus punch motion today; this could be due to increased muscle spasm in parascapular muscles following rapid release from scapular protraction. He responds positively to visual cues today. Patient will continue to benefit from skilled PT services to modify and progress therapeutic interventions, address functional mobility deficits, address ROM deficits, address strength deficits, analyze and address soft tissue restrictions, analyze and cue movement patterns, analyze and modify body mechanics/ergonomics, assess and modify postural abnormalities, address imbalance/dizziness and instruct in home and community integration to attain remaining goals. []  See Plan of Care  []  See progress note/recertification  []  See Discharge Summary         Progress towards goals / Updated goals:  Short Term Goals: To be accomplished in 1 week  - Goal: Pt to be compliant with initial HEP to improve shoulder/cervical range of motion and pain. Status at last note/certification: Established and reviewed with Pt  Long Term Goals: To be accomplished in 10 treatments  - Goal: Pt to demonstrate 5/5 shoulder flexion MMT of left shoulder to improve overhead lifting ability   Status at last note/certification: 4-/5 shoulder flexion MMT  - Goal: Pt to demonstrate at least 150 deg of B shoulder flexion AROM in seated position to facilitate ease of donning/doffing clothes, overhead lifting, and grooming tasks. Status at last note/certification: seated shoulder flexion right 134 deg, left 103 deg  - Goal: Pt to demonstrate at least 50 deg of B cervicatl rotation AROM to facilitate ease of visual scanning. Status at last note/certification: cervical rotation right 35 deg, left 37 deg  - Goal: Pt to report < 3/10 pain at worst to return to PLOF activities. Status at last note/certification: 3/35 pain at worst  - Goal: Pt to report FOTO score of at least 41 pts to show improved function and quality of life.   Status at last note/certification: FOTO 33 pts     PLAN  [x]  Upgrade activities as tolerated     [x] Continue plan of care  []  Update interventions per flow sheet       []  Discharge due to:_  []  Other:_      Eh Cee 11/29/2021  9:17 AM    Future Appointments   Date Time Provider Aristides Ibanez   11/29/2021  3:30 PM Carly Lites [facility]   12/7/2021  3:30 PM [facility] [place] [address] [facility]   12/9/2021  [facility]45 [facility] [place] [address] [facility]   12/14/2021  3:30 PM Carly Chaney [facility]   12/16/2021  2:00 PM Jaquelin Escudero, PT [facility]   12/20/2021 10:45 AM [facility]arly Julianes [facility]   12/22/2021  2:00 PM [facility] [place] [address] [facility]   12/27/2021 12:15 PM [facility] PT [place] [address] [facility]   12/29/2021 12:15 PM [facility] PT [place] [address] [facility]

## 2021-12-07 ENCOUNTER — APPOINTMENT (OUTPATIENT)
Dept: PHYSICAL THERAPY | Age: 56
End: 2021-12-07
Payer: MEDICAID

## 2021-12-08 ENCOUNTER — HOSPITAL ENCOUNTER (OUTPATIENT)
Dept: PHYSICAL THERAPY | Age: 56
Discharge: HOME | End: 2021-12-08
Payer: MEDICAID

## 2021-12-08 PROCEDURE — 97112 NEUROMUSCULAR REEDUCATION: CPT

## 2021-12-08 PROCEDURE — 97530 THERAPEUTIC ACTIVITIES: CPT

## 2021-12-08 PROCEDURE — 97110 THERAPEUTIC EXERCISES: CPT

## 2021-12-08 NOTE — PROGRESS NOTES
PT DAILY TREATMENT NOTE     Patient Name: Claudetta Perl  Date:2021  : 1965  [x]  Patient  Verified  Payor: [facility] MEDICAID / Plan: [facility] [employer] PLUS / Product Type: Managed Care Medicaid /    In time:3:32  Out time:4:18  Total Treatment Time (min): 55  Visit #: 3 of 10    Medicare/BCBS Only   Total Timed Codes (min):   1:1 Treatment Time:         Treatment Area: Neck pain [M54.2]  Impingement syndrome of left shoulder [M75.42]    SUBJECTIVE  Pain Level (0-10 scale): 4  Any medication changes, allergies to medications, adverse drug reactions, diagnosis change, or new procedure performed?: [x] No    [] Yes (see summary sheet for update)  Subjective functional status/changes:   [] No changes reported  \"I am going to an inpatient substance abuse program soon so I'd like some more exercises. \"    OBJECTIVE    17 min Therapeutic Exercise:  [x] See flow sheet :   Rationale: increase ROM and increase strength to improve the patients ability to perform ADLs with improved shoulder/elbow strength and mobility. 13 min Therapeutic Activity:  [x]  See flow sheet :   Rationale: increase ROM, increase strength, improve coordination, improve balance, and increase proprioception  to improve the patients ability to perform functional overhead/forward lifts. Patient education: updated HEP, goals reassessment     16 min Neuromuscular Re-education:  [x]  See flow sheet :   Rationale: increase ROM, increase strength, improve coordination, improve balance and increase proprioception  to improve the patients ability to perform overhead functional lifts with improved scapular stabilization and neutral cervical posture.             With   [] TE   [x] TA   [] neuro   [] other: Patient Education: [x] Review HEP    [] Progressed/Changed HEP based on:   [] positioning   [] body mechanics   [] transfers   [] heat/ice application    [] other:      Other Objective/Functional Measures:   -cervical rotation right 47 deg, left 45 deg   -shoulder flexion right seated flexion 147 deg, left seated flexion 148 deg    Pain Level (0-10 scale) post treatment: 3    ASSESSMENT/Changes in Function: Progressed HEP per pt request due to upcoming prolonged absence from therapy to attend in patient substance abuse program. Pt is demonstrating improved shoulder flexion strength/mobility at this time. Will hold pt's chart open for 30 days and have instructed pt to schedule additional follow ups when he returns from program. Pt in agreement. Patient will continue to benefit from skilled PT services to modify and progress therapeutic interventions, address functional mobility deficits, address ROM deficits, address strength deficits, analyze and address soft tissue restrictions, analyze and cue movement patterns, analyze and modify body mechanics/ergonomics, assess and modify postural abnormalities, address imbalance/dizziness and instruct in home and community integration to attain remaining goals. []  See Plan of Care  []  See progress note/recertification  []  See Discharge Summary         Progress towards goals / Updated goals:  Short Term Goals: To be accomplished in 1 week  - Goal: Pt to be compliant with initial HEP to improve shoulder/cervical range of motion and pain. Status at last note/certification: Established and reviewed with Pt  Current: progressing, pt reports regular compliance, updated HEP today (12/8/21)  Long Term Goals: To be accomplished in 10 treatments  - Goal: Pt to demonstrate 5/5 shoulder flexion MMT of left shoulder to improve overhead lifting ability   Status at last note/certification: 4-/5 shoulder flexion MMT  Current: progressing, left shoulder flexion 4+/5 (12/8/21)  - Goal: Pt to demonstrate at least 150 deg of B shoulder flexion AROM in seated position to facilitate ease of donning/doffing clothes, overhead lifting, and grooming tasks.   Status at last note/certification: seated shoulder flexion right 134 deg, left 103 deg  Current: progressing, right seated flexion 147 deg, left seated flexion 148 deg (12/8/21)  - Goal: Pt to demonstrate at least 50 deg of B cervicatl rotation AROM to facilitate ease of visual scanning. Status at last note/certification: cervical rotation right 35 deg, left 37 deg  Current: progressing, right 47 deg, left 45 deg (12/8/21)  - Goal: Pt to report < 3/10 pain at worst to return to PLOF activities. Status at last note/certification: 6/10 pain at worst  Current: progressing, 6/10 pain at worst but less frequently (12/8/21)   - Goal: Pt to report FOTO score of at least 41 pts to show improved function and quality of life.   Status at last note/certification: LGGN 21 KLQ     PLAN  [x]  Upgrade activities as tolerated     [x]  Continue plan of care  []  Update interventions per flow sheet       []  Discharge due to:_  []  Other:_      Мария Davis 12/8/2021  9:44 AM    Future Appointments   Date Time Provider Aristides Ibanez   12/8/2021  3:30 PM [facility]

## 2021-12-09 ENCOUNTER — APPOINTMENT (OUTPATIENT)
Dept: PHYSICAL THERAPY | Age: 56
End: 2021-12-09
Payer: MEDICAID

## 2021-12-14 ENCOUNTER — APPOINTMENT (OUTPATIENT)
Dept: PHYSICAL THERAPY | Age: 56
End: 2021-12-14
Payer: MEDICAID

## 2021-12-16 ENCOUNTER — APPOINTMENT (OUTPATIENT)
Dept: PHYSICAL THERAPY | Age: 56
End: 2021-12-16
Payer: MEDICAID

## 2021-12-20 ENCOUNTER — APPOINTMENT (OUTPATIENT)
Dept: PHYSICAL THERAPY | Age: 56
End: 2021-12-20
Payer: MEDICAID

## 2021-12-22 ENCOUNTER — APPOINTMENT (OUTPATIENT)
Dept: PHYSICAL THERAPY | Age: 56
End: 2021-12-22
Payer: MEDICAID

## 2021-12-27 ENCOUNTER — APPOINTMENT (OUTPATIENT)
Dept: PHYSICAL THERAPY | Age: 56
End: 2021-12-27
Payer: MEDICAID

## 2021-12-29 ENCOUNTER — APPOINTMENT (OUTPATIENT)
Dept: PHYSICAL THERAPY | Age: 56
End: 2021-12-29
Payer: MEDICAID

## 2022-01-13 NOTE — PROGRESS NOTES
[facility] AT [address]. [address], [place] [address]  Phone: [phone] Fax [phone] SUMMARY  Patient Name: Shana Jonas : 1965   Treatment/Medical Diagnosis: Neck pain [M54.2]  Impingement syndrome of left shoulder [M75.42]   Referral Source: DEANN Bernal     Date of Initial Visit: 21 Attended Visits: 3 Missed Visits: 0     SUMMARY OF TREATMENT  Pt is a pleasant 55 y.o. male who presents with c/o left shoulder pain and neck pain. Treatment has consisted of: therapeutic exercise to improved UE strength/mobility; therapeutic activities to improve forward/overhead reach/lift; neuromuscular re-education to improve scapular stability, cervical stability, and UE movement patterns; physical agent/modality for symptom management;manual therapy for symptom relief and improved UE/cervical mobility; patient education to improve symptom management; self Care training; and functional mobility training. CURRENT STATUS  Patient is being discharged at this time due to noncompliance; he entered a substance abuse treatment program and was unable to attend therapy for a prolonged period resulting in discharge today without further instruction/assessment. Short Term Goals: To be accomplished in 1 week  - Goal: Pt to be compliant with initial HEP to improve shoulder/cervical range of motion and pain. Status at last note/certification: Established and reviewed with Pt  Current: progressing, pt reports regular compliance, updated HEP today (21)  Long Term Goals:  To be accomplished in 10 treatments  - Goal: Pt to demonstrate 5/5 shoulder flexion MMT of left shoulder to improve overhead lifting ability   Status at last note/certification: 4-/5 shoulder flexion MMT  Current: progressing, left shoulder flexion 4+/5 (21)  - Goal: Pt to demonstrate at least 150 deg of B shoulder flexion AROM in seated position to facilitate ease of donning/doffing clothes, overhead lifting, and grooming tasks. Status at last note/certification: seated shoulder flexion right 134 deg, left 103 deg  Current: progressing, right seated flexion 147 deg, left seated flexion 148 deg (12/8/21)  - Goal: Pt to demonstrate at least 50 deg of B cervicatl rotation AROM to facilitate ease of visual scanning. Status at last note/certification: cervical rotation right 35 deg, left 37 deg  Current: progressing, right 47 deg, left 45 deg (12/8/21)  - Goal: Pt to report < 3/10 pain at worst to return to PLOF activities. Status at last note/certification: 6/10 pain at worst  Current: progressing, 6/10 pain at worst but less frequently (12/8/21)   - Goal: Pt to report FOTO score of at least 41 pts to show improved function and quality of life. Status at last note/certification: GSTS 07 FQN   Current: unable to reassess due to unexpected discharge (12/8/21)    RECOMMENDATIONS  Discontinue therapy due to lack of attendance or compliance. If you have any questions/comments please contact us directly at [phone]. Thank you for allowing us to assist in the care of your patient. To ensure we are able to process the patients encounter and avoid risk of your patient receiving a bill for our services, please sign and return this discharge summary by 2/13/22. Therapist Signature: Nancy Medina Date: 1/13/22   Reporting Period: 11/23/21-12/8/21 Time: 10:01 AM   NOTE TO PHYSICIAN:  Your patient's insurance requires this discharge note be signed and returned. PLEASE COMPLETE THE ORDERS BELOW AND RETURN TO:  [facility] PH[facility]    ___ I have read the above report and request that my patient be discharged from therapy.      Physician Signature:        Date:       Time:                                        Lily Morris

## 2022-03-18 PROBLEM — M54.42 CHRONIC LEFT-SIDED LOW BACK PAIN WITH LEFT-SIDED SCIATICA: Status: ACTIVE | Noted: 2017-07-31

## 2022-03-18 PROBLEM — G89.29 CHRONIC LEFT-SIDED LOW BACK PAIN WITH LEFT-SIDED SCIATICA: Status: ACTIVE | Noted: 2017-07-31

## 2022-03-18 PROBLEM — K56.609 SBO (SMALL BOWEL OBSTRUCTION) (HCC): Status: ACTIVE | Noted: 2019-03-12

## 2022-03-18 PROBLEM — F12.90 MARIJUANA USE: Status: ACTIVE | Noted: 2018-06-04

## 2022-03-18 PROBLEM — F10.931 DELIRIUM TREMENS (HCC): Status: ACTIVE | Noted: 2020-06-03

## 2022-03-18 PROBLEM — Z86.73 HISTORY OF CVA (CEREBROVASCULAR ACCIDENT): Status: ACTIVE | Noted: 2017-09-22

## 2022-03-18 PROBLEM — S82.841S ANKLE FRACTURE, BIMALLEOLAR, CLOSED, RIGHT, SEQUELA: Status: ACTIVE | Noted: 2020-11-03

## 2022-03-18 PROBLEM — F10.939 ALCOHOL WITHDRAWAL (HCC): Status: ACTIVE | Noted: 2021-03-28

## 2022-03-19 PROBLEM — F14.10 COCAINE ABUSE (HCC): Status: ACTIVE | Noted: 2021-03-28

## 2022-03-19 PROBLEM — K76.0 HEPATIC STEATOSIS: Status: ACTIVE | Noted: 2018-08-15

## 2022-03-19 PROBLEM — I26.99 RECURRENT PULMONARY EMBOLI (HCC): Status: ACTIVE | Noted: 2018-06-04

## 2022-03-19 PROBLEM — R45.851 SUICIDAL IDEATION: Status: ACTIVE | Noted: 2021-03-28

## 2022-03-19 PROBLEM — I25.10 CORONARY ARTERY DISEASE INVOLVING NATIVE CORONARY ARTERY OF NATIVE HEART WITHOUT ANGINA PECTORIS: Status: ACTIVE | Noted: 2017-08-31

## 2022-03-19 PROBLEM — L30.9 ECZEMA: Status: ACTIVE | Noted: 2018-01-17

## 2022-03-19 PROBLEM — D13.5 ADENOMYOMATOSIS OF GALLBLADDER: Status: ACTIVE | Noted: 2018-08-15

## 2022-03-20 PROBLEM — F10.939 ALCOHOL WITHDRAWAL SEIZURE (HCC): Status: ACTIVE | Noted: 2019-03-12

## 2022-03-20 PROBLEM — R56.9 ALCOHOL WITHDRAWAL SEIZURE (HCC): Status: ACTIVE | Noted: 2019-03-12

## 2022-03-20 PROBLEM — Z86.711 HX PULMONARY EMBOLISM: Status: ACTIVE | Noted: 2017-09-22

## 2022-03-20 PROBLEM — I25.2 HISTORY OF NON-ST ELEVATION MYOCARDIAL INFARCTION (NSTEMI): Status: ACTIVE | Noted: 2017-09-22

## 2022-08-18 ENCOUNTER — HOSPITAL ENCOUNTER (EMERGENCY)
Age: 57
Discharge: PSYCHIATRIC OR SUBSTANCE USE FACILITY | End: 2022-08-19
Attending: STUDENT IN AN ORGANIZED HEALTH CARE EDUCATION/TRAINING PROGRAM
Payer: MEDICAID

## 2022-08-18 VITALS
SYSTOLIC BLOOD PRESSURE: 147 MMHG | WEIGHT: 230 LBS | OXYGEN SATURATION: 96 % | BODY MASS INDEX: 29.52 KG/M2 | HEART RATE: 89 BPM | HEIGHT: 74 IN | RESPIRATION RATE: 16 BRPM | TEMPERATURE: 98.7 F | DIASTOLIC BLOOD PRESSURE: 88 MMHG

## 2022-08-18 DIAGNOSIS — F10.920 ALCOHOLIC INTOXICATION WITHOUT COMPLICATION (HCC): ICD-10-CM

## 2022-08-18 DIAGNOSIS — R45.851 SUICIDAL IDEATION: Primary | ICD-10-CM

## 2022-08-18 LAB
AMPHET UR QL SCN: NEGATIVE
ANION GAP SERPL CALC-SCNC: 9 MMOL/L (ref 3–18)
BARBITURATES UR QL SCN: POSITIVE
BASOPHILS # BLD: 0.1 K/UL (ref 0–0.1)
BASOPHILS NFR BLD: 1 % (ref 0–2)
BENZODIAZ UR QL: NEGATIVE
BUN SERPL-MCNC: 9 MG/DL (ref 7–18)
BUN/CREAT SERPL: 10 (ref 12–20)
CALCIUM SERPL-MCNC: 9.1 MG/DL (ref 8.5–10.1)
CANNABINOIDS UR QL SCN: NEGATIVE
CHLORIDE SERPL-SCNC: 104 MMOL/L (ref 100–111)
CO2 SERPL-SCNC: 25 MMOL/L (ref 21–32)
COCAINE UR QL SCN: NEGATIVE
CREAT SERPL-MCNC: 0.92 MG/DL (ref 0.6–1.3)
DIFFERENTIAL METHOD BLD: ABNORMAL
EOSINOPHIL # BLD: 0.3 K/UL (ref 0–0.4)
EOSINOPHIL NFR BLD: 4 % (ref 0–5)
ERYTHROCYTE [DISTWIDTH] IN BLOOD BY AUTOMATED COUNT: 14.1 % (ref 11.6–14.5)
ETHANOL SERPL-MCNC: 147 MG/DL (ref 0–3)
FLUAV RNA SPEC QL NAA+PROBE: NOT DETECTED
FLUBV RNA SPEC QL NAA+PROBE: NOT DETECTED
GLUCOSE SERPL-MCNC: 92 MG/DL (ref 74–99)
HCT VFR BLD AUTO: 38.3 % (ref 36–48)
HDSCOM,HDSCOM: ABNORMAL
HGB BLD-MCNC: 12.8 G/DL (ref 13–16)
IMM GRANULOCYTES # BLD AUTO: 0 K/UL (ref 0–0.04)
IMM GRANULOCYTES NFR BLD AUTO: 0 % (ref 0–0.5)
LYMPHOCYTES # BLD: 2 K/UL (ref 0.9–3.6)
LYMPHOCYTES NFR BLD: 30 % (ref 21–52)
MAGNESIUM SERPL-MCNC: 1.9 MG/DL (ref 1.6–2.6)
MCH RBC QN AUTO: 28.8 PG (ref 24–34)
MCHC RBC AUTO-ENTMCNC: 33.4 G/DL (ref 31–37)
MCV RBC AUTO: 86.1 FL (ref 78–100)
METHADONE UR QL: NEGATIVE
MONOCYTES # BLD: 0.6 K/UL (ref 0.05–1.2)
MONOCYTES NFR BLD: 9 % (ref 3–10)
NEUTS SEG # BLD: 3.6 K/UL (ref 1.8–8)
NEUTS SEG NFR BLD: 55 % (ref 40–73)
NRBC # BLD: 0 K/UL (ref 0–0.01)
NRBC BLD-RTO: 0 PER 100 WBC
OPIATES UR QL: NEGATIVE
PCP UR QL: NEGATIVE
PLATELET # BLD AUTO: 270 K/UL (ref 135–420)
PMV BLD AUTO: 10.3 FL (ref 9.2–11.8)
POTASSIUM SERPL-SCNC: 3.4 MMOL/L (ref 3.5–5.5)
RBC # BLD AUTO: 4.45 M/UL (ref 4.35–5.65)
SARS-COV-2, COV2: NOT DETECTED
SODIUM SERPL-SCNC: 138 MMOL/L (ref 136–145)
WBC # BLD AUTO: 6.5 K/UL (ref 4.6–13.2)

## 2022-08-18 PROCEDURE — 82077 ASSAY SPEC XCP UR&BREATH IA: CPT

## 2022-08-18 PROCEDURE — 99285 EMERGENCY DEPT VISIT HI MDM: CPT

## 2022-08-18 PROCEDURE — 83735 ASSAY OF MAGNESIUM: CPT

## 2022-08-18 PROCEDURE — 85025 COMPLETE CBC W/AUTO DIFF WBC: CPT

## 2022-08-18 PROCEDURE — 74011250637 HC RX REV CODE- 250/637: Performed by: PHYSICIAN ASSISTANT

## 2022-08-18 PROCEDURE — 80048 BASIC METABOLIC PNL TOTAL CA: CPT

## 2022-08-18 PROCEDURE — 80177 DRUG SCRN QUAN LEVETIRACETAM: CPT

## 2022-08-18 PROCEDURE — 87636 SARSCOV2 & INF A&B AMP PRB: CPT

## 2022-08-18 PROCEDURE — 96374 THER/PROPH/DIAG INJ IV PUSH: CPT

## 2022-08-18 PROCEDURE — 80307 DRUG TEST PRSMV CHEM ANLYZR: CPT

## 2022-08-18 RX ORDER — LORAZEPAM 2 MG/ML
3 INJECTION, SOLUTION INTRAMUSCULAR; INTRAVENOUS
Status: DISCONTINUED | OUTPATIENT
Start: 2022-08-18 | End: 2022-08-19 | Stop reason: HOSPADM

## 2022-08-18 RX ORDER — POTASSIUM CHLORIDE 20 MEQ/1
40 TABLET, EXTENDED RELEASE ORAL
Status: COMPLETED | OUTPATIENT
Start: 2022-08-18 | End: 2022-08-18

## 2022-08-18 RX ORDER — LANOLIN ALCOHOL/MO/W.PET/CERES
100 CREAM (GRAM) TOPICAL DAILY
Status: DISCONTINUED | OUTPATIENT
Start: 2022-08-18 | End: 2022-08-19 | Stop reason: HOSPADM

## 2022-08-18 RX ORDER — LORAZEPAM 1 MG/1
1 TABLET ORAL
Status: DISCONTINUED | OUTPATIENT
Start: 2022-08-18 | End: 2022-08-19 | Stop reason: HOSPADM

## 2022-08-18 RX ORDER — LORAZEPAM 2 MG/ML
1 INJECTION, SOLUTION INTRAMUSCULAR; INTRAVENOUS
Status: DISCONTINUED | OUTPATIENT
Start: 2022-08-18 | End: 2022-08-19 | Stop reason: HOSPADM

## 2022-08-18 RX ORDER — LORAZEPAM 1 MG/1
2 TABLET ORAL
Status: DISCONTINUED | OUTPATIENT
Start: 2022-08-18 | End: 2022-08-19 | Stop reason: HOSPADM

## 2022-08-18 RX ORDER — LEVETIRACETAM 500 MG/1
500 TABLET ORAL 2 TIMES DAILY
Status: DISCONTINUED | OUTPATIENT
Start: 2022-08-18 | End: 2022-08-18 | Stop reason: DRUGHIGH

## 2022-08-18 RX ORDER — SODIUM CHLORIDE 0.9 % (FLUSH) 0.9 %
5-40 SYRINGE (ML) INJECTION EVERY 8 HOURS
Status: DISCONTINUED | OUTPATIENT
Start: 2022-08-18 | End: 2022-08-19 | Stop reason: HOSPADM

## 2022-08-18 RX ORDER — LORAZEPAM 2 MG/ML
2 INJECTION, SOLUTION INTRAMUSCULAR; INTRAVENOUS
Status: DISCONTINUED | OUTPATIENT
Start: 2022-08-18 | End: 2022-08-19 | Stop reason: HOSPADM

## 2022-08-18 RX ORDER — THERA TABS 400 MCG
1 TAB ORAL DAILY
Status: DISCONTINUED | OUTPATIENT
Start: 2022-08-18 | End: 2022-08-19 | Stop reason: HOSPADM

## 2022-08-18 RX ORDER — SODIUM CHLORIDE 0.9 % (FLUSH) 0.9 %
5-40 SYRINGE (ML) INJECTION AS NEEDED
Status: DISCONTINUED | OUTPATIENT
Start: 2022-08-18 | End: 2022-08-19 | Stop reason: HOSPADM

## 2022-08-18 RX ADMIN — LEVETIRACETAM 750 MG: 500 TABLET, FILM COATED ORAL at 17:58

## 2022-08-18 RX ADMIN — THERA TABS 1 TABLET: TAB at 16:19

## 2022-08-18 RX ADMIN — Medication 100 MG: at 16:19

## 2022-08-18 RX ADMIN — LORAZEPAM 1 MG: 1 TABLET ORAL at 21:47

## 2022-08-18 RX ADMIN — POTASSIUM CHLORIDE 40 MEQ: 1500 TABLET, EXTENDED RELEASE ORAL at 18:19

## 2022-08-18 NOTE — ED NOTES
1900: Assumed care of pt from [facility] at shift change. Plan: Awaiting crisis evaluation. 2223: Transfer of care to [facility] at shift change. Plan: Awaiting inpatient behavioral health bed placement.
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
## 2022-08-18 NOTE — ED NOTES
Purposeful rounding completed:    Side rails up x 2:  YES  Bed in low position and wheels locked: YES  Call bell within reach: YES  Comfort addressed: YES    Toileting needs addressed: YES  Plan of care reviewed/updated with patient and or family members: YES  IV site assessed: N/A  Pain assessed and addressed: YES, 0    Seizure pads covering rails. Patient laying in bed, eyes closed, semi oliver position, blankets covering up to shoulders. No respiratory distress observed.

## 2022-08-18 NOTE — ED NOTES
Purposeful rounding completed:    Side rails up x 1:  YES  Bed in low position and wheels locked: YES  Call bell within reach: NO  Comfort addressed: YES    Toileting needs addressed: YES  Plan of care reviewed/updated with patient and or family members: YES  IV site assessed: N/A  Pain assessed and addressed: YES, 0    Patient sitting in chair. Endorses he is more comfortable. I encouraged and educated patient on the dangers of sitting in the chair. Patient refused to sit in bed.

## 2022-08-18 NOTE — ED PROVIDER NOTES
EMERGENCY DEPARTMENT HISTORY AND PHYSICAL EXAM    1:32 PM      Date: 8/18/2022  Patient Name: Cecilia Garber    History of Presenting Illness     Chief Complaint   Patient presents with    Alcohol Problem    Suicidal         History Provided By: Patient    Additional History (Context): Cecilia Garber is a 65 y.o. male with  history of seizures, alcohol abuse, and other noted past medical history  who presents with complaint of suicidal ideation with a plan to overdose. Pt notes last alcohol use was last night, would like to seek detox as well. Patient denies HI, visual or auditory hallucinations. Denies drug use. PCP: DEANN Mesa    Current Facility-Administered Medications   Medication Dose Route Frequency Provider Last Rate Last Admin    thiamine HCL (B-1) tablet 100 mg  100 mg Oral DAILY Tree Ip, PA   100 mg at 08/18/22 1619    therapeutic multivitamin [facility]) tablet 1 Tablet  1 Tablet Oral DAILY Tree Ip, PA   1 Tablet at 08/18/22 1619    levETIRAcetam (KEPPRA) tablet 750 mg  750 mg Oral BID Tree Ip, PA   750 mg at 08/18/22 1758    potassium chloride (K-DUR, KLOR-CON M20) SR tablet 40 mEq  40 mEq Oral NOW Tree Ip, PA         Current Outpatient Medications   Medication Sig Dispense Refill    acamprosate (CAMPRAL) 333 mg tablet acamprosate 333 mg tablet,delayed release      acetaminophen (TYLENOL) 500 mg tablet acetaminophen 500 mg tablet      Laxative, bisacodyl, 5 mg EC tablet take 2 tablets by mouth once daily if needed for constipation      buprenorphine-naloxone 2.1-0.3 mg film by Buccal route two (2) times a day. chlordiazePOXIDE (LIBRIUM) 10 mg capsule Take 10 mg by mouth three (3) times daily. cloNIDine HCL (CATAPRES) 0.1 mg tablet Take 0.1 mg by mouth two (2) times a day. clopidogreL (PLAVIX) 75 mg tab Take 75 mg by mouth daily.       influenza vaccine 2020-21, 4 yrs+,,PF, (Flucelvax Quad 5896-8989, PF,) syrg injection Flucelvax Quad 4586-3764 (PF) 60 mcg (15 mcg x 4)/0.5 mL IM syringe      folic acid (FOLVITE) 1 mg tablet folic acid 1 mg tablet      glucosa isaac 2KCl/chondroitin isaac (glucosamine isaac 2KCl-chondroit) 250-200 mg cap       glucosamine sulfate 500 mg capsule Take 250 mg by mouth daily. glucosamine-chondroitin (ELATION) 1,500-1,200 mg/30 mL liqd Take 30 mL by mouth three (3) times daily. glucosamn/condroitn/C/Mn/boron (Glucosam-Chondr-Vit C-Mn-Boron) 801-536-96-1 mg tab Take 1 Tablet by mouth three (3) times daily. ibuprofen (MOTRIN) 800 mg tablet ibuprofen 800 mg tablet      ketoconazole (NIZORAL) 2 % shampoo       melatonin 10 mg capsule       naloxone (NARCAN) 4 mg/actuation nasal spray spray 0.1 milliliter in 1 nostril MAY REPEAT DOSE EVERY 2-3 MINUT. ..  (REFER TO PRESCRIPTION NOTES). nicotine (NICODERM CQ) 14 mg/24 hr patch nicotine 14 mg/24 hr daily transdermal patch      polyethylene glycol (MIRALAX) 17 gram/dose powder take 1 scoopful in 8 ounce(s) OF WATER daily      potassium gluconate 2.5 mEq tab potassium gluconate 595 mg (99 mg) tablet   Take 1 tablet by oral route. P-COL RITE 8.6-50 mg per tablet Take 2 Tablets by mouth daily. triamcinolone acetonide (KENALOG) 0.1 % ointment 1 application      tamsulosin (FLOMAX) 0.4 mg capsule Take 2 Capsules by mouth daily (after dinner). 180 Capsule 3    finasteride (PROSCAR) 5 mg tablet Take 1 Tablet by mouth daily. 90 Tablet 3    busPIRone (BUSPAR) 10 mg tablet Take 1.5 Tabs by mouth three (3) times daily. Indications: repeated episodes of anxiety 90 Tab 0    DULoxetine (CYMBALTA) 60 mg capsule Take 1 Cap by mouth daily. Indications: anxiousness associated with depression 30 Cap 0    gabapentin (NEURONTIN) 400 mg capsule Take 2 Caps by mouth three (3) times daily. Max Daily Amount: 2,400 mg. Indications: additional medication to treat partial seizures 90 Cap 0    levETIRAcetam (KEPPRA) 500 mg tablet Take 1 Tab by mouth two (2) times a day.  Indications: additional medication to treat partial seizures 60 Tab 0    QUEtiapine SR (SEROquel XR) 300 mg sr tablet Take 1 Tab by mouth nightly. Indications: additional medications to treat depression 30 Tab 0    cholecalciferol (VITAMIN D3) (5000 Units /125 mcg) capsule Take 1 Cap by mouth daily. Indications: prevention of vitamin D deficiency 1 Cap 0    pantoprazole (PROTONIX) 40 mg tablet Take 1 Tab by mouth Before breakfast and dinner. 30 Tab 0    lipase-protease-amylase (Creon) 24,000-76,000 -120,000 unit capsule Creon 24,000-76,000-120,000 unit capsule,delayed release   take 1 capsule by mouth three times a day with meals      carvediloL (COREG) 12.5 mg tablet carvedilol 12.5 mg tablet      aspirin delayed-release 81 mg tablet aspirin 81 mg tablet,delayed release      tamsulosin (FLOMAX) 0.4 mg capsule Take 1 Cap by mouth daily. Indications: enlarged prostate with urination problem 60 Cap 3    multivit-min-FA-lycopen-lutein (Centrum Silver) 0.4-300-250 mg-mcg-mcg tab Take 1 Tab by mouth daily. 90 Tab 5    fluticasone propionate (FLONASE) 50 mcg/actuation nasal spray 2 sprays each nostril daily  Indications: inflammation of the nose due to an allergy 1 Bottle 5    atorvastatin (LIPITOR) 40 mg tablet Take 1 Tab by mouth nightly.  Indications: high cholesterol and high triglycerides 30 Tab 0       Past History     Past Medical History:  Past Medical History:   Diagnosis Date    Abuse     Anemia NEC     Anxiety     Arthritis     Chronic pain     Colitis     Contact dermatitis and other eczema, due to unspecified cause     Depression     Headache(784.0)     Heart attack ([address]     History of kidney disease     History of seizure     Hypercholesterolemia     Hypertension     Liver disease     Low back pain     with left leg pain -- multilevel spondylosis and L4 radiculitis    Neck pain     mild spondylosis    Other ill-defined conditions(799.89)     MS symptoms    Pancreatitis     Psychotic disorder ([address]     Trauma        Past Surgical History:  Past Surgical History:   Procedure Laterality Date    HX CAROTID STENT      HX CYST REMOVAL         Family History:  Family History   Problem Relation Age of Onset    Psychiatric Disorder Mother     Heart Disease Mother     OSTEOARTHRITIS Father     Alcohol abuse Father     Cancer Father         lung    Elevated Lipids Father     Headache Father     Hypertension Father     Lung Disease Father     Diabetes Father        Social History:  Social History     Tobacco Use    Smoking status: Some Days     Packs/day: 0.50     Years: 20.00     Pack years: 10.00     Types: Cigarettes    Smokeless tobacco: Current     Types: Chew    Tobacco comments:     patient states he vapes daily   Substance Use Topics    Alcohol use: Not Currently     Alcohol/week: 0.0 standard drinks     Comment:  ETOH abuse - quit NOV 2018    Drug use: No       Allergies: Allergies   Allergen Reactions    Amlodipine Other (comments)    Carbamazepine Unknown (comments)     violent    Isosorbide Mononitrate Other (comments)     Headaches    Lisinopril Unknown (comments)    Prednisone Other (comments)     He stated it hypes him up         Review of Systems       Review of Systems   Constitutional:  Negative for chills and fever. Respiratory:  Negative for shortness of breath. Cardiovascular:  Negative for chest pain. Gastrointestinal:  Negative for abdominal pain, nausea and vomiting. Skin:  Negative for rash. Neurological:  Negative for weakness. Psychiatric/Behavioral:  Positive for suicidal ideas. All other systems reviewed and are negative. Physical Exam   Visit Vitals  /84 (BP 1 Location: Left upper arm)   Pulse 84   Temp 98.1 °F (36.7 °C)   Resp 16   Ht 6' 2\" (1.88 m)   Wt 104.3 kg (230 lb)   SpO2 97%   BMI 29.53 kg/m²         Physical Exam  Vitals and nursing note reviewed. Constitutional:       General: He is not in acute distress. Appearance: He is well-developed. He is not diaphoretic.    HENT: Head: Normocephalic and atraumatic. Cardiovascular:      Rate and Rhythm: Normal rate and regular rhythm. Heart sounds: Normal heart sounds. No murmur heard. No friction rub. No gallop. Pulmonary:      Effort: Pulmonary effort is normal. No respiratory distress. Breath sounds: Normal breath sounds. No wheezing or rales. Musculoskeletal:         General: Normal range of motion. Cervical back: Normal range of motion and neck supple. Skin:     General: Skin is warm. Findings: No rash. Neurological:      Mental Status: He is alert. Psychiatric:         Attention and Perception: Attention normal.         Mood and Affect: Mood normal.         Speech: Speech is delayed. Behavior: Behavior is withdrawn. Thought Content: Thought content includes suicidal ideation. Thought content includes suicidal plan. Diagnostic Study Results     Labs -  Recent Results (from the past 12 hour(s))   COVID-19 WITH INFLUENZA A/B    Collection Time: 08/18/22  1:34 PM   Result Value Ref Range    SARS-CoV-2 by PCR Not detected NOTD      Influenza A by PCR Not detected NOTD      Influenza B by PCR Not detected NOTD     CBC WITH AUTOMATED DIFF    Collection Time: 08/18/22  1:37 PM   Result Value Ref Range    WBC 6.5 4.6 - 13.2 K/uL    RBC 4.45 4.35 - 5.65 M/uL    HGB 12.8 (L) 13.0 - 16.0 g/dL    HCT 38.3 36.0 - 48.0 %    MCV 86.1 78.0 - 100.0 FL    MCH 28.8 24.0 - 34.0 PG    MCHC 33.4 31.0 - 37.0 g/dL    RDW 14.1 11.6 - 14.5 %    PLATELET 019 231 - 913 K/uL    MPV 10.3 9.2 - 11.8 FL    NRBC 0.0 0  WBC    ABSOLUTE NRBC 0.00 0.00 - 0.01 K/uL    NEUTROPHILS 55 40 - 73 %    LYMPHOCYTES 30 21 - 52 %    MONOCYTES 9 3 - 10 %    EOSINOPHILS 4 0 - 5 %    BASOPHILS 1 0 - 2 %    IMMATURE GRANULOCYTES 0 0.0 - 0.5 %    ABS. NEUTROPHILS 3.6 1.8 - 8.0 K/UL    ABS. LYMPHOCYTES 2.0 0.9 - 3.6 K/UL    ABS. MONOCYTES 0.6 0.05 - 1.2 K/UL    ABS. EOSINOPHILS 0.3 0.0 - 0.4 K/UL    ABS.  BASOPHILS 0.1 0.0 - 0.1 K/UL    ABS. IMM. GRANS. 0.0 0.00 - 0.04 K/UL    DF AUTOMATED     METABOLIC PANEL, BASIC    Collection Time: 08/18/22  1:37 PM   Result Value Ref Range    Sodium 138 136 - 145 mmol/L    Potassium 3.4 (L) 3.5 - 5.5 mmol/L    Chloride 104 100 - 111 mmol/L    CO2 25 21 - 32 mmol/L    Anion gap 9 3.0 - 18 mmol/L    Glucose 92 74 - 99 mg/dL    BUN 9 7.0 - 18 MG/DL    Creatinine 0.92 0.6 - 1.3 MG/DL    BUN/Creatinine ratio 10 (L) 12 - 20      GFR est AA >60 >60 ml/min/1.73m2    GFR est non-AA >60 >60 ml/min/1.73m2    Calcium 9.1 8.5 - 10.1 MG/DL   ETHYL ALCOHOL    Collection Time: 08/18/22  1:37 PM   Result Value Ref Range    ALCOHOL(ETHYL),SERUM 147 (H) 0 - 3 MG/DL   MAGNESIUM    Collection Time: 08/18/22  1:37 PM   Result Value Ref Range    Magnesium 1.9 1.6 - 2.6 mg/dL   DRUG SCREEN, URINE    Collection Time: 08/18/22  1:45 PM   Result Value Ref Range    BENZODIAZEPINES Negative NEG      BARBITURATES Positive (A) NEG      THC (TH-CANNABINOL) Negative NEG      OPIATES Negative NEG      PCP(PHENCYCLIDINE) Negative NEG      COCAINE Negative NEG      AMPHETAMINES Negative NEG      METHADONE Negative NEG      HDSCOM (NOTE)        Radiologic Studies -   No orders to display         Medical Decision Making   I am the first provider for this patient. I reviewed the vital signs, available nursing notes, past medical history, past surgical history, family history and social history. Vital Signs-Reviewed the patient's vital signs. Records Reviewed: Nursing Notes and Old Medical Records (Time of Review: 1:32 PM)    ED Course: Progress Notes, Reevaluation, and Consults:  PROGRESS NOTE:  7:00 PM  Patient care will be transferred to [facility]. Discussed available diagnostic results and care plan at length. Pending crisis assessment. Written by Emmanuel Harrington PA-C       Diagnosis     Clinical Impression:   1. Suicidal ideation    2.  Alcoholic intoxication without complication (HCC)        Disposition: TBD Follow-up Information    None          Patient's Medications   Start Taking    No medications on file   Continue Taking    ACAMPROSATE (CAMPRAL) 333 MG TABLET    acamprosate 333 mg tablet,delayed release    ACETAMINOPHEN (TYLENOL) 500 MG TABLET    acetaminophen 500 mg tablet    ASPIRIN DELAYED-RELEASE 81 MG TABLET    aspirin 81 mg tablet,delayed release    ATORVASTATIN (LIPITOR) 40 MG TABLET    Take 1 Tab by mouth nightly. Indications: high cholesterol and high triglycerides    BUPRENORPHINE-NALOXONE 2.1-0.3 MG FILM    by Buccal route two (2) times a day. BUSPIRONE (BUSPAR) 10 MG TABLET    Take 1.5 Tabs by mouth three (3) times daily. Indications: repeated episodes of anxiety    CARVEDILOL (COREG) 12.5 MG TABLET    carvedilol 12.5 mg tablet    CHLORDIAZEPOXIDE (LIBRIUM) 10 MG CAPSULE    Take 10 mg by mouth three (3) times daily. CHOLECALCIFEROL (VITAMIN D3) (5000 UNITS /125 MCG) CAPSULE    Take 1 Cap by mouth daily. Indications: prevention of vitamin D deficiency    CLONIDINE HCL (CATAPRES) 0.1 MG TABLET    Take 0.1 mg by mouth two (2) times a day. CLOPIDOGREL (PLAVIX) 75 MG TAB    Take 75 mg by mouth daily. DULOXETINE (CYMBALTA) 60 MG CAPSULE    Take 1 Cap by mouth daily. Indications: anxiousness associated with depression    FINASTERIDE (PROSCAR) 5 MG TABLET    Take 1 Tablet by mouth daily. FLUTICASONE PROPIONATE (FLONASE) 50 MCG/ACTUATION NASAL SPRAY    2 sprays each nostril daily  Indications: inflammation of the nose due to an allergy    FOLIC ACID (FOLVITE) 1 MG TABLET    folic acid 1 mg tablet    GABAPENTIN (NEURONTIN) 400 MG CAPSULE    Take 2 Caps by mouth three (3) times daily. Max Daily Amount: 2,400 mg. Indications: additional medication to treat partial seizures    GLUCOSA STRONG 2KCL/CHONDROITIN STRONG (GLUCOSAMINE STRONG 2KCL-CHONDROIT) 250-200 MG CAP        GLUCOSAMINE SULFATE 500 MG CAPSULE    Take 250 mg by mouth daily.     GLUCOSAMINE-CHONDROITIN (ELATION) 1,500-1,200 MG/30 ML LIQD    Take 30 mL by mouth three (3) times daily. GLUCOSAMN/CONDROITN/C/MN/BORON (GLUCOSAM-CHONDR-VIT C-MN-BORON) 579-839-56-1 MG TAB    Take 1 Tablet by mouth three (3) times daily. IBUPROFEN (MOTRIN) 800 MG TABLET    ibuprofen 800 mg tablet    INFLUENZA VACCINE 2020-21, 4 YRS+,,PF, (FLUCELVAX QUAD 1172-8059, PF,) SYRG INJECTION    Flucelvax Quad 2351-4665 (PF) 60 mcg (15 mcg x 4)/0.5 mL IM syringe    KETOCONAZOLE (NIZORAL) 2 % SHAMPOO        LAXATIVE, BISACODYL, 5 MG EC TABLET    take 2 tablets by mouth once daily if needed for constipation    LEVETIRACETAM (KEPPRA) 500 MG TABLET    Take 1 Tab by mouth two (2) times a day. Indications: additional medication to treat partial seizures    LIPASE-PROTEASE-AMYLASE (CREON) 24,000-76,000 -120,000 UNIT CAPSULE    Creon 24,000-76,000-120,000 unit capsule,delayed release   take 1 capsule by mouth three times a day with meals    MELATONIN 10 MG CAPSULE        MULTIVIT-MIN-FA-LYCOPEN-LUTEIN (CENTRUM SILVER) 0.4-300-250 MG-MCG-MCG TAB    Take 1 Tab by mouth daily. NALOXONE (NARCAN) 4 MG/ACTUATION NASAL SPRAY    spray 0.1 milliliter in 1 nostril MAY REPEAT DOSE EVERY 2-3 MINUT. ..  (REFER TO PRESCRIPTION NOTES). NICOTINE (NICODERM CQ) 14 MG/24 HR PATCH    nicotine 14 mg/24 hr daily transdermal patch    P-COL RITE 8.6-50 MG PER TABLET    Take 2 Tablets by mouth daily. PANTOPRAZOLE (PROTONIX) 40 MG TABLET    Take 1 Tab by mouth Before breakfast and dinner. POLYETHYLENE GLYCOL (MIRALAX) 17 GRAM/DOSE POWDER    take 1 scoopful in 8 ounce(s) OF WATER daily    POTASSIUM GLUCONATE 2.5 MEQ TAB    potassium gluconate 595 mg (99 mg) tablet   Take 1 tablet by oral route. QUETIAPINE SR (SEROQUEL XR) 300 MG SR TABLET    Take 1 Tab by mouth nightly. Indications: additional medications to treat depression    TAMSULOSIN (FLOMAX) 0.4 MG CAPSULE    Take 1 Cap by mouth daily.  Indications: enlarged prostate with urination problem    TAMSULOSIN (FLOMAX) 0.4 MG CAPSULE    Take 2 Capsules by mouth daily (after dinner). TRIAMCINOLONE ACETONIDE (KENALOG) 0.1 % OINTMENT    1 application   These Medications have changed    No medications on file   Stop Taking    No medications on file       Dictation disclaimer:  Please note that this dictation was completed with [employer], the computer voice recognition software. Quite often unanticipated grammatical, syntax, homophones, and other interpretive errors are inadvertently transcribed by the computer software. Please disregard these errors. Please excuse any errors that have escaped final proofreading.

## 2022-08-18 NOTE — ED NOTES
Pt being changed into scrubs. Pt has a large suitcase with him and a cup that he states is ginger ale that he was drinking while in triage. I advised patient that I would need to take the cup from him but I could get him ginger ale. Pt then take several large swallows before handing over the cup. Upon dumping out the cup the contents smells strongly of gin. Upon security searching patient's belongings the  confiscated a handle of half-empty gin. Patient's last observed drink was 1345.

## 2022-08-18 NOTE — ED NOTES
Purposeful rounding completed:    Side rails up x 1:  YES  Bed in low position and wheels locked: YES  Call bell within reach: NO  Comfort addressed: YES    Toileting needs addressed: YES  Plan of care reviewed/updated with patient and or family members: YES  IV site assessed: N/A  Pain assessed and addressed: YES, 0    Sitter has patient within line of sight.

## 2022-08-18 NOTE — ED TRIAGE NOTES
Patient endorses fear of etoh withdrawal, states \"I have seizures. \" Last drink at midnight last night. CIWA score is currently a 3. Pt also endorses SI with plan to overdose.

## 2022-08-18 NOTE — ED NOTES
Purposeful rounding completed:    Side rails up x 1:  YES  Bed in low position and wheels locked: YES  Call bell within reach: NO  Comfort addressed: YES    Toileting needs addressed: YES  Plan of care reviewed/updated with patient and or family members: YES  IV site assessed: N/A  Pain assessed and addressed: YES, 0    Sitting quietly in chair. No respiratory distress observed.

## 2022-08-18 NOTE — ED NOTES
Assumed care of patient. Sitting on chair in room, wearing blue scrubs, red socks, soft spoken. Eye contact made. Quiet, calm. No respiratory distress observed. Skin is warm and dry to touch. Endorses suicidal ideation. Plan to overdose on medications. Denies homicidal ideation. Denies auditory and visual hallucinations. No IV placed. Cardiac, SpO2 and blood pressure monitored. Will continue to monitor.

## 2022-08-18 NOTE — ED NOTES
Upon obtaining bloodwork and EKG pt began shaking. During this approximately 15 second episode he was responsive, not incontinent, he did not loose consciousness, and he was not confused and no post-itcal state noted afterwards. After this episode patient states, \"sorry for my tremors. \"

## 2022-08-19 NOTE — BSMART NOTE
Crisis Note: Bed search in progress:    Spoke with Enoch with [facility] [phone], clinicals submitted; awaiting response. Spoke with Janessa Elizabeth with [facility] [phone], currently at capacity. Spoke with Vianey Alvarado with Hayley Delgado at [facility] [phone], clinicals submitted; awaiting response. Spoke with Sonya Finley with [facility] [phone], currently at capacity.

## 2022-08-19 NOTE — BSMART NOTE
Crisis Note: Received call from Yumi Beard with [facility] at [facility], patient accepted to [address] by Dr. Romero Dys [phone].

## 2022-08-19 NOTE — ED NOTES
11:24 PM   Pt care assumed from Luba ZACARIAS , ED provider. Pt complaint(s), current treatment plan, progression and available diagnostic results have been discussed thoroughly. Rounding occurred: no  Intended Disposition: ADMIT or  Transfer   Pending diagnostic reports and/or labs (please list): Awaiting bed placement    TURN OVER NOTE:   1:58 AM   Consulted with Eloy Hoskins DO  concerning patient Miguel Day, standard discussion of reason for visit, HPI, ROS, PE, and current results available. Report was given at this time.  Provider above will assume care at his time  Pending diagnostic reports and/or labs (please list): Awaiting bed placement  DEANN Marte

## 2022-08-22 LAB — LEVETIRACETAM SERPL-MCNC: 22.4 UG/ML (ref 10–40)

## 2022-10-13 ENCOUNTER — HOSPITAL ENCOUNTER (EMERGENCY)
Age: 57
Discharge: HOME | End: 2022-10-13
Attending: EMERGENCY MEDICINE
Payer: MEDICAID

## 2022-10-13 VITALS
WEIGHT: 230 LBS | HEART RATE: 69 BPM | RESPIRATION RATE: 18 BRPM | OXYGEN SATURATION: 96 % | BODY MASS INDEX: 29.52 KG/M2 | SYSTOLIC BLOOD PRESSURE: 171 MMHG | TEMPERATURE: 97.5 F | DIASTOLIC BLOOD PRESSURE: 113 MMHG | HEIGHT: 74 IN

## 2022-10-13 DIAGNOSIS — S01.301A OPEN WOUND OF RIGHT EAR, UNSPECIFIED OPEN WOUND TYPE, INITIAL ENCOUNTER: Primary | ICD-10-CM

## 2022-10-13 DIAGNOSIS — F19.10 SUBSTANCE ABUSE (HCC): ICD-10-CM

## 2022-10-13 PROCEDURE — 99283 EMERGENCY DEPT VISIT LOW MDM: CPT

## 2022-10-13 PROCEDURE — 74011250637 HC RX REV CODE- 250/637: Performed by: PHYSICIAN ASSISTANT

## 2022-10-13 RX ORDER — CEPHALEXIN 250 MG/1
500 CAPSULE ORAL
Status: COMPLETED | OUTPATIENT
Start: 2022-10-13 | End: 2022-10-13

## 2022-10-13 RX ORDER — CEPHALEXIN 500 MG/1
500 CAPSULE ORAL 3 TIMES DAILY
Qty: 21 CAPSULE | Refills: 0 | Status: SHIPPED | OUTPATIENT
Start: 2022-10-13 | End: 2022-10-20

## 2022-10-13 RX ADMIN — CEPHALEXIN 500 MG: 250 CAPSULE ORAL at 19:31

## 2022-10-13 NOTE — ED PROVIDER NOTES
EMERGENCY DEPARTMENT HISTORY AND PHYSICAL EXAM    Date: 10/13/2022  Patient Name: Ellamae Holstein    History of Presenting Illness     Chief Complaint   Patient presents with    Other     Patient went to [facility] for ETOH withdraw, urine drug screen showed +benzo, sent here for evaulation. Last drink sept 12th, hx of seizures         History Provided By: patient     Chief Complaint: medical clearance for starting detox/treatment for substance abuse at [facility]   Duration: n/a  Timing:  acute  Location: n/a  Quality: n/a  Severity: severe  Modifying Factors: none  Associated Symptoms: small wound to the R ear x 4 days       Additional History (Context): Ellamae Holstein is a 65 y.o. male with PMH anxiety, depression, seizures, hypercholesterolemia, hypertension, liver disease, kidney disease, and polysubstance abuse who presents to the ED requesting medical clearance to return to [facility] treatment facility. Patient states he was scheduled to enter [facility] today for treatment of his alcohol abuse and opiate abuse. Patient states upon arrival he was given a drug screen and tested positive for benzos. Patient states he was seen in mid September at [facility] for a seizure. He was given benzos at that time. The patient denies taking any benzo medication at home. He states his last alcohol and drug use was September 12. The patient states he has not experienced any withdrawal symptoms in several weeks. His only medical complaint is a small abrasion to the right ear for the past 4 days. Patient states he wants to make sure the wound does not get infected. No other complaints are reported at this time.     PCP: DEANN Morton    Current Facility-Administered Medications   Medication Dose Route Frequency Provider Last Rate Last Admin    cephALEXin (KEFLEX) capsule 500 mg  500 mg Oral NOW Dodie, April J, [place]         Current Outpatient Medications   Medication Sig Dispense Refill    cephALEXin (Keflex) 500 mg capsule Take 1 Capsule by mouth three (3) times daily for 7 days. 21 Capsule 0    acamprosate (CAMPRAL) 333 mg tablet acamprosate 333 mg tablet,delayed release      acetaminophen (TYLENOL) 500 mg tablet acetaminophen 500 mg tablet      Laxative, bisacodyl, 5 mg EC tablet take 2 tablets by mouth once daily if needed for constipation      buprenorphine-naloxone 2.1-0.3 mg film by Buccal route two (2) times a day. chlordiazePOXIDE (LIBRIUM) 10 mg capsule Take 10 mg by mouth three (3) times daily. cloNIDine HCL (CATAPRES) 0.1 mg tablet Take 0.1 mg by mouth two (2) times a day. clopidogreL (PLAVIX) 75 mg tab Take 75 mg by mouth daily. influenza vaccine 2020-21, 4 yrs+,,PF, (Flucelvax Quad [id number], PF,) syrg injection Flucelvax Quad 8770-1850 (PF) 60 mcg (15 mcg x 4)/0.5 mL IM syringe      folic acid (FOLVITE) 1 mg tablet folic acid 1 mg tablet      glucosa isaac 2KCl/chondroitin isaac (glucosamine isaac 2KCl-chondroit) 250-200 mg cap       glucosamine sulfate 500 mg capsule Take 250 mg by mouth daily. glucosamine-chondroitin (ELATION) 1,500-1,200 mg/30 mL liqd Take 30 mL by mouth three (3) times daily. glucosamn/condroitn/C/Mn/boron (Glucosam-Chondr-Vit C-Mn-Boron) 252-261-07-1 mg tab Take 1 Tablet by mouth three (3) times daily. ibuprofen (MOTRIN) 800 mg tablet ibuprofen 800 mg tablet      ketoconazole (NIZORAL) 2 % shampoo       melatonin 10 mg capsule       naloxone (NARCAN) 4 mg/actuation nasal spray spray 0.1 milliliter in 1 nostril MAY REPEAT DOSE EVERY 2-3 MINUT. ..  (REFER TO PRESCRIPTION NOTES). nicotine (NICODERM CQ) 14 mg/24 hr patch nicotine 14 mg/24 hr daily transdermal patch      polyethylene glycol (MIRALAX) 17 gram/dose powder take 1 scoopful in 8 ounce(s) OF WATER daily      potassium gluconate 2.5 mEq tab potassium gluconate 595 mg (99 mg) tablet   Take 1 tablet by oral route.       P-COL RITE 8.6-50 mg per tablet Take 2 Tablets by mouth daily. triamcinolone acetonide (KENALOG) 0.1 % ointment 1 application      tamsulosin (FLOMAX) 0.4 mg capsule Take 2 Capsules by mouth daily (after dinner). 180 Capsule 3    finasteride (PROSCAR) 5 mg tablet Take 1 Tablet by mouth daily. 90 Tablet 3    busPIRone (BUSPAR) 10 mg tablet Take 1.5 Tabs by mouth three (3) times daily. Indications: repeated episodes of anxiety 90 Tab 0    DULoxetine (CYMBALTA) 60 mg capsule Take 1 Cap by mouth daily. Indications: anxiousness associated with depression 30 Cap 0    gabapentin (NEURONTIN) 400 mg capsule Take 2 Caps by mouth three (3) times daily. Max Daily Amount: 2,400 mg. Indications: additional medication to treat partial seizures 90 Cap 0    levETIRAcetam (KEPPRA) 500 mg tablet Take 1 Tab by mouth two (2) times a day. Indications: additional medication to treat partial seizures 60 Tab 0    QUEtiapine SR (SEROquel XR) 300 mg sr tablet Take 1 Tab by mouth nightly. Indications: additional medications to treat depression 30 Tab 0    cholecalciferol (VITAMIN D3) (5000 Units /125 mcg) capsule Take 1 Cap by mouth daily. Indications: prevention of vitamin D deficiency 1 Cap 0    pantoprazole (PROTONIX) 40 mg tablet Take 1 Tab by mouth Before breakfast and dinner. 30 Tab 0    lipase-protease-amylase (Creon) 24,000-76,000 -120,000 unit capsule Creon 24,000-76,000-120,000 unit capsule,delayed release   take 1 capsule by mouth three times a day with meals      carvediloL (COREG) 12.5 mg tablet carvedilol 12.5 mg tablet      aspirin delayed-release 81 mg tablet aspirin 81 mg tablet,delayed release      tamsulosin (FLOMAX) 0.4 mg capsule Take 1 Cap by mouth daily. Indications: enlarged prostate with urination problem 60 Cap 3    multivit-min-FA-lycopen-lutein (Centrum Silver) 0.4-300-250 mg-mcg-mcg tab Take 1 Tab by mouth daily.  90 Tab 5    fluticasone propionate (FLONASE) 50 mcg/actuation nasal spray 2 sprays each nostril daily  Indications: inflammation of the nose due to an allergy 1 Bottle 5    atorvastatin (LIPITOR) 40 mg tablet Take 1 Tab by mouth nightly. Indications: high cholesterol and high triglycerides 30 Tab 0       Past History     Past Medical History:  Past Medical History:   Diagnosis Date    Abuse     Anemia NEC     Anxiety     Arthritis     Chronic pain     Colitis     Contact dermatitis and other eczema, due to unspecified cause     Depression     Headache(784.0)     Heart attack ([address]     History of kidney disease     History of seizure     Hypercholesterolemia     Hypertension     Liver disease     Low back pain     with left leg pain -- multilevel spondylosis and L4 radiculitis    Neck pain     mild spondylosis    Other ill-defined conditions(799.89)     MS symptoms    Pancreatitis     Psychotic disorder ([address]     Trauma        Past Surgical History:  Past Surgical History:   Procedure Laterality Date    HX CAROTID STENT      HX CYST REMOVAL         Family History:  Family History   Problem Relation Age of Onset    Psychiatric Disorder Mother     Heart Disease Mother     OSTEOARTHRITIS Father     Alcohol abuse Father     Cancer Father         lung    Elevated Lipids Father     Headache Father     Hypertension Father     Lung Disease Father     Diabetes Father        Social History:  Social History     Tobacco Use    Smoking status: Some Days     Packs/day: 0.50     Years: 20.00     Pack years: 10.00     Types: Cigarettes    Smokeless tobacco: Current     Types: Chew    Tobacco comments:     patient states he vapes daily   Substance Use Topics    Alcohol use: Not Currently     Alcohol/week: 0.0 standard drinks     Comment:  ETOH abuse - quit NOV 2018    Drug use: No       Allergies:   Allergies   Allergen Reactions    Amlodipine Other (comments)    Carbamazepine Unknown (comments)     violent    Isosorbide Mononitrate Other (comments)     Headaches    Lisinopril Unknown (comments)    Prednisone Other (comments)     He stated it hypes him up         Review of Systems   Review of Systems   Constitutional: Negative. Negative for chills and fever. HENT: Negative. Negative for congestion, ear pain and rhinorrhea. Eyes: Negative. Negative for pain and redness. Respiratory: Negative. Negative for cough, shortness of breath, wheezing and stridor. Cardiovascular: Negative. Negative for chest pain and leg swelling. Gastrointestinal: Negative. Negative for abdominal pain, constipation, diarrhea, nausea and vomiting. Genitourinary: Negative. Negative for dysuria and frequency. Musculoskeletal: Negative. Negative for back pain and neck pain. Skin:  Positive for wound. Negative for rash. Neurological: Negative. Negative for dizziness, seizures, syncope and headaches. Psychiatric/Behavioral:          Substance use disorder   All other systems reviewed and are negative. All Other Systems Negative  Physical Exam     Vitals:    10/13/22 1732   BP: (!) 171/113   Pulse: 69   Resp: 18   Temp: 97.5 °F (36.4 °C)   SpO2: 96%   Weight: 104.3 kg (230 lb)   Height: 6' 2\" (1.88 m)     Physical Exam  Vitals and nursing note reviewed. Constitutional:       General: He is not in acute distress. Appearance: He is well-developed. He is not diaphoretic. HENT:      Head: Normocephalic and atraumatic. Eyes:      General: No scleral icterus. Right eye: No discharge. Left eye: No discharge. Conjunctiva/sclera: Conjunctivae normal.   Cardiovascular:      Rate and Rhythm: Normal rate and regular rhythm. Heart sounds: Normal heart sounds. No murmur heard. No friction rub. No gallop. Pulmonary:      Effort: Pulmonary effort is normal. No respiratory distress. Breath sounds: Normal breath sounds. No stridor. No wheezing or rales. Musculoskeletal:         General: Normal range of motion. Cervical back: Normal range of motion and neck supple. Skin:     General: Skin is warm and dry. Findings: No erythema or rash. Comments: Small abrasion noted to the R ear pinna, minimal surrounding erythema noted, no purulence elicited on palpation    Neurological:      General: No focal deficit present. Mental Status: He is alert and oriented to person, place, and time. Mental status is at baseline. Coordination: Coordination normal.      Comments: Gait is steady and patient exhibits no evidence of ataxia. Patient is able to ambulate without difficulty. No focal neurological deficit noted. No facial droop, slurred speech, or evidence of altered mentation noted on exam.       Psychiatric:         Behavior: Behavior normal.         Thought Content: Thought content normal.              Diagnostic Study Results     Labs -   No results found for this or any previous visit (from the past 12 hour(s)). Radiologic Studies -   No orders to display     CT Results  (Last 48 hours)      None          CXR Results  (Last 48 hours)      None              Medical Decision Making   I am the first provider for this patient. I reviewed the vital signs, available nursing notes, past medical history, past surgical history, family history and social history. Vital Signs-Reviewed the patient's vital signs. Records Reviewed: Megan Stoll PA-C   Procedures:  Procedures    Provider Notes (Medical Decision Making): Impression:  ear wound, substance abuse    I have attempted to contact [facility] to confirm what this patient needs for medical clearance to return. No answer was obtained. The patient is asymptomatic in the ED. His vitals are stable. No signs or symptoms of withdrawal are noted today. I have provided the patient with a letter stating that he is safe to return to their facility to continue his treatment. We will plan to cover the patient with Keflex for his wound. PCP follow-up as needed. Return precautions advised. Patient agrees.  Megan Morejon PA-C     Dictation disclaimer:  Please note that this dictation was completed with [employer], the Fuzz voice recognition software. Quite often unanticipated grammatical, syntax, homophones, and other interpretive errors are inadvertently transcribed by the computer software. Please disregard these errors. Please excuse any errors that have escaped final proofreading. MED RECONCILIATION:  Current Facility-Administered Medications   Medication Dose Route Frequency    cephALEXin (KEFLEX) capsule 500 mg  500 mg Oral NOW     Current Outpatient Medications   Medication Sig    cephALEXin (Keflex) 500 mg capsule Take 1 Capsule by mouth three (3) times daily for 7 days. acamprosate (CAMPRAL) 333 mg tablet acamprosate 333 mg tablet,delayed release    acetaminophen (TYLENOL) 500 mg tablet acetaminophen 500 mg tablet    Laxative, bisacodyl, 5 mg EC tablet take 2 tablets by mouth once daily if needed for constipation    buprenorphine-naloxone 2.1-0.3 mg film by Buccal route two (2) times a day. chlordiazePOXIDE (LIBRIUM) 10 mg capsule Take 10 mg by mouth three (3) times daily. cloNIDine HCL (CATAPRES) 0.1 mg tablet Take 0.1 mg by mouth two (2) times a day. clopidogreL (PLAVIX) 75 mg tab Take 75 mg by mouth daily. influenza vaccine 2020-21, 4 yrs+,,PF, (Flucelvax Quad 7347-0061, PF,) syrg injection Flucelvax Quad [id number] (PF) 60 mcg (15 mcg x 4)/0.5 mL IM syringe    folic acid (FOLVITE) 1 mg tablet folic acid 1 mg tablet    glucosa isaac 2KCl/chondroitin isaac (glucosamine isaac 2KCl-chondroit) 250-200 mg cap     glucosamine sulfate 500 mg capsule Take 250 mg by mouth daily. glucosamine-chondroitin (ELATION) 1,500-1,200 mg/30 mL liqd Take 30 mL by mouth three (3) times daily. glucosamn/condroitn/C/Mn/boron (Glucosam-Chondr-Vit C-Mn-Boron) 088-631-12-1 mg tab Take 1 Tablet by mouth three (3) times daily.     ibuprofen (MOTRIN) 800 mg tablet ibuprofen 800 mg tablet    ketoconazole (NIZORAL) 2 % shampoo     melatonin 10 mg capsule     naloxone (NARCAN) 4 mg/actuation nasal spray spray 0.1 milliliter in 1 nostril MAY REPEAT DOSE EVERY 2-3 MINUT. ..  (REFER TO PRESCRIPTION NOTES). nicotine (NICODERM CQ) 14 mg/24 hr patch nicotine 14 mg/24 hr daily transdermal patch    polyethylene glycol (MIRALAX) 17 gram/dose powder take 1 scoopful in 8 ounce(s) OF WATER daily    potassium gluconate 2.5 mEq tab potassium gluconate 595 mg (99 mg) tablet   Take 1 tablet by oral route. P-COL RITE 8.6-50 mg per tablet Take 2 Tablets by mouth daily. triamcinolone acetonide (KENALOG) 0.1 % ointment 1 application    tamsulosin (FLOMAX) 0.4 mg capsule Take 2 Capsules by mouth daily (after dinner). finasteride (PROSCAR) 5 mg tablet Take 1 Tablet by mouth daily. busPIRone (BUSPAR) 10 mg tablet Take 1.5 Tabs by mouth three (3) times daily. Indications: repeated episodes of anxiety    DULoxetine (CYMBALTA) 60 mg capsule Take 1 Cap by mouth daily. Indications: anxiousness associated with depression    gabapentin (NEURONTIN) 400 mg capsule Take 2 Caps by mouth three (3) times daily. Max Daily Amount: 2,400 mg. Indications: additional medication to treat partial seizures    levETIRAcetam (KEPPRA) 500 mg tablet Take 1 Tab by mouth two (2) times a day. Indications: additional medication to treat partial seizures    QUEtiapine SR (SEROquel XR) 300 mg sr tablet Take 1 Tab by mouth nightly. Indications: additional medications to treat depression    cholecalciferol (VITAMIN D3) (5000 Units /125 mcg) capsule Take 1 Cap by mouth daily. Indications: prevention of vitamin D deficiency    pantoprazole (PROTONIX) 40 mg tablet Take 1 Tab by mouth Before breakfast and dinner.     lipase-protease-amylase (Creon) 24,000-76,000 -120,000 unit capsule Creon 24,000-76,000-120,000 unit capsule,delayed release   take 1 capsule by mouth three times a day with meals    carvediloL (COREG) 12.5 mg tablet carvedilol 12.5 mg tablet    aspirin delayed-release 81 mg tablet aspirin 81 mg tablet,delayed release tamsulosin (FLOMAX) 0.4 mg capsule Take 1 Cap by mouth daily. Indications: enlarged prostate with urination problem    multivit-min-FA-lycopen-lutein (Centrum Silver) 0.4-300-250 mg-mcg-mcg tab Take 1 Tab by mouth daily. fluticasone propionate (FLONASE) 50 mcg/actuation nasal spray 2 sprays each nostril daily  Indications: inflammation of the nose due to an allergy    atorvastatin (LIPITOR) 40 mg tablet Take 1 Tab by mouth nightly. Indications: high cholesterol and high triglycerides       Disposition:  D/c    DISCHARGE NOTE:   Patient is stable for discharge at this time. I have discussed all the findings from today's work up with the patient, including lab results and imaging. I have answered all questions. Rx for keflex given. Rest and close follow-up with the PCP recommended this week. Return to the ED immediately for any new or worsening symptoms. Megan Christensen PA-C     Follow-up Information       Follow up With Specialties Details Why Contact Info    DEANN García Physician Assistant In 1 week As needed [address] [facility] EMERGENCY DEPT Emergency Medicine  As needed, If symptoms worsen [address] [place] [address]  [phone]            Current Discharge Medication List        START taking these medications    Details   cephALEXin (Keflex) 500 mg capsule Take 1 Capsule by mouth three (3) times daily for 7 days. Qty: 21 Capsule, Refills: 0  Start date: 10/13/2022, End date: 10/20/2022                  Diagnosis     Clinical Impression:   1. Open wound of right ear, unspecified open wound type, initial encounter    2.  Substance abuse ([facility] [address]

## 2022-10-13 NOTE — Clinical Note
Marry Moritz was seen and treated in our emergency department on 10/13/2022. To whom it may concern:    Mr. Marry Moritz was seen in the ED on 10/13/2022. His exam was unremarkable and his vitals were stable. He is medically cleared to return to [facility] to continue his treatment.      Sincerely,     DOLLY Willett MD

## 2022-10-13 NOTE — ED TRIAGE NOTES
Patient states has been sober since 9/12/2022, states checked into [facility] for treatment, urine drug screen done noted to be + benzo, patient history of seizures, last seizure sept 23rd [facility]. Benzo given, patient no medication for seizures. Needs medical clearance for benzo to go back to [facility].  Denies SI and HI, denies withdraw

## 2022-10-13 NOTE — Clinical Note
Allyson Louis was seen and treated in our emergency department on 10/13/2022. To whom it may concern:    Mr. Allyson Louis was seen in the ED on 10/13/2022. His exam was unremarkable and his vitals were stable. He is medically cleared to return to [facility] to continue his treatment.      Sincerely,     DOLLY York MD

## 2022-10-13 NOTE — DISCHARGE INSTRUCTIONS
EvalYou Activation    Thank you for requesting access to EvalYou. Please follow the instructions below to securely access and download your online medical record. EvalYou allows you to send messages to your doctor, view your test results, renew your prescriptions, schedule appointments, and more. How Do I Sign Up? In your internet browser, go to [email or web address]  Click on the First Time User? Click Here link in the Sign In box. You will be redirect to the New Member Sign Up page. Enter your EvalYou Access Code exactly as it appears below. You will not need to use this code after youve completed the sign-up process. If you do not sign up before the expiration date, you must request a new code. EvalYou Access Code: [email or web address]  Expires: 2022  9:06 AM (This is the date your EvalYou access code will )    Enter the last four digits of your Social Security Number (xxxx) and Date of Birth (mm/dd/yyyy) as indicated and click Submit. You will be taken to the next sign-up page. Create a EvalYou ID. This will be your EvalYou login ID and cannot be changed, so think of one that is secure and easy to remember. Create a EvalYou password. You can change your password at any time. Enter your Password Reset Question and Answer. This can be used at a later time if you forget your password. Enter your e-mail address. You will receive e-mail notification when new information is available in [address] Click Sign Up. You can now view and download portions of your medical record. Click the [employer] link to download a portable copy of your medical information. Additional Information    If you have questions, please visit the Frequently Asked Questions section of the EvalYou website at [email or web address] Remember, EvalYou is NOT to be used for urgent needs. For medical emergencies, dial 911.

## 2023-07-10 NOTE — PROGRESS NOTES
Problem: Alcohol Withdrawal  Goal: *STG: Seeks staff when symptoms of withdrawal increase  Description: AEB pt seeking staff as needed when symptoms of alcohol withdrawal increase daily while in the hospital  Outcome: Progressing Towards Goal  Goal: *STG: Complies with medication therapy  Description: AEB pt taking all scheduled medications daily while in the hospital  Outcome: Progressing Towards Goal  Goal: *STG: Will identify negative impact of chemical dependency including the use of tobacco, alcohol, and other substances  Description: AEB pt identifying 3 negative impacts of substance abuse prior to discharge from the hospital  Outcome: Progressing Towards Goal  Goal: *STG: Verbalizes abstinence as an achievable goal  Description: AEB pt verbalizing abstinence as an achievable goal prior to discharge from the hospital  Outcome: Progressing Towards Goal     Romina Moore is a 48 y.o. Male that presented today as anxious, depressed, some withdrawal symptoms well controlled with prn medications, and cooperative with staff. Romina Moore has been compliant with medications, has eaten all meals, and has attended most groups. RN's will initiate, develop, implement, review, and revise treatment plans as necessary. Will continue to provide education, redirection, and support as needed or verbalized by patient.    Signed By: Emile Reyes RN     April 3, 2021 Cimzia Pregnancy And Lactation Text: This medication crosses the placenta but can be considered safe in certain situations. Cimzia may be excreted in breast milk.

## 2023-10-18 ENCOUNTER — OFFICE VISIT (OUTPATIENT)
Age: 58
End: 2023-10-18
Payer: MEDICAID

## 2023-10-18 VITALS
TEMPERATURE: 97.5 F | BODY MASS INDEX: 30.67 KG/M2 | DIASTOLIC BLOOD PRESSURE: 63 MMHG | HEIGHT: 74 IN | SYSTOLIC BLOOD PRESSURE: 88 MMHG | HEART RATE: 63 BPM | WEIGHT: 239 LBS | OXYGEN SATURATION: 97 %

## 2023-10-18 DIAGNOSIS — G89.29 CHRONIC BILATERAL LOW BACK PAIN WITHOUT SCIATICA: Primary | ICD-10-CM

## 2023-10-18 DIAGNOSIS — M54.50 CHRONIC BILATERAL LOW BACK PAIN WITHOUT SCIATICA: Primary | ICD-10-CM

## 2023-10-18 DIAGNOSIS — G89.29 CHRONIC BILATERAL LOW BACK PAIN WITH LEFT-SIDED SCIATICA: ICD-10-CM

## 2023-10-18 DIAGNOSIS — M54.42 CHRONIC BILATERAL LOW BACK PAIN WITH LEFT-SIDED SCIATICA: ICD-10-CM

## 2023-10-18 DIAGNOSIS — M54.16 LEFT LUMBAR RADICULITIS: ICD-10-CM

## 2023-10-18 PROCEDURE — 72100 X-RAY EXAM L-S SPINE 2/3 VWS: CPT | Performed by: PHYSICAL MEDICINE & REHABILITATION

## 2023-10-18 PROCEDURE — 99204 OFFICE O/P NEW MOD 45 MIN: CPT | Performed by: PHYSICAL MEDICINE & REHABILITATION

## 2023-10-18 PROCEDURE — 3074F SYST BP LT 130 MM HG: CPT | Performed by: PHYSICAL MEDICINE & REHABILITATION

## 2023-10-18 PROCEDURE — 3078F DIAST BP <80 MM HG: CPT | Performed by: PHYSICAL MEDICINE & REHABILITATION

## 2023-10-18 RX ORDER — POLYETHYLENE GLYCOL 3350 17 G/17G
17 POWDER, FOR SOLUTION ORAL DAILY PRN
COMMUNITY
Start: 2023-10-17

## 2023-10-18 RX ORDER — BUPRENORPHINE AND NALOXONE 8; 2 MG/1; MG/1
1 FILM, SOLUBLE BUCCAL; SUBLINGUAL DAILY
COMMUNITY

## 2023-10-18 RX ORDER — NAPROXEN 500 MG/1
500 TABLET ORAL 2 TIMES DAILY WITH MEALS
COMMUNITY
Start: 2023-10-04

## 2023-10-18 RX ORDER — CHOLECALCIFEROL (VITAMIN D3) 125 MCG
500 CAPSULE ORAL DAILY
COMMUNITY
Start: 2023-10-17

## 2023-10-18 RX ORDER — ASPIRIN 81 MG
81 TABLET,CHEWABLE ORAL DAILY
COMMUNITY
Start: 2023-10-16

## 2023-10-18 RX ORDER — LIDOCAINE 50 MG/G
1 PATCH TOPICAL DAILY
Qty: 30 PATCH | Refills: 3 | Status: SHIPPED | OUTPATIENT
Start: 2023-10-18 | End: 2023-11-17

## 2023-10-18 RX ORDER — RIVAROXABAN 10 MG/1
10 TABLET, FILM COATED ORAL DAILY
COMMUNITY
Start: 2023-09-11

## 2023-10-18 RX ORDER — GABAPENTIN 800 MG/1
TABLET ORAL 3 TIMES DAILY
COMMUNITY
Start: 2023-10-11

## 2023-10-18 RX ORDER — BUSPIRONE HYDROCHLORIDE 15 MG/1
TABLET ORAL
COMMUNITY
Start: 2023-10-04

## 2023-10-18 RX ORDER — TIZANIDINE 4 MG/1
TABLET ORAL
COMMUNITY
Start: 2023-10-16

## 2023-10-18 RX ORDER — RISPERIDONE 0.5 MG/1
0.5 TABLET ORAL DAILY
COMMUNITY
Start: 2023-09-20

## 2023-10-18 RX ORDER — NITROGLYCERIN 0.4 MG/1
0.4 TABLET SUBLINGUAL EVERY 5 MIN PRN
COMMUNITY
Start: 2023-10-15

## 2023-10-18 RX ORDER — BETAMETHASONE VALERATE 0.1 %
LOTION (ML) TOPICAL
COMMUNITY
Start: 2023-10-13

## 2023-10-18 RX ORDER — CHLORAL HYDRATE 500 MG
1 CAPSULE ORAL DAILY
COMMUNITY
Start: 2023-10-16

## 2023-10-18 RX ORDER — OMEPRAZOLE 20 MG/1
20 CAPSULE, DELAYED RELEASE ORAL DAILY
COMMUNITY
Start: 2023-10-13

## 2023-10-18 RX ORDER — ALCOHOL 70.47 ML/100ML
1 GEL TOPICAL DAILY
COMMUNITY
Start: 2023-10-10

## 2023-10-18 RX ORDER — CYCLOBENZAPRINE HCL 10 MG
10 TABLET ORAL 2 TIMES DAILY PRN
COMMUNITY
Start: 2023-10-08

## 2023-10-18 RX ORDER — LEVETIRACETAM 750 MG/1
750 TABLET ORAL EVERY 12 HOURS
COMMUNITY
Start: 2023-09-07

## 2023-10-18 RX ORDER — PROPRANOLOL HYDROCHLORIDE 20 MG/1
20 TABLET ORAL 3 TIMES DAILY
COMMUNITY
Start: 2023-10-04

## 2023-10-18 RX ORDER — ACETAMINOPHEN, DIPHENHYDRAMINE HCL, PHENYLEPHRINE HCL 325; 25; 5 MG/1; MG/1; MG/1
1 TABLET ORAL NIGHTLY PRN
COMMUNITY
Start: 2023-09-28

## 2023-10-18 RX ORDER — DOCUSATE SODIUM 100 MG/1
100 CAPSULE, LIQUID FILLED ORAL 2 TIMES DAILY PRN
COMMUNITY
Start: 2023-10-16

## 2023-10-18 NOTE — PROGRESS NOTES
Brett Bravo presents today for   Chief Complaint   Patient presents with    Lower Back Pain    Neck Pain       Is someone accompanying this pt? no    Is the patient using any DME equipment during OV? no      Coordination of Care:  1. Have you been to the ER, urgent care clinic since your last visit? Yes, pt states that he went to the ER after he fell 8 weeks ago  Hospitalized since your last visit? no    2. Have you seen or consulted any other health care providers outside of the [address] [place] [address] since your last visit? Yes, pcp Include any pap smears or colon screening.  no

## 2023-10-18 NOTE — PROGRESS NOTES
[facility]  [address], [address] [place] [address]   Phone: [phone]  Fax: [phone]      Patient: Earlyne Schirmer                                                                              MRN: [id number]        YOB: 1965          AGE: 63 y.o. PCP: BINDU Catherine  Date:  10/18/23    Reason for Consultation: Lower Back Pain and Neck Pain      HPI:  Earlyne Schirmer is a 63 y.o. male with relevant PMH of TBI in 2008 , seizures due to ETOH withdrawal,  CAD with stents, H/o PE, prior history of opioid use- now on suboxone , who presents with chronic intermittent low back pain that worsened 7-8 weeks ago-  when he stepped on a loose tile and fell backwards on his gluteal area. Since the injury he has had increased left low back pain and neck pain. Neurologic symptoms: + numbness, tingling- left foot , weakness, bowel or bladder changes. Recent fall 2 months ago. Ambulates with cane     Location: The pain is located in the low back pain L>R, neck L>R    Radiation: The pain does radiate down the left lateral thigh2. Into the left shoulder . Pain Score: Currently: 7/10   Quality: Pain is of a sharpquality. Aggravating: Pain is exacerbated by walking  Alleviating: The pain is alleviated by sitting in a firm chair    Prior Treatments:  Physical therapy: None recently   Injections:Yes  Left L4 SNRB 1/16/2014  Surgery:No  Previous Medications:   Current Medications: suboxone , flexeril 10mg , tizanadine 4mg, cymbalta 60mg , gabapentin 800mg tid   Previous work-up has included:   8/26/2023- left hip xray - mild joint space narrowing    CT cervical spine 7/27/2022  Degenerative changes: Mild facet arthropathy at bilateral C3-C4. Mild disc height loss at C3-C4 and C5-C6         MRI lumbar spine 9/17/2016  L1/L2: Patent canal and foramina. L2/L3: Patent canal and foramina. L3/L4: Mild disc bulge.  Bilateral facet

## 2023-10-27 ENCOUNTER — HOSPITAL ENCOUNTER (OUTPATIENT)
Facility: HOSPITAL | Age: 58
Setting detail: RECURRING SERIES
Discharge: HOME | End: 2023-10-30
Payer: MEDICAID

## 2023-10-27 PROCEDURE — 97162 PT EVAL MOD COMPLEX 30 MIN: CPT

## 2023-10-27 NOTE — PROGRESS NOTES
PHYSICAL / OCCUPATIONAL THERAPY - DAILY TREATMENT NOTE (updated )    Patient Name: Tamiko Hartmann    Date: 10/27/2023    : 1965  Insurance: Payor: [facility] MEDICAID / Plan: [facility] [employer] / Product Type: *No Product type* /      Patient  verified Yes     Visit #   Current / Total 1 10   Time   In / Out 1:00 1:40   Pain   In / Out 9 9   Subjective Functional Status/Changes: See IE     TREATMENT AREA =  Other low back pain [M54.59]    OBJECTIVE    Modalities Rationale:     decrease pain to improve patient's ability to progress to PLOF and address remaining functional goals. min [] Estim Unattended, type/location:                                      []  w/ice    []  w/heat    min [] Estim Attended, type/location:                                     []  w/US     []  w/ice    []  w/heat    []  TENS insruct      min []  Mechanical Traction: type/lbs                   []  pro   []  sup   []  int   []  cont    []  before manual    []  after manual    min []  Ultrasound, settings/location:      min []  Iontophoresis w/ dexamethasone, location:                                               []  take home patch       []  in clinic    min  unbill []  Ice     []  Heat    location/position:     min []  Paraffin,  details:     min []  Vasopneumatic Device, press/temp:     min []  Sudhir Nail / Clorinda Pata: If using vaso (only need to measure limb vaso being performed on)      pre-treatment girth :       post-treatment girth :       measured at (landmark location) :      min []  Other:    Skin assessment post-treatment (if applicable):    []  intact    []  redness- no adverse reaction                 []redness - adverse reaction:        Therapeutic Procedures:   Tx Min  Procedure, Rationale, Specifics   5  28393 Self Care/Home Management (timed):  improve patient knowledge and understanding of diagnosis/prognosis, physical therapy expectations, procedures and progression, and HEP  to improve
addressing body structure, function, activity limitation and / or participation in recreation  ;Presentation:  MEDIUM Complexity : Evolving with changing characteristics  ; Clinical Decision Making:  MEDIUM Complexity : FOTO score of 26-74 FOTO score = an established functional score where 100 = no disability  Overall Complexity Rating: MEDIUM  Problem List: pain affecting function, decrease ROM, decrease strength, impaired gait/balance, decrease ADL/functional abilities, decrease activity tolerance, and decrease flexibility/joint mobility   Treatment Plan may include any combination of the followin Therapeutic Exercise, 92724 Neuromuscular Re-Education, 74529 Manual Therapy, 45880 Therapeutic Activity, 12845 Self Care/Home Management, 70124 Electrical Stim unattended, and 54306 Gait Training If patient is receiving VASO: Vasopnuematic compression justification:  Per bilateral girth measures taken and listed above the edema is considered significant and having an impact on the patient's strength, balance, gait, transfers, self care, and ADL's  Patient / Family readiness to learn indicated by: asking questions, trying to perform skills, interest, return verbalization , and return demonstration   Persons(s) to be included in education: patient (P)  Barriers to Learning/Limitations: none  Measures taken if barriers to learning present: NA  Patient Goal (s): \"To walk tow miles. \"  Patient Self Reported Health Status: fair  Rehabilitation Potential: good    Short Term Goals: To be accomplished in 4 treatments  Patient will report daily compliance with HEP to improve tolerance to ADLs. Status at last assessment: provided HEP at   Long Term Goals: To be accomplished in 10 treatments  Patient will report pain 2/10 at worst to improve tolerance to walking. Status at last assessment: pain 9/10 at worst  Patient will increase TUG Score to 10 seconds to improve tolerance to ADLs.   Status at last assessment:14

## 2023-11-07 ENCOUNTER — HOSPITAL ENCOUNTER (OUTPATIENT)
Facility: HOSPITAL | Age: 58
Setting detail: RECURRING SERIES
Discharge: HOME | End: 2023-11-10
Payer: MEDICAID

## 2023-11-07 PROCEDURE — 97110 THERAPEUTIC EXERCISES: CPT

## 2023-11-07 PROCEDURE — 97112 NEUROMUSCULAR REEDUCATION: CPT

## 2023-11-07 PROCEDURE — 97530 THERAPEUTIC ACTIVITIES: CPT

## 2023-11-07 NOTE — PROGRESS NOTES
PHYSICAL / OCCUPATIONAL THERAPY - DAILY TREATMENT NOTE (updated )    Patient Name: Lilia Cardona    Date: 2023    : 1965  Insurance: Payor:   / Plan: [facility]  / Product Type: *No Product type* /      Patient  verified Yes     Visit #   Current / Total 2 10   Time   In / Out 3:42 4:20   Pain   In / Out 6 7   Subjective Functional Status/Changes: Pt reports 6/10 pain, primarily in lower back (states minimal left LE pain). Pt reports that he rode his bike here, reports that he has more pain with walking. Pt reports compliance with HEP. Pt requests to use ice instead of heat after exercises this session. Next PN/ RC due 2023  Auth due 36 visits, exp 2023    TREATMENT AREA =  Other low back pain [M54.59]    OBJECTIVE    Modalities Rationale:     decrease pain to improve patient's ability to progress to PLOF and address remaining functional goals. min [] Estim Unattended, type/location:                                      []  w/ice    []  w/heat    min [] Estim Attended, type/location:                                     []  w/US     []  w/ice    []  w/heat    []  TENS insruct      min []  Mechanical Traction: type/lbs                   []  pro   []  sup   []  int   []  cont    []  before manual    []  after manual    min []  Ultrasound, settings/location:     10 min  unbill [x]  Ice     []  Heat    location/position: L/S, supine with wedge under B LE    min []  Paraffin,  details:     min []  Vasopneumatic Device, press/temp:     min []  Starleen Karen / Verlisa Brightly: If using vaso (only need to measure limb vaso being performed on)      pre-treatment girth :       post-treatment girth :       measured at (landmark location) :      min []  Other:    Skin assessment post-treatment:   Intact      Therapeutic Procedures:     Tx Min Billable or 1:1 Min (if diff from Tx Min) Procedure, Rationale, Specifics   8  96715 Therapeutic Exercise (timed):  increase ROM,

## 2023-11-10 ENCOUNTER — HOSPITAL ENCOUNTER (OUTPATIENT)
Facility: HOSPITAL | Age: 58
Setting detail: RECURRING SERIES
Discharge: HOME | End: 2023-11-13
Payer: MEDICAID

## 2023-11-10 PROCEDURE — 97110 THERAPEUTIC EXERCISES: CPT

## 2023-11-10 PROCEDURE — 97112 NEUROMUSCULAR REEDUCATION: CPT

## 2023-11-10 PROCEDURE — 97530 THERAPEUTIC ACTIVITIES: CPT

## 2023-11-10 NOTE — PROGRESS NOTES
PHYSICAL / OCCUPATIONAL THERAPY - DAILY TREATMENT NOTE (updated )    Patient Name: Earlyne Schirmer    Date: 11/10/2023    : 1965  Insurance: Payor: Riddhi Paige / Plan: Riddhi Paige / Product Type: *No Product type* /      Patient  verified Yes     Visit #   Current / Total 3 10   Time   In / Out 11:45 12:30   Pain   In / Out 4 5   Subjective Functional Status/Changes: \"I've been riding my bike more to get around. \"     TREATMENT AREA =  Other low back pain [M54.59]    OBJECTIVE    Modalities Rationale:     decrease pain to improve patient's ability to progress to PLOF and address remaining functional goals. min [] Estim Unattended, type/location:                                      []  w/ice    []  w/heat    min [] Estim Attended, type/location:                                     []  w/US     []  w/ice    []  w/heat    []  TENS insruct      min []  Mechanical Traction: type/lbs                   []  pro   []  sup   []  int   []  cont    []  before manual    []  after manual    min []  Ultrasound, settings/location:     10 min  unbill [x]  Ice     []  Heat    location/position: L/S, supine with wedge under B LE    min []  Paraffin,  details:     min []  Vasopneumatic Device, press/temp:     min []  Roxane Hey / Annconniery Hamilton: If using vaso (only need to measure limb vaso being performed on)      pre-treatment girth :       post-treatment girth :       measured at (landmark location) :      min []  Other:    Skin assessment post-treatment:   Intact      Therapeutic Procedures: Tx Min  Procedure, Rationale, Specifics   8  76699 Therapeutic Exercise (timed):  increase ROM, strength, coordination, balance, and proprioception to improve patient's ability to progress to PLOF and address remaining functional goals.  (see flow sheet as applicable)     Details if applicable:  LE strengthening     9  15796 Neuromuscular Re-Education (timed):  improve balance, coordination, kinesthetic sense, posture,

## 2023-11-13 ENCOUNTER — HOSPITAL ENCOUNTER (OUTPATIENT)
Facility: HOSPITAL | Age: 58
Setting detail: RECURRING SERIES
Discharge: HOME | End: 2023-11-16
Payer: MEDICAID

## 2023-11-13 PROCEDURE — 97112 NEUROMUSCULAR REEDUCATION: CPT

## 2023-11-13 PROCEDURE — 97530 THERAPEUTIC ACTIVITIES: CPT

## 2023-11-13 PROCEDURE — 97110 THERAPEUTIC EXERCISES: CPT

## 2023-11-13 NOTE — PROGRESS NOTES
PHYSICAL / OCCUPATIONAL THERAPY - DAILY TREATMENT NOTE (updated )    Patient Name: Rodolfo Ventura    Date: 2023    : 1965  Insurance: Payor: Blessing Watson / Plan: Blessing Watson / Product Type: *No Product type* /      Patient  verified Yes     Visit #   Current / Total 4 10   Time   In / Out 3:40 pm 4:28 pm   Pain   In / Out 4 6   Subjective Functional Status/Changes: Patient notes reduced pain today. TREATMENT AREA =  Other low back pain [M54.59]    OBJECTIVE    Modalities Rationale:     decrease pain to improve patient's ability to progress to PLOF and address remaining functional goals. min [] Estim Unattended, type/location:                                      []  w/ice    []  w/heat    min [] Estim Attended, type/location:                                     []  w/US     []  w/ice    []  w/heat    []  TENS insruct      min []  Mechanical Traction: type/lbs                   []  pro   []  sup   []  int   []  cont    []  before manual    []  after manual    min []  Ultrasound, settings/location:     10 min  unbill [x]  Ice     []  Heat    location/position: L/S, supine with wedge under B LE    min []  Paraffin,  details:     min []  Vasopneumatic Device, press/temp:     min []  Reva Soria / Leyla [place]: If using vaso (only need to measure limb vaso being performed on)      pre-treatment girth :       post-treatment girth :       measured at (landmark location) :      min []  Other:    Skin assessment post-treatment:   Intact      Therapeutic Procedures: Tx Min  Procedure, Rationale, Specifics   15  22233 Therapeutic Exercise (timed):  increase ROM, strength, coordination, balance, and proprioception to improve patient's ability to progress to PLOF and address remaining functional goals.  (see flow sheet as applicable)     Details if applicable:  LE strengthening     13  38594 Neuromuscular Re-Education (timed):  improve balance, coordination, kinesthetic sense, posture, core

## 2023-11-17 ENCOUNTER — APPOINTMENT (OUTPATIENT)
Facility: HOSPITAL | Age: 58
End: 2023-11-17
Payer: MEDICAID

## 2023-11-20 ENCOUNTER — HOSPITAL ENCOUNTER (OUTPATIENT)
Facility: HOSPITAL | Age: 58
Setting detail: RECURRING SERIES
Discharge: HOME | End: 2023-11-23
Payer: MEDICAID

## 2023-11-20 PROCEDURE — 97110 THERAPEUTIC EXERCISES: CPT

## 2023-11-20 PROCEDURE — 97530 THERAPEUTIC ACTIVITIES: CPT

## 2023-11-20 PROCEDURE — 97112 NEUROMUSCULAR REEDUCATION: CPT

## 2023-11-20 NOTE — PROGRESS NOTES
PHYSICAL / OCCUPATIONAL THERAPY - DAILY TREATMENT NOTE (updated )    Patient Name: Rebeca Desir    Date: 2023    : 1965  Insurance: Payor: Vanessa Marie / Plan: Vanessa Marie / Product Type: *No Product type* /      Patient  verified Yes     Visit #   Current / Total 5 10   Time   In / Out 3:40 4:24   Pain   In / Out 4 6   Subjective Functional Status/Changes: \"I still can't walk very far without pain. \"     TREATMENT AREA =  Other low back pain [M54.59]    OBJECTIVE    Modalities Rationale:     decrease pain to improve patient's ability to progress to PLOF and address remaining functional goals. min [] Estim Unattended, type/location:                                      []  w/ice    []  w/heat    min [] Estim Attended, type/location:                                     []  w/US     []  w/ice    []  w/heat    []  TENS insruct      min []  Mechanical Traction: type/lbs                   []  pro   []  sup   []  int   []  cont    []  before manual    []  after manual    min []  Ultrasound, settings/location:     10 min  unbill [x]  Ice     []  Heat    location/position: L/S, supine with wedge under B LE    min []  Paraffin,  details:     min []  Vasopneumatic Device, press/temp:     min []  Eldonna Flight / Reola Proctor: If using vaso (only need to measure limb vaso being performed on)      pre-treatment girth :       post-treatment girth :       measured at (landmark location) :      min []  Other:    Skin assessment post-treatment:   Intact      Therapeutic Procedures: Tx Min  Procedure, Rationale, Specifics   16  29329 Therapeutic Exercise (timed):  increase ROM, strength, coordination, balance, and proprioception to improve patient's ability to progress to PLOF and address remaining functional goals.  (see flow sheet as applicable)     Details if applicable:  LE strengthening     10  92330 Neuromuscular Re-Education (timed):  improve balance, coordination, kinesthetic sense, posture, core

## 2023-11-22 ENCOUNTER — HOSPITAL ENCOUNTER (OUTPATIENT)
Facility: HOSPITAL | Age: 58
Setting detail: RECURRING SERIES
Discharge: HOME | End: 2023-11-25
Payer: MEDICAID

## 2023-11-22 PROCEDURE — 97110 THERAPEUTIC EXERCISES: CPT

## 2023-11-22 PROCEDURE — 97530 THERAPEUTIC ACTIVITIES: CPT

## 2023-11-22 PROCEDURE — 97112 NEUROMUSCULAR REEDUCATION: CPT

## 2023-11-22 NOTE — PROGRESS NOTES
PHYSICAL / OCCUPATIONAL THERAPY - DAILY TREATMENT NOTE (updated )    Patient Name: Samira Part    Date: 2023    : 1965  Insurance: Payor: Bette Park / Plan: Bette Park / Product Type: *No Product type* /      Patient  verified Yes     Visit #   Current / Total 6 10   Time   In / Out 5:00 pm 5:51 pm   Pain   In / Out 6/10 5/10   Subjective Functional Status/Changes: \"I had to walk here today. \"     TREATMENT AREA =  Other low back pain [M54.59]    OBJECTIVE    Modalities Rationale:     decrease pain to improve patient's ability to progress to PLOF and address remaining functional goals. min [] Estim Unattended, type/location:                                      []  w/ice    []  w/heat    min [] Estim Attended, type/location:                                     []  w/US     []  w/ice    []  w/heat    []  TENS insruct      min []  Mechanical Traction: type/lbs                   []  pro   []  sup   []  int   []  cont    []  before manual    []  after manual    min []  Ultrasound, settings/location:     10 min  unbill [x]  Ice     []  Heat    location/position: L/S and left hip, right S/L with two pillows between legs    min []  Paraffin,  details:     min []  Vasopneumatic Device, press/temp:     min []  Charlann Dues / Exie Brunner: If using vaso (only need to measure limb vaso being performed on)      pre-treatment girth :       post-treatment girth :       measured at (landmark location) :      min []  Other:    Skin assessment post-treatment:   Intact      Therapeutic Procedures: Tx Min  Procedure, Rationale, Specifics   15  70108 Therapeutic Exercise (timed):  increase ROM, strength, coordination, balance, and proprioception to improve patient's ability to progress to PLOF and address remaining functional goals.  (see flow sheet as applicable)     Details if applicable:  LE strengthening     16  35731 Neuromuscular Re-Education (timed):  improve balance, coordination, kinesthetic

## 2023-11-27 ENCOUNTER — HOSPITAL ENCOUNTER (OUTPATIENT)
Facility: HOSPITAL | Age: 58
Setting detail: RECURRING SERIES
Discharge: HOME | End: 2023-11-30
Payer: MEDICAID

## 2023-11-27 PROCEDURE — 97112 NEUROMUSCULAR REEDUCATION: CPT

## 2023-11-27 PROCEDURE — 97530 THERAPEUTIC ACTIVITIES: CPT

## 2023-11-27 PROCEDURE — 97110 THERAPEUTIC EXERCISES: CPT

## 2023-11-27 NOTE — PROGRESS NOTES
PHYSICAL / OCCUPATIONAL THERAPY - DAILY TREATMENT NOTE (updated )    Patient Name: Geraldine Gunter    Date: 2023    : 1965  Insurance: Payor: Talia John / Plan: Talia John / Product Type: *No Product type* /      Patient  verified Yes     Visit #   Current / Total 7 10   Time   In / Out 1:00 1:42   Pain   In / Out 5 5   Subjective Functional Status/Changes: Reported Improvement: 0%  Pain:    Average 5/10              Best 3/10              Worst 8/10  Reported Functional Deficits:  walking > 10 minutes, supported sitting > 45 minutes, squatting, lifting, bending  Reported Functional Improvements: supine     TREATMENT AREA =  Other low back pain [M54.59]    OBJECTIVE    Modalities Rationale:     decrease pain to improve patient's ability to progress to PLOF and address remaining functional goals. min [] Estim Unattended, type/location:                                      []  w/ice    []  w/heat    min [] Estim Attended, type/location:                                     []  w/US     []  w/ice    []  w/heat    []  TENS insruct      min []  Mechanical Traction: type/lbs                   []  pro   []  sup   []  int   []  cont    []  before manual    []  after manual    min []  Ultrasound, settings/location:     10 min  unbill [x]  Ice     []  Heat    location/position: L/S and left hip, right S/L with two pillows between legs    min []  Paraffin,  details:     min []  Vasopneumatic Device, press/temp:     min []  Elizabeth Blue / [place]: If using vaso (only need to measure limb vaso being performed on)      pre-treatment girth :       post-treatment girth :       measured at (landmark location) :      min []  Other:    Skin assessment post-treatment:   Intact      Therapeutic Procedures:     Tx Min  Procedure, Rationale, Specifics   16  98611 Therapeutic Exercise (timed):  increase ROM, strength, coordination, balance, and proprioception to improve patient's ability to progress to PLOF

## 2023-11-27 NOTE — PROGRESS NOTES
[address] [facility] [facility]   [address] [facility] [address], [address]  Phone: [phone] Fax: [phone] SUMMARY  Patient Name: Samuel Subramanian : 1965   Treatment/Medical Diagnosis: Other low back pain [M54.59]   Referral Source: Cary Hurley Payor: Payor: Sandra Castillo / Plan: Sandra Castillo / Product Type: *No Product type* /    Date of Initial Visit: 10/27/23 Attended Visits: 7 Missed Visits: 0     SUMMARY OF TREATMENT  Samuel Subramanian is a 63 y.o.  yo male with Dx of Other low back pain [M54.59]. Treatment has consisted of  therapeutic exercise, therapeutic activity, neuromuscular re-education, gait training, self care education and physical agent/modality to improve left lumbar radiculopathy. CURRENT STATUS  Patient has attended 7 of 7 scheduled sessions from 10/27/23-23 with limited progress toward goals. Patient has demonstrated good compliance with HEP and neutral lumbar stability based therex program, however without reported symptom relief. Patient demonstrated some minor objective improvements, however without functional carry through. Persistent limitation in day activities and community ambulation. Recommend follow up with MD to discuss further interventions and hold PT at this time due to limited progress toward goals and persistent limiting pain.     Subjective:  Reported Improvement: 0%  Pain: Average 5/10   Best 3/10   Worst 8/10  Reported Functional Deficits:  walking > 10 minutes, supported sitting > 45 minutes, squatting, lifting, bending  Reported Functional Improvements: supine    Objective:  Lumbar AROM:  Flexion Limited 50%   Extension Limited 75%   Right Lateral Flexion Limited 75%   Left Lateral Flexion Limited 50%   Right Rotation Limited 50%   Left Rotation Limited 50%   *Left lumbar pain present with all AROM*     Strength:  Manual Muscle Testing: Right Left   Hip Flexion 4+/5 4/5   Knee

## 2023-11-27 NOTE — PROGRESS NOTES
Physical Therapy Discharge Instructions      [facility] - [address] [place] [address]  [phone] fax      Patient: Desiree Wang  : 1965    Continue Home Exercise Program 1 times per day for 4 weeks, then decrease to 5 times per week      Continue with    [x] Ice  as needed 2 times per day     [] Heat           Follow up with MD:     [x] Upon completion of therapy     [] As needed    Recommendations:     []   Return to activity with home program    []   Return to activity with the following modifications:       []Post Rehab Program    []Join Independent aquatic program     []Return to/join local gym    Additional Comments: recommend follow up with MD due to persistent pain    Fiona Bhakta, PT 2023 1:42 PM

## 2023-11-29 ENCOUNTER — APPOINTMENT (OUTPATIENT)
Facility: HOSPITAL | Age: 58
End: 2023-11-29
Payer: MEDICAID

## 2023-12-12 ENCOUNTER — OFFICE VISIT (OUTPATIENT)
Age: 58
End: 2023-12-12
Payer: MEDICAID

## 2023-12-12 VITALS — HEIGHT: 74 IN | WEIGHT: 242 LBS | BODY MASS INDEX: 31.06 KG/M2

## 2023-12-12 DIAGNOSIS — M54.16 LEFT LUMBAR RADICULITIS: Primary | ICD-10-CM

## 2023-12-12 DIAGNOSIS — G89.29 CHRONIC BILATERAL LOW BACK PAIN WITH LEFT-SIDED SCIATICA: ICD-10-CM

## 2023-12-12 DIAGNOSIS — M54.42 CHRONIC BILATERAL LOW BACK PAIN WITH LEFT-SIDED SCIATICA: ICD-10-CM

## 2023-12-12 PROCEDURE — 99213 OFFICE O/P EST LOW 20 MIN: CPT | Performed by: PHYSICAL MEDICINE & REHABILITATION

## 2023-12-12 RX ORDER — LOSARTAN POTASSIUM 50 MG/1
TABLET ORAL
COMMUNITY

## 2023-12-12 RX ORDER — CLOPIDOGREL BISULFATE 75 MG/1
TABLET ORAL
COMMUNITY

## 2023-12-12 RX ORDER — PANTOPRAZOLE SODIUM 40 MG/1
TABLET, DELAYED RELEASE ORAL
COMMUNITY

## 2023-12-12 RX ORDER — CHLORDIAZEPOXIDE HYDROCHLORIDE 10 MG/1
CAPSULE, GELATIN COATED ORAL
COMMUNITY

## 2023-12-12 RX ORDER — MULTIVIT-MIN/IRON/FOLIC/HRB186 3.3 MG-25
TABLET ORAL
COMMUNITY

## 2023-12-12 RX ORDER — HYDROMORPHONE HYDROCHLORIDE 2 MG/1
TABLET ORAL
COMMUNITY

## 2023-12-12 RX ORDER — AMOXICILLIN 250 MG
CAPSULE ORAL
COMMUNITY

## 2023-12-12 ASSESSMENT — PATIENT HEALTH QUESTIONNAIRE - PHQ9
SUM OF ALL RESPONSES TO PHQ9 QUESTIONS 1 & 2: 2
SUM OF ALL RESPONSES TO PHQ QUESTIONS 1-9: 2
SUM OF ALL RESPONSES TO PHQ QUESTIONS 1-9: 2
1. LITTLE INTEREST OR PLEASURE IN DOING THINGS: 1
SUM OF ALL RESPONSES TO PHQ QUESTIONS 1-9: 2
2. FEELING DOWN, DEPRESSED OR HOPELESS: 1
SUM OF ALL RESPONSES TO PHQ QUESTIONS 1-9: 2

## 2023-12-12 NOTE — PROGRESS NOTES
[place] [id number] [facility], [address] [place] [address]   Phone: [phone]  Fax: [phone]      Patient: Karol Pham                                                                              MRN: [id number]        YOB: 1965          AGE: 63 y.o. PCP: BINDU Fernandez  Date:  12/12/23    Reason for Consultation: Lower Back Pain      HPI:  Karol Pham is a 63 y.o. male with relevant PMH of TBI in 2008 , seizures due to ETOH withdrawal,  CAD with stents, H/o PE, prior history of opioid use- now on suboxone , who presents with chronic intermittent low back pain that worsened 7-8 weeks ago-  when he stepped on a loose tile and fell backwards on his gluteal area. Since the injury he has had increased left low back pain and neck pain. He has completed a course of PT his neck pain is better but he continues to have low back pain radiating down the left leg. Neurologic symptoms: + numbness, tingling- left foot , weakness, bowel or bladder changes. Recent fall 2 months ago. Ambulates with cane     Location: The pain is located in the low back pain L>R,   Radiation: The pain does radiate down the left lateral thigh  Pain Score: Currently: 7/10   Quality: Pain is of a sharpquality. Aggravating: Pain is exacerbated by walking  Alleviating: The pain is alleviated by sitting in a firm chair    Prior Treatments:  Physical therapy:10/18/2023- 11/27/2023   Injections:Yes  Left L4 SNRB 1/16/2014  Surgery:No  Previous Medications:   Current Medications: suboxone , flexeril 10mg , tizanadine 4mg, cymbalta 60mg , gabapentin 800mg tid   Previous work-up has included:   8/26/2023- left hip xray - mild joint space narrowing    CT cervical spine 7/27/2022  Degenerative changes: Mild facet arthropathy at bilateral C3-C4. Mild disc height loss at C3-C4 and C5-C6         MRI lumbar spine 9/17/2016  L1/L2: Patent canal and foramina.

## 2023-12-12 NOTE — PROGRESS NOTES
Brett Bravo presents today for   Chief Complaint   Patient presents with    Lower Back Pain       Is someone accompanying this pt? no    Is the patient using any DME equipment during OV? yes    Depression Screening:       No data to display                Learning Assessment:      Abuse Screening:       No data to display                Fall Risk      OPIOID RISK TOOL      Coordination of Care:  1. Have you been to the ER, urgent care clinic since your last visit? no  Hospitalized since your last visit? no    2. Have you seen or consulted any other health care providers outside of the [address] [place] [address] since your last visit? no Include any pap smears or colon screening.  no

## 2023-12-12 NOTE — PATIENT INSTRUCTIONS
[facility] Radiology    Please expect an automated call within 24-48 business hours to schedule your outpatient study with [address] [place] [address]    If you have not received an automated call, please call [phone] to speak directly with a     [address] [place] [address] at [facility]

## 2024-01-04 ENCOUNTER — HOSPITAL ENCOUNTER (OUTPATIENT)
Facility: HOSPITAL | Age: 59
Discharge: HOME | End: 2024-01-04
Attending: PHYSICAL MEDICINE & REHABILITATION
Payer: MEDICAID

## 2024-01-04 DIAGNOSIS — M54.16 LEFT LUMBAR RADICULITIS: ICD-10-CM

## 2024-01-04 DIAGNOSIS — G89.29 CHRONIC BILATERAL LOW BACK PAIN WITH LEFT-SIDED SCIATICA: ICD-10-CM

## 2024-01-04 DIAGNOSIS — M54.42 CHRONIC BILATERAL LOW BACK PAIN WITH LEFT-SIDED SCIATICA: ICD-10-CM

## 2024-01-04 PROCEDURE — 72148 MRI LUMBAR SPINE W/O DYE: CPT

## 2024-02-01 ENCOUNTER — OFFICE VISIT (OUTPATIENT)
Age: 59
End: 2024-02-01
Payer: MEDICAID

## 2024-02-01 VITALS — HEIGHT: 74 IN | BODY MASS INDEX: 33.29 KG/M2 | WEIGHT: 259.4 LBS

## 2024-02-01 DIAGNOSIS — G89.29 CHRONIC BILATERAL LOW BACK PAIN WITH LEFT-SIDED SCIATICA: ICD-10-CM

## 2024-02-01 DIAGNOSIS — M54.42 CHRONIC BILATERAL LOW BACK PAIN WITH LEFT-SIDED SCIATICA: ICD-10-CM

## 2024-02-01 DIAGNOSIS — M54.16 LEFT LUMBAR RADICULITIS: Primary | ICD-10-CM

## 2024-02-01 PROCEDURE — 99214 OFFICE O/P EST MOD 30 MIN: CPT | Performed by: PHYSICAL MEDICINE & REHABILITATION

## 2024-02-01 RX ORDER — LANCETS 30 GAUGE
EACH MISCELLANEOUS
COMMUNITY
Start: 2023-12-22

## 2024-02-01 RX ORDER — BLOOD-GLUCOSE METER
EACH MISCELLANEOUS
COMMUNITY
Start: 2023-12-21

## 2024-02-01 RX ORDER — IBUPROFEN 800 MG/1
TABLET ORAL
COMMUNITY

## 2024-02-01 RX ORDER — BLOOD SUGAR DIAGNOSTIC
STRIP MISCELLANEOUS
COMMUNITY
Start: 2024-01-13

## 2024-02-01 ASSESSMENT — PATIENT HEALTH QUESTIONNAIRE - PHQ9
SUM OF ALL RESPONSES TO PHQ QUESTIONS 1-9: 0
2. FEELING DOWN, DEPRESSED OR HOPELESS: 0
1. LITTLE INTEREST OR PLEASURE IN DOING THINGS: 0
SUM OF ALL RESPONSES TO PHQ QUESTIONS 1-9: 0
SUM OF ALL RESPONSES TO PHQ QUESTIONS 1-9: 0
SUM OF ALL RESPONSES TO PHQ9 QUESTIONS 1 & 2: 0
SUM OF ALL RESPONSES TO PHQ QUESTIONS 1-9: 0

## 2024-02-01 NOTE — PROGRESS NOTES
Wisamraisa Mancuso presents today for   Chief Complaint   Patient presents with    Back Pain       Is someone accompanying this pt? no    Is the patient using any DME equipment during OV? no    Depression Screening:       No data to display                Learning Assessment:      Abuse Screening:       No data to display                Fall Risk      OPIOID RISK TOOL      Coordination of Care:  1. Have you been to the ER, urgent care clinic since your last visit? Yes 01/15/2024 sugar got high  Hospitalized since your last visit? no    2. Have you seen or consulted any other health care providers outside of the [facility] since your last visit? no Include any pap smears or colon screening. no

## 2024-02-01 NOTE — PROGRESS NOTES
[facility]  [address] [place] [address], [address]  [place] [address]  Phone: [phone]  Fax: [phone]      Patient: Wisam Mancuso                                                                              MRN: [id number]        YOB: 1965          AGE: 58 y.o.             PCP: Lara Hogan ACNP  Date:  02/01/24    Reason for Consultation: Back Pain      HPI:  Wisam Mancuso is a 58 y.o. male with relevant PMH of TBI in 2008 , seizures due to ETOH withdrawal,  CAD with stents, H/o PE, prior history of opioid use- now on suboxone , who presents with chronic intermittent low back pain that worsened 7-8 weeks ago-  when he stepped on a loose tile and fell backwards on his gluteal area.  Since the injury he has had increased left low back pain and neck pain.  He has completed a course of PT his neck pain is better but he continues to have low back pain radiating down the left leg.  MRI lumbar 1/4/2024-  L3/4 mild to moderate left foraminal narrowing, L4/5 moderate bilateral foraminal narrowing       Neurologic symptoms: + numbness, tingling- left foot , weakness, bowel or bladder changes. Recent fall 2 months ago.  Ambulates with cane     Location: The pain is located in the low back pain L>R,   Radiation: The pain does radiate down the left lateral thigh  Pain Score: Currently: 7/10   Quality: Pain is of a sharpquality.    Aggravating: Pain is exacerbated by walking  Alleviating: The pain is alleviated by sitting in a firm chair    Prior Treatments:  Physical therapy:10/18/2023- 11/27/2023   Injections:Yes  Left L4 SNRB 1/16/2014  Surgery:No  Previous Medications:   Current Medications: suboxone , flexeril 10mg , tizanadine 4mg, cymbalta 60mg , gabapentin 800mg tid   Previous work-up has included:   8/26/2023- left hip xray - mild joint space narrowing    CT cervical spine 7/27/2022  Degenerative changes: Mild facet arthropathy at bilateral C3-C4. Mild

## 2024-02-05 ENCOUNTER — TELEPHONE (OUTPATIENT)
Age: 59
End: 2024-02-05

## 2024-02-05 NOTE — TELEPHONE ENCOUNTER
Called pt to schedule injection/ Pt stated that he did not want to schedule until he knew if he could come off his Blood thinners first. Pt also stated that he did not know what Dr manages them.  Pt advised that  I would call him back after I find out who that is

## 2024-02-09 ENCOUNTER — CLINICAL DOCUMENTATION (OUTPATIENT)
Age: 59
End: 2024-02-09

## 2024-02-09 NOTE — PROGRESS NOTES
PATIENT HAS 2 BLOOD THINNERS (PLAVIX AND XARELTO) PLUS ASPIRIN ON HIS MED LIST. THIS CAUSED A LOT OF CONFUSION WHEN GETTING CARDIOLOGY CLEARANCE  ( DR MADHU DUNHAM'S OFFICE)FOR HIM TO STOP BEFORE THE PROCEDURE.   1) PT STATED THAT HE TAKES ASPIRIN AND CARDIOLOGY  (MARY LOU)STATED THAT HE SHOULD NOT BE TAKING ASPIRIN SINCE HE  WAS SWITCHED TO XARELTO IN APRIL. CARDIOLOGY SAID THAT THEY HAVE NO HISTORY OF HIM TAKING PLAVIX AND THAT IS ON HIS MED LIST. SEVERAL CALLS HAD TO BE MADE BACK AND FORTH TO HIS CARDIOLOGY OFFICE TRYING TO FIND OUT WHAT HE SHOULD BE TAKING. THEY DID FINALLY OK HIM TO STOP ASPIRIN 5 DAYS AND THE XARELTO 48 HOURS PRIOR TO PROCEDURE.

## 2024-02-14 RX ORDER — ACETAMINOPHEN 500 MG
500 TABLET ORAL EVERY 8 HOURS PRN
COMMUNITY

## 2024-02-14 RX ORDER — RISPERIDONE 1 MG/1
1 TABLET ORAL EVERY EVENING
COMMUNITY

## 2024-02-14 RX ORDER — CARVEDILOL 6.25 MG/1
6.25 TABLET ORAL 2 TIMES DAILY WITH MEALS
COMMUNITY

## 2024-02-15 ENCOUNTER — HOSPITAL ENCOUNTER (OUTPATIENT)
Facility: HOSPITAL | Age: 59
Discharge: HOME | End: 2024-02-15
Payer: MEDICAID

## 2024-02-15 ENCOUNTER — HOSPITAL ENCOUNTER (OUTPATIENT)
Facility: HOSPITAL | Age: 59
End: 2024-02-15
Payer: MEDICAID

## 2024-02-15 VITALS
HEART RATE: 60 BPM | SYSTOLIC BLOOD PRESSURE: 103 MMHG | TEMPERATURE: 97.3 F | OXYGEN SATURATION: 95 % | RESPIRATION RATE: 15 BRPM | DIASTOLIC BLOOD PRESSURE: 66 MMHG

## 2024-02-15 PROBLEM — M48.062 SPINAL STENOSIS, LUMBAR REGION, WITH NEUROGENIC CLAUDICATION: Status: ACTIVE | Noted: 2024-02-15

## 2024-02-15 LAB — GLUCOSE BLD STRIP.AUTO-MCNC: 198 MG/DL (ref 70–110)

## 2024-02-15 PROCEDURE — 64484 NJX AA&/STRD TFRM EPI L/S EA: CPT | Performed by: PHYSICAL MEDICINE & REHABILITATION

## 2024-02-15 PROCEDURE — 2500000003 HC RX 250 WO HCPCS: Performed by: PHYSICAL MEDICINE & REHABILITATION

## 2024-02-15 PROCEDURE — 82962 GLUCOSE BLOOD TEST: CPT

## 2024-02-15 PROCEDURE — 6370000000 HC RX 637 (ALT 250 FOR IP): Performed by: PHYSICAL MEDICINE & REHABILITATION

## 2024-02-15 PROCEDURE — 6360000002 HC RX W HCPCS: Performed by: PHYSICAL MEDICINE & REHABILITATION

## 2024-02-15 PROCEDURE — 64483 NJX AA&/STRD TFRM EPI L/S 1: CPT

## 2024-02-15 PROCEDURE — 64483 NJX AA&/STRD TFRM EPI L/S 1: CPT | Performed by: PHYSICAL MEDICINE & REHABILITATION

## 2024-02-15 PROCEDURE — 64484 NJX AA&/STRD TFRM EPI L/S EA: CPT

## 2024-02-15 RX ORDER — DIAZEPAM 5 MG/1
2.5 TABLET ORAL ONCE
Status: COMPLETED | OUTPATIENT
Start: 2024-02-15 | End: 2024-02-15

## 2024-02-15 RX ORDER — LIDOCAINE HYDROCHLORIDE 10 MG/ML
30 INJECTION, SOLUTION EPIDURAL; INFILTRATION; INTRACAUDAL; PERINEURAL ONCE
Status: COMPLETED | OUTPATIENT
Start: 2024-02-15 | End: 2024-02-15

## 2024-02-15 RX ORDER — DIAZEPAM 5 MG/1
5 TABLET ORAL ONCE
Status: COMPLETED | OUTPATIENT
Start: 2024-02-15 | End: 2024-02-15

## 2024-02-15 RX ORDER — QUETIAPINE FUMARATE 300 MG/1
TABLET, FILM COATED ORAL
COMMUNITY
Start: 2024-02-14

## 2024-02-15 RX ORDER — IOPAMIDOL 408 MG/ML
4 INJECTION, SOLUTION INTRATHECAL
Status: DISCONTINUED | OUTPATIENT
Start: 2024-02-15 | End: 2024-02-19 | Stop reason: HOSPADM

## 2024-02-15 RX ORDER — DEXAMETHASONE SODIUM PHOSPHATE 10 MG/ML
10 INJECTION, SOLUTION INTRAMUSCULAR; INTRAVENOUS ONCE
Status: COMPLETED | OUTPATIENT
Start: 2024-02-15 | End: 2024-02-15

## 2024-02-15 RX ORDER — DIAZEPAM 5 MG/1
10 TABLET ORAL ONCE
Status: COMPLETED | OUTPATIENT
Start: 2024-02-15 | End: 2024-02-15

## 2024-02-15 RX ADMIN — DEXAMETHASONE SODIUM PHOSPHATE 10 MG: 10 INJECTION, SOLUTION INTRAMUSCULAR; INTRAVENOUS at 14:27

## 2024-02-15 RX ADMIN — DIAZEPAM 10 MG: 5 TABLET ORAL at 13:28

## 2024-02-15 RX ADMIN — LIDOCAINE HYDROCHLORIDE 12 ML: 10 INJECTION, SOLUTION EPIDURAL; INFILTRATION; INTRACAUDAL; PERINEURAL at 14:26

## 2024-02-15 ASSESSMENT — PAIN - FUNCTIONAL ASSESSMENT: PAIN_FUNCTIONAL_ASSESSMENT: 0-10

## 2024-02-15 NOTE — OP NOTE
PROCEDURE NOTE      Patient Name: Wisam Mancuso    Date of Procedure: February 15, 2024    Preoperative Diagnosis:  M48.062    Post Operative Diagnosis:  same    Procedure :    left L3 Selective Nerve Root Block  left L4 Selective Nerve Root Block      Consent:  Informed consent was obtained prior to the procedure.  The patient was given the opportunity to ask questions regarding the procedure and its associated risks.  In addition to the potential risks associated with the procedure itself, the patient was informed both verbally and in writing of the potential side effects of the use of glucocorticoid.  The patient appeared to comprehend the informed consent and desired to have the procedure performed.        Procedure:  The patient was placed in the prone position on the fluoroscopy table and the back was prepped and draped in the usual sterile manner.  The exact spinal level was  identified using fluoroscopy, and Lidocaine 1 % was injected locally, a # 22 gauge spinal needle was passed to the transverse process.  The depth was noted and the needle redirected to pass inferior and approximately one cm anterior to the transverse process.    No  1 cc of Isovue M-200 was used to verify positioning in the epidural and paravertebral space and outlined the course of the spinal nerve into the epidural space.  The same procedure was repeated at each spinal level indicated above.    A total of 10 mg of preservative free Dexamethasone and 2 cc of Lidocaine was slowly injected.   The patient tolerated the procedure well.  The injection area was cleaned and bandaids applied.  Not excessive bleeding was noted.   Patient dressed and discharged to home with instructions.    Discussion: The patient tolerated the procedure well.                                              VANE ACUÑA III, MD  February 15, 2024

## 2024-03-26 ENCOUNTER — OFFICE VISIT (OUTPATIENT)
Age: 59
End: 2024-03-26
Payer: MEDICAID

## 2024-03-26 VITALS — BODY MASS INDEX: 31.83 KG/M2 | WEIGHT: 248 LBS | HEIGHT: 74 IN

## 2024-03-26 DIAGNOSIS — M54.16 LEFT LUMBAR RADICULITIS: Primary | ICD-10-CM

## 2024-03-26 DIAGNOSIS — M54.42 CHRONIC BILATERAL LOW BACK PAIN WITH LEFT-SIDED SCIATICA: ICD-10-CM

## 2024-03-26 DIAGNOSIS — G89.29 CHRONIC BILATERAL LOW BACK PAIN WITH LEFT-SIDED SCIATICA: ICD-10-CM

## 2024-03-26 PROCEDURE — 99212 OFFICE O/P EST SF 10 MIN: CPT | Performed by: PHYSICAL MEDICINE & REHABILITATION

## 2024-03-26 NOTE — PROGRESS NOTES
Wisam Mancuso presents today for   Chief Complaint   Patient presents with    Back Pain     lumbar       Is someone accompanying this pt? no    Is the patient using any DME equipment during OV? no    Depression Screening:       No data to display                Learning Assessment:  Failed to redirect to the Timeline version of the [employer] SmartLink.    Abuse Screening:       No data to display                Fall Risk  Failed to redirect to the Timeline version of the [employer] SmartLink.    OPIOID RISK TOOL  Failed to redirect to the Timeline version of the [employer] SmartLink.    Coordination of Care:  1. Have you been to the ER, urgent care clinic since your last visit? no  Hospitalized since your last visit? no    2. Have you seen or consulted any other health care providers outside of the [facility] since your last visit? no Include any pap smears or colon screening. no

## 2024-03-26 NOTE — PROGRESS NOTES
[facility]  [address] [place] [address], [address]  [place] [address]  Phone: [phone]  Fax: [phone]      Patient: Wisam Mancuso                                                                              MRN: [id number]        YOB: 1965          AGE: 58 y.o.             PCP: Lara Hogan ACNP  Date:  03/26/24    Reason for Consultation: Back Pain (lumbar)      HPI:  Wisam Mancuso is a 58 y.o. male with relevant PMH of TBI in 2008 , seizures due to ETOH withdrawal,  CAD with stents, H/o PE, prior history of opioid use- now on suboxone , who presents with chronic intermittent low back pain that worsened 7-8 weeks ago-  when he stepped on a loose tile and fell backwards on his gluteal area.  Since the injury he has had increased left low back pain and neck pain.  He has completed a course of PT his neck pain is better but he continues to have low back pain radiating down the left leg.  MRI lumbar 1/4/2024-  L3/4 mild to moderate left foraminal narrowing, L4/5 moderate bilateral foraminal narrowing. On 2/15/2024- he had left l3 and l4 SNRB which provided greater relief and today he has minimal pain.        Neurologic symptoms: + numbness, tingling- left foot , weakness, bowel or bladder changes. Recent fall 2 months ago.  Ambulates with cane     Location: The pain is located in the low back pain L>R,   Radiation: The pain does radiate down the left lateral thigh  Pain Score: Currently: 1/10   Quality: Pain is of a sharpquality.    Aggravating: Pain is exacerbated by walking  Alleviating: The pain is alleviated by sitting in a firm chair    Prior Treatments:  Physical therapy:10/18/2023- 11/27/2023   Injections:Yes  Left L4 SNRB 1/16/2014  2/15/2024 left l4 and L5 SNRB- 90% relief - Dr Sheets   Surgery:No  Previous Medications:   Current Medications: suboxone , flexeril 10mg , tizanadine 4mg, cymbalta 60mg , gabapentin 800mg tid   Previous work-up

## 2025-04-02 NOTE — PROGRESS NOTES
Rm: 16    Chief Complaint   Patient presents with   [facility] Follow Up     hypertension, cellulitis     Depression Screening:  3 most recent [facility] [place] Screens 8/15/2018 3/30/2018 10/30/2017   PHQ Not Done Active Diagnosis of Depression or Bipolar Disorder Active Diagnosis of Depression or Bipolar Disorder Active Diagnosis of Depression or Bipolar Disorder       Learning Assessment:  Learning Assessment 1/28/2014 12/31/2013 8/20/2013   PRIMARY LEARNER Patient Patient Patient   HIGHEST LEVEL OF EDUCATION - PRIMARY LEARNER  - DID NOT GRADUATE HIGH SCHOOL DID NOT GRADUATE HIGH SCHOOL   BARRIERS PRIMARY LEARNER - NONE COGNITIVE   CO-LEARNER CAREGIVER - No No   PRIMARY LANGUAGE ENGLISH ENGLISH ENGLISH   LEARNER PREFERENCE PRIMARY LISTENING DEMONSTRATION DEMONSTRATION   ANSWERED BY patient self Patient   RELATIONSHIP SELF SELF SELF       Abuse Screening:  Abuse Screening Questionnaire 3/30/2018 8/31/2017 7/28/2014   Do you ever feel afraid of your partner? N N N   Are you in a relationship with someone who physically or mentally threatens you? N N N   Is it safe for you to go home? Emiliano Costa       Health Maintenance reviewed and discussed per provider: yes     Coordination of Care:    1. Have you been to the ER, urgent care clinic since your last visit? Hospitalized since your last visit? no    2. Have you seen or consulted any other health care providers outside of the [address] [place] [address] since your last visit? Include any pap smears or colon screening.  no scissors